# Patient Record
Sex: FEMALE | Race: WHITE | Employment: OTHER | URBAN - METROPOLITAN AREA
[De-identification: names, ages, dates, MRNs, and addresses within clinical notes are randomized per-mention and may not be internally consistent; named-entity substitution may affect disease eponyms.]

---

## 2017-01-25 RX ORDER — ALBUTEROL SULFATE 2.5 MG/3ML
SOLUTION RESPIRATORY (INHALATION)
Qty: 360 VIAL | Refills: 11 | Status: ON HOLD | OUTPATIENT
Start: 2017-01-25 | End: 2018-02-02 | Stop reason: HOSPADM

## 2017-02-28 ENCOUNTER — OFFICE VISIT (OUTPATIENT)
Dept: PULMONOLOGY | Age: 72
End: 2017-02-28

## 2017-02-28 VITALS
BODY MASS INDEX: 19.92 KG/M2 | HEART RATE: 105 BPM | SYSTOLIC BLOOD PRESSURE: 132 MMHG | DIASTOLIC BLOOD PRESSURE: 76 MMHG | WEIGHT: 131 LBS | OXYGEN SATURATION: 91 %

## 2017-02-28 DIAGNOSIS — R06.02 SHORTNESS OF BREATH: ICD-10-CM

## 2017-02-28 DIAGNOSIS — R05.9 COUGH: ICD-10-CM

## 2017-02-28 DIAGNOSIS — C34.90 MALIGNANT NEOPLASM OF LUNG, UNSPECIFIED LATERALITY, UNSPECIFIED PART OF LUNG (HCC): ICD-10-CM

## 2017-02-28 DIAGNOSIS — J96.11 CHRONIC RESPIRATORY FAILURE WITH HYPOXIA (HCC): ICD-10-CM

## 2017-02-28 DIAGNOSIS — J44.9 COPD, SEVERE (HCC): Primary | ICD-10-CM

## 2017-02-28 DIAGNOSIS — R91.8 PULMONARY NODULES: ICD-10-CM

## 2017-02-28 PROCEDURE — 99214 OFFICE O/P EST MOD 30 MIN: CPT | Performed by: INTERNAL MEDICINE

## 2017-02-28 RX ORDER — PREDNISONE 10 MG/1
TABLET ORAL
Qty: 30 TABLET | Refills: 0 | Status: ON HOLD | OUTPATIENT
Start: 2017-02-28 | End: 2017-03-12

## 2017-02-28 RX ORDER — AMOXICILLIN AND CLAVULANATE POTASSIUM 875; 125 MG/1; MG/1
1 TABLET, FILM COATED ORAL 2 TIMES DAILY
Qty: 20 TABLET | Refills: 0 | Status: ON HOLD | OUTPATIENT
Start: 2017-02-28 | End: 2017-03-12 | Stop reason: HOSPADM

## 2017-02-28 RX ORDER — DOXYCYCLINE HYCLATE 100 MG/1
100 CAPSULE ORAL DAILY
Qty: 10 CAPSULE | Refills: 3 | Status: ON HOLD | OUTPATIENT
Start: 2017-02-28 | End: 2017-03-12

## 2017-03-03 ENCOUNTER — TELEPHONE (OUTPATIENT)
Dept: PULMONOLOGY | Age: 72
End: 2017-03-03

## 2017-03-03 DIAGNOSIS — R06.02 SHORTNESS OF BREATH: ICD-10-CM

## 2017-03-03 DIAGNOSIS — R05.9 COUGH: ICD-10-CM

## 2017-03-03 DIAGNOSIS — J44.9 COPD, SEVERE (HCC): Primary | ICD-10-CM

## 2017-03-04 ENCOUNTER — HOSPITAL ENCOUNTER (OUTPATIENT)
Dept: OTHER | Age: 72
Discharge: HOME | End: 2017-03-04
Attending: INTERNAL MEDICINE | Admitting: INTERNAL MEDICINE

## 2017-03-04 DIAGNOSIS — R06.02 SHORTNESS OF BREATH: ICD-10-CM

## 2017-03-04 DIAGNOSIS — R05.9 COUGH: ICD-10-CM

## 2017-03-04 DIAGNOSIS — J44.9 COPD, SEVERE (HCC): ICD-10-CM

## 2017-03-07 ENCOUNTER — OFFICE VISIT (OUTPATIENT)
Dept: PULMONOLOGY | Age: 72
End: 2017-03-07

## 2017-03-07 ENCOUNTER — OFFICE VISIT (OUTPATIENT)
Dept: RHEUMATOLOGY | Age: 72
End: 2017-03-07

## 2017-03-07 VITALS
HEART RATE: 112 BPM | OXYGEN SATURATION: 86 % | DIASTOLIC BLOOD PRESSURE: 79 MMHG | SYSTOLIC BLOOD PRESSURE: 130 MMHG | BODY MASS INDEX: 19.61 KG/M2 | WEIGHT: 129 LBS

## 2017-03-07 VITALS
HEART RATE: 100 BPM | HEIGHT: 68 IN | DIASTOLIC BLOOD PRESSURE: 80 MMHG | WEIGHT: 129.5 LBS | BODY MASS INDEX: 19.63 KG/M2 | SYSTOLIC BLOOD PRESSURE: 128 MMHG

## 2017-03-07 DIAGNOSIS — J44.9 COPD, SEVERE (HCC): ICD-10-CM

## 2017-03-07 DIAGNOSIS — Z79.1 ENCOUNTER FOR LONG-TERM (CURRENT) USE OF NON-STEROIDAL ANTI-INFLAMMATORIES: ICD-10-CM

## 2017-03-07 DIAGNOSIS — M81.0 OSTEOPOROSIS: Primary | ICD-10-CM

## 2017-03-07 DIAGNOSIS — J96.11 CHRONIC RESPIRATORY FAILURE WITH HYPOXIA (HCC): ICD-10-CM

## 2017-03-07 DIAGNOSIS — C34.90 MALIGNANT NEOPLASM OF LUNG, UNSPECIFIED LATERALITY, UNSPECIFIED PART OF LUNG (HCC): ICD-10-CM

## 2017-03-07 DIAGNOSIS — R06.02 SHORTNESS OF BREATH: Primary | ICD-10-CM

## 2017-03-07 LAB
BANDED NEUTROPHILS RELATIVE PERCENT: 1 % (ref 0–7)
BASOPHILS ABSOLUTE: 0 K/UL (ref 0–0.2)
BASOPHILS RELATIVE PERCENT: 0 %
EOSINOPHILS ABSOLUTE: 0 K/UL (ref 0–0.6)
EOSINOPHILS RELATIVE PERCENT: 0 %
HCT VFR BLD CALC: 51.3 % (ref 36–48)
HEMOGLOBIN: 16.3 G/DL (ref 12–16)
LYMPHOCYTES ABSOLUTE: 3.7 K/UL (ref 1–5.1)
LYMPHOCYTES RELATIVE PERCENT: 22 %
MCH RBC QN AUTO: 29.8 PG (ref 26–34)
MCHC RBC AUTO-ENTMCNC: 31.9 G/DL (ref 31–36)
MCV RBC AUTO: 93.7 FL (ref 80–100)
METAMYELOCYTES RELATIVE PERCENT: 1 %
MONOCYTES ABSOLUTE: 1 K/UL (ref 0–1.3)
MONOCYTES RELATIVE PERCENT: 6 %
NEUTROPHILS ABSOLUTE: 12 K/UL (ref 1.7–7.7)
NEUTROPHILS RELATIVE PERCENT: 70 %
NUCLEATED RED BLOOD CELLS: 2 /100 WBC
PDW BLD-RTO: 15.6 % (ref 12.4–15.4)
PLATELET # BLD: 272 K/UL (ref 135–450)
PLATELET SLIDE REVIEW: ADEQUATE
PMV BLD AUTO: 9.3 FL (ref 5–10.5)
RBC # BLD: 5.48 M/UL (ref 4–5.2)
WBC # BLD: 16.7 K/UL (ref 4–11)

## 2017-03-07 PROCEDURE — 99213 OFFICE O/P EST LOW 20 MIN: CPT | Performed by: INTERNAL MEDICINE

## 2017-03-07 PROCEDURE — 99214 OFFICE O/P EST MOD 30 MIN: CPT | Performed by: INTERNAL MEDICINE

## 2017-03-07 RX ORDER — TRAMADOL HYDROCHLORIDE 50 MG/1
50 TABLET ORAL EVERY 8 HOURS PRN
Qty: 90 TABLET | Refills: 2 | Status: SHIPPED | OUTPATIENT
Start: 2017-03-07 | End: 2017-06-13 | Stop reason: SDUPTHER

## 2017-03-07 RX ORDER — PREDNISONE 10 MG/1
10 TABLET ORAL 2 TIMES DAILY
Qty: 60 TABLET | Refills: 1 | Status: ON HOLD | OUTPATIENT
Start: 2017-03-07 | End: 2017-03-12 | Stop reason: HOSPADM

## 2017-03-08 LAB
ALBUMIN SERPL-MCNC: 4.7 G/DL (ref 3.4–5)
ALP BLD-CCNC: 80 U/L (ref 40–129)
ALT SERPL-CCNC: 14 U/L (ref 10–40)
AST SERPL-CCNC: 14 U/L (ref 15–37)
BILIRUB SERPL-MCNC: 0.4 MG/DL (ref 0–1)
BILIRUBIN DIRECT: <0.2 MG/DL (ref 0–0.3)
BILIRUBIN, INDIRECT: ABNORMAL MG/DL (ref 0–1)
CREAT SERPL-MCNC: 0.7 MG/DL (ref 0.6–1.2)
GFR AFRICAN AMERICAN: >60
GFR NON-AFRICAN AMERICAN: >60
TOTAL PROTEIN: 7.2 G/DL (ref 6.4–8.2)

## 2017-03-09 ENCOUNTER — OFFICE VISIT (OUTPATIENT)
Dept: PULMONOLOGY | Age: 72
End: 2017-03-09

## 2017-03-09 VITALS
DIASTOLIC BLOOD PRESSURE: 74 MMHG | RESPIRATION RATE: 20 BRPM | BODY MASS INDEX: 19.95 KG/M2 | OXYGEN SATURATION: 92 % | WEIGHT: 131.2 LBS | HEART RATE: 106 BPM | SYSTOLIC BLOOD PRESSURE: 138 MMHG

## 2017-03-09 DIAGNOSIS — J96.01 ACUTE HYPOXEMIC RESPIRATORY FAILURE (HCC): Primary | ICD-10-CM

## 2017-03-09 DIAGNOSIS — C34.90 MALIGNANT NEOPLASM OF LUNG, UNSPECIFIED LATERALITY, UNSPECIFIED PART OF LUNG (HCC): ICD-10-CM

## 2017-03-09 DIAGNOSIS — J20.9 ACUTE BRONCHITIS, UNSPECIFIED ORGANISM: ICD-10-CM

## 2017-03-09 DIAGNOSIS — J44.1 COPD EXACERBATION (HCC): ICD-10-CM

## 2017-03-09 PROCEDURE — 99214 OFFICE O/P EST MOD 30 MIN: CPT | Performed by: NURSE PRACTITIONER

## 2017-03-27 ENCOUNTER — OFFICE VISIT (OUTPATIENT)
Dept: PULMONOLOGY | Age: 72
End: 2017-03-27

## 2017-03-27 VITALS
DIASTOLIC BLOOD PRESSURE: 65 MMHG | BODY MASS INDEX: 19.77 KG/M2 | OXYGEN SATURATION: 95 % | WEIGHT: 130 LBS | SYSTOLIC BLOOD PRESSURE: 132 MMHG | HEART RATE: 102 BPM | RESPIRATION RATE: 18 BRPM

## 2017-03-27 DIAGNOSIS — J44.1 COPD EXACERBATION (HCC): Primary | ICD-10-CM

## 2017-03-27 DIAGNOSIS — R91.8 PULMONARY NODULES: ICD-10-CM

## 2017-03-27 DIAGNOSIS — J96.11 CHRONIC RESPIRATORY FAILURE WITH HYPOXIA (HCC): ICD-10-CM

## 2017-03-27 DIAGNOSIS — C34.12 MALIGNANT NEOPLASM OF UPPER LOBE OF LEFT LUNG (HCC): ICD-10-CM

## 2017-03-27 PROCEDURE — 99214 OFFICE O/P EST MOD 30 MIN: CPT | Performed by: NURSE PRACTITIONER

## 2017-03-28 ENCOUNTER — OFFICE VISIT (OUTPATIENT)
Dept: INTERNAL MEDICINE CLINIC | Age: 72
End: 2017-03-28

## 2017-03-28 ENCOUNTER — TELEPHONE (OUTPATIENT)
Dept: INTERNAL MEDICINE CLINIC | Age: 72
End: 2017-03-28

## 2017-03-28 VITALS
WEIGHT: 132 LBS | HEIGHT: 68 IN | BODY MASS INDEX: 20 KG/M2 | SYSTOLIC BLOOD PRESSURE: 104 MMHG | DIASTOLIC BLOOD PRESSURE: 56 MMHG | HEART RATE: 76 BPM | OXYGEN SATURATION: 91 %

## 2017-03-28 DIAGNOSIS — J43.9 PULMONARY EMPHYSEMA, UNSPECIFIED EMPHYSEMA TYPE (HCC): Primary | ICD-10-CM

## 2017-03-28 PROCEDURE — 99214 OFFICE O/P EST MOD 30 MIN: CPT | Performed by: INTERNAL MEDICINE

## 2017-03-28 ASSESSMENT — PATIENT HEALTH QUESTIONNAIRE - PHQ9
SUM OF ALL RESPONSES TO PHQ QUESTIONS 1-9: 0
2. FEELING DOWN, DEPRESSED OR HOPELESS: 0
SUM OF ALL RESPONSES TO PHQ9 QUESTIONS 1 & 2: 0
1. LITTLE INTEREST OR PLEASURE IN DOING THINGS: 0

## 2017-04-14 ENCOUNTER — TELEPHONE (OUTPATIENT)
Dept: INTERNAL MEDICINE CLINIC | Age: 72
End: 2017-04-14

## 2017-05-17 ENCOUNTER — OFFICE VISIT (OUTPATIENT)
Dept: ENT CLINIC | Age: 72
End: 2017-05-17

## 2017-05-17 VITALS
BODY MASS INDEX: 20.31 KG/M2 | DIASTOLIC BLOOD PRESSURE: 79 MMHG | HEART RATE: 99 BPM | HEIGHT: 68 IN | WEIGHT: 134 LBS | SYSTOLIC BLOOD PRESSURE: 134 MMHG

## 2017-05-17 DIAGNOSIS — H61.23 CERUMEN DEBRIS ON TYMPANIC MEMBRANE, BILATERAL: Primary | ICD-10-CM

## 2017-05-17 PROCEDURE — 69210 REMOVE IMPACTED EAR WAX UNI: CPT | Performed by: OTOLARYNGOLOGY

## 2017-05-17 RX ORDER — ACETAMINOPHEN 325 MG/1
650 TABLET ORAL EVERY 6 HOURS PRN
COMMUNITY
End: 2019-05-13

## 2017-06-13 ENCOUNTER — OFFICE VISIT (OUTPATIENT)
Dept: RHEUMATOLOGY | Age: 72
End: 2017-06-13

## 2017-06-13 VITALS
BODY MASS INDEX: 19.85 KG/M2 | WEIGHT: 131 LBS | HEART RATE: 72 BPM | HEIGHT: 68 IN | SYSTOLIC BLOOD PRESSURE: 124 MMHG | DIASTOLIC BLOOD PRESSURE: 78 MMHG

## 2017-06-13 DIAGNOSIS — M81.0 OSTEOPOROSIS: ICD-10-CM

## 2017-06-13 DIAGNOSIS — Z79.1 ENCOUNTER FOR LONG-TERM (CURRENT) USE OF NON-STEROIDAL ANTI-INFLAMMATORIES: ICD-10-CM

## 2017-06-13 PROCEDURE — 99213 OFFICE O/P EST LOW 20 MIN: CPT | Performed by: INTERNAL MEDICINE

## 2017-06-13 RX ORDER — TRAMADOL HYDROCHLORIDE 50 MG/1
50 TABLET ORAL EVERY 8 HOURS PRN
Qty: 90 TABLET | Refills: 2 | Status: SHIPPED | OUTPATIENT
Start: 2017-06-13 | End: 2017-09-12 | Stop reason: SDUPTHER

## 2017-06-22 ENCOUNTER — OFFICE VISIT (OUTPATIENT)
Dept: INTERNAL MEDICINE CLINIC | Age: 72
End: 2017-06-22

## 2017-06-22 VITALS
HEART RATE: 88 BPM | DIASTOLIC BLOOD PRESSURE: 66 MMHG | SYSTOLIC BLOOD PRESSURE: 124 MMHG | HEIGHT: 68 IN | BODY MASS INDEX: 19.85 KG/M2 | WEIGHT: 131 LBS

## 2017-06-22 DIAGNOSIS — I10 ESSENTIAL HYPERTENSION: Primary | ICD-10-CM

## 2017-06-22 PROCEDURE — 99213 OFFICE O/P EST LOW 20 MIN: CPT | Performed by: INTERNAL MEDICINE

## 2017-07-14 ENCOUNTER — OFFICE VISIT (OUTPATIENT)
Dept: PULMONOLOGY | Age: 72
End: 2017-07-14

## 2017-07-14 VITALS
OXYGEN SATURATION: 95 % | DIASTOLIC BLOOD PRESSURE: 72 MMHG | WEIGHT: 131 LBS | HEART RATE: 78 BPM | BODY MASS INDEX: 19.92 KG/M2 | SYSTOLIC BLOOD PRESSURE: 139 MMHG

## 2017-07-14 DIAGNOSIS — C34.12 MALIGNANT NEOPLASM OF UPPER LOBE OF LEFT LUNG (HCC): ICD-10-CM

## 2017-07-14 DIAGNOSIS — R91.8 PULMONARY NODULES: ICD-10-CM

## 2017-07-14 DIAGNOSIS — R06.02 SHORTNESS OF BREATH: Primary | ICD-10-CM

## 2017-07-14 DIAGNOSIS — J44.9 COPD, SEVERE (HCC): ICD-10-CM

## 2017-07-14 DIAGNOSIS — Z79.1 ENCOUNTER FOR LONG-TERM (CURRENT) USE OF NON-STEROIDAL ANTI-INFLAMMATORIES: ICD-10-CM

## 2017-07-14 DIAGNOSIS — M81.0 OSTEOPOROSIS: ICD-10-CM

## 2017-07-14 PROCEDURE — 99214 OFFICE O/P EST MOD 30 MIN: CPT | Performed by: INTERNAL MEDICINE

## 2017-07-14 RX ORDER — DOXYCYCLINE HYCLATE 100 MG/1
100 CAPSULE ORAL 2 TIMES DAILY
Qty: 14 CAPSULE | Refills: 3 | Status: SHIPPED | OUTPATIENT
Start: 2017-07-14 | End: 2017-07-21

## 2017-09-11 ENCOUNTER — OFFICE VISIT (OUTPATIENT)
Dept: INTERNAL MEDICINE CLINIC | Age: 72
End: 2017-09-11

## 2017-09-11 VITALS
WEIGHT: 133 LBS | DIASTOLIC BLOOD PRESSURE: 68 MMHG | HEART RATE: 64 BPM | HEIGHT: 68 IN | BODY MASS INDEX: 20.16 KG/M2 | SYSTOLIC BLOOD PRESSURE: 130 MMHG

## 2017-09-11 DIAGNOSIS — I10 ESSENTIAL HYPERTENSION: Primary | ICD-10-CM

## 2017-09-11 DIAGNOSIS — M72.0 DUPUYTREN'S CONTRACTURE OF BOTH HANDS: ICD-10-CM

## 2017-09-11 PROCEDURE — 99213 OFFICE O/P EST LOW 20 MIN: CPT | Performed by: INTERNAL MEDICINE

## 2017-09-12 ENCOUNTER — OFFICE VISIT (OUTPATIENT)
Dept: RHEUMATOLOGY | Age: 72
End: 2017-09-12

## 2017-09-12 VITALS
SYSTOLIC BLOOD PRESSURE: 122 MMHG | WEIGHT: 132.5 LBS | BODY MASS INDEX: 20.08 KG/M2 | DIASTOLIC BLOOD PRESSURE: 80 MMHG | HEART RATE: 72 BPM | HEIGHT: 68 IN

## 2017-09-12 DIAGNOSIS — Z79.1 ENCOUNTER FOR LONG-TERM (CURRENT) USE OF NON-STEROIDAL ANTI-INFLAMMATORIES: ICD-10-CM

## 2017-09-12 DIAGNOSIS — M81.0 AGE-RELATED OSTEOPOROSIS WITHOUT CURRENT PATHOLOGICAL FRACTURE: Primary | ICD-10-CM

## 2017-09-12 PROCEDURE — 99213 OFFICE O/P EST LOW 20 MIN: CPT | Performed by: INTERNAL MEDICINE

## 2017-09-12 RX ORDER — TRAMADOL HYDROCHLORIDE 50 MG/1
50 TABLET ORAL EVERY 8 HOURS PRN
Qty: 90 TABLET | Refills: 2 | Status: SHIPPED | OUTPATIENT
Start: 2017-09-13 | End: 2017-12-19 | Stop reason: SDUPTHER

## 2017-09-19 ENCOUNTER — HOSPITAL ENCOUNTER (OUTPATIENT)
Dept: MAMMOGRAPHY | Age: 72
Discharge: HOME | End: 2017-09-19
Attending: OBSTETRICS & GYNECOLOGY | Admitting: OBSTETRICS & GYNECOLOGY

## 2017-09-19 DIAGNOSIS — Z12.31 ENCOUNTER FOR SCREENING MAMMOGRAM FOR BREAST CANCER: ICD-10-CM

## 2017-09-19 DIAGNOSIS — Z80.3 FAMILY HISTORY OF BREAST CANCER IN SISTER: ICD-10-CM

## 2017-09-20 ENCOUNTER — TELEPHONE (OUTPATIENT)
Dept: INTERNAL MEDICINE CLINIC | Age: 72
End: 2017-09-20

## 2017-09-20 ENCOUNTER — HOSPITAL ENCOUNTER (OUTPATIENT)
Dept: OTHER | Age: 72
Discharge: HOME | End: 2017-09-20
Attending: INTERNAL MEDICINE | Admitting: INTERNAL MEDICINE

## 2017-09-20 DIAGNOSIS — M72.0 DUPUYTREN'S CONTRACTURE OF BOTH HANDS: ICD-10-CM

## 2017-09-20 DIAGNOSIS — I10 ESSENTIAL HYPERTENSION: ICD-10-CM

## 2017-09-20 LAB
A/G RATIO: 1.5 (ref 1.1–2.2)
ALBUMIN SERPL-MCNC: 4.1 G/DL (ref 3.4–5)
ALP BLD-CCNC: 66 U/L (ref 40–129)
ALT SERPL-CCNC: 15 U/L (ref 10–40)
ANION GAP SERPL CALCULATED.3IONS-SCNC: 13 MMOL/L (ref 3–16)
AST SERPL-CCNC: 22 U/L (ref 15–37)
BILIRUB SERPL-MCNC: 0.7 MG/DL (ref 0–1)
BUN BLDV-MCNC: 16 MG/DL (ref 7–20)
CALCIUM SERPL-MCNC: 11.1 MG/DL (ref 8.3–10.6)
CHLORIDE BLD-SCNC: 99 MMOL/L (ref 99–110)
CHOLESTEROL, TOTAL: 177 MG/DL (ref 0–199)
CO2: 29 MMOL/L (ref 21–32)
CREAT SERPL-MCNC: 0.7 MG/DL (ref 0.6–1.2)
GFR AFRICAN AMERICAN: >60
GFR NON-AFRICAN AMERICAN: >60
GLOBULIN: 2.7 G/DL
GLUCOSE BLD-MCNC: 95 MG/DL (ref 70–99)
HCT VFR BLD CALC: 44.3 % (ref 36–48)
HDLC SERPL-MCNC: 70 MG/DL (ref 40–60)
HEMOGLOBIN: 15.1 G/DL (ref 12–16)
LDL CHOLESTEROL CALCULATED: 88 MG/DL
MCH RBC QN AUTO: 32.1 PG (ref 26–34)
MCHC RBC AUTO-ENTMCNC: 34 G/DL (ref 31–36)
MCV RBC AUTO: 94.5 FL (ref 80–100)
PDW BLD-RTO: 14.4 % (ref 12.4–15.4)
PLATELET # BLD: 189 K/UL (ref 135–450)
PMV BLD AUTO: 9.5 FL (ref 5–10.5)
POTASSIUM SERPL-SCNC: 4.6 MMOL/L (ref 3.5–5.1)
RBC # BLD: 4.69 M/UL (ref 4–5.2)
SODIUM BLD-SCNC: 141 MMOL/L (ref 136–145)
TOTAL PROTEIN: 6.8 G/DL (ref 6.4–8.2)
TRIGL SERPL-MCNC: 97 MG/DL (ref 0–150)
VLDLC SERPL CALC-MCNC: 19 MG/DL
WBC # BLD: 9.4 K/UL (ref 4–11)

## 2017-09-21 ENCOUNTER — TELEPHONE (OUTPATIENT)
Dept: INTERNAL MEDICINE CLINIC | Age: 72
End: 2017-09-21

## 2017-11-02 ENCOUNTER — HOSPITAL ENCOUNTER (OUTPATIENT)
Dept: CT IMAGING | Age: 72
Discharge: HOME | End: 2017-11-02
Attending: INTERNAL MEDICINE | Admitting: INTERNAL MEDICINE

## 2017-11-02 ENCOUNTER — HOSPITAL ENCOUNTER (OUTPATIENT)
Dept: GENERAL RADIOLOGY | Age: 72
Discharge: HOME | End: 2017-11-02
Attending: INTERNAL MEDICINE | Admitting: INTERNAL MEDICINE

## 2017-11-02 DIAGNOSIS — C34.12 MALIGNANT NEOPLASM OF UPPER LOBE OF LEFT LUNG (HCC): ICD-10-CM

## 2017-11-02 DIAGNOSIS — R91.8 PULMONARY NODULES: ICD-10-CM

## 2017-11-02 DIAGNOSIS — M81.0 AGE-RELATED OSTEOPOROSIS WITHOUT CURRENT PATHOLOGICAL FRACTURE: ICD-10-CM

## 2017-12-04 ENCOUNTER — TELEPHONE (OUTPATIENT)
Dept: PULMONOLOGY | Age: 72
End: 2017-12-04

## 2017-12-12 ENCOUNTER — OFFICE VISIT (OUTPATIENT)
Dept: ENT CLINIC | Age: 72
End: 2017-12-12

## 2017-12-12 VITALS
WEIGHT: 130 LBS | SYSTOLIC BLOOD PRESSURE: 140 MMHG | HEART RATE: 93 BPM | OXYGEN SATURATION: 92 % | DIASTOLIC BLOOD PRESSURE: 80 MMHG | BODY MASS INDEX: 19.77 KG/M2

## 2017-12-12 DIAGNOSIS — H61.23 CERUMEN DEBRIS ON TYMPANIC MEMBRANE OF BOTH EARS: Primary | ICD-10-CM

## 2017-12-12 PROCEDURE — 69210 REMOVE IMPACTED EAR WAX UNI: CPT | Performed by: OTOLARYNGOLOGY

## 2017-12-19 ENCOUNTER — OFFICE VISIT (OUTPATIENT)
Dept: RHEUMATOLOGY | Age: 72
End: 2017-12-19

## 2017-12-19 VITALS
DIASTOLIC BLOOD PRESSURE: 74 MMHG | HEIGHT: 68 IN | WEIGHT: 130.25 LBS | HEART RATE: 80 BPM | SYSTOLIC BLOOD PRESSURE: 124 MMHG | BODY MASS INDEX: 19.74 KG/M2

## 2017-12-19 DIAGNOSIS — M81.0 AGE-RELATED OSTEOPOROSIS WITHOUT CURRENT PATHOLOGICAL FRACTURE: Primary | ICD-10-CM

## 2017-12-19 PROCEDURE — 99213 OFFICE O/P EST LOW 20 MIN: CPT | Performed by: INTERNAL MEDICINE

## 2017-12-19 RX ORDER — TRAMADOL HYDROCHLORIDE 50 MG/1
50 TABLET ORAL EVERY 8 HOURS PRN
Qty: 90 TABLET | Refills: 2 | Status: ON HOLD | OUTPATIENT
Start: 2017-12-19 | End: 2018-01-22 | Stop reason: HOSPADM

## 2018-01-09 ENCOUNTER — TELEPHONE (OUTPATIENT)
Dept: PULMONOLOGY | Age: 73
End: 2018-01-09

## 2018-01-09 ENCOUNTER — OFFICE VISIT (OUTPATIENT)
Dept: PULMONOLOGY | Age: 73
End: 2018-01-09

## 2018-01-09 VITALS
HEART RATE: 131 BPM | WEIGHT: 124 LBS | BODY MASS INDEX: 18.85 KG/M2 | SYSTOLIC BLOOD PRESSURE: 98 MMHG | OXYGEN SATURATION: 78 % | DIASTOLIC BLOOD PRESSURE: 67 MMHG | TEMPERATURE: 98.1 F

## 2018-01-09 DIAGNOSIS — R05.9 COUGH: ICD-10-CM

## 2018-01-09 DIAGNOSIS — R06.02 SHORTNESS OF BREATH: Primary | ICD-10-CM

## 2018-01-09 DIAGNOSIS — J44.9 COPD, SEVERE (HCC): ICD-10-CM

## 2018-01-09 PROBLEM — J96.20 ACUTE ON CHRONIC RESPIRATORY FAILURE (HCC): Status: ACTIVE | Noted: 2018-01-09

## 2018-01-09 PROCEDURE — 99214 OFFICE O/P EST MOD 30 MIN: CPT | Performed by: INTERNAL MEDICINE

## 2018-01-09 NOTE — PROGRESS NOTES
[facility]  Progress Note  Skyler Trevino MD       Ann-Mariealejandrina    YOB: 1945    Date of Visit:  1/9/2018    Assessment/Plan:  1. Shortness of breath and 2. Cough  Her oxygen saturation on 5 L nasal cannula oxygen is only 88%  Her pulses in the 120s  I'm concerned that she may have influenza or pneumonia. I have recommended hospital admission and she is agreeable. 3. COPD, severe ([address]  PFT 7/15:  Spirometry reveals decreased FVC at 1.97 L which is 55% predicted. FEV1 is  decreased at 0.91 L which is 33% predicted. FEV1/FVC ratio is reduced at  46%. There is no significant improvement after inhaled bronchodilators. Lung volumes reveal mildly decreased total lung capacity at 78% predicted. Vital capacity is decreased at 55% predicted. Diffusion capacity is  decreased at 45% predicted. In comparison to a study from March 2012, FVC  has decreased by 15%, FEV1 has decreased by 20%. IMPRESSION: Very severe obstructive lung disease. There has been worsening  in air flow since the preceding study. Advair   Combivent  Duoneb    4. Chronic respiratory failure with hypoxia (HCC)  O2 sats are acceptable on supplemental O2. The patient benefits from the use of supplemental O2.      5. Malignant neoplasm of lung, unspecified laterality, unspecified part of lung ([address]  She had left upper lobectomy in 2008 for non-small cell carcinoma of the lung. Last CT scan was July 2016 and was stable. Repeat CT July 2017    6. Pulmonary nodules  Stable nodules on last imaging  Repeat CT ordered      FOLLOW UP: 6 months      Chief Complaint   Patient presents with    Shortness of Breath    Cough     green phlegm. Not sure if she has been running a feve but gets real hot and then cold Sx's started Sunday while pt was taking her Doxy    Wheezing       HPI  The patient presents with a chief complaint of shortness of breath and cough. She has been sick for several days now. She's been feeling both hot and cold at home. She feels congested but really has not brought up any mucus yet. she does have a history of severe COPD which is O2 dependent. Normally, she is wearing 2 L nasal cannula oxygen at home she has a history of non-small cell lung cancer with lobectomy in 2008. Her most recent CT scan in 2017 was okay. She still smokes a little bit. he still smokes intermittently. She denies fevers and chills. Review of Systems  As documented in HPI     Physical Exam:  Well developed, well nourished  Alert and oriented  Sclera is clear  No cervical adenopathy  No JVD. Chest examination is clear. Cardiac examination reveals regular rate and rhythm without murmur, gallop or rub. The abdomen is soft, nontender and nondistended. There is no clubbing, cyanosis or edema of the extremities. There is no obvious skin rash. No focal neuro deficicts  Normal mood and affect    Allergies   Allergen Reactions    Wellbutrin [Bupropion Hcl] Palpitations     Prior to Visit Medications    Medication Sig Taking? Authorizing Provider   traMADol (ULTRAM) 50 MG tablet Take 1 tablet by mouth every 8 hours as needed for Pain .   Abrahan Lee MD   teriparatide, recombinant, (FORTEO) 600 MCG/2.4ML SOLN injection INJECT 0.08 ML UNDER THE SKIN DAILY  Abrahan Lee MD   fluticasone-salmeterol (ADVAIR DISKUS) 250-50 MCG/DOSE AEPB Inhale 1 puff into the lungs 2 times daily  Jonatan Anaya MD   albuterol-ipratropium (COMBIVENT RESPIMAT)  MCG/ACT AERS inhaler Inhale 1 puff into the lungs every 6 hours  Jonatan Anaya MD   TOVIAZ 4 MG TB24 ER tablet TAKE ONE TABLET BY MOUTH DAILY  Blanco Mejía MD   acetaminophen (TYLENOL) 325 MG tablet Take 650 mg by mouth every 6 hours as needed for Pain  Historical Provider, MD   Naproxen Sodium (ALEVE PO) Take by mouth  Historical Provider, MD   gabapentin (NEURONTIN) 300 MG capsule TAKE ONE CAPSULE BY MOUTH TWICE A DAY  Justino Bueno

## 2018-01-17 ENCOUNTER — TELEPHONE (OUTPATIENT)
Dept: PULMONOLOGY | Age: 73
End: 2018-01-17

## 2018-01-17 ENCOUNTER — TELEPHONE (OUTPATIENT)
Dept: INTERNAL MEDICINE CLINIC | Age: 73
End: 2018-01-17

## 2018-01-17 PROBLEM — A40.3 SEPSIS DUE TO STREPTOCOCCUS PNEUMONIAE (HCC): Status: ACTIVE | Noted: 2018-01-17

## 2018-01-17 PROBLEM — K92.2 UPPER GI BLEEDING: Status: ACTIVE | Noted: 2018-01-17

## 2018-01-17 PROBLEM — A41.9 SEPSIS (HCC): Status: ACTIVE | Noted: 2018-01-17

## 2018-01-17 NOTE — TELEPHONE ENCOUNTER
Candy Moss, increase to 6 L, call Dr Toth office for order. If [place] office will not call back.     TK

## 2018-01-19 ENCOUNTER — TELEPHONE (OUTPATIENT)
Dept: INTERNAL MEDICINE CLINIC | Age: 73
End: 2018-01-19

## 2018-01-21 PROBLEM — A40.3 SEPSIS DUE TO STREPTOCOCCUS PNEUMONIAE (HCC): Status: RESOLVED | Noted: 2018-01-17 | Resolved: 2018-01-21

## 2018-01-23 ENCOUNTER — TELEPHONE (OUTPATIENT)
Dept: INTERNAL MEDICINE CLINIC | Age: 73
End: 2018-01-23

## 2018-01-23 DIAGNOSIS — J43.1 PANLOBULAR EMPHYSEMA (HCC): Primary | ICD-10-CM

## 2018-01-23 NOTE — TELEPHONE ENCOUNTER
Pt called, was instructed by hospital staff to get order from PCP for overnight pulse ox monitor test. Please send order to [facility].    Pt # [phone] after ordering test

## 2018-01-24 ENCOUNTER — TELEPHONE (OUTPATIENT)
Dept: INTERNAL MEDICINE CLINIC | Age: 73
End: 2018-01-24

## 2018-01-29 ENCOUNTER — OFFICE VISIT (OUTPATIENT)
Dept: PULMONOLOGY | Age: 73
End: 2018-01-29

## 2018-01-29 VITALS — HEART RATE: 88 BPM | OXYGEN SATURATION: 92 %

## 2018-01-29 DIAGNOSIS — C34.12 MALIGNANT NEOPLASM OF UPPER LOBE OF LEFT LUNG (HCC): ICD-10-CM

## 2018-01-29 DIAGNOSIS — J44.9 COPD, SEVERE (HCC): ICD-10-CM

## 2018-01-29 DIAGNOSIS — R05.9 COUGH: ICD-10-CM

## 2018-01-29 DIAGNOSIS — J96.11 CHRONIC RESPIRATORY FAILURE WITH HYPOXIA (HCC): ICD-10-CM

## 2018-01-29 DIAGNOSIS — R06.02 SHORTNESS OF BREATH: Primary | ICD-10-CM

## 2018-01-29 PROBLEM — K25.4 CHRONIC GASTRIC ULCER WITH HEMORRHAGE: Status: ACTIVE | Noted: 2018-01-29

## 2018-01-29 PROCEDURE — 99214 OFFICE O/P EST MOD 30 MIN: CPT | Performed by: INTERNAL MEDICINE

## 2018-02-12 ENCOUNTER — OFFICE VISIT (OUTPATIENT)
Dept: INTERNAL MEDICINE CLINIC | Age: 73
End: 2018-02-12

## 2018-02-12 VITALS
DIASTOLIC BLOOD PRESSURE: 70 MMHG | HEART RATE: 100 BPM | BODY MASS INDEX: 17.88 KG/M2 | SYSTOLIC BLOOD PRESSURE: 130 MMHG | OXYGEN SATURATION: 97 % | WEIGHT: 118 LBS | HEIGHT: 68 IN

## 2018-02-12 DIAGNOSIS — R09.02 HYPOXIA: ICD-10-CM

## 2018-02-12 DIAGNOSIS — K92.2 UPPER GI BLEEDING: Primary | ICD-10-CM

## 2018-02-12 PROCEDURE — 99495 TRANSJ CARE MGMT MOD F2F 14D: CPT | Performed by: INTERNAL MEDICINE

## 2018-02-12 NOTE — PROGRESS NOTES
Subjective:      Patient ID: Ken Triplett is a 81 y.o. female.     HPI-has had 3 hospitalizations within past 4 weeks, today is first time she was able to make it to a hospital f/u visit before getting re-admitted again  [facility] 1 & 3 were for respiratory/COPD/flu issues and #2 was for UGI bleed/gastric ulcer, nabumetone was D/C'd and she is on PPI currently    \"Not sure if she should be using DuoNeb or not\"    Remains on continuous flow nasal oxygen 2 l/min    Review of Systems -comes in today in wheelchair    Current Outpatient Prescriptions on File Prior to Visit   Medication Sig Dispense Refill    doxycycline hyclate (VIBRA-TABS) 100 MG tablet Take 1 tablet by mouth daily 30 tablet 0    predniSONE (DELTASONE) 10 MG tablet Take 2 tablets by mouth daily 30 tablet 0    ferrous sulfate 325 (65 Fe) MG tablet Take 1 tablet by mouth 2 times daily (with meals) 30 tablet 1    pantoprazole (PROTONIX) 40 MG tablet Take 1 tablet by mouth every morning (before breakfast) 30 tablet 3    teriparatide, recombinant, (FORTEO) 600 MCG/2.4ML SOLN injection INJECT 0.08 ML UNDER THE SKIN DAILY 7.2 mL 1    fluticasone-salmeterol (ADVAIR DISKUS) 250-50 MCG/DOSE AEPB Inhale 1 puff into the lungs 2 times daily 60 each 11    albuterol-ipratropium (COMBIVENT RESPIMAT)  MCG/ACT AERS inhaler Inhale 1 puff into the lungs every 6 hours 1 Inhaler 11    TOVIAZ 4 MG TB24 ER tablet TAKE ONE TABLET BY MOUTH DAILY 30 tablet 11    acetaminophen (TYLENOL) 325 MG tablet Take 650 mg by mouth every 6 hours as needed for Pain      gabapentin (NEURONTIN) 300 MG capsule TAKE ONE CAPSULE BY MOUTH TWICE A DAY 60 capsule 11    montelukast (SINGULAIR) 10 MG tablet TAKE ONE TABLET BY MOUTH ONCE NIGHTLY 30 tablet 11    diltiazem (CARDIZEM) 60 MG tablet Take 60 mg by mouth 4 times daily       calcium carbonate 600 MG TABS tablet Take 1 tablet by mouth 2 times daily      OXYGEN Inhale into the lungs Use nightly as needed      Estradiol (VAGIFEM) 10 MCG TABS vaginal tablet Place 10 mcg vaginally daily      Cholecalciferol (VITAMIN D) 2000 UNITS CAPS capsule Take 1 capsule by mouth daily      ipratropium-albuterol (DUONEB) 0.5-2.5 (3) MG/3ML SOLN nebulizer solution Inhale 3 mLs into the lungs 4 times daily. 360 mL 11    therapeutic multivitamin-minerals (THERAGRAN-M) tablet Take 1 tablet by mouth daily. No current facility-administered medications on file prior to visit. Objective:   Physical Exam   Constitutional: She is oriented to person, place, and time. She appears well-developed and well-nourished. /70   Pulse 100   Ht 5' 8\" (1.727 m)   Wt 118 lb (53.5 kg)   SpO2 97%   BMI 17.94 kg/m²   C/o \"weak and fatigue\"     Neck: No JVD present. No thyromegaly present. Cardiovascular: Normal rate, regular rhythm and normal heart sounds. Pulmonary/Chest: Effort normal and breath sounds normal.   Genitourinary: No vaginal discharge found. Musculoskeletal: Normal range of motion. She exhibits no edema or tenderness. Neurological: She is alert and oriented to person, place, and time. She has normal reflexes. Psychiatric: She has a normal mood and affect. Her behavior is normal.   Nursing note and vitals reviewed.       Assessment:      Patient Active Problem List   Diagnosis    COPD (chronic obstructive pulmonary disease) ([address]    Lung cancer ([address]    Hypertension    Diverticulosis    Acute on chronic respiratory failure with hypoxia (HCC)    Colon cancer screening    OAB (overactive bladder)    Pulmonary nodules    Multifocal pneumonia    Cavitary lesion of lung    Left lower lobe pneumonia (HCC)    Ventral hernia    Cerumen debris on tympanic membrane    COPD, severe ([address]    Breast cancer screening    Hemoptysis    Anorexia    S/P lobectomy of lung    Shortness of breath    Cough    COPD exacerbation (HCC)    Acute on chronic respiratory failure ([address]    Pneumonia of both lungs due to Streptococcus pneumoniae [facility]    Upper GI bleeding    Sepsis ([facility]    Upper GI bleed    Chronic respiratory failure with hypoxia (HCC)    Chronic gastric ulcer with hemorrhage    HAP (hospital-acquired pneumonia)    Mucus plugging of bronchi           Plan:      Check labs again now after UGI bleed    Refer for sleep study as recommended by [employer]  Screening  F/u with Pulm on Wed    Albuterol per [facility] instead of DuoNeb when on Combivent    F/u in 1 month with labs prior    Mariano Catherine

## 2018-02-14 ENCOUNTER — TELEPHONE (OUTPATIENT)
Dept: SLEEP MEDICINE | Age: 73
End: 2018-02-14

## 2018-02-15 ENCOUNTER — HOSPITAL ENCOUNTER (OUTPATIENT)
Dept: OTHER | Age: 73
Discharge: HOME | End: 2018-02-15
Attending: INTERNAL MEDICINE | Admitting: INTERNAL MEDICINE

## 2018-02-15 ENCOUNTER — OFFICE VISIT (OUTPATIENT)
Dept: PULMONOLOGY | Age: 73
End: 2018-02-15

## 2018-02-15 VITALS
HEART RATE: 94 BPM | DIASTOLIC BLOOD PRESSURE: 63 MMHG | RESPIRATION RATE: 24 BRPM | SYSTOLIC BLOOD PRESSURE: 113 MMHG | WEIGHT: 120 LBS | BODY MASS INDEX: 18.25 KG/M2 | OXYGEN SATURATION: 92 %

## 2018-02-15 DIAGNOSIS — K92.2 UPPER GI BLEEDING: ICD-10-CM

## 2018-02-15 DIAGNOSIS — J13 PNEUMONIA OF RIGHT LOWER LOBE DUE TO STREPTOCOCCUS PNEUMONIAE (HCC): ICD-10-CM

## 2018-02-15 DIAGNOSIS — J44.9 COPD, VERY SEVERE (HCC): ICD-10-CM

## 2018-02-15 DIAGNOSIS — J96.11 CHRONIC RESPIRATORY FAILURE WITH HYPOXIA (HCC): Primary | ICD-10-CM

## 2018-02-15 LAB
HCT VFR BLD CALC: 38.7 % (ref 36–48)
HEMOGLOBIN: 12.4 G/DL (ref 12–16)
MCH RBC QN AUTO: 29.3 PG (ref 26–34)
MCHC RBC AUTO-ENTMCNC: 31.9 G/DL (ref 31–36)
MCV RBC AUTO: 91.9 FL (ref 80–100)
PDW BLD-RTO: 16.8 % (ref 12.4–15.4)
PLATELET # BLD: 209 K/UL (ref 135–450)
PMV BLD AUTO: 9.7 FL (ref 5–10.5)
RBC # BLD: 4.21 M/UL (ref 4–5.2)
WBC # BLD: 12.3 K/UL (ref 4–11)

## 2018-02-15 PROCEDURE — 99214 OFFICE O/P EST MOD 30 MIN: CPT | Performed by: NURSE PRACTITIONER

## 2018-02-22 ASSESSMENT — ENCOUNTER SYMPTOMS
SHORTNESS OF BREATH: 1
ABDOMINAL PAIN: 0
WHEEZING: 0
COUGH: 1
SINUS PAIN: 0
SORE THROAT: 0
COLOR CHANGE: 0
DIARRHEA: 0
VOMITING: 0

## 2018-02-27 ENCOUNTER — TELEPHONE (OUTPATIENT)
Dept: PULMONOLOGY | Age: 73
End: 2018-02-27

## 2018-02-27 RX ORDER — ALBUTEROL SULFATE 90 UG/1
2 AEROSOL, METERED RESPIRATORY (INHALATION) EVERY 6 HOURS PRN
Qty: 1 INHALER | Status: SHIPPED | OUTPATIENT
Start: 2018-02-27 | End: 2020-11-09 | Stop reason: SDUPTHER

## 2018-02-27 RX ORDER — IPRATROPIUM BROMIDE AND ALBUTEROL SULFATE 2.5; .5 MG/3ML; MG/3ML
1 SOLUTION RESPIRATORY (INHALATION) 4 TIMES DAILY
Qty: 360 ML | Refills: 11 | Status: SHIPPED | OUTPATIENT
Start: 2018-02-27 | End: 2019-05-13 | Stop reason: ALTCHOICE

## 2018-03-05 ENCOUNTER — HOSPITAL ENCOUNTER (OUTPATIENT)
Dept: OTHER | Age: 73
Discharge: HOME | End: 2018-03-05
Attending: INTERNAL MEDICINE | Admitting: INTERNAL MEDICINE

## 2018-03-05 LAB
A/G RATIO: 1.4 (ref 1.1–2.2)
ALBUMIN SERPL-MCNC: 3.9 G/DL (ref 3.4–5)
ALP BLD-CCNC: 68 U/L (ref 40–129)
ALT SERPL-CCNC: 14 U/L (ref 10–40)
ANION GAP SERPL CALCULATED.3IONS-SCNC: 12 MMOL/L (ref 3–16)
AST SERPL-CCNC: 16 U/L (ref 15–37)
BILIRUB SERPL-MCNC: 0.3 MG/DL (ref 0–1)
BUN BLDV-MCNC: 21 MG/DL (ref 7–20)
CALCIUM SERPL-MCNC: 10.2 MG/DL (ref 8.3–10.6)
CHLORIDE BLD-SCNC: 99 MMOL/L (ref 99–110)
CHOLESTEROL, TOTAL: 214 MG/DL (ref 0–199)
CO2: 33 MMOL/L (ref 21–32)
CREAT SERPL-MCNC: 0.7 MG/DL (ref 0.6–1.2)
GFR AFRICAN AMERICAN: >60
GFR NON-AFRICAN AMERICAN: >60
GLOBULIN: 2.8 G/DL
GLUCOSE BLD-MCNC: 197 MG/DL (ref 70–99)
HCT VFR BLD CALC: 40.4 % (ref 36–48)
HDLC SERPL-MCNC: 88 MG/DL (ref 40–60)
HEMOGLOBIN: 13.1 G/DL (ref 12–16)
LDL CHOLESTEROL CALCULATED: 100 MG/DL
MCH RBC QN AUTO: 29 PG (ref 26–34)
MCHC RBC AUTO-ENTMCNC: 32.6 G/DL (ref 31–36)
MCV RBC AUTO: 88.9 FL (ref 80–100)
PDW BLD-RTO: 16.3 % (ref 12.4–15.4)
PLATELET # BLD: 273 K/UL (ref 135–450)
PMV BLD AUTO: 9.4 FL (ref 5–10.5)
POTASSIUM SERPL-SCNC: 5.3 MMOL/L (ref 3.5–5.1)
RBC # BLD: 4.54 M/UL (ref 4–5.2)
SODIUM BLD-SCNC: 144 MMOL/L (ref 136–145)
TOTAL PROTEIN: 6.7 G/DL (ref 6.4–8.2)
TRIGL SERPL-MCNC: 129 MG/DL (ref 0–150)
VLDLC SERPL CALC-MCNC: 26 MG/DL
WBC # BLD: 15.6 K/UL (ref 4–11)

## 2018-03-08 ENCOUNTER — OFFICE VISIT (OUTPATIENT)
Dept: INTERNAL MEDICINE CLINIC | Age: 73
End: 2018-03-08

## 2018-03-08 VITALS
HEART RATE: 115 BPM | WEIGHT: 124 LBS | DIASTOLIC BLOOD PRESSURE: 62 MMHG | BODY MASS INDEX: 18.79 KG/M2 | HEIGHT: 68 IN | OXYGEN SATURATION: 91 % | SYSTOLIC BLOOD PRESSURE: 118 MMHG

## 2018-03-08 DIAGNOSIS — R73.9 HYPERGLYCEMIA: ICD-10-CM

## 2018-03-08 DIAGNOSIS — J44.1 CHRONIC OBSTRUCTIVE PULMONARY DISEASE WITH ACUTE EXACERBATION (HCC): Primary | ICD-10-CM

## 2018-03-08 DIAGNOSIS — E87.5 HYPERKALEMIA: ICD-10-CM

## 2018-03-08 PROCEDURE — 99214 OFFICE O/P EST MOD 30 MIN: CPT | Performed by: INTERNAL MEDICINE

## 2018-04-10 ENCOUNTER — OFFICE VISIT (OUTPATIENT)
Dept: RHEUMATOLOGY | Age: 73
End: 2018-04-10

## 2018-04-10 VITALS
SYSTOLIC BLOOD PRESSURE: 110 MMHG | DIASTOLIC BLOOD PRESSURE: 68 MMHG | BODY MASS INDEX: 19.46 KG/M2 | HEIGHT: 68 IN | WEIGHT: 128.38 LBS | HEART RATE: 88 BPM

## 2018-04-10 DIAGNOSIS — Z79.899 ENCOUNTER FOR LONG-TERM (CURRENT) USE OF MEDICATIONS: ICD-10-CM

## 2018-04-10 DIAGNOSIS — M81.0 AGE-RELATED OSTEOPOROSIS WITHOUT CURRENT PATHOLOGICAL FRACTURE: Primary | ICD-10-CM

## 2018-04-10 LAB
CALCIUM SERPL-MCNC: 10 MG/DL (ref 8.3–10.6)
CREAT SERPL-MCNC: 0.5 MG/DL (ref 0.6–1.2)
GFR AFRICAN AMERICAN: >60
GFR NON-AFRICAN AMERICAN: >60

## 2018-04-10 PROCEDURE — 99213 OFFICE O/P EST LOW 20 MIN: CPT | Performed by: INTERNAL MEDICINE

## 2018-04-10 RX ORDER — TRAMADOL HYDROCHLORIDE 50 MG/1
50 TABLET ORAL 2 TIMES DAILY
COMMUNITY
End: 2018-04-10 | Stop reason: SDUPTHER

## 2018-04-10 RX ORDER — TRAMADOL HYDROCHLORIDE 50 MG/1
50 TABLET ORAL 2 TIMES DAILY
Qty: 60 TABLET | Refills: 2 | Status: SHIPPED | OUTPATIENT
Start: 2018-04-10 | End: 2018-07-10 | Stop reason: SDUPTHER

## 2018-04-24 ENCOUNTER — TELEPHONE (OUTPATIENT)
Dept: INTERNAL MEDICINE CLINIC | Age: 73
End: 2018-04-24

## 2018-04-26 ENCOUNTER — TELEPHONE (OUTPATIENT)
Dept: RHEUMATOLOGY | Age: 73
End: 2018-04-26

## 2018-05-11 ENCOUNTER — OFFICE VISIT (OUTPATIENT)
Dept: PULMONOLOGY | Age: 73
End: 2018-05-11

## 2018-05-11 VITALS
SYSTOLIC BLOOD PRESSURE: 118 MMHG | DIASTOLIC BLOOD PRESSURE: 71 MMHG | WEIGHT: 133 LBS | BODY MASS INDEX: 20.22 KG/M2 | HEART RATE: 88 BPM | OXYGEN SATURATION: 89 %

## 2018-05-11 DIAGNOSIS — R91.8 PULMONARY NODULES: ICD-10-CM

## 2018-05-11 DIAGNOSIS — R06.02 SHORTNESS OF BREATH: Primary | ICD-10-CM

## 2018-05-11 DIAGNOSIS — J96.11 CHRONIC RESPIRATORY FAILURE WITH HYPOXIA (HCC): ICD-10-CM

## 2018-05-11 DIAGNOSIS — J44.9 COPD, SEVERE (HCC): ICD-10-CM

## 2018-05-11 DIAGNOSIS — R05.9 COUGH: ICD-10-CM

## 2018-05-11 PROCEDURE — 99214 OFFICE O/P EST MOD 30 MIN: CPT | Performed by: INTERNAL MEDICINE

## 2018-05-17 ENCOUNTER — TELEPHONE (OUTPATIENT)
Dept: PULMONOLOGY | Age: 73
End: 2018-05-17

## 2018-05-24 RX ORDER — PANTOPRAZOLE SODIUM 40 MG/1
TABLET, DELAYED RELEASE ORAL
Qty: 30 TABLET | Refills: 2 | Status: SHIPPED | OUTPATIENT
Start: 2018-05-24 | End: 2018-08-29 | Stop reason: SDUPTHER

## 2018-05-30 ENCOUNTER — TELEPHONE (OUTPATIENT)
Dept: INTERNAL MEDICINE CLINIC | Age: 73
End: 2018-05-30

## 2018-06-04 ENCOUNTER — HOSPITAL ENCOUNTER (OUTPATIENT)
Dept: CT IMAGING | Age: 73
Discharge: HOME | End: 2018-06-04
Attending: INTERNAL MEDICINE | Admitting: INTERNAL MEDICINE

## 2018-06-04 DIAGNOSIS — R91.8 PULMONARY NODULES: ICD-10-CM

## 2018-06-04 DIAGNOSIS — R91.8 OTHER NONSPECIFIC ABNORMAL FINDING OF LUNG FIELD (CODE): ICD-10-CM

## 2018-06-04 LAB
BUN BLDV-MCNC: 20 MG/DL (ref 7–20)
CREAT SERPL-MCNC: 0.7 MG/DL (ref 0.6–1.2)
GFR AFRICAN AMERICAN: >60
GFR NON-AFRICAN AMERICAN: >60

## 2018-06-05 ENCOUNTER — TELEPHONE (OUTPATIENT)
Dept: RHEUMATOLOGY | Age: 73
End: 2018-06-05

## 2018-06-11 ENCOUNTER — HOSPITAL ENCOUNTER (OUTPATIENT)
Dept: PULMONOLOGY | Age: 73
Discharge: HOME | End: 2018-06-11
Attending: INTERNAL MEDICINE | Admitting: INTERNAL MEDICINE

## 2018-06-11 VITALS — OXYGEN SATURATION: 100 %

## 2018-06-11 DIAGNOSIS — J44.9 COPD, SEVERE (HCC): ICD-10-CM

## 2018-06-11 DIAGNOSIS — R91.8 OTHER NONSPECIFIC ABNORMAL FINDING OF LUNG FIELD (CODE): ICD-10-CM

## 2018-06-11 DIAGNOSIS — R06.02 SHORTNESS OF BREATH: ICD-10-CM

## 2018-06-11 RX ORDER — ALBUTEROL SULFATE 90 UG/1
4 AEROSOL, METERED RESPIRATORY (INHALATION) ONCE
Status: COMPLETED | OUTPATIENT
Start: 2018-06-11 | End: 2018-06-11

## 2018-06-11 RX ADMIN — ALBUTEROL SULFATE 4 PUFF: 90 AEROSOL, METERED RESPIRATORY (INHALATION) at 13:26

## 2018-06-12 ENCOUNTER — OFFICE VISIT (OUTPATIENT)
Dept: ENT CLINIC | Age: 73
End: 2018-06-12

## 2018-06-12 VITALS — DIASTOLIC BLOOD PRESSURE: 66 MMHG | SYSTOLIC BLOOD PRESSURE: 110 MMHG | HEART RATE: 72 BPM

## 2018-06-12 DIAGNOSIS — H61.23 BILATERAL IMPACTED CERUMEN: Primary | ICD-10-CM

## 2018-06-12 PROCEDURE — 69210 REMOVE IMPACTED EAR WAX UNI: CPT | Performed by: OTOLARYNGOLOGY

## 2018-06-13 ENCOUNTER — TELEPHONE (OUTPATIENT)
Dept: INTERNAL MEDICINE CLINIC | Age: 73
End: 2018-06-13

## 2018-06-14 DIAGNOSIS — N32.81 OAB (OVERACTIVE BLADDER): ICD-10-CM

## 2018-06-14 RX ORDER — FESOTERODINE FUMARATE 4 MG/1
TABLET, FILM COATED, EXTENDED RELEASE ORAL
Qty: 30 TABLET | Refills: 10 | Status: SHIPPED | OUTPATIENT
Start: 2018-06-14 | End: 2019-06-12 | Stop reason: SDUPTHER

## 2018-07-02 PROBLEM — J18.9 PNEUMONIA DUE TO INFECTIOUS ORGANISM: Status: ACTIVE | Noted: 2018-07-02

## 2018-07-02 PROBLEM — J18.9 PNEUMONIA: Status: ACTIVE | Noted: 2018-07-02

## 2018-07-05 PROBLEM — K25.4 CHRONIC GASTRIC ULCER WITH HEMORRHAGE: Status: RESOLVED | Noted: 2018-01-29 | Resolved: 2018-07-05

## 2018-07-06 ENCOUNTER — TELEPHONE (OUTPATIENT)
Dept: INTERNAL MEDICINE CLINIC | Age: 73
End: 2018-07-06

## 2018-07-06 ENCOUNTER — TELEPHONE (OUTPATIENT)
Dept: PULMONOLOGY | Age: 73
End: 2018-07-06

## 2018-07-06 NOTE — TELEPHONE ENCOUNTER
Patient was discharged from the hospital yesterday and needs a HFU appointment. Patient has an appointment with Dr. Iwona Malhotra on 07/10 at 1:00, other than that she is available. Please call patient at [phone].

## 2018-07-06 NOTE — TELEPHONE ENCOUNTER
Patient is calling to see if her Prolia came in yet. She has an appointment on 7/10/18 and wanted to receive shot at that visit. There is not a Prolia in the refrigerator with her name on it. There is one left in the refrigerator without a name on it. Please call and let her know if drug will be her on the 10th.

## 2018-07-10 ENCOUNTER — OFFICE VISIT (OUTPATIENT)
Dept: RHEUMATOLOGY | Age: 73
End: 2018-07-10

## 2018-07-10 VITALS
WEIGHT: 127 LBS | DIASTOLIC BLOOD PRESSURE: 78 MMHG | BODY MASS INDEX: 19.25 KG/M2 | SYSTOLIC BLOOD PRESSURE: 120 MMHG | HEIGHT: 68 IN | HEART RATE: 68 BPM

## 2018-07-10 DIAGNOSIS — M81.0 AGE-RELATED OSTEOPOROSIS WITHOUT CURRENT PATHOLOGICAL FRACTURE: ICD-10-CM

## 2018-07-10 PROCEDURE — 99213 OFFICE O/P EST LOW 20 MIN: CPT | Performed by: INTERNAL MEDICINE

## 2018-07-10 RX ORDER — TRAMADOL HYDROCHLORIDE 50 MG/1
50 TABLET ORAL 2 TIMES DAILY
Qty: 60 TABLET | Refills: 2 | Status: SHIPPED | OUTPATIENT
Start: 2018-07-10 | End: 2018-10-09 | Stop reason: SDUPTHER

## 2018-07-10 RX ORDER — PREDNISONE 10 MG/1
10 TABLET ORAL 2 TIMES DAILY
COMMUNITY
End: 2018-07-23

## 2018-07-19 ENCOUNTER — OFFICE VISIT (OUTPATIENT)
Dept: PULMONOLOGY | Age: 73
End: 2018-07-19

## 2018-07-19 VITALS
BODY MASS INDEX: 20.99 KG/M2 | HEIGHT: 65 IN | DIASTOLIC BLOOD PRESSURE: 66 MMHG | RESPIRATION RATE: 15 BRPM | HEART RATE: 88 BPM | WEIGHT: 126 LBS | OXYGEN SATURATION: 92 % | SYSTOLIC BLOOD PRESSURE: 115 MMHG

## 2018-07-19 DIAGNOSIS — J96.11 CHRONIC RESPIRATORY FAILURE WITH HYPOXIA (HCC): ICD-10-CM

## 2018-07-19 DIAGNOSIS — J44.9 COPD, SEVERE (HCC): ICD-10-CM

## 2018-07-19 DIAGNOSIS — C34.12 MALIGNANT NEOPLASM OF UPPER LOBE OF LEFT LUNG (HCC): ICD-10-CM

## 2018-07-19 DIAGNOSIS — Z09 HOSPITAL DISCHARGE FOLLOW-UP: Primary | ICD-10-CM

## 2018-07-19 PROCEDURE — 99214 OFFICE O/P EST MOD 30 MIN: CPT | Performed by: NURSE PRACTITIONER

## 2018-07-19 NOTE — PROGRESS NOTES
[facility] Follow Up Note    Subjective:   CHIEF COMPLAINT / HPI: Hospitalization 7/1-7/5   The patient is 72 y.o. female who presents today for a routine follow up visit. There is a PMH significant for severe COPD, non-small cell carcinoma of the LOS s/p lobectomy, tobacco abuse and HTN. She presented to the ED with increased SOB with cough productive of purulent mucous and a Tmax of 103. CXR was negative for acute infiltrative process. She was treated empirically with Zithromax and Rocephin as well as Prednisone. Sputum cx revealed NRF and she was discharged on oral Levaquin. Presently she reports SOB which has returned to baseline. She does continue to cough but sputum is no longer purulent. She does feel like sputum quantity has increased but it is much easier to expectorate. She completed a full course of Levaquin and Prednisone a couple of days ago. She has been compliant with previously prescribed Duoneb BID and PRN, Advair BID. She was discharged on supplemental O2 continuously. She has been using this PRN. Past Medical History:   Diagnosis Date    Bronchitis     COPD     Hemoptysis     Hernia     Hypertension     Lung cancer ([facility] 2008    left lung    Pneumonia     Rupture of colon ([facility] 05/24/2013    Skin cancer 2014    ear     Social History:    History   Smoking Status    Light Tobacco Smoker    Packs/day: 1.00    Years: 42.00    Types: Cigarettes    Last attempt to quit: 2/28/2010   Smokeless Tobacco    Current User     Comment: 1 cigarette seun, pt like to quit, will quit after vacation      Current Medications:  Current Outpatient Prescriptions on File Prior to Visit   Medication Sig Dispense Refill    traMADol (ULTRAM) 50 MG tablet Take 1 tablet by mouth 2 times daily for 90 days. . 60 tablet 2    OXYGEN Inhale 3 L/min into the lungs continuous Use nightly as needed 1 Bottle 5    TOVIAZ 4 MG TB24 ER tablet TAKE ONE TABLET BY MOUTH DAILY 30 tablet 10

## 2018-07-23 ENCOUNTER — OFFICE VISIT (OUTPATIENT)
Dept: INTERNAL MEDICINE CLINIC | Age: 73
End: 2018-07-23

## 2018-07-23 VITALS
HEART RATE: 102 BPM | HEIGHT: 68 IN | OXYGEN SATURATION: 96 % | BODY MASS INDEX: 18.94 KG/M2 | SYSTOLIC BLOOD PRESSURE: 118 MMHG | WEIGHT: 125 LBS | DIASTOLIC BLOOD PRESSURE: 70 MMHG

## 2018-07-23 DIAGNOSIS — J44.1 CHRONIC OBSTRUCTIVE PULMONARY DISEASE WITH ACUTE EXACERBATION (HCC): ICD-10-CM

## 2018-07-23 DIAGNOSIS — R35.0 FREQUENT URINATION: Primary | ICD-10-CM

## 2018-07-23 LAB
BILIRUBIN, POC: NORMAL
BLOOD URINE, POC: NORMAL
CLARITY, POC: CLEAR
COLOR, POC: NORMAL
GLUCOSE URINE, POC: NORMAL
KETONES, POC: 15
LEUKOCYTE EST, POC: NORMAL
NITRITE, POC: NORMAL
PH, POC: 5
PROTEIN, POC: NORMAL
SPECIFIC GRAVITY, POC: 1.01
UROBILINOGEN, POC: 1

## 2018-07-23 PROCEDURE — 99214 OFFICE O/P EST MOD 30 MIN: CPT | Performed by: INTERNAL MEDICINE

## 2018-07-23 PROCEDURE — 81002 URINALYSIS NONAUTO W/O SCOPE: CPT | Performed by: INTERNAL MEDICINE

## 2018-07-23 RX ORDER — LEVOFLOXACIN 750 MG/1
750 TABLET ORAL DAILY
Qty: 10 TABLET | Refills: 0 | Status: SHIPPED | OUTPATIENT
Start: 2018-07-23 | End: 2018-08-02

## 2018-07-25 ASSESSMENT — ENCOUNTER SYMPTOMS
SHORTNESS OF BREATH: 1
COLOR CHANGE: 0
COUGH: 1
CONSTIPATION: 0
ABDOMINAL PAIN: 0

## 2018-08-01 ENCOUNTER — TELEPHONE (OUTPATIENT)
Dept: INTERNAL MEDICINE CLINIC | Age: 73
End: 2018-08-01

## 2018-08-02 ENCOUNTER — NURSE ONLY (OUTPATIENT)
Dept: INTERNAL MEDICINE CLINIC | Age: 73
End: 2018-08-02

## 2018-08-02 ENCOUNTER — HOSPITAL ENCOUNTER (OUTPATIENT)
Dept: OTHER | Age: 73
Discharge: HOME | End: 2018-08-02
Attending: INTERNAL MEDICINE | Admitting: INTERNAL MEDICINE

## 2018-08-02 DIAGNOSIS — J44.1 CHRONIC OBSTRUCTIVE PULMONARY DISEASE WITH ACUTE EXACERBATION (HCC): ICD-10-CM

## 2018-08-02 DIAGNOSIS — M81.0 AGE-RELATED OSTEOPOROSIS WITHOUT CURRENT PATHOLOGICAL FRACTURE: Primary | ICD-10-CM

## 2018-08-02 PROCEDURE — 96372 THER/PROPH/DIAG INJ SC/IM: CPT | Performed by: INTERNAL MEDICINE

## 2018-08-02 NOTE — PROGRESS NOTES
Pt came in for Prolia injection.    [facility]   Lot# [id number]  Exp: 11/2020  1 syringe = 60 mg given SQ left Arm  Injection time : 9:40A

## 2018-08-03 ENCOUNTER — TELEPHONE (OUTPATIENT)
Dept: INTERNAL MEDICINE CLINIC | Age: 73
End: 2018-08-03

## 2018-08-03 NOTE — TELEPHONE ENCOUNTER
----- Message from Savannah Sever, MD sent at 8/2/2018  1:17 PM EDT -----  Interval improvement in previously seen basilar changes seen on CXR    F/u as planned    Savannah Sever

## 2018-08-06 ENCOUNTER — OFFICE VISIT (OUTPATIENT)
Dept: INTERNAL MEDICINE CLINIC | Age: 73
End: 2018-08-06

## 2018-08-06 VITALS
OXYGEN SATURATION: 95 % | HEIGHT: 68 IN | BODY MASS INDEX: 18.94 KG/M2 | SYSTOLIC BLOOD PRESSURE: 124 MMHG | DIASTOLIC BLOOD PRESSURE: 62 MMHG | HEART RATE: 110 BPM | WEIGHT: 125 LBS

## 2018-08-06 DIAGNOSIS — J44.1 CHRONIC OBSTRUCTIVE PULMONARY DISEASE WITH ACUTE EXACERBATION (HCC): Primary | ICD-10-CM

## 2018-08-06 PROCEDURE — 99214 OFFICE O/P EST MOD 30 MIN: CPT | Performed by: INTERNAL MEDICINE

## 2018-08-06 NOTE — PROGRESS NOTES
Cholecalciferol (VITAMIN D) 2000 UNITS CAPS capsule Take 1 capsule by mouth daily      therapeutic multivitamin-minerals (THERAGRAN-M) tablet Take 1 tablet by mouth daily. No current facility-administered medications on file prior to visit. Objective:   Physical Exam   Constitutional: She is oriented to person, place, and time. She appears well-developed and well-nourished. /62   Pulse 110   Ht 5' 8\" (1.727 m)   Wt 125 lb (56.7 kg)   SpO2 95%   BMI 19.01 kg/m²     02% on 3 l/min     Neck: No JVD present. No thyromegaly present. Cardiovascular: Normal rate, regular rhythm and normal heart sounds. Pulmonary/Chest: Effort normal and breath sounds normal.   Genitourinary: No vaginal discharge found. Musculoskeletal: Normal range of motion. She exhibits no edema or tenderness. Neurological: She is alert and oriented to person, place, and time. She has normal reflexes. Psychiatric: She has a normal mood and affect. Her behavior is normal.   Nursing note and vitals reviewed.       Assessment:      Clinically stable  Patient Active Problem List   Diagnosis    COPD (chronic obstructive pulmonary disease) ([address]    Lung cancer ([address]    Hypertension    Diverticulosis    Acute on chronic respiratory failure with hypoxia (HCC)    Colon cancer screening    OAB (overactive bladder)    Pulmonary nodules    Multifocal pneumonia    Cavitary lesion of lung    Left lower lobe pneumonia (HCC)    Ventral hernia    Cerumen debris on tympanic membrane    COPD, severe ([address]    Breast cancer screening    Hemoptysis    Anorexia    S/P lobectomy of lung    Shortness of breath    Cough    COPD exacerbation (HCC)    Acute on chronic respiratory failure (HCC)    Pneumonia of both lungs due to Streptococcus pneumoniae (HCC)    Upper GI bleeding    Sepsis ([address]    Upper GI bleed    Chronic respiratory failure with hypoxia (HCC)    HAP (hospital-acquired pneumonia)    Mucus plugging of

## 2018-08-09 ENCOUNTER — HOSPITAL ENCOUNTER (OUTPATIENT)
Dept: OTHER | Age: 73
Discharge: HOME | End: 2018-08-09
Attending: INTERNAL MEDICINE | Admitting: INTERNAL MEDICINE

## 2018-08-22 ENCOUNTER — OFFICE VISIT (OUTPATIENT)
Dept: PULMONOLOGY | Age: 73
End: 2018-08-22

## 2018-08-22 VITALS
DIASTOLIC BLOOD PRESSURE: 72 MMHG | HEART RATE: 100 BPM | BODY MASS INDEX: 19.01 KG/M2 | WEIGHT: 125 LBS | SYSTOLIC BLOOD PRESSURE: 117 MMHG | OXYGEN SATURATION: 95 %

## 2018-08-22 DIAGNOSIS — J44.9 COPD, SEVERE (HCC): ICD-10-CM

## 2018-08-22 DIAGNOSIS — C34.12 MALIGNANT NEOPLASM OF UPPER LOBE OF LEFT LUNG (HCC): ICD-10-CM

## 2018-08-22 DIAGNOSIS — J96.11 CHRONIC RESPIRATORY FAILURE WITH HYPOXIA (HCC): Primary | ICD-10-CM

## 2018-08-22 PROCEDURE — 99213 OFFICE O/P EST LOW 20 MIN: CPT | Performed by: NURSE PRACTITIONER

## 2018-08-22 RX ORDER — DOXYCYCLINE HYCLATE 100 MG
100 TABLET ORAL 2 TIMES DAILY
Qty: 20 TABLET | Refills: 3 | Status: SHIPPED | OUTPATIENT
Start: 2018-08-22 | End: 2019-04-25 | Stop reason: SDUPTHER

## 2018-08-22 ASSESSMENT — ENCOUNTER SYMPTOMS
ABDOMINAL PAIN: 0
COLOR CHANGE: 0
SHORTNESS OF BREATH: 1
COUGH: 0
CONSTIPATION: 0

## 2018-08-22 NOTE — PROGRESS NOTES
allergies. Hematological: Negative for adenopathy. Psychiatric/Behavioral: Negative for agitation and confusion. Objective:       VITALS:  /72   Pulse 100   Wt 125 lb (56.7 kg)   SpO2 95% Comment: RA at rest  BMI 19.01 kg/m²  on RA    Physical Exam   Constitutional: She is oriented to person, place, and time. She appears well-developed and well-nourished. No distress. HENT:   Head: Normocephalic. Mouth/Throat: No oropharyngeal exudate. Eyes: Pupils are equal, round, and reactive to light. Conjunctivae are normal.   Neck: Normal range of motion. Neck supple. No tracheal deviation present. No thyromegaly present. Cardiovascular: Normal rate, regular rhythm and intact distal pulses. Exam reveals no friction rub. Pulmonary/Chest: Effort normal. No accessory muscle usage or stridor. No tachypnea. No respiratory distress. She has decreased breath sounds. She has no wheezes. She has no rhonchi. She exhibits no tenderness and no crepitus. Abdominal: Soft. Bowel sounds are normal. She exhibits no distension. There is no tenderness. Musculoskeletal: Normal range of motion. She exhibits no edema. Lymphadenopathy:     She has no cervical adenopathy. Neurological: She is alert and oriented to person, place, and time. Skin: Skin is warm and dry. No erythema. Psychiatric: She has a normal mood and affect. Thought content normal.   Vitals reviewed. DATA:      Radiology Review:  Pertinent images / reports were reviewed as a part of this visit. CXR done 18:  1. Interval improvement in appearance of right basilar airspace disease, with mild residual right basilar atelectasis remaining. 2. Stable chronic postsurgical changes status post left upper lobectomy with chronic volume loss within the left hemithorax      CT chest done 2018 reveals the followin. Resolved infectious process in the right lower lobe 2.  Stable parenchymal changes since 2016, including right basilar lung nodule, compatible with benign postinflammatory opacities. No new suspicious pulmonary abnormality 3. Pulmonary emphysema 4. Status post left upper lobectomy    Last PFTs done June 2018:  Spirometry reveals decreased FVC at 2.02 liters, which is 58% predicted. FEV1 is decreased at 0.9 liters, which is 34% predicted. FEV1/FVC ratio is  reduced at 44%. There is a 22% improvement in FEV1 after inhaled  bronchodilators. Lung volumes reveal normal total lung capacity. Vital  capacity is reduced. Diffusion capacity is reduced at 42% of predicted. In comparison to a study from 07/2015, diffusion capacity has decreased by  22%. IMPRESSION:  Severe obstructive lung disease. There is a good response to  inhaled bronchodilators. There has been reduction in diffusion capacity  since the preceding study. Assessment / Plan:   1. Chronic respiratory failure with hypoxia (HCC)  - She presents to the office today without O2, RA resting saturations are acceptable   - Exercise oximetry done on RA also reveals acceptable saturations  - Continue O2 at night and PRN during the day    2. COPD, severe ([address]  - Cough has resolved  - She is experiencing baseline HAYES  - Continue Advair BID  - Continue Duoneb q4 PRN, educated on increased frequency when ill  - Doxy Rx'd to pharmacy to have on hand in the event of AECOPD     3. Malignant neoplasm of upper lobe of left lung ([address]  - CXR done per Dr. Hall Span the beginning of August showed improved ASD  - She is s/p ROMINA lobectomy   - No evidence of concerning pulmonary nodules on last CT chest done in June of this year    Return in about 3 months (around 11/22/2018).      Lyn Fails MSN APRN-ACNP CCRN

## 2018-08-29 RX ORDER — PANTOPRAZOLE SODIUM 40 MG/1
TABLET, DELAYED RELEASE ORAL
Qty: 30 TABLET | Refills: 1 | Status: SHIPPED | OUTPATIENT
Start: 2018-08-29 | End: 2018-11-13 | Stop reason: SDUPTHER

## 2018-09-26 PROBLEM — R05.9 COUGH: Status: RESOLVED | Noted: 2017-02-28 | Resolved: 2018-09-26

## 2018-10-09 ENCOUNTER — OFFICE VISIT (OUTPATIENT)
Dept: RHEUMATOLOGY | Age: 73
End: 2018-10-09
Payer: COMMERCIAL

## 2018-10-09 VITALS
HEIGHT: 68 IN | BODY MASS INDEX: 18.22 KG/M2 | SYSTOLIC BLOOD PRESSURE: 120 MMHG | HEART RATE: 96 BPM | DIASTOLIC BLOOD PRESSURE: 78 MMHG | WEIGHT: 120.25 LBS

## 2018-10-09 DIAGNOSIS — M81.0 AGE-RELATED OSTEOPOROSIS WITHOUT CURRENT PATHOLOGICAL FRACTURE: ICD-10-CM

## 2018-10-09 PROCEDURE — 99213 OFFICE O/P EST LOW 20 MIN: CPT | Performed by: INTERNAL MEDICINE

## 2018-10-09 RX ORDER — TRAMADOL HYDROCHLORIDE 50 MG/1
50 TABLET ORAL 2 TIMES DAILY
Qty: 60 TABLET | Refills: 2 | Status: SHIPPED | OUTPATIENT
Start: 2018-10-09 | End: 2019-01-14 | Stop reason: SDUPTHER

## 2018-10-15 NOTE — PROGRESS NOTES
Patient not reached. Preop instructions left on voice mail.  [phone]______________    -Date_10/31/18_masc_____time_0730______arrival__0600__________  -Nothing to eat or drink after midnight  -Responsible adult 25 or older to stay on site while you are here and drive you home and stay with you after  -Follow any instructions your doctors office has given you  -Bring a complete list of all your medications and supplements  -If you normally take the following medications in the morning please do so with a small    sip of water-heart,blood pressure,seizure,breathing or thyroid-avoid water pillls and any blood pressure medications ending in \"jamarcus\" or \"pril\"  -You may use your inhalers  -Take half of your normal dose of any long acting insulins the night before-do not take    any diabetic medications in the morning  -Follow your doctors instructions regarding blood thinners  -Any questions call your surgeons office  Anesthesia attempts to review all Endo charts prior to surgery and will place any PAT orders,Surgery patients will have orders placed based on history in chart which may not be complete  ENDOSCOPY PATIENTS ONLY-FOLLOW YOUR DOCTORS BOWEL PREP INSTRUCTIONS,THIS MAY INCLUDE TAKING A SECOND PORTION OF YOUR PREP AFTER MIDNIGHT

## 2018-10-17 ENCOUNTER — ANESTHESIA EVENT (OUTPATIENT)
Dept: ENDOSCOPY | Age: 73
End: 2018-10-17
Payer: COMMERCIAL

## 2018-10-25 ENCOUNTER — TELEPHONE (OUTPATIENT)
Dept: PULMONOLOGY | Age: 73
End: 2018-10-25

## 2018-10-25 NOTE — TELEPHONE ENCOUNTER
Patient called becaused she is purchasing a portable inogen portable unit and that she need Nini Dalton to sign off on it. She is purchasing it out right not using any insurance. She want you to know because she does not want to lose any of her home oxygen.   [phone]

## 2018-10-30 ENCOUNTER — TELEPHONE (OUTPATIENT)
Dept: PULMONOLOGY | Age: 73
End: 2018-10-30

## 2018-10-31 ENCOUNTER — HOSPITAL ENCOUNTER (OUTPATIENT)
Age: 73
Setting detail: OUTPATIENT SURGERY
Discharge: HOME | End: 2018-10-31
Attending: INTERNAL MEDICINE | Admitting: INTERNAL MEDICINE
Payer: COMMERCIAL

## 2018-10-31 ENCOUNTER — ANESTHESIA (OUTPATIENT)
Dept: ENDOSCOPY | Age: 73
End: 2018-10-31
Payer: COMMERCIAL

## 2018-10-31 VITALS — OXYGEN SATURATION: 91 % | SYSTOLIC BLOOD PRESSURE: 124 MMHG | DIASTOLIC BLOOD PRESSURE: 63 MMHG

## 2018-10-31 VITALS
OXYGEN SATURATION: 93 % | SYSTOLIC BLOOD PRESSURE: 126 MMHG | DIASTOLIC BLOOD PRESSURE: 57 MMHG | RESPIRATION RATE: 16 BRPM | TEMPERATURE: 98.1 F | HEIGHT: 68 IN | HEART RATE: 59 BPM | BODY MASS INDEX: 18.19 KG/M2 | WEIGHT: 120 LBS

## 2018-10-31 LAB
BASOPHILS ABSOLUTE: 0 K/UL (ref 0–0.2)
BASOPHILS RELATIVE PERCENT: 0.3 %
EOSINOPHILS ABSOLUTE: 0.1 K/UL (ref 0–0.6)
EOSINOPHILS RELATIVE PERCENT: 2.5 %
HCT VFR BLD CALC: 45.4 % (ref 36–48)
HEMOGLOBIN: 15 G/DL (ref 12–16)
LYMPHOCYTES ABSOLUTE: 1.7 K/UL (ref 1–5.1)
LYMPHOCYTES RELATIVE PERCENT: 28.6 %
MAGNESIUM: 2.1 MG/DL (ref 1.8–2.4)
MCH RBC QN AUTO: 30.8 PG (ref 26–34)
MCHC RBC AUTO-ENTMCNC: 33.1 G/DL (ref 31–36)
MCV RBC AUTO: 92.9 FL (ref 80–100)
MONOCYTES ABSOLUTE: 1.2 K/UL (ref 0–1.3)
MONOCYTES RELATIVE PERCENT: 20.4 %
NEUTROPHILS ABSOLUTE: 2.8 K/UL (ref 1.7–7.7)
NEUTROPHILS RELATIVE PERCENT: 48.2 %
PDW BLD-RTO: 14.4 % (ref 12.4–15.4)
PLATELET # BLD: 146 K/UL (ref 135–450)
PMV BLD AUTO: 10.2 FL (ref 5–10.5)
RBC # BLD: 4.89 M/UL (ref 4–5.2)
WBC # BLD: 5.9 K/UL (ref 4–11)

## 2018-10-31 PROCEDURE — 83735 ASSAY OF MAGNESIUM: CPT

## 2018-10-31 PROCEDURE — 6360000002 HC RX W HCPCS: Performed by: NURSE ANESTHETIST, CERTIFIED REGISTERED

## 2018-10-31 PROCEDURE — 7100000011 HC PHASE II RECOVERY - ADDTL 15 MIN: Performed by: INTERNAL MEDICINE

## 2018-10-31 PROCEDURE — 2709999900 HC NON-CHARGEABLE SUPPLY: Performed by: INTERNAL MEDICINE

## 2018-10-31 PROCEDURE — 88305 TISSUE EXAM BY PATHOLOGIST: CPT

## 2018-10-31 PROCEDURE — 3609012400 HC EGD TRANSORAL BIOPSY SINGLE/MULTIPLE: Performed by: INTERNAL MEDICINE

## 2018-10-31 PROCEDURE — 3700000000 HC ANESTHESIA ATTENDED CARE: Performed by: INTERNAL MEDICINE

## 2018-10-31 PROCEDURE — 2500000003 HC RX 250 WO HCPCS: Performed by: NURSE ANESTHETIST, CERTIFIED REGISTERED

## 2018-10-31 PROCEDURE — 3700000001 HC ADD 15 MINUTES (ANESTHESIA): Performed by: INTERNAL MEDICINE

## 2018-10-31 PROCEDURE — 2580000003 HC RX 258: Performed by: FAMILY MEDICINE

## 2018-10-31 PROCEDURE — 7100000010 HC PHASE II RECOVERY - FIRST 15 MIN: Performed by: INTERNAL MEDICINE

## 2018-10-31 PROCEDURE — 85025 COMPLETE CBC W/AUTO DIFF WBC: CPT

## 2018-10-31 PROCEDURE — 36415 COLL VENOUS BLD VENIPUNCTURE: CPT

## 2018-10-31 RX ORDER — SODIUM CHLORIDE 9 MG/ML
INJECTION, SOLUTION INTRAVENOUS CONTINUOUS
Status: DISCONTINUED | OUTPATIENT
Start: 2018-10-31 | End: 2018-10-31 | Stop reason: HOSPADM

## 2018-10-31 RX ORDER — SODIUM CHLORIDE 0.9 % (FLUSH) 0.9 %
10 SYRINGE (ML) INJECTION EVERY 12 HOURS SCHEDULED
Status: DISCONTINUED | OUTPATIENT
Start: 2018-10-31 | End: 2018-10-31 | Stop reason: HOSPADM

## 2018-10-31 RX ORDER — SODIUM CHLORIDE 0.9 % (FLUSH) 0.9 %
10 SYRINGE (ML) INJECTION PRN
Status: DISCONTINUED | OUTPATIENT
Start: 2018-10-31 | End: 2018-10-31 | Stop reason: HOSPADM

## 2018-10-31 RX ORDER — PROPOFOL 10 MG/ML
INJECTION, EMULSION INTRAVENOUS PRN
Status: DISCONTINUED | OUTPATIENT
Start: 2018-10-31 | End: 2018-10-31 | Stop reason: SDUPTHER

## 2018-10-31 RX ORDER — LIDOCAINE HYDROCHLORIDE 20 MG/ML
INJECTION, SOLUTION INFILTRATION; PERINEURAL PRN
Status: DISCONTINUED | OUTPATIENT
Start: 2018-10-31 | End: 2018-10-31 | Stop reason: SDUPTHER

## 2018-10-31 RX ADMIN — PROPOFOL 20 MG: 10 INJECTION, EMULSION INTRAVENOUS at 07:50

## 2018-10-31 RX ADMIN — SODIUM CHLORIDE: 9 INJECTION, SOLUTION INTRAVENOUS at 07:39

## 2018-10-31 RX ADMIN — PROPOFOL 50 MG: 10 INJECTION, EMULSION INTRAVENOUS at 07:42

## 2018-10-31 RX ADMIN — SODIUM CHLORIDE: 9 INJECTION, SOLUTION INTRAVENOUS at 06:33

## 2018-10-31 RX ADMIN — PROPOFOL 20 MG: 10 INJECTION, EMULSION INTRAVENOUS at 07:46

## 2018-10-31 RX ADMIN — LIDOCAINE HYDROCHLORIDE 60 MG: 20 INJECTION, SOLUTION INFILTRATION; PERINEURAL at 07:42

## 2018-10-31 ASSESSMENT — PAIN SCALES - GENERAL
PAINLEVEL_OUTOF10: 0

## 2018-10-31 ASSESSMENT — ENCOUNTER SYMPTOMS: SHORTNESS OF BREATH: 1

## 2018-10-31 ASSESSMENT — COPD QUESTIONNAIRES: CAT_SEVERITY: MODERATE

## 2018-10-31 ASSESSMENT — PAIN - FUNCTIONAL ASSESSMENT: PAIN_FUNCTIONAL_ASSESSMENT: 0-10

## 2018-10-31 NOTE — PROGRESS NOTES
Name:  Nubia Gan  Age:  72 y.o.  CSN:  [id number]            Past Medical History:        Diagnosis Date    Bronchitis     COPD     Hemoptysis     Hernia     Hypertension     Lung [facility] ([facility] 2008    left lung    Osteoporosis     Pneumonia     Rupture of colon ([facility] 05/24/2013    Skin cancer 2014    ear       Past Surgical History:      Procedure Laterality Date    BRONCHOSCOPY  08/05/2016    BRONCHOSCOPY  01/31/2018    CHOLECYSTECTOMY      COLONOSCOPY  8/20/13    KYPHOSIS SURGERY  02/04/2016    LOBECTOMY  0808    MANDIBLE SURGERY      upper jaw and lower jaw    OTHER SURGICAL HISTORY  5/30/13    secondary wound closure    SIGMOIDECTOMY  5/24/13    THORACOTOMY  0808    TONSILLECTOMY      UPPER GASTROINTESTINAL ENDOSCOPY  01/18/2018       Medications Prior to Admission:  Prescriptions Prior to Admission: traMADol (ULTRAM) 50 MG tablet, Take 1 tablet by mouth 2 times daily for 90 days. .  pantoprazole (PROTONIX) 40 MG tablet, TAKE ONE TABLET BY MOUTH EVERY MORNING BEFORE BREAKFAST  fluticasone-salmeterol (ADVAIR DISKUS) 250-50 MCG/DOSE AEPB, Inhale 1 puff into the lungs 2 times daily  albuterol-ipratropium (COMBIVENT RESPIMAT)  MCG/ACT AERS inhaler, Inhale 1 puff into the lungs every 6 hours  OXYGEN, Inhale 3 L/min into the lungs continuous Use nightly as needed  TOVIAZ 4 MG TB24 ER tablet, TAKE ONE TABLET BY MOUTH DAILY  gabapentin (NEURONTIN) 300 MG capsule, TAKE ONE CAPSULE BY MOUTH TWICE A DAY  ipratropium-albuterol (DUONEB) 0.5-2.5 (3) MG/3ML SOLN nebulizer solution, Inhale 3 mLs into the lungs 4 times daily DX:COPD J44.9  albuterol sulfate HFA (VENTOLIN HFA) 108 (90 Base) MCG/ACT inhaler, Inhale 2 puffs into the lungs every 6 hours as needed for Wheezing  montelukast (SINGULAIR) 10 MG tablet, TAKE ONE TABLET BY MOUTH ONCE NIGHTLY  ferrous sulfate 325 (65 Fe) MG tablet, Take 1 tablet by mouth 2 times daily (with meals)  acetaminophen (TYLENOL) 325 MG tablet, Take 650 mg by mouth

## 2018-10-31 NOTE — ANESTHESIA PRE PROCEDURE
Provider, MD   diltiazem (CARDIZEM) 60 MG tablet Take 60 mg by mouth 4 times daily    Yes Historical Provider, MD   calcium carbonate 600 MG TABS tablet Take 1 tablet by mouth 2 times daily   Yes Historical Provider, MD   Estradiol (VAGIFEM) 10 MCG TABS vaginal tablet Place 10 mcg vaginally daily   Yes Historical Provider, MD   Cholecalciferol (VITAMIN D) 2000 UNITS CAPS capsule Take 1 capsule by mouth daily   Yes Historical Provider, MD   therapeutic multivitamin-minerals (THERAGRAN-M) tablet Take 1 tablet by mouth daily. Yes Historical Provider, MD   Denosumab (PROLIA SC) Inject into the skin    Historical Provider, MD       Current medications:    Current Facility-Administered Medications   Medication Dose Route Frequency Provider Last Rate Last Dose    0.9 % sodium chloride infusion   Intravenous Continuous Lisa Jennings MD 75 mL/hr at 10/31/18 4736      sodium chloride flush 0.9 % injection 10 mL  10 mL Intravenous 2 times per day Lisa Jennings MD        sodium chloride flush 0.9 % injection 10 mL  10 mL Intravenous PRN Lisa Jennings MD           Allergies:     Allergies   Allergen Reactions    Wellbutrin [Bupropion Hcl] Palpitations       Problem List:    Patient Active Problem List   Diagnosis Code    COPD (chronic obstructive pulmonary disease) ([facility] J44.9    Lung cancer ([facility] C34.90    Hypertension I10    Diverticulosis K57.90    Acute on chronic respiratory failure with hypoxia ([facility]) J96.21    OAB (overactive bladder) N32.81    Pulmonary nodules R91.8    Multifocal pneumonia J18.9    Cavitary lesion of lung J98.4    Left lower lobe pneumonia ([facility] J18.1    Ventral hernia K43.9    Cerumen debris on tympanic membrane H61.20    COPD, severe ([facility] J44.9    Hemoptysis R04.2    Anorexia R63.0    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    Shortness of breath R06.02    COPD exacerbation (HCC) J44.1    Acute on chronic respiratory failure (HCC) J96.20    Pneumonia of both lungs due to Date of last liquid consumption: 10/30/18                        Date of last solid food consumption: 10/30/18    BMI:   Wt Readings from Last 3 Encounters:   10/31/18 120 lb (54.4 kg)   10/09/18 120 lb 4 oz (54.5 kg)   08/22/18 125 lb (56.7 kg)     Body mass index is 18.25 kg/m². CBC:   Lab Results   Component Value Date    WBC 9.5 07/03/2018    RBC 5.04 07/03/2018    HGB 14.2 07/03/2018    HCT 43.6 07/03/2018    MCV 86.6 07/03/2018    RDW 16.2 07/03/2018     07/03/2018       CMP:   Lab Results   Component Value Date     07/01/2018    K 3.9 07/01/2018    K 4.1 01/18/2018     07/01/2018    CO2 28 07/01/2018    BUN 16 07/01/2018    CREATININE 0.6 07/01/2018    GFRAA >60 07/01/2018    GFRAA >60 05/27/2013    AGRATIO 1.4 03/05/2018    LABGLOM >60 07/01/2018    GLUCOSE 195 07/01/2018    PROT 6.7 03/05/2018    PROT 7.4 04/08/2010    CALCIUM 10.2 07/01/2018    BILITOT 0.3 03/05/2018    ALKPHOS 68 03/05/2018    AST 16 03/05/2018    ALT 14 03/05/2018       POC Tests: No results for input(s): POCGLU, POCNA, POCK, POCCL, POCBUN, POCHEMO, POCHCT in the last 72 hours. Coags:   Lab Results   Component Value Date    PROTIME 11.9 01/18/2018    INR 1.05 01/18/2018    APTT 24.6 01/18/2018       HCG (If Applicable): No results found for: PREGTESTUR, PREGSERUM, HCG, HCGQUANT     ABGs:   Lab Results   Component Value Date    PHART 7.462 01/29/2018    PO2ART 110.5 01/29/2018    TRT7YKR 36.4 01/29/2018    VES3VIJ 25.4 01/29/2018    BEART 1.7 01/29/2018    O3EFQJKO 98.7 01/29/2018        Type & Screen (If Applicable):  No results found for: [facility], [facility]    Anesthesia Evaluation  Patient summary reviewed and Nursing notes reviewed  Airway: Mallampati: II  TM distance: >3 FB   Neck ROM: full  Mouth opening: > = 3 FB Dental:          Pulmonary:   (+) pneumonia: resolved,  COPD: moderate,  shortness of breath:                            ROS comment: LUNG CA.  S/P LOBECTOMY

## 2018-11-13 ENCOUNTER — OFFICE VISIT (OUTPATIENT)
Dept: INTERNAL MEDICINE CLINIC | Age: 73
End: 2018-11-13

## 2018-11-13 VITALS
DIASTOLIC BLOOD PRESSURE: 68 MMHG | WEIGHT: 122 LBS | HEIGHT: 68 IN | OXYGEN SATURATION: 91 % | HEART RATE: 80 BPM | SYSTOLIC BLOOD PRESSURE: 130 MMHG | BODY MASS INDEX: 18.49 KG/M2

## 2018-11-13 DIAGNOSIS — J44.9 CHRONIC OBSTRUCTIVE PULMONARY DISEASE, UNSPECIFIED COPD TYPE (HCC): Primary | ICD-10-CM

## 2018-11-13 PROCEDURE — 99213 OFFICE O/P EST LOW 20 MIN: CPT | Performed by: INTERNAL MEDICINE

## 2018-11-13 RX ORDER — PANTOPRAZOLE SODIUM 40 MG/1
40 TABLET, DELAYED RELEASE ORAL 2 TIMES DAILY
Qty: 30 TABLET | Refills: 1
Start: 2018-11-13 | End: 2019-05-13 | Stop reason: DRUGHIGH

## 2018-11-13 NOTE — PROGRESS NOTES
Subjective:      Patient ID: Sea Carlton is a 72 y.o. female. HPI-chronically dyspneic as usual, feels \"better today\" after having had bout of bronchitis for last 2 weeks of October. ., had old antibiotics on hand from Pulmonary so she took those and now feels good  Has prn nasal oxygen at home    HE GIVES ME PRESCRIPTIONS FOR ANTIBIOTICS WHEN I NEED THEM AND I SEE PULMONARY LATER THIS  MONTH    Review of Systems-had an Endoscopy 10/31/2018 and was told that her Lorena Stewart is NOT gone! \"  ZOË Jimenez increased her protonix to BID again! Has another EGD scheduled early Jan 2019    Current Outpatient Prescriptions on File Prior to Visit   Medication Sig Dispense Refill    traMADol (ULTRAM) 50 MG tablet Take 1 tablet by mouth 2 times daily for 90 days. . 60 tablet 2    pantoprazole (PROTONIX) 40 MG tablet TAKE ONE TABLET BY MOUTH EVERY MORNING BEFORE BREAKFAST 30 tablet 1    Denosumab (PROLIA SC) Inject into the skin      fluticasone-salmeterol (ADVAIR DISKUS) 250-50 MCG/DOSE AEPB Inhale 1 puff into the lungs 2 times daily 60 each 11    albuterol-ipratropium (COMBIVENT RESPIMAT)  MCG/ACT AERS inhaler Inhale 1 puff into the lungs every 6 hours 1 Inhaler 11    OXYGEN Inhale 3 L/min into the lungs continuous Use nightly as needed 1 Bottle 5    TOVIAZ 4 MG TB24 ER tablet TAKE ONE TABLET BY MOUTH DAILY 30 tablet 10    gabapentin (NEURONTIN) 300 MG capsule TAKE ONE CAPSULE BY MOUTH TWICE A DAY 60 capsule 5    ipratropium-albuterol (DUONEB) 0.5-2.5 (3) MG/3ML SOLN nebulizer solution Inhale 3 mLs into the lungs 4 times daily DX:COPD J44.9 360 mL 11    albuterol sulfate HFA (VENTOLIN HFA) 108 (90 Base) MCG/ACT inhaler Inhale 2 puffs into the lungs every 6 hours as needed for Wheezing 1 Inhaler 11plkeas    montelukast (SINGULAIR) 10 MG tablet TAKE ONE TABLET BY MOUTH ONCE NIGHTLY 30 tablet 11    ferrous sulfate 325 (65 Fe) MG tablet Take 1 tablet by mouth 2 times daily (with meals) 30 tablet 1    acetaminophen

## 2018-11-13 NOTE — PATIENT INSTRUCTIONS
Annual fluvax is advised every Fall    Consider Shingrix series of 2 shots over 2-6 month period if desired for additional protection again shingles

## 2018-11-19 ENCOUNTER — OFFICE VISIT (OUTPATIENT)
Dept: PULMONOLOGY | Age: 73
End: 2018-11-19
Payer: COMMERCIAL

## 2018-11-19 VITALS
BODY MASS INDEX: 17.79 KG/M2 | SYSTOLIC BLOOD PRESSURE: 122 MMHG | WEIGHT: 117 LBS | OXYGEN SATURATION: 90 % | DIASTOLIC BLOOD PRESSURE: 79 MMHG | HEART RATE: 93 BPM

## 2018-11-19 DIAGNOSIS — R06.02 SHORTNESS OF BREATH: Primary | ICD-10-CM

## 2018-11-19 DIAGNOSIS — R91.8 PULMONARY NODULES: ICD-10-CM

## 2018-11-19 DIAGNOSIS — C34.12 MALIGNANT NEOPLASM OF UPPER LOBE OF LEFT LUNG (HCC): ICD-10-CM

## 2018-11-19 DIAGNOSIS — J44.9 COPD, SEVERE (HCC): ICD-10-CM

## 2018-11-19 DIAGNOSIS — J96.11 CHRONIC RESPIRATORY FAILURE WITH HYPOXIA (HCC): ICD-10-CM

## 2018-11-19 PROCEDURE — 99214 OFFICE O/P EST MOD 30 MIN: CPT | Performed by: INTERNAL MEDICINE

## 2018-11-19 RX ORDER — PREDNISONE 10 MG/1
TABLET ORAL
Qty: 30 TABLET | Refills: 0 | Status: SHIPPED | OUTPATIENT
Start: 2018-11-19 | End: 2018-11-29

## 2018-12-07 ENCOUNTER — TELEPHONE (OUTPATIENT)
Dept: PULMONOLOGY | Age: 73
End: 2018-12-07

## 2018-12-14 ENCOUNTER — OFFICE VISIT (OUTPATIENT)
Dept: ENT CLINIC | Age: 73
End: 2018-12-14
Payer: COMMERCIAL

## 2018-12-14 VITALS — DIASTOLIC BLOOD PRESSURE: 70 MMHG | HEART RATE: 98 BPM | SYSTOLIC BLOOD PRESSURE: 132 MMHG | OXYGEN SATURATION: 90 %

## 2018-12-14 DIAGNOSIS — H61.23 BILATERAL IMPACTED CERUMEN: Primary | ICD-10-CM

## 2018-12-14 PROCEDURE — 69210 REMOVE IMPACTED EAR WAX UNI: CPT | Performed by: OTOLARYNGOLOGY

## 2018-12-17 ENCOUNTER — OFFICE VISIT (OUTPATIENT)
Dept: PULMONOLOGY | Age: 73
End: 2018-12-17
Payer: COMMERCIAL

## 2018-12-17 ENCOUNTER — HOSPITAL ENCOUNTER (OUTPATIENT)
Dept: GENERAL RADIOLOGY | Age: 73
Discharge: HOME | End: 2018-12-17
Payer: COMMERCIAL

## 2018-12-17 ENCOUNTER — HOSPITAL ENCOUNTER (OUTPATIENT)
Age: 73
Discharge: HOME | End: 2018-12-17
Payer: COMMERCIAL

## 2018-12-17 ENCOUNTER — TELEPHONE (OUTPATIENT)
Dept: PULMONOLOGY | Age: 73
End: 2018-12-17

## 2018-12-17 VITALS
SYSTOLIC BLOOD PRESSURE: 131 MMHG | OXYGEN SATURATION: 94 % | BODY MASS INDEX: 17.49 KG/M2 | WEIGHT: 115 LBS | TEMPERATURE: 99.2 F | DIASTOLIC BLOOD PRESSURE: 84 MMHG | HEART RATE: 114 BPM

## 2018-12-17 DIAGNOSIS — J44.9 COPD, SEVERE (HCC): ICD-10-CM

## 2018-12-17 DIAGNOSIS — G62.9 NEUROPATHY: ICD-10-CM

## 2018-12-17 DIAGNOSIS — R05.3 CHRONIC COUGH: ICD-10-CM

## 2018-12-17 DIAGNOSIS — J96.11 CHRONIC RESPIRATORY FAILURE WITH HYPOXIA (HCC): ICD-10-CM

## 2018-12-17 DIAGNOSIS — C34.12 MALIGNANT NEOPLASM OF UPPER LOBE OF LEFT LUNG (HCC): ICD-10-CM

## 2018-12-17 DIAGNOSIS — R06.02 SHORTNESS OF BREATH: Primary | ICD-10-CM

## 2018-12-17 DIAGNOSIS — M54.50 CHRONIC MIDLINE LOW BACK PAIN WITHOUT SCIATICA: ICD-10-CM

## 2018-12-17 DIAGNOSIS — R91.8 PULMONARY NODULES: ICD-10-CM

## 2018-12-17 DIAGNOSIS — G89.29 CHRONIC MIDLINE LOW BACK PAIN WITHOUT SCIATICA: ICD-10-CM

## 2018-12-17 LAB
BASOPHILS ABSOLUTE: 0 K/UL (ref 0–0.2)
BASOPHILS RELATIVE PERCENT: 0.3 %
EOSINOPHILS ABSOLUTE: 0.1 K/UL (ref 0–0.6)
EOSINOPHILS RELATIVE PERCENT: 0.5 %
HCT VFR BLD CALC: 53.2 % (ref 36–48)
HEMOGLOBIN: 17 G/DL (ref 12–16)
LYMPHOCYTES ABSOLUTE: 1.5 K/UL (ref 1–5.1)
LYMPHOCYTES RELATIVE PERCENT: 11.8 %
MCH RBC QN AUTO: 30.2 PG (ref 26–34)
MCHC RBC AUTO-ENTMCNC: 32 G/DL (ref 31–36)
MCV RBC AUTO: 94.5 FL (ref 80–100)
MONOCYTES ABSOLUTE: 1.3 K/UL (ref 0–1.3)
MONOCYTES RELATIVE PERCENT: 10.2 %
NEUTROPHILS ABSOLUTE: 9.6 K/UL (ref 1.7–7.7)
NEUTROPHILS RELATIVE PERCENT: 77.2 %
PDW BLD-RTO: 14.5 % (ref 12.4–15.4)
PLATELET # BLD: 253 K/UL (ref 135–450)
PMV BLD AUTO: 9.8 FL (ref 5–10.5)
RBC # BLD: 5.63 M/UL (ref 4–5.2)
WBC # BLD: 12.5 K/UL (ref 4–11)

## 2018-12-17 PROCEDURE — 71046 X-RAY EXAM CHEST 2 VIEWS: CPT

## 2018-12-17 PROCEDURE — 36415 COLL VENOUS BLD VENIPUNCTURE: CPT

## 2018-12-17 PROCEDURE — 85025 COMPLETE CBC W/AUTO DIFF WBC: CPT

## 2018-12-17 PROCEDURE — 99214 OFFICE O/P EST MOD 30 MIN: CPT | Performed by: INTERNAL MEDICINE

## 2018-12-17 RX ORDER — GABAPENTIN 300 MG/1
CAPSULE ORAL
Qty: 60 CAPSULE | Refills: 4 | Status: SHIPPED | OUTPATIENT
Start: 2018-12-17 | End: 2019-06-29 | Stop reason: SDUPTHER

## 2018-12-17 RX ORDER — AMOXICILLIN AND CLAVULANATE POTASSIUM 875; 125 MG/1; MG/1
1 TABLET, FILM COATED ORAL 2 TIMES DAILY
Qty: 20 TABLET | Refills: 0 | Status: SHIPPED | OUTPATIENT
Start: 2018-12-17 | End: 2018-12-27

## 2018-12-17 RX ORDER — PREDNISONE 10 MG/1
TABLET ORAL
Qty: 30 TABLET | Refills: 0 | Status: SHIPPED | OUTPATIENT
Start: 2018-12-17 | End: 2018-12-27

## 2018-12-17 NOTE — PROGRESS NOTES
[facility]  Progress Note  Catrina Sanches MD       Stella Sesay   YOB: 1945    Date of Visit:  12/17/2018    Assessment/Plan:  1. Shortness of breath and 2. Cough  She just finished Doxy and Pred taper  Still sick    CXR  CBC   Sputum Culture    Augmentin 875 mg bid  Another Pred taper. 3. COPD, severe ([address]  PFT 7/15:  Spirometry reveals decreased FVC at 1.97 L which is 55% predicted. FEV1 is  decreased at 0.91 L which is 33% predicted. FEV1/FVC ratio is reduced at  46%. There is no significant improvement after inhaled bronchodilators. Lung volumes reveal mildly decreased total lung capacity at 78% predicted. Vital capacity is decreased at 55% predicted. Diffusion capacity is  decreased at 45% predicted. In comparison to a study from March 2012, FVC  has decreased by 15%, FEV1 has decreased by 20%. IMPRESSION: Very severe obstructive lung disease. There has been worsening  in air flow since the preceding study. Advair   Combivent  Duoneb    4. Chronic respiratory failure with hypoxia (HCC)  O2 sats are acceptable on supplemental O2. The patient benefits from the use of supplemental O2. She really needs to wear her O2. She was 83% when she got to the office. 5. Malignant neoplasm of lung, unspecified laterality, unspecified part of lung ([address]  She had left upper lobectomy in 2008 for non-small cell carcinoma of the lung. Repeat CT chest    6. Pulmonary nodules  Stable nodules on last imaging      FOLLOW UP: 6 months      Chief Complaint   Patient presents with    Shortness of Breath    Cough     12-7-18 started the Doxy and Pred Taper       HPI  The patient presents with a chief complaint of shortness of breath and cough. She was admitted to the hospital in July 2018. She feels more SOB since last Thursday. She always has cough and sputum. She started Doxy but it is not helping.   She has a history of very severe COPD and emphysema. She is also had lung cancer in the past for which she underwent left upper lobectomy in 2008. CT chest 6/18 did show any suspicious findings. She has O2 but doesn't wear it No Chest pain, Nausea or vomiting reported. She is not smoking but \"I'd love to. \"    Review of Systems  As documented in HPI     Physical Exam:  Well developed, well nourished  Alert and oriented  Sclera is clear  No cervical adenopathy  No JVD. Chest examination is diffuse wheeze. Cardiac examination reveals regular rate and rhythm without murmur, gallop or rub. The abdomen is soft, nontender and nondistended. There is no clubbing, cyanosis or edema of the extremities. There is no obvious skin rash. No focal neuro deficicts  Normal mood and affect    Allergies   Allergen Reactions    Wellbutrin [Bupropion Hcl] Palpitations     Prior to Visit Medications    Medication Sig Taking? Authorizing Provider   gabapentin (NEURONTIN) 300 MG capsule TAKE ONE CAPSULE BY MOUTH TWICE A DAY  Ingrid Retana MD   pantoprazole (PROTONIX) 40 MG tablet Take 1 tablet by mouth 2 times daily  Ingrid Retana MD   traMADol (ULTRAM) 50 MG tablet Take 1 tablet by mouth 2 times daily for 90 days. Messi Booth MD   Denosumab (PROLIA SC) Inject into the skin  Historical Provider, MD   fluticasone-salmeterol (ADVAIR DISKUS) 250-50 MCG/DOSE AEPB Inhale 1 puff into the lungs 2 times daily  Ingrid Retana MD   albuterol-ipratropium (COMBIVENT RESPIMAT)  MCG/ACT AERS inhaler Inhale 1 puff into the lungs every 6 hours  Ingrid Retana MD   OXYGEN Inhale 3 L/min into the lungs continuous Use nightly as needed  Ingrid Retana MD   TOVIAZ 4 MG TB24 ER tablet TAKE ONE TABLET BY MOUTH DAILY  Ingrid Retana MD   ipratropium-albuterol (DUONEB) 0.5-2.5 (3) MG/3ML SOLN nebulizer solution Inhale 3 mLs into the lungs 4 times daily DX:COPD J44.9  Mel Arroyo MD   albuterol sulfate HFA (VENTOLIN HFA) 108 (90 Base) MCG/ACT inhaler Inhale 2 puffs into the

## 2018-12-19 ENCOUNTER — HOSPITAL ENCOUNTER (OUTPATIENT)
Age: 73
Setting detail: SPECIMEN
Discharge: HOME | End: 2018-12-19
Payer: COMMERCIAL

## 2018-12-19 PROCEDURE — 87070 CULTURE OTHR SPECIMN AEROBIC: CPT

## 2018-12-19 PROCEDURE — 87186 SC STD MICRODIL/AGAR DIL: CPT

## 2018-12-19 PROCEDURE — 87205 SMEAR GRAM STAIN: CPT

## 2018-12-19 PROCEDURE — 87077 CULTURE AEROBIC IDENTIFY: CPT

## 2018-12-21 LAB
CULTURE, RESPIRATORY: ABNORMAL
CULTURE, RESPIRATORY: ABNORMAL
GRAM STAIN RESULT: ABNORMAL
ORGANISM: ABNORMAL

## 2018-12-26 ENCOUNTER — TELEPHONE (OUTPATIENT)
Dept: PULMONOLOGY | Age: 73
End: 2018-12-26

## 2018-12-26 NOTE — TELEPHONE ENCOUNTER
Patient called said that the infection in her chest has not cleared up. She still have the congestion in her chest and mucous is still green. So she wanted to know if she could get another round of antibiotics.   [phone]

## 2019-01-10 ENCOUNTER — ANESTHESIA EVENT (OUTPATIENT)
Dept: ENDOSCOPY | Age: 74
End: 2019-01-10
Payer: COMMERCIAL

## 2019-01-14 ENCOUNTER — OFFICE VISIT (OUTPATIENT)
Dept: RHEUMATOLOGY | Age: 74
End: 2019-01-14
Payer: COMMERCIAL

## 2019-01-14 VITALS
HEIGHT: 68 IN | DIASTOLIC BLOOD PRESSURE: 72 MMHG | SYSTOLIC BLOOD PRESSURE: 110 MMHG | BODY MASS INDEX: 17.88 KG/M2 | WEIGHT: 118 LBS | HEART RATE: 80 BPM

## 2019-01-14 DIAGNOSIS — Z79.899 ENCOUNTER FOR LONG-TERM (CURRENT) USE OF MEDICATIONS: ICD-10-CM

## 2019-01-14 DIAGNOSIS — M81.0 AGE-RELATED OSTEOPOROSIS WITHOUT CURRENT PATHOLOGICAL FRACTURE: Primary | ICD-10-CM

## 2019-01-14 PROCEDURE — 99213 OFFICE O/P EST LOW 20 MIN: CPT | Performed by: INTERNAL MEDICINE

## 2019-01-14 RX ORDER — TRAMADOL HYDROCHLORIDE 50 MG/1
50 TABLET ORAL 2 TIMES DAILY
Qty: 60 TABLET | Refills: 2 | Status: SHIPPED | OUTPATIENT
Start: 2019-01-14 | End: 2019-05-09 | Stop reason: SDUPTHER

## 2019-01-15 LAB
CALCIUM SERPL-MCNC: 10.2 MG/DL (ref 8.3–10.6)
CREAT SERPL-MCNC: 0.6 MG/DL (ref 0.6–1.2)
GFR AFRICAN AMERICAN: >60
GFR NON-AFRICAN AMERICAN: >60

## 2019-01-18 ENCOUNTER — TELEPHONE (OUTPATIENT)
Dept: INTERNAL MEDICINE CLINIC | Age: 74
End: 2019-01-18

## 2019-01-29 ENCOUNTER — TELEPHONE (OUTPATIENT)
Dept: RHEUMATOLOGY | Age: 74
End: 2019-01-29

## 2019-01-29 NOTE — TELEPHONE ENCOUNTER
I received a PA request for Prolia 60 mg Injections. I called [address] [place] [address] and spoke to Carole Ibanez. She asked for a start date and I told her that there is a PA on file that ends 3/28/19. She tried to go ahead and do a PA starting on 3/29/19 and the system won't allow her to do once for that far in advance. This will have to be done closer the the end date as listed above. Patient received her last Prolia on August 2nd 2018 so she has 1 more visit on this PA if she gets it in February. She shouldn't need another PA for Prolia until July for her August injection.

## 2019-01-30 ENCOUNTER — HOSPITAL ENCOUNTER (OUTPATIENT)
Age: 74
Setting detail: OUTPATIENT SURGERY
Discharge: HOME | End: 2019-01-30
Attending: INTERNAL MEDICINE | Admitting: INTERNAL MEDICINE
Payer: COMMERCIAL

## 2019-01-30 ENCOUNTER — ANESTHESIA (OUTPATIENT)
Dept: ENDOSCOPY | Age: 74
End: 2019-01-30
Payer: COMMERCIAL

## 2019-01-30 VITALS
SYSTOLIC BLOOD PRESSURE: 129 MMHG | DIASTOLIC BLOOD PRESSURE: 66 MMHG | HEIGHT: 68 IN | BODY MASS INDEX: 17.43 KG/M2 | OXYGEN SATURATION: 99 % | RESPIRATION RATE: 16 BRPM | HEART RATE: 73 BPM | WEIGHT: 115 LBS | TEMPERATURE: 97.6 F

## 2019-01-30 VITALS
OXYGEN SATURATION: 76 % | DIASTOLIC BLOOD PRESSURE: 56 MMHG | SYSTOLIC BLOOD PRESSURE: 120 MMHG | RESPIRATION RATE: 16 BRPM

## 2019-01-30 PROCEDURE — 7100000010 HC PHASE II RECOVERY - FIRST 15 MIN: Performed by: INTERNAL MEDICINE

## 2019-01-30 PROCEDURE — 2580000003 HC RX 258: Performed by: ANESTHESIOLOGY

## 2019-01-30 PROCEDURE — 7100000011 HC PHASE II RECOVERY - ADDTL 15 MIN: Performed by: INTERNAL MEDICINE

## 2019-01-30 PROCEDURE — 2500000003 HC RX 250 WO HCPCS: Performed by: NURSE ANESTHETIST, CERTIFIED REGISTERED

## 2019-01-30 PROCEDURE — 3700000000 HC ANESTHESIA ATTENDED CARE: Performed by: INTERNAL MEDICINE

## 2019-01-30 PROCEDURE — 3609017100 HC EGD: Performed by: INTERNAL MEDICINE

## 2019-01-30 PROCEDURE — 6360000002 HC RX W HCPCS: Performed by: NURSE ANESTHETIST, CERTIFIED REGISTERED

## 2019-01-30 PROCEDURE — 2709999900 HC NON-CHARGEABLE SUPPLY: Performed by: INTERNAL MEDICINE

## 2019-01-30 RX ORDER — PROPOFOL 10 MG/ML
INJECTION, EMULSION INTRAVENOUS PRN
Status: DISCONTINUED | OUTPATIENT
Start: 2019-01-30 | End: 2019-01-30 | Stop reason: SDUPTHER

## 2019-01-30 RX ORDER — LIDOCAINE HYDROCHLORIDE 10 MG/ML
1 INJECTION, SOLUTION EPIDURAL; INFILTRATION; INTRACAUDAL; PERINEURAL
Status: DISCONTINUED | OUTPATIENT
Start: 2019-01-30 | End: 2019-01-30 | Stop reason: HOSPADM

## 2019-01-30 RX ORDER — SODIUM CHLORIDE 9 MG/ML
INJECTION, SOLUTION INTRAVENOUS CONTINUOUS
Status: DISCONTINUED | OUTPATIENT
Start: 2019-01-30 | End: 2019-01-30 | Stop reason: HOSPADM

## 2019-01-30 RX ORDER — LIDOCAINE HYDROCHLORIDE 20 MG/ML
INJECTION, SOLUTION INFILTRATION; PERINEURAL PRN
Status: DISCONTINUED | OUTPATIENT
Start: 2019-01-30 | End: 2019-01-30 | Stop reason: SDUPTHER

## 2019-01-30 RX ADMIN — PROPOFOL 60 MG: 10 INJECTION, EMULSION INTRAVENOUS at 08:11

## 2019-01-30 RX ADMIN — PROPOFOL 30 MG: 10 INJECTION, EMULSION INTRAVENOUS at 08:19

## 2019-01-30 RX ADMIN — SODIUM CHLORIDE: 9 INJECTION, SOLUTION INTRAVENOUS at 07:18

## 2019-01-30 RX ADMIN — PROPOFOL 30 MG: 10 INJECTION, EMULSION INTRAVENOUS at 08:15

## 2019-01-30 RX ADMIN — LIDOCAINE HYDROCHLORIDE 100 MG: 20 INJECTION, SOLUTION INFILTRATION; PERINEURAL at 08:11

## 2019-01-30 ASSESSMENT — COPD QUESTIONNAIRES: CAT_SEVERITY: SEVERE

## 2019-01-30 ASSESSMENT — PAIN SCALES - GENERAL: PAINLEVEL_OUTOF10: 0

## 2019-02-13 ENCOUNTER — TELEPHONE (OUTPATIENT)
Dept: PULMONOLOGY | Age: 74
End: 2019-02-13

## 2019-02-19 ENCOUNTER — TELEPHONE (OUTPATIENT)
Dept: INTERNAL MEDICINE CLINIC | Age: 74
End: 2019-02-19

## 2019-02-20 DIAGNOSIS — J41.1 MUCOPURULENT CHRONIC BRONCHITIS (HCC): ICD-10-CM

## 2019-02-20 RX ORDER — MONTELUKAST SODIUM 10 MG/1
TABLET ORAL
Qty: 30 TABLET | Refills: 10 | Status: SHIPPED | OUTPATIENT
Start: 2019-02-20 | End: 2020-01-15 | Stop reason: SDUPTHER

## 2019-02-27 ENCOUNTER — NURSE ONLY (OUTPATIENT)
Dept: RHEUMATOLOGY | Age: 74
End: 2019-02-27
Payer: COMMERCIAL

## 2019-02-27 DIAGNOSIS — M81.0 AGE-RELATED OSTEOPOROSIS WITHOUT CURRENT PATHOLOGICAL FRACTURE: Primary | ICD-10-CM

## 2019-02-27 PROCEDURE — 96372 THER/PROPH/DIAG INJ SC/IM: CPT | Performed by: INTERNAL MEDICINE

## 2019-03-19 ENCOUNTER — HOSPITAL ENCOUNTER (OUTPATIENT)
Age: 74
Discharge: HOME | End: 2019-03-19
Payer: COMMERCIAL

## 2019-03-19 ENCOUNTER — TELEPHONE (OUTPATIENT)
Dept: PULMONOLOGY | Age: 74
End: 2019-03-19

## 2019-03-19 ENCOUNTER — HOSPITAL ENCOUNTER (OUTPATIENT)
Dept: GENERAL RADIOLOGY | Age: 74
Discharge: HOME | End: 2019-03-19
Payer: COMMERCIAL

## 2019-03-19 ENCOUNTER — OFFICE VISIT (OUTPATIENT)
Dept: PULMONOLOGY | Age: 74
End: 2019-03-19
Payer: COMMERCIAL

## 2019-03-19 VITALS
DIASTOLIC BLOOD PRESSURE: 66 MMHG | WEIGHT: 113 LBS | TEMPERATURE: 99.4 F | OXYGEN SATURATION: 91 % | HEART RATE: 109 BPM | SYSTOLIC BLOOD PRESSURE: 106 MMHG | BODY MASS INDEX: 17.18 KG/M2

## 2019-03-19 DIAGNOSIS — R91.8 PULMONARY NODULES: ICD-10-CM

## 2019-03-19 DIAGNOSIS — J96.11 CHRONIC RESPIRATORY FAILURE WITH HYPOXIA (HCC): ICD-10-CM

## 2019-03-19 DIAGNOSIS — C34.12 MALIGNANT NEOPLASM OF UPPER LOBE OF LEFT LUNG (HCC): ICD-10-CM

## 2019-03-19 DIAGNOSIS — R06.02 SHORTNESS OF BREATH: ICD-10-CM

## 2019-03-19 DIAGNOSIS — R05.3 CHRONIC COUGH: Primary | ICD-10-CM

## 2019-03-19 DIAGNOSIS — R05.3 CHRONIC COUGH: ICD-10-CM

## 2019-03-19 DIAGNOSIS — J44.9 COPD, SEVERE (HCC): ICD-10-CM

## 2019-03-19 DIAGNOSIS — R06.02 SHORTNESS OF BREATH: Primary | ICD-10-CM

## 2019-03-19 PROCEDURE — 99214 OFFICE O/P EST MOD 30 MIN: CPT | Performed by: INTERNAL MEDICINE

## 2019-03-19 PROCEDURE — 71046 X-RAY EXAM CHEST 2 VIEWS: CPT

## 2019-03-19 RX ORDER — CIPROFLOXACIN 500 MG/1
500 TABLET, FILM COATED ORAL 2 TIMES DAILY
Qty: 20 TABLET | Refills: 0 | Status: SHIPPED | OUTPATIENT
Start: 2019-03-19 | End: 2019-03-29

## 2019-03-19 RX ORDER — PREDNISONE 10 MG/1
TABLET ORAL
Qty: 30 TABLET | Refills: 0 | Status: SHIPPED | OUTPATIENT
Start: 2019-03-19 | End: 2019-03-29

## 2019-04-05 ENCOUNTER — TELEPHONE (OUTPATIENT)
Dept: PULMONOLOGY | Age: 74
End: 2019-04-05

## 2019-04-05 NOTE — TELEPHONE ENCOUNTER
Doxycycline 100mg, 10 days  Prednisone taper -- 40mg x 3 days; 30 mg x 3 days; 20 mg x 3 days; 10 mg x 3 days

## 2019-04-05 NOTE — TELEPHONE ENCOUNTER
Pt called in stating that she is sick again. Pt stated she is coughing up green mucus. Pt said she can not come in, she does not trust herself to drive right now and her  is on jury duty. Pt requesting a call back from Sheila.      Pt # [phone]

## 2019-04-25 ENCOUNTER — OFFICE VISIT (OUTPATIENT)
Dept: INTERNAL MEDICINE CLINIC | Age: 74
End: 2019-04-25

## 2019-04-25 VITALS
HEART RATE: 103 BPM | BODY MASS INDEX: 18.09 KG/M2 | SYSTOLIC BLOOD PRESSURE: 100 MMHG | OXYGEN SATURATION: 93 % | WEIGHT: 119 LBS | DIASTOLIC BLOOD PRESSURE: 70 MMHG

## 2019-04-25 DIAGNOSIS — I10 ESSENTIAL HYPERTENSION: Primary | ICD-10-CM

## 2019-04-25 PROCEDURE — 99214 OFFICE O/P EST MOD 30 MIN: CPT | Performed by: INTERNAL MEDICINE

## 2019-04-25 RX ORDER — DOXYCYCLINE HYCLATE 100 MG
100 TABLET ORAL 2 TIMES DAILY
Qty: 20 TABLET | Refills: 3 | Status: SHIPPED | OUTPATIENT
Start: 2019-04-25 | End: 2019-05-05

## 2019-04-25 NOTE — PROGRESS NOTES
Chief Complaint   Patient presents with    COPD     wants labs ordered again     Subjective:      Patient ID: Sachin Rdz is a 69 y.o. female. HPI \"didn't have any blood work ordered. Yahaira Never Yahaira Never \"  Wears home Oxygen  I WHEEZE ALL THE TIME   wants a form completed for a physical exam!  My husbands work needs it signed  Sees Pulm every 3 months    Takes Cardizem for swallowing that's from Dr. Dana Castellanos  See you had me on Lisinopril so I stopped it    Review of Systems-wants refill on something, takes doxycycline chronically due to all her respiratory infections. ..   Takes doxy when she needs it used 4 courses over past 7 months    Current Outpatient Medications on File Prior to Visit   Medication Sig Dispense Refill    montelukast (SINGULAIR) 10 MG tablet TAKE ONE TABLET BY MOUTH ONCE NIGHTLY 30 tablet 10    gabapentin (NEURONTIN) 300 MG capsule TAKE ONE CAPSULE BY MOUTH TWICE A DAY 60 capsule 4    pantoprazole (PROTONIX) 40 MG tablet Take 1 tablet by mouth 2 times daily 30 tablet 1    Denosumab (PROLIA SC) Inject into the skin      fluticasone-salmeterol (ADVAIR DISKUS) 250-50 MCG/DOSE AEPB Inhale 1 puff into the lungs 2 times daily 60 each 11    albuterol-ipratropium (COMBIVENT RESPIMAT)  MCG/ACT AERS inhaler Inhale 1 puff into the lungs every 6 hours 1 Inhaler 11    OXYGEN Inhale 3 L/min into the lungs continuous Use nightly as needed 1 Bottle 5    TOVIAZ 4 MG TB24 ER tablet TAKE ONE TABLET BY MOUTH DAILY 30 tablet 10    ipratropium-albuterol (DUONEB) 0.5-2.5 (3) MG/3ML SOLN nebulizer solution Inhale 3 mLs into the lungs 4 times daily DX:COPD J44.9 360 mL 11    albuterol sulfate HFA (VENTOLIN HFA) 108 (90 Base) MCG/ACT inhaler Inhale 2 puffs into the lungs every 6 hours as needed for Wheezing 1 Inhaler 11plkeas    ferrous sulfate 325 (65 Fe) MG tablet Take 1 tablet by mouth 2 times daily (with meals) 30 tablet 1    acetaminophen (TYLENOL) 325 MG tablet Take 650 mg by mouth every 6 hours as needed for Pain       diltiazem (CARDIZEM) 60 MG tablet Take 60 mg by mouth 4 times daily       calcium carbonate 600 MG TABS tablet Take 1 tablet by mouth 2 times daily      Estradiol (VAGIFEM) 10 MCG TABS vaginal tablet Place 10 mcg vaginally daily      Cholecalciferol (VITAMIN D) 2000 UNITS CAPS capsule Take 1 capsule by mouth daily      therapeutic multivitamin-minerals (THERAGRAN-M) tablet Take 1 tablet by mouth daily. No current facility-administered medications on file prior to visit. Objective:   Physical Exam   Constitutional: She is oriented to person, place, and time. She appears well-developed and well-nourished. /70   Pulse 103   Wt 119 lb (54 kg)   SpO2 93%   BMI 18.09 kg/m²      Neck: No JVD present. No thyromegaly present. Cardiovascular: Normal rate, regular rhythm and normal heart sounds. Pulmonary/Chest: Effort normal. No respiratory distress. She has wheezes. \"always has wheezes\"   Musculoskeletal: Normal range of motion. She exhibits no edema or tenderness. Neurological: She is alert and oriented to person, place, and time. She has normal reflexes. Psychiatric: She has a normal mood and affect. Her behavior is normal.   Nursing note and vitals reviewed.       Assessment:      Patient Active Problem List   Diagnosis    COPD (chronic obstructive pulmonary disease) ([address]    Lung cancer ([address]    Hypertension    Diverticulosis    Acute on chronic respiratory failure with hypoxia (HCC)    OAB (overactive bladder)    Pulmonary nodules    Multifocal pneumonia    Cavitary lesion of lung    Left lower lobe pneumonia (HCC)    Ventral hernia    Cerumen debris on tympanic membrane    COPD, severe ([address]    Hemoptysis    Anorexia    S/P lobectomy of lung    Shortness of breath    Chronic cough    COPD exacerbation (HCC)    Acute on chronic respiratory failure (HCC)    Pneumonia of both lungs due to Streptococcus pneumoniae ([address]    Upper GI bleeding    Sepsis ([address]    Upper GI bleed    Chronic respiratory failure with hypoxia (HCC)    HAP (hospital-acquired pneumonia)    Mucus plugging of bronchi    Bilateral impacted cerumen    Pneumonia due to infectious organism    Pneumonia           Plan:      Discussed possible decrease Cardizem due to low BP if OK with Dr. Marla Hernadez (takes it for swallowing)    F/u with Dr. Justin Lopez for COPD  OK'd prn Doxycycline again as per pt request    Well call pt with lab results when available    F/u here in 6 months    Joe Ely MD

## 2019-05-09 ENCOUNTER — OFFICE VISIT (OUTPATIENT)
Dept: RHEUMATOLOGY | Age: 74
End: 2019-05-09
Payer: COMMERCIAL

## 2019-05-09 VITALS
HEART RATE: 80 BPM | BODY MASS INDEX: 17.97 KG/M2 | WEIGHT: 118.2 LBS | DIASTOLIC BLOOD PRESSURE: 68 MMHG | SYSTOLIC BLOOD PRESSURE: 110 MMHG

## 2019-05-09 DIAGNOSIS — M81.0 AGE-RELATED OSTEOPOROSIS WITHOUT CURRENT PATHOLOGICAL FRACTURE: ICD-10-CM

## 2019-05-09 PROCEDURE — 99213 OFFICE O/P EST LOW 20 MIN: CPT | Performed by: INTERNAL MEDICINE

## 2019-05-09 RX ORDER — TRAMADOL HYDROCHLORIDE 50 MG/1
50 TABLET ORAL 2 TIMES DAILY
Qty: 60 TABLET | Refills: 2 | Status: SHIPPED | OUTPATIENT
Start: 2019-05-09 | End: 2019-05-13 | Stop reason: ALTCHOICE

## 2019-05-13 ENCOUNTER — APPOINTMENT (OUTPATIENT)
Dept: GENERAL RADIOLOGY | Age: 74
DRG: 190 | End: 2019-05-13
Payer: COMMERCIAL

## 2019-05-13 ENCOUNTER — TELEPHONE (OUTPATIENT)
Dept: PULMONOLOGY | Age: 74
End: 2019-05-13

## 2019-05-13 ENCOUNTER — APPOINTMENT (OUTPATIENT)
Dept: CT IMAGING | Age: 74
DRG: 190 | End: 2019-05-13
Payer: COMMERCIAL

## 2019-05-13 ENCOUNTER — HOSPITAL ENCOUNTER (INPATIENT)
Age: 74
LOS: 4 days | Discharge: HOME | DRG: 190 | End: 2019-05-17
Attending: EMERGENCY MEDICINE | Admitting: INTERNAL MEDICINE
Payer: COMMERCIAL

## 2019-05-13 DIAGNOSIS — J18.9 PNEUMONIA DUE TO ORGANISM: ICD-10-CM

## 2019-05-13 DIAGNOSIS — J44.1 COPD EXACERBATION (HCC): Primary | ICD-10-CM

## 2019-05-13 PROBLEM — H61.23 BILATERAL IMPACTED CERUMEN: Status: RESOLVED | Noted: 2018-06-12 | Resolved: 2019-05-13

## 2019-05-13 PROBLEM — J96.20 ACUTE ON CHRONIC RESPIRATORY FAILURE (HCC): Status: RESOLVED | Noted: 2018-01-09 | Resolved: 2019-05-13

## 2019-05-13 PROBLEM — A41.9 SEPSIS (HCC): Status: RESOLVED | Noted: 2018-01-17 | Resolved: 2019-05-13

## 2019-05-13 LAB
A/G RATIO: 1.2 (ref 1.1–2.2)
ALBUMIN SERPL-MCNC: 3.6 G/DL (ref 3.4–5)
ALP BLD-CCNC: 57 U/L (ref 40–129)
ALT SERPL-CCNC: 9 U/L (ref 10–40)
ANION GAP SERPL CALCULATED.3IONS-SCNC: 11 MMOL/L (ref 3–16)
AST SERPL-CCNC: 13 U/L (ref 15–37)
BASOPHILS ABSOLUTE: 0 K/UL (ref 0–0.2)
BASOPHILS RELATIVE PERCENT: 0.1 %
BILIRUB SERPL-MCNC: 0.8 MG/DL (ref 0–1)
BUN BLDV-MCNC: 14 MG/DL (ref 7–20)
CALCIUM SERPL-MCNC: 9.7 MG/DL (ref 8.3–10.6)
CHLORIDE BLD-SCNC: 103 MMOL/L (ref 99–110)
CO2: 26 MMOL/L (ref 21–32)
CREAT SERPL-MCNC: <0.5 MG/DL (ref 0.6–1.2)
EKG ATRIAL RATE: 100 BPM
EKG DIAGNOSIS: NORMAL
EKG P AXIS: 83 DEGREES
EKG P-R INTERVAL: 126 MS
EKG Q-T INTERVAL: 344 MS
EKG QRS DURATION: 66 MS
EKG QTC CALCULATION (BAZETT): 443 MS
EKG R AXIS: 5 DEGREES
EKG T AXIS: 50 DEGREES
EKG VENTRICULAR RATE: 100 BPM
EOSINOPHILS ABSOLUTE: 0 K/UL (ref 0–0.6)
EOSINOPHILS RELATIVE PERCENT: 0.3 %
GFR AFRICAN AMERICAN: >60
GFR NON-AFRICAN AMERICAN: >60
GLOBULIN: 2.9 G/DL
GLUCOSE BLD-MCNC: 116 MG/DL (ref 70–99)
GLUCOSE BLD-MCNC: 233 MG/DL (ref 70–99)
HCT VFR BLD CALC: 46.3 % (ref 36–48)
HEMOGLOBIN: 15 G/DL (ref 12–16)
INR BLD: 1.09 (ref 0.86–1.14)
LACTIC ACID, SEPSIS: 0.9 MMOL/L (ref 0.4–1.9)
LYMPHOCYTES ABSOLUTE: 1.1 K/UL (ref 1–5.1)
LYMPHOCYTES RELATIVE PERCENT: 8.1 %
MCH RBC QN AUTO: 30.3 PG (ref 26–34)
MCHC RBC AUTO-ENTMCNC: 32.4 G/DL (ref 31–36)
MCV RBC AUTO: 93.4 FL (ref 80–100)
MONOCYTES ABSOLUTE: 1.2 K/UL (ref 0–1.3)
MONOCYTES RELATIVE PERCENT: 9 %
NEUTROPHILS ABSOLUTE: 11.3 K/UL (ref 1.7–7.7)
NEUTROPHILS RELATIVE PERCENT: 82.5 %
PDW BLD-RTO: 14 % (ref 12.4–15.4)
PERFORMED ON: ABNORMAL
PLATELET # BLD: 179 K/UL (ref 135–450)
PMV BLD AUTO: 9.8 FL (ref 5–10.5)
POTASSIUM REFLEX MAGNESIUM: 4.3 MMOL/L (ref 3.5–5.1)
PRO-BNP: 253 PG/ML (ref 0–124)
PROCALCITONIN: 0.13 NG/ML (ref 0–0.15)
PROTHROMBIN TIME: 12.4 SEC (ref 9.8–13)
RAPID INFLUENZA  B AGN: NEGATIVE
RAPID INFLUENZA A AGN: NEGATIVE
RBC # BLD: 4.95 M/UL (ref 4–5.2)
SODIUM BLD-SCNC: 140 MMOL/L (ref 136–145)
TOTAL PROTEIN: 6.5 G/DL (ref 6.4–8.2)
TROPONIN: <0.01 NG/ML
WBC # BLD: 13.7 K/UL (ref 4–11)

## 2019-05-13 PROCEDURE — 6370000000 HC RX 637 (ALT 250 FOR IP): Performed by: PHYSICIAN ASSISTANT

## 2019-05-13 PROCEDURE — 1200000000 HC SEMI PRIVATE

## 2019-05-13 PROCEDURE — 71046 X-RAY EXAM CHEST 2 VIEWS: CPT

## 2019-05-13 PROCEDURE — 94760 N-INVAS EAR/PLS OXIMETRY 1: CPT

## 2019-05-13 PROCEDURE — 85610 PROTHROMBIN TIME: CPT

## 2019-05-13 PROCEDURE — 2700000000 HC OXYGEN THERAPY PER DAY

## 2019-05-13 PROCEDURE — 96365 THER/PROPH/DIAG IV INF INIT: CPT

## 2019-05-13 PROCEDURE — 94640 AIRWAY INHALATION TREATMENT: CPT

## 2019-05-13 PROCEDURE — 6370000000 HC RX 637 (ALT 250 FOR IP): Performed by: INTERNAL MEDICINE

## 2019-05-13 PROCEDURE — 80053 COMPREHEN METABOLIC PANEL: CPT

## 2019-05-13 PROCEDURE — 6360000002 HC RX W HCPCS: Performed by: INTERNAL MEDICINE

## 2019-05-13 PROCEDURE — 96375 TX/PRO/DX INJ NEW DRUG ADDON: CPT

## 2019-05-13 PROCEDURE — 84145 PROCALCITONIN (PCT): CPT

## 2019-05-13 PROCEDURE — 93005 ELECTROCARDIOGRAM TRACING: CPT | Performed by: EMERGENCY MEDICINE

## 2019-05-13 PROCEDURE — 83605 ASSAY OF LACTIC ACID: CPT

## 2019-05-13 PROCEDURE — 85025 COMPLETE CBC W/AUTO DIFF WBC: CPT

## 2019-05-13 PROCEDURE — 84484 ASSAY OF TROPONIN QUANT: CPT

## 2019-05-13 PROCEDURE — 99223 1ST HOSP IP/OBS HIGH 75: CPT | Performed by: INTERNAL MEDICINE

## 2019-05-13 PROCEDURE — 87040 BLOOD CULTURE FOR BACTERIA: CPT

## 2019-05-13 PROCEDURE — 87804 INFLUENZA ASSAY W/OPTIC: CPT

## 2019-05-13 PROCEDURE — 83880 ASSAY OF NATRIURETIC PEPTIDE: CPT

## 2019-05-13 PROCEDURE — 93010 ELECTROCARDIOGRAM REPORT: CPT | Performed by: INTERNAL MEDICINE

## 2019-05-13 PROCEDURE — 99285 EMERGENCY DEPT VISIT HI MDM: CPT

## 2019-05-13 PROCEDURE — 83036 HEMOGLOBIN GLYCOSYLATED A1C: CPT

## 2019-05-13 PROCEDURE — 6360000002 HC RX W HCPCS: Performed by: PHYSICIAN ASSISTANT

## 2019-05-13 PROCEDURE — 71250 CT THORAX DX C-: CPT

## 2019-05-13 RX ORDER — GUAIFENESIN 600 MG/1
1200 TABLET, EXTENDED RELEASE ORAL DAILY
COMMUNITY

## 2019-05-13 RX ORDER — DEXTROSE MONOHYDRATE 25 G/50ML
12.5 INJECTION, SOLUTION INTRAVENOUS PRN
Status: DISCONTINUED | OUTPATIENT
Start: 2019-05-13 | End: 2019-05-17 | Stop reason: HOSPADM

## 2019-05-13 RX ORDER — TROSPIUM CHLORIDE 20 MG/1
20 TABLET, FILM COATED ORAL
Status: DISCONTINUED | OUTPATIENT
Start: 2019-05-13 | End: 2019-05-15

## 2019-05-13 RX ORDER — IPRATROPIUM BROMIDE AND ALBUTEROL SULFATE 2.5; .5 MG/3ML; MG/3ML
1 SOLUTION RESPIRATORY (INHALATION) EVERY 6 HOURS
Status: DISCONTINUED | OUTPATIENT
Start: 2019-05-13 | End: 2019-05-14

## 2019-05-13 RX ORDER — TRAMADOL HYDROCHLORIDE 50 MG/1
50 TABLET ORAL EVERY 6 HOURS PRN
Status: DISCONTINUED | OUTPATIENT
Start: 2019-05-13 | End: 2019-05-17 | Stop reason: HOSPADM

## 2019-05-13 RX ORDER — GUAIFENESIN 600 MG/1
1200 TABLET, EXTENDED RELEASE ORAL DAILY
Status: DISCONTINUED | OUTPATIENT
Start: 2019-05-13 | End: 2019-05-17 | Stop reason: HOSPADM

## 2019-05-13 RX ORDER — METHYLPREDNISOLONE SODIUM SUCCINATE 40 MG/ML
40 INJECTION, POWDER, LYOPHILIZED, FOR SOLUTION INTRAMUSCULAR; INTRAVENOUS EVERY 6 HOURS
Status: DISCONTINUED | OUTPATIENT
Start: 2019-05-13 | End: 2019-05-15

## 2019-05-13 RX ORDER — MONTELUKAST SODIUM 10 MG/1
10 TABLET ORAL NIGHTLY
Status: DISCONTINUED | OUTPATIENT
Start: 2019-05-13 | End: 2019-05-17 | Stop reason: HOSPADM

## 2019-05-13 RX ORDER — TRAMADOL HYDROCHLORIDE 50 MG/1
50 TABLET ORAL 2 TIMES DAILY PRN
COMMUNITY
End: 2020-11-09 | Stop reason: SDUPTHER

## 2019-05-13 RX ORDER — NICOTINE POLACRILEX 4 MG
15 LOZENGE BUCCAL PRN
Status: DISCONTINUED | OUTPATIENT
Start: 2019-05-13 | End: 2019-05-17 | Stop reason: HOSPADM

## 2019-05-13 RX ORDER — LEVOFLOXACIN 5 MG/ML
500 INJECTION, SOLUTION INTRAVENOUS EVERY 24 HOURS
Status: DISCONTINUED | OUTPATIENT
Start: 2019-05-13 | End: 2019-05-14

## 2019-05-13 RX ORDER — IPRATROPIUM BROMIDE AND ALBUTEROL SULFATE 2.5; .5 MG/3ML; MG/3ML
1 SOLUTION RESPIRATORY (INHALATION) ONCE
Status: COMPLETED | OUTPATIENT
Start: 2019-05-13 | End: 2019-05-13

## 2019-05-13 RX ORDER — DEXTROSE MONOHYDRATE 50 MG/ML
100 INJECTION, SOLUTION INTRAVENOUS PRN
Status: DISCONTINUED | OUTPATIENT
Start: 2019-05-13 | End: 2019-05-17 | Stop reason: HOSPADM

## 2019-05-13 RX ORDER — LEVOFLOXACIN 5 MG/ML
750 INJECTION, SOLUTION INTRAVENOUS EVERY 24 HOURS
Status: DISCONTINUED | OUTPATIENT
Start: 2019-05-14 | End: 2019-05-17

## 2019-05-13 RX ORDER — GABAPENTIN 300 MG/1
300 CAPSULE ORAL 2 TIMES DAILY
Status: DISCONTINUED | OUTPATIENT
Start: 2019-05-13 | End: 2019-05-17 | Stop reason: HOSPADM

## 2019-05-13 RX ORDER — IPRATROPIUM BROMIDE AND ALBUTEROL SULFATE 2.5; .5 MG/3ML; MG/3ML
1 SOLUTION RESPIRATORY (INHALATION) 4 TIMES DAILY
Status: DISCONTINUED | OUTPATIENT
Start: 2019-05-13 | End: 2019-05-17 | Stop reason: HOSPADM

## 2019-05-13 RX ORDER — DILTIAZEM HYDROCHLORIDE 60 MG/1
60 TABLET, FILM COATED ORAL 4 TIMES DAILY
Status: DISCONTINUED | OUTPATIENT
Start: 2019-05-13 | End: 2019-05-17 | Stop reason: HOSPADM

## 2019-05-13 RX ADMIN — LEVOFLOXACIN 500 MG: 5 INJECTION, SOLUTION INTRAVENOUS at 14:38

## 2019-05-13 RX ADMIN — GABAPENTIN 300 MG: 300 CAPSULE ORAL at 21:42

## 2019-05-13 RX ADMIN — GUAIFENESIN 1200 MG: 600 TABLET, EXTENDED RELEASE ORAL at 19:12

## 2019-05-13 RX ADMIN — IPRATROPIUM BROMIDE AND ALBUTEROL SULFATE 1 AMPULE: .5; 3 SOLUTION RESPIRATORY (INHALATION) at 13:31

## 2019-05-13 RX ADMIN — Medication 2 PUFF: at 19:57

## 2019-05-13 RX ADMIN — MONTELUKAST SODIUM 10 MG: 10 TABLET, FILM COATED ORAL at 21:42

## 2019-05-13 RX ADMIN — IPRATROPIUM BROMIDE AND ALBUTEROL SULFATE 1 AMPULE: .5; 3 SOLUTION RESPIRATORY (INHALATION) at 19:57

## 2019-05-13 RX ADMIN — METHYLPREDNISOLONE SODIUM SUCCINATE 40 MG: 40 INJECTION, POWDER, FOR SOLUTION INTRAMUSCULAR; INTRAVENOUS at 16:18

## 2019-05-13 RX ADMIN — METHYLPREDNISOLONE SODIUM SUCCINATE 40 MG: 40 INJECTION, POWDER, FOR SOLUTION INTRAMUSCULAR; INTRAVENOUS at 21:42

## 2019-05-13 RX ADMIN — TRAMADOL HYDROCHLORIDE 50 MG: 50 TABLET, FILM COATED ORAL at 19:12

## 2019-05-13 RX ADMIN — DILTIAZEM HYDROCHLORIDE 60 MG: 60 TABLET, FILM COATED ORAL at 21:42

## 2019-05-13 RX ADMIN — ALBUTEROL SULFATE 5 MG: 2.5 SOLUTION RESPIRATORY (INHALATION) at 13:31

## 2019-05-13 RX ADMIN — IPRATROPIUM BROMIDE AND ALBUTEROL SULFATE 1 AMPULE: .5; 3 SOLUTION RESPIRATORY (INHALATION) at 14:18

## 2019-05-13 RX ADMIN — INSULIN LISPRO 1 UNITS: 100 INJECTION, SOLUTION INTRAVENOUS; SUBCUTANEOUS at 21:42

## 2019-05-13 ASSESSMENT — ENCOUNTER SYMPTOMS
ALLERGIC/IMMUNOLOGIC NEGATIVE: 1
NAUSEA: 0
DIARRHEA: 0
SHORTNESS OF BREATH: 1
CONSTIPATION: 0
CHEST TIGHTNESS: 1
GASTROINTESTINAL NEGATIVE: 1
ABDOMINAL PAIN: 0
WHEEZING: 1
COLOR CHANGE: 0
VOMITING: 0
COUGH: 1
EYES NEGATIVE: 1

## 2019-05-13 ASSESSMENT — PAIN DESCRIPTION - LOCATION
LOCATION: BACK
LOCATION: BACK

## 2019-05-13 ASSESSMENT — PAIN SCALES - GENERAL
PAINLEVEL_OUTOF10: 10
PAINLEVEL_OUTOF10: 3
PAINLEVEL_OUTOF10: 8
PAINLEVEL_OUTOF10: 0
PAINLEVEL_OUTOF10: 7

## 2019-05-13 ASSESSMENT — PAIN DESCRIPTION - PAIN TYPE
TYPE: CHRONIC PAIN
TYPE: CHRONIC PAIN

## 2019-05-13 ASSESSMENT — PAIN DESCRIPTION - ORIENTATION: ORIENTATION: UPPER

## 2019-05-13 NOTE — ED NOTES
Juju Field RN to ED to pull patient to [facility] room 4474. Bedside report given. Patient's belongings placed on bed and transported to [facility] with patient. Patient's IV is saline locked. Patient's  is at the bedside. Patient transported by bed on 2 liter O2 nasal canula by ED tech [place].      Loretta Saez RN  05/13/19 [id number]

## 2019-05-13 NOTE — PROGRESS NOTES
4 Eyes Skin Assessment     The patient is being assess for  Admission    I agree that 2 RN's have performed a thorough Head to Toe Skin Assessment on the patient. ALL assessment sites listed below have been assessed. Areas assessed by both nurses:   [x]   Head, Face, and Ears   [x]   Shoulders, Back, and Chest  [x]   Arms, Elbows, and Hands   [x]   Coccyx, Sacrum, and IschIum  [x]   Legs, Feet, and Heels        Does the Patient have Skin Breakdown?   No         Lele Prevention initiated:  No   Wound Care Orders initiated:  No      [facility] nurse consulted for Pressure Injury (Stage 3,4, Unstageable, DTI, NWPT, and Complex wounds), New and Established Ostomies:  No      Nurse 1 eSignature: Electronically signed by Josseline Gutiérrez RN on 5/13/19 at 6:30 PM    **SHARE this note so that the co-signing nurse is able to place an eSignature**    Nurse 2 eSignature: Electronically signed by Eddy Barksdale RN on 5/13/19 at 7:02 PM

## 2019-05-13 NOTE — ED PROVIDER NOTES
[facility] Emergency Department      Pt Name: Katelyn Martinez  MRN: [id number]  [place] 1945  Date of evaluation: 5/13/2019  Provider: Christin Pittman MD  I independently performed a history and physical on Katelyn Martinez. All diagnostic, treatment, and disposition decisions were made by myself in conjunction with the advanced practice provider. HPI: Katelyn Martinez presented with   Chief Complaint   Patient presents with    Shortness of Breath     pt transported from home by [employer]. pt feeling short of breath since Saturday night, coughing up green gunk, she started taking doxycycline. pt unable to speak in complete sentences for ems, bilat wheezes heard per ems. 2 duonebs and 125 mg solumedrol administed by ems. Katelyn Martinez has a past medical history of Bronchitis, COPD, Hemoptysis, Hernia, Hypertension, Lung cancer ([facility] (2008), Osteoporosis, Pneumonia, Rupture of colon ([facility] (05/24/2013), and Skin cancer (2014). She has a past surgical history that includes thoracotomy (0645); lobectomy (6412); Cholecystectomy; Tonsillectomy; Mandible surgery; sigmoidectomy (5/24/13); other surgical history (5/30/13); Colonoscopy (8/20/13); bronchoscopy (08/05/2016); Kyphosis surgery (02/04/2016); Upper gastrointestinal endoscopy (01/18/2018); bronchoscopy (01/31/2018); Upper gastrointestinal endoscopy (N/A, 10/31/2018); and Upper gastrointestinal endoscopy (N/A, 1/30/2019). No current facility-administered medications on file prior to encounter.       Current Outpatient Medications on File Prior to Encounter   Medication Sig Dispense Refill    guaiFENesin (MUCINEX) 600 MG extended release tablet Take 1,200 mg by mouth daily      montelukast (SINGULAIR) 10 MG tablet TAKE ONE TABLET BY MOUTH ONCE NIGHTLY 30 tablet 10    gabapentin (NEURONTIN) 300 MG capsule TAKE ONE CAPSULE BY MOUTH TWICE A DAY 60 capsule 4    fluticasone-salmeterol (ADVAIR DISKUS) 250-50 MCG/DOSE AEPB Inhale 1 puff into the lungs 2 times daily 60 each 11    albuterol-ipratropium (COMBIVENT RESPIMAT)  MCG/ACT AERS inhaler Inhale 1 puff into the lungs every 6 hours 1 Inhaler 11    OXYGEN Inhale 3 L/min into the lungs continuous Use nightly as needed 1 Bottle 5    TOVIAZ 4 MG TB24 ER tablet TAKE ONE TABLET BY MOUTH DAILY 30 tablet 10    albuterol sulfate HFA (VENTOLIN HFA) 108 (90 Base) MCG/ACT inhaler Inhale 2 puffs into the lungs every 6 hours as needed for Wheezing 1 Inhaler 11plkeas    diltiazem (CARDIZEM) 60 MG tablet Take 60 mg by mouth 4 times daily       Denosumab (PROLIA SC) Inject into the skin Every 6 months       PHYSICAL EXAM  Vitals: BP (!) 129/50   Pulse 102   Temp 98.3 °F (36.8 °C) (Oral)   Resp (!) 36   Ht 5' 8\" (1.727 m)   Wt 118 lb (53.5 kg)   SpO2 93%   BMI 17.94 kg/m²   Constitutional:  69 y.o. female alert, cooperative  HENT:  Atraumatic scalp, mucous membranes moist  Eyes:   Conjunctiva clear, no icterus  Neck:  Supple, no visible JVD, no signs of injury  Cardiovascular:  Regular, no rubs, no discernible murmur  Thorax & Lungs:  Slight accessory muscle usage, wheezes present  Abdomen:  Soft, non distended, NT  Back:  No deformity  Genitalia:  Deferred  Rectal:  Deferred  Extremities:  No cyanosis, no edema, adequate perfusion  Skin:  Warm, dry  Neurologic:  Alert, no slurred speech  Psychiatric:  Affect appropriate    Medical Decision Making and Plan:  Briefly, this is an 69 y. o.female who presented with shortness of breath, green phlegm production, hx of COPD. CT shows ASD. Abx initiated. Dr. Jolene Jarrell will consult. Plan is to admit for further care. For further details of [facility] encounter, please see documentation by advanced practice provider TAWANNA Pelletier.      Labs Reviewed   CBC WITH AUTO DIFFERENTIAL - Abnormal; Notable for the following components:       Result Value    WBC 13.7 (*)     Neutrophils # 11.3 (*)     All other components within normal limits    Narrative:     Performed at:  [facility] [address], [place]   Phone [phone]   COMPREHENSIVE METABOLIC PANEL W/ REFLEX TO MG FOR LOW K - Abnormal; Notable for the following components:    Glucose 116 (*)     CREATININE <0.5 (*)     ALT 9 (*)     AST 13 (*)     All other components within normal limits    Narrative:     Performed at:  [facility] [address] [place], [place]   Phone [phone]   BRAIN NATRIURETIC PEPTIDE - Abnormal; Notable for the following components:    Pro- (*)     All other components within normal limits    Narrative:     Performed at:  [facility] [address], [place]   Phone [phone]   RAPID INFLUENZA A/B ANTIGENS    Narrative:     Performed at:  [facility] [address], [place]   Phone [phone]   CULTURE BLOOD #1   CULTURE BLOOD #2   TROPONIN    Narrative:     Performed at:  [facility] [address], [place]   Phone [phone]   PROTIME-INR    Narrative:     Performed at:  [facility] [address], [place]   Phone [phone]   LACTATE, SEPSIS    Narrative:     Performed at:  [facility] [address]   Phone [phone]   PROCALCITONIN     RADIOLOGY:     Plain x-rays were viewed by me:   CT Chest WO Contrast   Final Result   Multifocal airspace opacities as above favoring infection. Recommend short   interval follow-up in 3 months to ensure resolution given background   emphysema. A few scattered pulmonary nodules measuring up to 5 mm, not substantially   changed. These have been stable for a year no further follow-up needed.          XR CHEST STANDARD (2 VW)   Final Result   No significant change in left basilar opacity which may be chronic           EKG:  Read by me in the absence of a cardiologist shows:  SR, rate 100, intervals normal, axis 5, NSSTTWA, delayed R wave progression, no major change from prior study     Vitals:    05/13/19 1350 05/13/19 1415 05/13/19 1419 05/13/19 1430   BP:  (!) 137/55     Pulse: 106   111   Resp: 24   22   Temp:       TempSrc:       SpO2: 92%  95% 94%   Weight:       Height:         Medications administered:  EMS provided solumedrol 125 mg  Medications   ipratropium-albuterol (DUONEB) nebulizer solution 1 ampule (1 ampule Inhalation Given 5/13/19 1418)   mometasone-formoterol (DULERA) 200-5 MCG/ACT inhaler 2 puff (has no administration in time range)   methylPREDNISolone sodium (SOLU-MEDROL) injection 40 mg (has no administration in time range)   levofloxacin (LEVAQUIN) 500 MG/100ML infusion 500 mg (500 mg Intravenous New Bag 5/13/19 1438)   ipratropium-albuterol (DUONEB) nebulizer solution 1 ampule (1 ampule Inhalation Given 5/13/19 1331)   albuterol (PROVENTIL) nebulizer solution 5 mg (5 mg Nebulization Given 5/13/19 1331)     FINAL IMPRESSION:    1. COPD exacerbation ([address]    2.  Pneumonia due to organism          Leeanne Lau MD  05/13/19 2293

## 2019-05-13 NOTE — CONSULTS
[facility] Pulmonary and Critical Care   Consult Note      Reason for Consult: Shortness of breath, very severe COPD, respiratory failure   Requesting Physician: Dr Jen Gilmore:   [facility]:                The patient is a 69 y.o. female with significant past medical history of:      Diagnosis Date    Bronchitis     COPD     Hemoptysis     Hernia     Hypertension     Lung cancer ([facility] [facility]     left lung    Osteoporosis     Pneumonia     Rupture of colon ([facility] [facility] 2013    Skin cancer 2014    ear     The patient presented to the emergency department acutely with complaints of severe shortness of breath. She has had chest tightness and congestion over the last several days. She is known to have very severe COPD of many years duration. She normally takes Advair, Combivent and DuoNeb. She is oxygen dependent as well. She does have a prior history of lung cancer with left upper lobectomy in  for non-small cell carcinoma.   She struggled with smoking cessation       Past Surgical History:        Procedure Laterality Date    BRONCHOSCOPY  2016    BRONCHOSCOPY  2018    CHOLECYSTECTOMY      COLONOSCOPY  13    KYPHOSIS SURGERY  2016    LOBECTOMY  0808    MANDIBLE SURGERY      upper jaw and lower jaw    OTHER SURGICAL HISTORY  13    secondary wound closure    SIGMOIDECTOMY  13    THORACOTOMY  0808    TONSILLECTOMY      UPPER GASTROINTESTINAL ENDOSCOPY  2018    UPPER GASTROINTESTINAL ENDOSCOPY N/A 10/31/2018    EGD BIOPSY performed by Jacek Manuel MD at [facility] N/A 2019    EGD performed by Jacek Manuel MD at [facility]     Current Medications:    Current Facility-Administered Medications: ipratropium-albuterol (DUONEB) nebulizer solution 1 ampule, 1 ampule, Inhalation, 4x daily  mometasone-formoterol (DULERA) 200-5 MCG/ACT inhaler 2 puff, 2 puff, Inhalation, BID **AND** MDI Treatment, , , BID  methylPREDNISolone sodium (SOLU-MEDROL) injection 40 mg, 40 mg, Intravenous, Q6H  levofloxacin (LEVAQUIN) 500 MG/100ML infusion 500 mg, 500 mg, Intravenous, Q24H    Allergies   Allergen Reactions    Wellbutrin [Bupropion Hcl] Palpitations       Social History:    TOBACCO:   reports that she has been smoking cigarettes. She has a 4.20 pack-year smoking history. She has never used smokeless tobacco.  ETOH:   reports that she does not drink alcohol. Patient currently lives independently  Environmental/chemical exposure: None known     Family History:       Problem Relation Age of Onset   Beasley Diabetes Father     Other Father         A-Fib    Cancer Father         prostate    Cancer Mother         ovarian    Other Sister         A-Fib     REVIEW OF SYSTEMS:    CONSTITUTIONAL:  negative for fevers, chills, diaphoresis, activity change, appetite change, fatigue, night sweats and unexpected weight change.    EYES:  negative for blurred vision, eye discharge, visual disturbance and icterus  HEENT:  negative for hearing loss, tinnitus, ear drainage, sinus pressure, nasal congestion, epistaxis and snoring  RESPIRATORY:  See HPI  CARDIOVASCULAR:  negative for chest pain, palpitations, exertional chest pressure/discomfort, edema, syncope  GASTROINTESTINAL:  negative for nausea, vomiting, diarrhea, constipation, blood in stool and abdominal pain  GENITOURINARY:  negative for frequency, dysuria, urinary incontinence, decreased urine volume, and hematuria  HEMATOLOGIC/LYMPHATIC:  negative for easy bruising, bleeding and lymphadenopathy  ALLERGIC/IMMUNOLOGIC:  negative for recurrent infections, angioedema, anaphylaxis and drug reactions  ENDOCRINE:  negative for weight changes and diabetic symptoms including polyuria, polydipsia and polyphagia  MUSCULOSKELETAL:  negative for  pain, joint swelling, decreased range of motion and muscle weakness  NEUROLOGICAL:  negative for headaches, slurred speech, unilateral weakness  PSYCHIATRIC/BEHAVIORAL: negative for hallucinations, behavioral problems, confusion and agitation. Objective:   PHYSICAL EXAM:      VITALS:  /66   Pulse 114   Temp 98.5 °F (36.9 °C) (Oral)   Resp 21   Ht 5' 8\" (1.727 m)   Wt 118 lb (53.5 kg)   SpO2 93%   BMI 17.94 kg/m²      24HR INTAKE/OUTPUT:      Intake/Output Summary (Last 24 hours) at 5/13/2019 1625  Last data filed at 5/13/2019 1538  Gross per 24 hour   Intake 100 ml   Output --   Net 100 ml     CONSTITUTIONAL:  awake, alert, cooperative, no apparent distress, and appears stated age  NECK:  Supple, symmetrical, trachea midline, no adenopathy, thyroid symmetric, not enlarged and no tenderness, skin normal  LUNGS:  no increased work of breathing and diminished at the bases and wheezes in the upper lobes to auscultation. No accessory muscle use  CARDIOVASCULAR: S1 and S2, no edema and no JVD  ABDOMEN:  normal bowel sounds, non-distended and no masses palpated, and no tenderness to palpation. No hepatospleenomegaly  LYMPHADENOPATHY:  no axillary or supraclavicular adenopathy. No cervical adnenopathy  PSYCHIATRIC: Oriented to person place and time. No obvious depression or anxiety. MUSCULOSKELETAL: No obvious misalignment or effusion of the joints. No clubbing, cyanosis of the digits. SKIN:  normal skin color, texture, turgor and no redness, warmth, or swelling. No palpable nodules    DATA:    Old records have been reviewed    CBC:  Recent Labs     05/13/19  1334   WBC 13.7*   RBC 4.95   HGB 15.0   HCT 46.3      MCV 93.4   MCH 30.3   MCHC 32.4   RDW 14.0      BMP:  Recent Labs     05/13/19  1334      K 4.3      CO2 26   BUN 14   CREATININE <0.5*   CALCIUM 9.7   GLUCOSE 116*      ABG:  No results for input(s): PHART, RWV1JSQ, PO2ART, BSN9NJS, G3LYXVFL, BEART in the last 72 hours.     No results found for: BNP  Lab Results   Component Value Date    CKTOTAL 129 04/21/2010    TROPONINI <0.01 05/13/2019       Cultures: Abx:    Radiology Review:  All pertinent images / reports were reviewed as a part of this visit. Assessment:     1. Acute on chronic hypoxemic respiratory failure  2. COPD exacerbation  3. Multifocal pneumonia  4. History of non-small cell lung cancer status post lobectomy 2008    I reviewed CT imaging which reveals patchy airspace disease and some elements of bronchiectasis and bronchial thickening. Certainly could have early pneumonia  Does not appear to be malignant  However, given her history, she will need CT imaging to document clearing. She is on Levaquin which should cover community-acquired pneumonia and aspiration.   Continue Solu-Medrol, Dulera and DuoNeb as well  CT imaging does confirm severe emphysema  Over the last 12 months, her functional status has declined significantly  Discussed with her  at the bedside

## 2019-05-13 NOTE — ED PROVIDER NOTES
[facility]        Pt Name: Mikaela Cary  MRN: [id number]  [place] 1945  Date of evaluation: 5/13/2019  Provider: TAWANNA Trujillo  PCP: Pasquale North MD    This patient was seen and evaluated by the attending physician Leeanne Lau, *. CHIEF COMPLAINT       Chief Complaint   Patient presents with    Shortness of Breath     pt transported from home by [employer]. pt feeling short of breath since Saturday night, coughing up green gunk, she started taking doxycycline. pt unable to speak in complete sentences for ems, bilat wheezes heard per ems. 2 duonebs and 125 mg solumedrol administed by ems. HISTORY OF PRESENT ILLNESS   (Location/Symptom, Timing/Onset, Context/Setting, Quality, Duration, Modifying Factors, Severity)  Note limiting factors. Mikaela Cary is a 69 y.o. female with past medical history of COPD, hypertension, lung cancer, previous pneumonia who presents the ED with complaint of shortness of breath. Patient has had increasing shortness of breath, chest tightness and wheezing since Friday. States she has had a cough productive of greenish sputum. Patient's age is long-standing history of COPD and worse oxygen as needed at night. States she follows up with pulmonologist, Dr. Stefan Major, and states she has an outstanding prescription for doxycycline due to her frequent upper respiratory infections and long-standing COPD. Patient states she started taking doxycycline over the weekend but states has not improved her symptoms. States continued productive cough with feelings of fever and chills and no documented temperature. States chest tightness and associated shortness of breath with wheezing. Denies chest pain, pedal edema, calf tenderness, pleuritic pain, orthopnea, headache, lightheadedness/dizziness, rashes/lesions, abd pain or nausea/vomiting.     Nursing Notes were vacation    Substance and Sexual Activity    Alcohol use: No     Alcohol/week: 0.0 oz    Drug use: No    Sexual activity: Never   Lifestyle    Physical activity:     Days per week: None     Minutes per session: None    Stress: None   Relationships    Social connections:     Talks on phone: None     Gets together: None     Attends Mandaeism service: None     Active member of club or organization: None     Attends meetings of clubs or organizations: None     Relationship status: None    Intimate partner violence:     Fear of current or ex partner: None     Emotionally abused: None     Physically abused: None     Forced sexual activity: None   Other Topics Concern    None   Social History Narrative    ** Merged History Encounter **            SCREENINGS             PHYSICAL EXAM    (up to 7 for level 4, 8 or more for level 5)     ED Triage Vitals [05/13/19 1229]   BP Temp Temp Source Pulse Resp SpO2 Height Weight   (!) 129/50 98.3 °F (36.8 °C) Oral 102 (!) 36 93 % 5' 8\" (1.727 m) 118 lb (53.5 kg)       Physical Exam   Constitutional: She is oriented to person, place, and time. She appears well-developed and well-nourished. No distress. HENT:   Head: Normocephalic and atraumatic. Right Ear: External ear normal.   Left Ear: External ear normal.   Mouth/Throat: Oropharynx is clear and moist. No oropharyngeal exudate. Eyes: Conjunctivae are normal. Right eye exhibits no discharge. Left eye exhibits no discharge. Neck: Normal range of motion. Neck supple. No JVD present. Cardiovascular: Normal rate, regular rhythm, normal heart sounds and intact distal pulses. Exam reveals no gallop and no friction rub. No murmur heard. 2+ radial pulses bilaterally. No calf tenderness. No JVD. No pedal edema. Pulmonary/Chest: Effort normal. No stridor. No respiratory distress. She has wheezes. She has no rales. She exhibits no tenderness. Abdominal: Soft. She exhibits no distension and no mass.  There is no tenderness. There is no rebound and no guarding. Musculoskeletal: Normal range of motion. Lymphadenopathy:     She has no cervical adenopathy. Neurological: She is alert and oriented to person, place, and time. Skin: Skin is warm and dry. No rash noted. She is not diaphoretic. No erythema. No pallor. Psychiatric: She has a normal mood and affect.  Her behavior is normal.       DIAGNOSTIC RESULTS   LABS:    Labs Reviewed   CBC WITH AUTO DIFFERENTIAL - Abnormal; Notable for the following components:       Result Value    WBC 13.7 (*)     Neutrophils # 11.3 (*)     All other components within normal limits    Narrative:     Performed at:  [facility] [address]   Phone [phone]   COMPREHENSIVE METABOLIC PANEL W/ REFLEX TO MG FOR LOW K - Abnormal; Notable for the following components:    Glucose 116 (*)     CREATININE <0.5 (*)     ALT 9 (*)     AST 13 (*)     All other components within normal limits    Narrative:     Performed at:  [facility] [address]   Phone [phone]   BRAIN NATRIURETIC PEPTIDE - Abnormal; Notable for the following components:    Pro- (*)     All other components within normal limits    Narrative:     Performed at:  [facility] [address]   Phone [phone]   RAPID INFLUENZA A/B ANTIGENS    Narrative:     Performed at:  [facility] [address]   Phone [phone]   CULTURE BLOOD #1   CULTURE BLOOD #2   TROPONIN    Narrative:     Performed at:  [facility] [address]   Phone [phone]   PROTIME-INR    Narrative:     Performed at:  [facility] [address]   Phone [phone]   LACTATE, SEPSIS    Narrative: Performed at:  [facility]  [address]   Phone [phone]   PROCALCITONIN       All other labs were within normal range or not returned as of this dictation. EKG: All EKG's are interpreted by the Emergency Department Physician who either signs orCo-signs this chart in the absence of a cardiologist.  Please see their note for interpretation of EKG. RADIOLOGY:   Non-plain film images such as CT, Ultrasound and MRI are read by the radiologist. Gabbie Shoemaker radiographic images are visualized andpreliminarily interpreted by the  ED Provider with the below findings:        Interpretation perthe Radiologist below, if available at the time of this note:    CT Chest WO Contrast   Final Result   Multifocal airspace opacities as above favoring infection. Recommend short   interval follow-up in 3 months to ensure resolution given background   emphysema. A few scattered pulmonary nodules measuring up to 5 mm, not substantially   changed. These have been stable for a year no further follow-up needed. XR CHEST STANDARD (2 VW)   Final Result   No significant change in left basilar opacity which may be chronic           No results found.        PROCEDURES   Unless otherwise noted below, none     Procedures    CRITICAL CARE TIME   N/A    CONSULTS:  IP CONSULT TO INTERNAL MEDICINE  IP CONSULT TO PULMONOLOGY      EMERGENCY DEPARTMENT COURSE and DIFFERENTIALDIAGNOSIS/MDM:   Vitals:    Vitals:    05/13/19 1515 05/13/19 1530 05/13/19 1545 05/13/19 1600   BP: (!) 126/56 (!) 118/57 (!) 120/57 110/66   Pulse: 115 117 115 114   Resp: 17 24 25 21   Temp:    98.5 °F (36.9 °C)   TempSrc:    Oral   SpO2: 93% 92% 94% 93%   Weight:       Height:           Patient was given thefollowing medications:  Medications   ipratropium-albuterol (DUONEB) nebulizer solution 1 ampule (1 ampule Inhalation Given 5/13/19 1418)   mometasone-formoterol (DULERA) 200-5 MCG/ACT inhaler 2 puff (has no administration in time range)   methylPREDNISolone sodium (SOLU-MEDROL) injection 40 mg (40 mg Intravenous Given 5/13/19 1618)   levofloxacin (LEVAQUIN) 500 MG/100ML infusion 500 mg (0 mg Intravenous Stopped 5/13/19 1538)   ipratropium-albuterol (DUONEB) nebulizer solution 1 ampule (1 ampule Inhalation Given 5/13/19 1331)   albuterol (PROVENTIL) nebulizer solution 5 mg (5 mg Nebulization Given 5/13/19 1331)       Patient is a 72-year-old female who presents to the ED with complaint of shortness of breath and associated cough. Productive cough with associated shortness of breath. Upon examination patient is borderline hypoxic with O2 saturation in the lower 90s. Patient tachypnea and tachycardic. Remaining vital signs unremarkable. Patient given albuterol, DuoNeb here in the emergency department. Received IV Solu-Medrol in route. Blood work obtained. CBC showed white count 13.7. Hemoglobin and platelets unremarkable. CMP relatively unremarkable. Troponin normal.  . INR unremarkable. Lactic normal.  Flu negative. Blood cultures pending. Chest x-ray showed questionable chronic opacities. Evaluated by pulmonology here in the ED for IV Levaquin. CT ordered per pulmonology. CT of the chest showed multifocal airspace disease favoring infection. Given history and physical examination patient appears to be suffering from COPD exacerbation with associated pneumonia. Has been on doxycycline outpatient only persistent symptoms. Therefore believe would benefit from admission for IV antibiotics and further treatments. Patient was given IV Levaquin per pulmonology border. Case discussed with PCP, Dr. Leodan Valderrama, who agreed to admit the patient for further evaluation and treatment. FINAL IMPRESSION      1. COPD exacerbation ([address]    2.  Pneumonia due to organism          DISPOSITION/PLAN   DISPOSITION Admitted 05/13/2019 03:42:36 PM      PATIENT REFERREDTO:  José Miguel Alamo MD  [facility]

## 2019-05-13 NOTE — H&P
Rangel Morse is an 69 y.o.  female. Known severe COPD requiring fairly regular admission for SOB. She sees Pulmonary Dr. Flaquita Skinner, who she called earlier this AM to report 3 day h/o productive cough with green sputum, she was advised to come to [address] [facility] ED for admission. Initial CXR did not demonstrate any definite new findings, so she underwent chest CT which suggests multiple areas of \"pockets of infection\" so I was therefore notified by ED to admit patient for IV antibiotics and steroids. Pt has already been evaluated in ED by her Pulmonologist, Dr. Flaquita Skinner.       Past Medical History:   Diagnosis Date    Bronchitis     COPD     Hemoptysis     Hernia     Hypertension     Lung cancer ([facility] [address] 2008    left lung    Osteoporosis     Pneumonia     Rupture of colon ([facility] [address] 05/24/2013    Skin cancer 2014    ear     Past Surgical History:   Procedure Laterality Date    BRONCHOSCOPY  08/05/2016    BRONCHOSCOPY  01/31/2018    CHOLECYSTECTOMY      COLONOSCOPY  8/20/13    KYPHOSIS SURGERY  02/04/2016    LOBECTOMY  0808    MANDIBLE SURGERY      upper jaw and lower jaw    OTHER SURGICAL HISTORY  5/30/13    secondary wound closure    SIGMOIDECTOMY  5/24/13    THORACOTOMY  0808    TONSILLECTOMY      UPPER GASTROINTESTINAL ENDOSCOPY  01/18/2018    UPPER GASTROINTESTINAL ENDOSCOPY N/A 10/31/2018    EGD BIOPSY performed by Jessica England MD at [address] N/A 1/30/2019    EGD performed by Jessica England MD at [address] History     Socioeconomic History    Marital status:      Spouse name: Mark Montelongo Number of children: 2    Years of education: Not on file    Highest education level: Not on file   Occupational History    Not on file   Social Needs    Financial resource strain: Not on file    Food insecurity:     Worry: Not on file     Inability: Not on file    Transportation needs:     Medical: Not on file Non-medical: Not on file   Tobacco Use    Smoking status: Light Tobacco Smoker     Packs/day: 0.10     Years: 42.00     Pack years: 4.20     Types: Cigarettes     Last attempt to quit: 2010     Years since quittin.2    Smokeless tobacco: Never Used    Tobacco comment: 1 cigarette seun, pt like to quit, will quit after vacation    Substance and Sexual Activity    Alcohol use: No     Alcohol/week: 0.0 oz    Drug use: No    Sexual activity: Never   Lifestyle    Physical activity:     Days per week: Not on file     Minutes per session: Not on file    Stress: Not on file   Relationships    Social connections:     Talks on phone: Not on file     Gets together: Not on file     Attends Restorationist service: Not on file     Active member of club or organization: Not on file     Attends meetings of clubs or organizations: Not on file     Relationship status: Not on file    Intimate partner violence:     Fear of current or ex partner: Not on file     Emotionally abused: Not on file     Physically abused: Not on file     Forced sexual activity: Not on file   Other Topics Concern    Not on file   Social History Narrative    ** Merged History Encounter **          No current facility-administered medications on file prior to encounter.       Current Outpatient Medications on File Prior to Encounter   Medication Sig Dispense Refill    guaiFENesin (MUCINEX) 600 MG extended release tablet Take 1,200 mg by mouth daily      montelukast (SINGULAIR) 10 MG tablet TAKE ONE TABLET BY MOUTH ONCE NIGHTLY 30 tablet 10    gabapentin (NEURONTIN) 300 MG capsule TAKE ONE CAPSULE BY MOUTH TWICE A DAY 60 capsule 4    fluticasone-salmeterol (ADVAIR DISKUS) 250-50 MCG/DOSE AEPB Inhale 1 puff into the lungs 2 times daily 60 each 11    albuterol-ipratropium (COMBIVENT RESPIMAT)  MCG/ACT AERS inhaler Inhale 1 puff into the lungs every 6 hours 1 Inhaler 11    OXYGEN Inhale 3 L/min into the lungs continuous Use nightly as needed 1 Bottle 5    TOVIAZ 4 MG TB24 ER tablet TAKE ONE TABLET BY MOUTH DAILY 30 tablet 10    albuterol sulfate HFA (VENTOLIN HFA) 108 (90 Base) MCG/ACT inhaler Inhale 2 puffs into the lungs every 6 hours as needed for Wheezing 1 Inhaler 11plkeas    diltiazem (CARDIZEM) 60 MG tablet Take 60 mg by mouth 4 times daily       Denosumab (PROLIA SC) Inject into the skin Every 6 months         Allergies: Allergies   Allergen Reactions    Wellbutrin [Bupropion Hcl] Palpitations       Principal Problem:    Acute on chronic respiratory failure with hypoxia ([facility])  Active Problems:    COPD (chronic obstructive pulmonary disease) ([facility])    Lung cancer ([facility])    Hypertension  Resolved Problems:    * No resolved hospital problems. *    Blood pressure (!) 126/56, pulse 115, temperature 98.3 °F (36.8 °C), temperature source Oral, resp. rate 17, height 5' 8\" (1.727 m), weight 118 lb (53.5 kg), SpO2 93 %, not currently breastfeeding. Review of Systems   Constitutional: Negative. HENT: Negative. Eyes: Negative. Respiratory: Positive for cough, chest tightness, shortness of breath and wheezing. Green mucus x 3 days, no improvement after taking Doxycycline that she has at home for acute exacerbations of her COPD   Gastrointestinal: Negative. Endocrine: Negative. Genitourinary: Positive for urgency. Musculoskeletal: Negative. Allergic/Immunologic: Negative. Neurological: Negative. Hematological: Negative. Psychiatric/Behavioral: Negative. Physical Exam   Constitutional: She is oriented to person, place, and time. She appears well-developed. She appears distressed. BP (!) 126/56   Pulse 115   Temp 98.3 °F (36.8 °C) (Oral)   Resp 17   Ht 5' 8\" (1.727 m)   Wt 118 lb (53.5 kg)   SpO2 93%   BMI 17.94 kg/m²   Chronically ill female   HENT:   Head: Normocephalic and atraumatic.    Right Ear: External ear normal.   Left Ear: External ear normal.   Mouth/Throat: Oropharynx is clear and moist. No oropharyngeal exudate. Eyes: Pupils are equal, round, and reactive to light. Right eye exhibits no discharge. Left eye exhibits no discharge. Neck: No JVD present. No thyromegaly present. Cardiovascular: Regular rhythm and normal heart sounds. Mildly tachycardic   Pulmonary/Chest: No stridor. She is in respiratory distress. She has wheezes. She has no rales. She exhibits no tenderness. Coarse rhonchi bilaterally  Now seems comfortable on 2l/min nc oxygen   Abdominal: Soft. Bowel sounds are normal. She exhibits no distension and no mass. There is no tenderness. There is no rebound and no guarding. No hernia. Genitourinary:   Genitourinary Comments: deferred   Musculoskeletal: Normal range of motion. She exhibits no edema or tenderness. Lymphadenopathy:     She has no cervical adenopathy. Neurological: She is alert and oriented to person, place, and time. She has normal reflexes. She displays normal reflexes. No cranial nerve deficit. She exhibits normal muscle tone. Coordination normal.   Skin: Skin is warm and dry. No erythema. Psychiatric: She has a normal mood and affect. Her behavior is normal.   Nursing note and vitals reviewed.     CXR shows chronic changes at L base, unchanged from prior studies    CT chest shows what appears to be changes associated with bronchiolitis, \"multifocal airspace disease opacities favoring infection\"    Lab Results   Component Value Date    WBC 13.7 (H) 05/13/2019    HGB 15.0 05/13/2019    HCT 46.3 05/13/2019    MCV 93.4 05/13/2019     05/13/2019     Lab Results   Component Value Date     05/13/2019    K 4.3 05/13/2019     05/13/2019    CO2 26 05/13/2019    BUN 14 05/13/2019    CREATININE <0.5 (L) 05/13/2019    GLUCOSE 116 (H) 05/13/2019    CALCIUM 9.7 05/13/2019    PROT 6.5 05/13/2019    LABALBU 3.6 05/13/2019    BILITOT 0.8 05/13/2019    ALKPHOS 57 05/13/2019    AST 13 (L) 05/13/2019    ALT 9 (L) 05/13/2019    LABGLOM >60 05/13/2019 GFRAA >60 05/13/2019    AGRATIO 1.2 05/13/2019    GLOB 2.9 05/13/2019       Lab Results   Component Value Date    INR 1.09 05/13/2019    INR 1.05 01/18/2018    INR 0.98 08/05/2016    PROTIME 12.4 05/13/2019    PROTIME 11.9 01/18/2018    PROTIME 11.2 08/05/2016      BNP is WNL in ED    Assessment:  Patient Active Problem List   Diagnosis    [facility]) [facility] [address]    Lung cancer ([facility] [address]    Hypertension    Diverticulosis    Acute on chronic respiratory failure with hypoxia (HCC)    OAB (overactive bladder)    Pulmonary nodules    Ventral hernia    COPD, severe ([facility] [address]    S/P lobectomy of lung    Shortness of breath    Chronic cough    COPD exacerbation (HCC)    Upper GI bleeding    Chronic respiratory failure with hypoxia (HCC)    HAP (hospital-acquired pneumonia)    Mucus plugging of bronchi         Plan:  Admit Med Surg  IV Levaquin 750 QD to covers Pseudomonas spp  IV SoluMedrol 40 mg q 6 hrs  Albuterol HHN ATC with DuoNeb QID  Supplemental nasal oxygen at baseline of 3 l/min  Pulmonary consult with Dr. Sammi Birch QID while on steroids    Continue home meds    Caren Del Castillo MD  5/13/2019

## 2019-05-13 NOTE — TELEPHONE ENCOUNTER
Patient called stating Saturday she began coughing up green phlegm. She also has been having a difficult time keeping her oxygen above 90. When she gets up, it drops to 85. Please give patient a call at [phone].

## 2019-05-13 NOTE — PROGRESS NOTES
The care plan and education has been reviewed and mutually agreed upon with the patient. Assessment complete and documented. Independent. A/O, VSS. Lungs sounds diminished, expiratory wheezing. Patient states having pain 8/10 which is chronic from osteoarthritis. NOELLE Gordon as to what patient takes at home for pain, and home med list also updated. Assisted with meal order. Oriented to room and call light. Needs met, call light in reach, will monitor.

## 2019-05-13 NOTE — ED NOTES
Pharmacy Medication History Note      List of current medications patient is taking is complete. Source of information: [facility]    Changes made to medication list:  Medications flagged for removal (include reason, ex. noncompliance):  none    Medications removed (include reason, ex. therapy complete or physician discontinued):  Duoneb- therapy completed  Vitamins- therapy completed  Estradiol- therapy completed  Ferrous sulfate- therapy completed  Tramadol- therapy completed  Pantoprazole- dose adjustment    Medications added/doses adjusted:  Pantoprazole 40 mg daily    Other notes (ex. Recent course of antibiotics, Coumadin dosing):  Denies use of other OTC or herbal medications. Last dose times updated. Jam Bah, PharmD  ED Pharmacist [id number]      No current facility-administered medications on file prior to encounter.         Current Outpatient Medications on File Prior to Encounter   Medication Sig Dispense Refill    guaiFENesin (MUCINEX) 600 MG extended release tablet Take 1,200 mg by mouth daily      montelukast (SINGULAIR) 10 MG tablet TAKE ONE TABLET BY MOUTH ONCE NIGHTLY 30 tablet 10    gabapentin (NEURONTIN) 300 MG capsule TAKE ONE CAPSULE BY MOUTH TWICE A DAY 60 capsule 4    fluticasone-salmeterol (ADVAIR DISKUS) 250-50 MCG/DOSE AEPB Inhale 1 puff into the lungs 2 times daily 60 each 11    albuterol-ipratropium (COMBIVENT RESPIMAT)  MCG/ACT AERS inhaler Inhale 1 puff into the lungs every 6 hours 1 Inhaler 11    OXYGEN Inhale 3 L/min into the lungs continuous Use nightly as needed 1 Bottle 5    TOVIAZ 4 MG TB24 ER tablet TAKE ONE TABLET BY MOUTH DAILY 30 tablet 10    albuterol sulfate HFA (VENTOLIN HFA) 108 (90 Base) MCG/ACT inhaler Inhale 2 puffs into the lungs every 6 hours as needed for Wheezing 1 Inhaler 11plkeas    diltiazem (CARDIZEM) 60 MG tablet Take 60 mg by mouth 4 times daily       [DISCONTINUED] traMADol (ULTRAM) 50 MG tablet Take 1 tablet by mouth 2 times daily for 90 days. 60 tablet 2    [DISCONTINUED] pantoprazole (PROTONIX) 40 MG tablet Take 1 tablet by mouth 2 times daily 30 tablet 1    Denosumab (PROLIA SC) Inject into the skin Every 6 months      [DISCONTINUED] ipratropium-albuterol (DUONEB) 0.5-2.5 (3) MG/3ML SOLN nebulizer solution Inhale 3 mLs into the lungs 4 times daily DX:COPD J44.9 360 mL 11    [DISCONTINUED] ferrous sulfate 325 (65 Fe) MG tablet Take 1 tablet by mouth 2 times daily (with meals) 30 tablet 1    [DISCONTINUED] acetaminophen (TYLENOL) 325 MG tablet Take 650 mg by mouth every 6 hours as needed for Pain       [DISCONTINUED] calcium carbonate 600 MG TABS tablet Take 1 tablet by mouth 2 times daily      [DISCONTINUED] Estradiol (VAGIFEM) 10 MCG TABS vaginal tablet Place 10 mcg vaginally daily      [DISCONTINUED] Cholecalciferol (VITAMIN D) 2000 UNITS CAPS capsule Take 1 capsule by mouth daily      [DISCONTINUED] therapeutic multivitamin-minerals (THERAGRAN-M) tablet Take 1 tablet by mouth daily.

## 2019-05-13 NOTE — ED NOTES
Patient is awake in bed, she is alert and oriented, her respirations are easy and unlabored. Patient asking if Dr. Joshua Ha has been contacted for admission orders, discussed with TAWANNA Morrow.  IV antibiotics infusing as ordered.      Laura Chirinos RN  05/13/19 [id number]

## 2019-05-13 NOTE — ED NOTES
Bed: 29  Expected date:   Expected time:   Means of arrival: [facility]  Comments:  Maribel Mendez RN  05/13/19 [id number]

## 2019-05-14 LAB
ESTIMATED AVERAGE GLUCOSE: 142.7 MG/DL
GLUCOSE BLD-MCNC: 144 MG/DL (ref 70–99)
GLUCOSE BLD-MCNC: 169 MG/DL (ref 70–99)
GLUCOSE BLD-MCNC: 200 MG/DL (ref 70–99)
GLUCOSE BLD-MCNC: 267 MG/DL (ref 70–99)
HBA1C MFR BLD: 6.6 %
PERFORMED ON: ABNORMAL

## 2019-05-14 PROCEDURE — 6370000000 HC RX 637 (ALT 250 FOR IP): Performed by: INTERNAL MEDICINE

## 2019-05-14 PROCEDURE — 99232 SBSQ HOSP IP/OBS MODERATE 35: CPT | Performed by: INTERNAL MEDICINE

## 2019-05-14 PROCEDURE — 2580000003 HC RX 258

## 2019-05-14 PROCEDURE — 6360000002 HC RX W HCPCS: Performed by: INTERNAL MEDICINE

## 2019-05-14 PROCEDURE — 1200000000 HC SEMI PRIVATE

## 2019-05-14 PROCEDURE — 94640 AIRWAY INHALATION TREATMENT: CPT

## 2019-05-14 PROCEDURE — 89220 SPUTUM SPECIMEN COLLECTION: CPT

## 2019-05-14 PROCEDURE — 2700000000 HC OXYGEN THERAPY PER DAY

## 2019-05-14 PROCEDURE — 94669 MECHANICAL CHEST WALL OSCILL: CPT

## 2019-05-14 PROCEDURE — 94760 N-INVAS EAR/PLS OXIMETRY 1: CPT

## 2019-05-14 PROCEDURE — 87070 CULTURE OTHR SPECIMN AEROBIC: CPT

## 2019-05-14 PROCEDURE — 87205 SMEAR GRAM STAIN: CPT

## 2019-05-14 RX ORDER — SODIUM CHLORIDE 9 MG/ML
INJECTION, SOLUTION INTRAVENOUS
Status: COMPLETED
Start: 2019-05-14 | End: 2019-05-14

## 2019-05-14 RX ADMIN — GABAPENTIN 300 MG: 300 CAPSULE ORAL at 21:08

## 2019-05-14 RX ADMIN — DILTIAZEM HYDROCHLORIDE 60 MG: 60 TABLET, FILM COATED ORAL at 21:08

## 2019-05-14 RX ADMIN — TROSPIUM CHLORIDE 20 MG: 20 TABLET, FILM COATED ORAL at 07:00

## 2019-05-14 RX ADMIN — GABAPENTIN 300 MG: 300 CAPSULE ORAL at 08:37

## 2019-05-14 RX ADMIN — INSULIN LISPRO 1 UNITS: 100 INJECTION, SOLUTION INTRAVENOUS; SUBCUTANEOUS at 08:37

## 2019-05-14 RX ADMIN — MONTELUKAST SODIUM 10 MG: 10 TABLET, FILM COATED ORAL at 21:08

## 2019-05-14 RX ADMIN — METHYLPREDNISOLONE SODIUM SUCCINATE 40 MG: 40 INJECTION, POWDER, FOR SOLUTION INTRAMUSCULAR; INTRAVENOUS at 17:27

## 2019-05-14 RX ADMIN — INSULIN LISPRO 1 UNITS: 100 INJECTION, SOLUTION INTRAVENOUS; SUBCUTANEOUS at 21:17

## 2019-05-14 RX ADMIN — TROSPIUM CHLORIDE 20 MG: 20 TABLET, FILM COATED ORAL at 17:27

## 2019-05-14 RX ADMIN — GUAIFENESIN 1200 MG: 600 TABLET, EXTENDED RELEASE ORAL at 08:37

## 2019-05-14 RX ADMIN — IPRATROPIUM BROMIDE AND ALBUTEROL SULFATE 1 AMPULE: .5; 3 SOLUTION RESPIRATORY (INHALATION) at 20:03

## 2019-05-14 RX ADMIN — INSULIN LISPRO 3 UNITS: 100 INJECTION, SOLUTION INTRAVENOUS; SUBCUTANEOUS at 12:31

## 2019-05-14 RX ADMIN — IPRATROPIUM BROMIDE AND ALBUTEROL SULFATE 1 AMPULE: .5; 3 SOLUTION RESPIRATORY (INHALATION) at 15:36

## 2019-05-14 RX ADMIN — IPRATROPIUM BROMIDE AND ALBUTEROL SULFATE 1 AMPULE: .5; 3 SOLUTION RESPIRATORY (INHALATION) at 08:05

## 2019-05-14 RX ADMIN — Medication 2 PUFF: at 08:05

## 2019-05-14 RX ADMIN — METHYLPREDNISOLONE SODIUM SUCCINATE 40 MG: 40 INJECTION, POWDER, FOR SOLUTION INTRAMUSCULAR; INTRAVENOUS at 10:56

## 2019-05-14 RX ADMIN — Medication 2 PUFF: at 15:48

## 2019-05-14 RX ADMIN — METHYLPREDNISOLONE SODIUM SUCCINATE 40 MG: 40 INJECTION, POWDER, FOR SOLUTION INTRAMUSCULAR; INTRAVENOUS at 21:07

## 2019-05-14 RX ADMIN — DILTIAZEM HYDROCHLORIDE 60 MG: 60 TABLET, FILM COATED ORAL at 14:12

## 2019-05-14 RX ADMIN — DILTIAZEM HYDROCHLORIDE 60 MG: 60 TABLET, FILM COATED ORAL at 08:37

## 2019-05-14 RX ADMIN — IPRATROPIUM BROMIDE AND ALBUTEROL SULFATE 1 AMPULE: .5; 3 SOLUTION RESPIRATORY (INHALATION) at 12:15

## 2019-05-14 RX ADMIN — METHYLPREDNISOLONE SODIUM SUCCINATE 40 MG: 40 INJECTION, POWDER, FOR SOLUTION INTRAMUSCULAR; INTRAVENOUS at 03:44

## 2019-05-14 RX ADMIN — ENOXAPARIN SODIUM 40 MG: 40 INJECTION SUBCUTANEOUS at 21:08

## 2019-05-14 RX ADMIN — Medication 2 PUFF: at 20:03

## 2019-05-14 RX ADMIN — DILTIAZEM HYDROCHLORIDE 60 MG: 60 TABLET, FILM COATED ORAL at 17:27

## 2019-05-14 RX ADMIN — SODIUM CHLORIDE: 9 INJECTION, SOLUTION INTRAVENOUS at 14:15

## 2019-05-14 RX ADMIN — LEVOFLOXACIN 750 MG: 5 INJECTION, SOLUTION INTRAVENOUS at 14:12

## 2019-05-14 RX ADMIN — INSULIN LISPRO 1 UNITS: 100 INJECTION, SOLUTION INTRAVENOUS; SUBCUTANEOUS at 17:28

## 2019-05-14 ASSESSMENT — PAIN SCALES - GENERAL: PAINLEVEL_OUTOF10: 0

## 2019-05-14 NOTE — PROGRESS NOTES
PM assessment completed, see flow sheet. Patient resting well in bed with no c/o pain or discomfort made. VSS. No needs voiced. Fall precautions in place, hourly rounding, call light and belongings in reach, bed in lowest position, wheels locked in place, side rails up x 2, walkways free of clutter. The care plan and education has been reviewed and mutually agreed upon with the patient. Will monitor.

## 2019-05-14 NOTE — PLAN OF CARE
Problem: Falls - Risk of:  Goal: Will remain free from falls  Description  Will remain free from falls  Outcome: Ongoing  Goal: Absence of physical injury  Description  Absence of physical injury  Outcome: Ongoing  Fall precautions in place, hourly rounding, call light and belongings in reach, bed in lowest position, wheels locked in place, side rails up x 2, walkways free of clutter       Problem: Pain:  Goal: Pain level will decrease  Description  Pain level will decrease  Outcome: Ongoing  Goal: Control of acute pain  Description  Control of acute pain  Outcome: Ongoing  Goal: Control of chronic pain  Description  Control of chronic pain  Outcome: Ongoing  Pt has PRN pain medication available upon request. Pt aware to let nursing know when pain medication is needed. Problem: Breathing Pattern - Ineffective:  Goal: Ability to achieve and maintain a regular respiratory rate will improve  Description  Ability to achieve and maintain a regular respiratory rate will improve  Outcome: Ongoing. Pt remains on 2L oxygen. Breathing treatments administered per RT.  Remains on solu-medrol

## 2019-05-14 NOTE — PROGRESS NOTES
[facility] Pulmonary/CCM Progress note      Admit Date: 5/13/2019    Chief Complaint: Shortness of breath and chest tightness    Subjective: Interval History: States that she feels somewhat better, still has dyspnea with exertion and dry cough.     Scheduled Meds:   ipratropium-albuterol  1 ampule Inhalation 4x daily    methylPREDNISolone  40 mg Intravenous Q6H    levofloxacin  500 mg Intravenous Q24H    diltiazem  60 mg Oral 4x Daily    mometasone-formoterol  2 puff Inhalation BID    gabapentin  300 mg Oral BID    guaiFENesin  1,200 mg Oral Daily    montelukast  10 mg Oral Nightly    trospium  20 mg Oral BID AC    insulin lispro  0-6 Units Subcutaneous TID WC    insulin lispro  0-3 Units Subcutaneous Nightly    levofloxacin  750 mg Intravenous Q24H    ipratropium-albuterol  1 ampule Inhalation Q6H     Continuous Infusions:   dextrose       PRN Meds:glucose, dextrose, glucagon (rDNA), dextrose, traMADol    Review of Systems  Constitutional: negative for fatigue, fevers, malaise and weight loss  Ears, nose, mouth, throat: negative for ear drainage, epistaxis, hoarseness, nasal congestion, sore throat and voice change  Respiratory: negative except for cough, shortness of breath and wheezing  Cardiovascular: negative for chest pain, chest pressure/discomfort, irregular heart beat, lower extremity edema and palpitations  Gastrointestinal: negative for abdominal pain, constipation, diarrhea, jaundice, melena, odynophagia, reflux symptoms and vomiting  Hematologic/lymphatic: negative for bleeding, easy bruising, lymphadenopathy and petechiae  Musculoskeletal:negative for arthralgias, bone pain, muscle weakness, neck pain and stiff joints  Neurological: negative for dizziness, gait problems, headaches, seizures, speech problems, tremors and weakness  Behavioral/Psych: negative for anxiety, behavior problems, depression, fatigue and sleep disturbance  Endocrine: negative for diabetic symptoms including none, neuropathy, polyphagia, polyuria, polydipsia, vomiting and diarrhea and temperature intolerance  Allergic/Immunologic: negative for anaphylaxis, angioedema, hay fever and urticaria    Objective:     Patient Vitals for the past 8 hrs:   BP Temp Temp src Pulse Resp SpO2   05/14/19 0835 102/60 98.3 °F (36.8 °C) Oral 94 19 94 %   05/14/19 0808 -- -- -- -- 18 91 %   05/14/19 0512 110/65 98.2 °F (36.8 °C) Oral 84 16 95 %     I/O last 3 completed shifts: In: 80 [P.O.:480; IV Piggyback:100]  Out: -   No intake/output data recorded.     General Appearance: alert and oriented to person, place and time, well developed and well- nourished, in no acute distress  Skin: warm and dry, no rash or erythema  Head: normocephalic and atraumatic  Eyes: pupils equal, round, and reactive to light, extraocular eye movements intact, conjunctivae normal  ENT: external ear and ear canal normal bilaterally, nose without deformity, nasal mucosa and turbinates normal  Neck: supple and non-tender without mass, no cervical lymphadenopathy  Pulmonary/Chest: wheezing present- bilateral and rales present- bilateral  Cardiovascular: normal rate, regular rhythm,  no murmurs, rubs, distal pulses intact, no carotid bruits  Abdomen: soft, non-tender, non-distended, normal bowel sounds, no masses or organomegaly  Lymph Nodes: Cervical, supraclavicular normal  Extremities: no cyanosis, clubbing or edema  Musculoskeletal: normal range of motion, no joint swelling, deformity or tenderness  Neurologic: alert, no focal neurologic deficits    Data Review:  CBC:   Lab Results   Component Value Date    WBC 13.7 05/13/2019    RBC 4.95 05/13/2019     BMP:   Lab Results   Component Value Date    GLUCOSE 116 05/13/2019    CO2 26 05/13/2019    BUN 14 05/13/2019    CREATININE <0.5 05/13/2019    CALCIUM 9.7 05/13/2019     ABG:   Lab Results   Component Value Date    JXX4JSD 25.4 01/29/2018    BEART 1.7 01/29/2018    Y5PTBGMZ 98.7 01/29/2018    PHART 7.462 01/29/2018 THGBART 15.8 05/25/2013    THGBART 16.6 04/08/2010    MOA8WUW 36.4 01/29/2018    PO2ART 110.5 01/29/2018    YGV7MYR 26.5 01/29/2018       Radiology: All pertinent images / reports were reviewed as a part of this visit. Narrative   EXAMINATION:   CT OF THE CHEST WITHOUT CONTRAST 5/13/2019 2:02 pm       TECHNIQUE:   CT of the chest was performed without the administration of intravenous   contrast. Multiplanar reformatted images are provided for review. Dose   modulation, iterative reconstruction, and/or weight based adjustment of the   mA/kV was utilized to reduce the radiation dose to as low as reasonably   achievable.       COMPARISON:   June 4, 2018       HISTORY:   ORDERING SYSTEM PROVIDED HISTORY: SHORTNESS OF BREATH   TECHNOLOGIST PROVIDED HISTORY:   Ordering Physician Provided Reason for Exam: sob   Acuity: Unknown   Type of Exam: Unknown       FINDINGS:   Mediastinum: No thoracic aortic aneurysm.  Coronary artery calcifications. No significant pericardial effusion.  No adenopathy.       Lungs/pleura: No pneumothorax.  Emphysema.  Patchy opacity and ground-glass   attenuation in the right lower lobe, new.  Right apical scarring, unchanged. A few nodules in the left upper lobe measuring up to 5 mm, not substantially   changed.  Bronchial wall thickening and patchy airspace disease in the left   upper lobe and left lower lobe, new.       Upper Abdomen: Unremarkable       Soft Tissues/Bones: Postsurgical changes from kyphoplasty at T6 and T7. Compression deformity of T12 with loss of approximately 50% vertebral body   height.  This is similar to previous exam.           Impression   Multifocal airspace opacities as above favoring infection.  Recommend short   interval follow-up in 3 months to ensure resolution given background   emphysema.       A few scattered pulmonary nodules measuring up to 5 mm, not substantially   changed.  These have been stable for a year no further follow-up needed. Problem List:     COPD exacerbation  Acute hypoxic respiratory failure    Assessment/Plan:     Reviewed CT imaging done yesterday, suggestive of scattered bilateral faint infiltrates, could represent mucus plugging rather than pneumonia. Patient also has a dense right upper lobe scar which is stable. History of left upper lobectomy in remote past.  No suspicious lung nodules or masses. Continue Levaquin, Solu-Medrol and bronchodilators. Acapella valve.     Pulmonary will follow    Richy Bruno MD

## 2019-05-14 NOTE — CARE COORDINATION
Discharge Planning Assessment     met with patient to discuss reason for admission, current living situation, and potential needs at the time of discharge    Demographics/Insurance verified: Yes    Current type of dwelling: House    Living arrangements: Patient reports that she lives at home with her spouse. Level of function/Support: Patient reports that she is independent in her ADLs. Patient reports that her family is supportive. PCP: Dr. Giovanni Pillai    Last Visit to PCP: 4/25/19    DME: Has home oxygen. Nightly (through [facility]). Patient identifies no other DME needs at this time. Active with any community resources/agencies/skilled home care: None identified. Patient denies home care needs at this time. Medication compliance issues: None identified. Financial issues that could impact healthcare: None identified. Transportation at the time of discharge: Family will transport. Tentative discharge plan: Home with family. Discussed with the patient at the bedside, who identities no other needs at this time.     Will continue to follow for support and discharge planning.    -Veronica Jimenez, MSW, LSW

## 2019-05-14 NOTE — PROGRESS NOTES
Not much change yet since yesterday    Remains on IV Levaquin, IV site in ED became erythematous after infusion yesterday, pt has taken Levaquin multiple times in past without difficulty    /60   Pulse 94   Temp 98.3 °F (36.8 °C) (Oral)   Resp 19   Ht 5' 8\" (1.727 m)   Wt 118 lb (53.5 kg)   SpO2 94%   BMI 17.94 kg/m²     Scheduled Meds:   ipratropium-albuterol  1 ampule Inhalation 4x daily    methylPREDNISolone  40 mg Intravenous Q6H    diltiazem  60 mg Oral 4x Daily    mometasone-formoterol  2 puff Inhalation BID    gabapentin  300 mg Oral BID    guaiFENesin  1,200 mg Oral Daily    montelukast  10 mg Oral Nightly    trospium  20 mg Oral BID AC    insulin lispro  0-6 Units Subcutaneous TID WC    insulin lispro  0-3 Units Subcutaneous Nightly    levofloxacin  750 mg Intravenous Q24H    ipratropium-albuterol  1 ampule Inhalation Q6H     Continuous Infusions:   dextrose       PRN Meds:.glucose, dextrose, glucagon (rDNA), dextrose, traMADol    Lungs-coarse rhonchi bilaterally  Pt on 2 l/min nc oxygen    Patient Active Problem List   Diagnosis    COPD (chronic obstructive pulmonary disease) (HCC)    Lung cancer (HCC)    Hypertension    Diverticulosis    Acute on chronic respiratory failure with hypoxia (HCC)    OAB (overactive bladder)    Pulmonary nodules    Ventral hernia    COPD, severe (HCC)    S/P lobectomy of lung    Shortness of breath    Chronic cough    COPD exacerbation (HCC)    Upper GI bleeding    Chronic respiratory failure with hypoxia (HCC)    HAP (hospital-acquired pneumonia)    Mucus plugging of bronchi    COPD with respiratory distress, acute (HCC)     Plan: Continue current management, monitor IV site with next Levaquin dose    On Solumedrol 40mg q 6 hrs    Lovenox for DVT prophylaxis  OOB as tolerated    Tramadol was re-ordered last night as per pt request for her chronic arthralgias from [facility]

## 2019-05-14 NOTE — PROGRESS NOTES
The care plan and education has been reviewed and mutually agreed upon with the patient. Assessment complete and documented. A/O, VSS. Denies pain. SOB with exertion. Needs met. Independent. Call light in reach, will monitor.

## 2019-05-15 LAB
GLUCOSE BLD-MCNC: 170 MG/DL (ref 70–99)
GLUCOSE BLD-MCNC: 171 MG/DL (ref 70–99)
GLUCOSE BLD-MCNC: 218 MG/DL (ref 70–99)
GLUCOSE BLD-MCNC: 238 MG/DL (ref 70–99)
PERFORMED ON: ABNORMAL

## 2019-05-15 PROCEDURE — 2700000000 HC OXYGEN THERAPY PER DAY

## 2019-05-15 PROCEDURE — 6360000002 HC RX W HCPCS: Performed by: INTERNAL MEDICINE

## 2019-05-15 PROCEDURE — 99233 SBSQ HOSP IP/OBS HIGH 50: CPT | Performed by: INTERNAL MEDICINE

## 2019-05-15 PROCEDURE — 1200000000 HC SEMI PRIVATE

## 2019-05-15 PROCEDURE — 94668 MNPJ CHEST WALL SBSQ: CPT

## 2019-05-15 PROCEDURE — 94667 MNPJ CHEST WALL 1ST: CPT

## 2019-05-15 PROCEDURE — 6370000000 HC RX 637 (ALT 250 FOR IP): Performed by: INTERNAL MEDICINE

## 2019-05-15 PROCEDURE — 94640 AIRWAY INHALATION TREATMENT: CPT

## 2019-05-15 PROCEDURE — 94760 N-INVAS EAR/PLS OXIMETRY 1: CPT

## 2019-05-15 RX ORDER — METHYLPREDNISOLONE SODIUM SUCCINATE 40 MG/ML
40 INJECTION, POWDER, LYOPHILIZED, FOR SOLUTION INTRAMUSCULAR; INTRAVENOUS EVERY 8 HOURS
Status: DISCONTINUED | OUTPATIENT
Start: 2019-05-15 | End: 2019-05-16

## 2019-05-15 RX ORDER — FESOTERODINE FUMARATE 4 MG/1
4 TABLET, EXTENDED RELEASE ORAL DAILY
Status: DISCONTINUED | OUTPATIENT
Start: 2019-05-16 | End: 2019-05-17 | Stop reason: HOSPADM

## 2019-05-15 RX ADMIN — INSULIN LISPRO 2 UNITS: 100 INJECTION, SOLUTION INTRAVENOUS; SUBCUTANEOUS at 12:09

## 2019-05-15 RX ADMIN — LEVOFLOXACIN 750 MG: 5 INJECTION, SOLUTION INTRAVENOUS at 13:32

## 2019-05-15 RX ADMIN — Medication 2 PUFF: at 08:29

## 2019-05-15 RX ADMIN — DILTIAZEM HYDROCHLORIDE 60 MG: 60 TABLET, FILM COATED ORAL at 10:00

## 2019-05-15 RX ADMIN — IPRATROPIUM BROMIDE AND ALBUTEROL SULFATE 1 AMPULE: .5; 3 SOLUTION RESPIRATORY (INHALATION) at 16:55

## 2019-05-15 RX ADMIN — METHYLPREDNISOLONE SODIUM SUCCINATE 40 MG: 40 INJECTION, POWDER, FOR SOLUTION INTRAMUSCULAR; INTRAVENOUS at 17:10

## 2019-05-15 RX ADMIN — INSULIN LISPRO 1 UNITS: 100 INJECTION, SOLUTION INTRAVENOUS; SUBCUTANEOUS at 20:36

## 2019-05-15 RX ADMIN — ENOXAPARIN SODIUM 40 MG: 40 INJECTION SUBCUTANEOUS at 20:27

## 2019-05-15 RX ADMIN — INSULIN LISPRO 1 UNITS: 100 INJECTION, SOLUTION INTRAVENOUS; SUBCUTANEOUS at 17:15

## 2019-05-15 RX ADMIN — METHYLPREDNISOLONE SODIUM SUCCINATE 40 MG: 40 INJECTION, POWDER, FOR SOLUTION INTRAMUSCULAR; INTRAVENOUS at 03:49

## 2019-05-15 RX ADMIN — Medication 2 PUFF: at 19:51

## 2019-05-15 RX ADMIN — IPRATROPIUM BROMIDE AND ALBUTEROL SULFATE 1 AMPULE: .5; 3 SOLUTION RESPIRATORY (INHALATION) at 08:30

## 2019-05-15 RX ADMIN — DILTIAZEM HYDROCHLORIDE 60 MG: 60 TABLET, FILM COATED ORAL at 20:26

## 2019-05-15 RX ADMIN — METHYLPREDNISOLONE SODIUM SUCCINATE 40 MG: 40 INJECTION, POWDER, FOR SOLUTION INTRAMUSCULAR; INTRAVENOUS at 09:59

## 2019-05-15 RX ADMIN — IPRATROPIUM BROMIDE AND ALBUTEROL SULFATE 1 AMPULE: .5; 3 SOLUTION RESPIRATORY (INHALATION) at 13:38

## 2019-05-15 RX ADMIN — DILTIAZEM HYDROCHLORIDE 60 MG: 60 TABLET, FILM COATED ORAL at 13:31

## 2019-05-15 RX ADMIN — GABAPENTIN 300 MG: 300 CAPSULE ORAL at 20:26

## 2019-05-15 RX ADMIN — MONTELUKAST SODIUM 10 MG: 10 TABLET, FILM COATED ORAL at 20:26

## 2019-05-15 RX ADMIN — INSULIN LISPRO 1 UNITS: 100 INJECTION, SOLUTION INTRAVENOUS; SUBCUTANEOUS at 10:01

## 2019-05-15 RX ADMIN — IPRATROPIUM BROMIDE AND ALBUTEROL SULFATE 1 AMPULE: .5; 3 SOLUTION RESPIRATORY (INHALATION) at 19:51

## 2019-05-15 RX ADMIN — GABAPENTIN 300 MG: 300 CAPSULE ORAL at 09:59

## 2019-05-15 RX ADMIN — TROSPIUM CHLORIDE 20 MG: 20 TABLET, FILM COATED ORAL at 06:31

## 2019-05-15 RX ADMIN — DILTIAZEM HYDROCHLORIDE 60 MG: 60 TABLET, FILM COATED ORAL at 17:11

## 2019-05-15 ASSESSMENT — PAIN SCALES - GENERAL: PAINLEVEL_OUTOF10: 0

## 2019-05-15 NOTE — PLAN OF CARE
Problem: Falls - Risk of:  Goal: Will remain free from falls  Description  Will remain free from falls  5/15/2019 1055 by Enrrique Forrest RN  Outcome: Ongoing  Note:   Fall precautions in place, hourly rounding, call light and belongings in reach, bed in lowest position, wheels locked in place, side rails up x 2, walkways free of clutter    5/15/2019 0107 by Genny Orozco RN  Outcome: Ongoing  Goal: Absence of physical injury  Description  Absence of physical injury  5/15/2019 1055 by Enrrique Forrest RN  Outcome: Ongoing  5/15/2019 0107 by Genny Orozco RN  Outcome: Ongoing     Problem: Pain:  Goal: Pain level will decrease  Description  Pain level will decrease  5/15/2019 1055 by Enrrique Forrest RN  Outcome: Ongoing  Note:   Pt has PRN pain medication available upon request. Pt aware to let nursing know when pain medication is needed.     5/15/2019 0107 by Genny Orozco RN  Outcome: Ongoing  Goal: Control of acute pain  Description  Control of acute pain  5/15/2019 1055 by Enrrique Forrest RN  Outcome: Ongoing  5/15/2019 0107 by Genny Orozco RN  Outcome: Ongoing  Goal: Control of chronic pain  Description  Control of chronic pain  5/15/2019 1055 by Enrrique Forrest RN  Outcome: Ongoing  5/15/2019 0107 by Genny Orozco RN  Outcome: Ongoing     Problem: Breathing Pattern - Ineffective:  Goal: Ability to achieve and maintain a regular respiratory rate will improve  Description  Ability to achieve and maintain a regular respiratory rate will improve  5/15/2019 1055 by Enrrique Forrest RN  Outcome: Ongoing  5/15/2019 0107 by Genny Orozco RN  Outcome: Ongoing

## 2019-05-15 NOTE — PLAN OF CARE
Problem: Falls - Risk of:  Goal: Will remain free from falls  Description  Will remain free from falls  5/15/2019 1952 by Gary Alvarez RN  Outcome: Ongoing  5/15/2019 1055 by Ezekiel Jennings RN  Outcome: Ongoing  Note:   Fall precautions in place, hourly rounding, call light and belongings in reach, bed in lowest position, wheels locked in place, side rails up x 2, walkways free of clutter    Goal: Absence of physical injury  Description  Absence of physical injury  5/15/2019 1952 by Gary Alvarez RN  Outcome: Ongoing  5/15/2019 1055 by Ezekiel Jennings RN  Outcome: Ongoing     Problem: Pain:  Goal: Pain level will decrease  Description  Pain level will decrease  5/15/2019 1952 by Gary Alvarez RN  Outcome: Ongoing  Pain controlled with prn pain medication. Patient understands to use call light and use of the pain scale. 5/15/2019 1055 by Ezekiel Jennings RN  Outcome: Ongoing  Note:   Pt has PRN pain medication available upon request. Pt aware to let nursing know when pain medication is needed. Goal: Control of acute pain  Description  Control of acute pain  5/15/2019 1952 by Gary Alvarez RN  Outcome: Ongoing  5/15/2019 1055 by Ezekiel Jennings RN  Outcome: Ongoing  Goal: Control of chronic pain  Description  Control of chronic pain  5/15/2019 1952 by Gary Alvarez RN  Outcome: Ongoing  5/15/2019 1055 by Ezekiel Jennings RN  Outcome: Ongoing     Problem: Breathing Pattern - Ineffective:  Goal: Ability to achieve and maintain a regular respiratory rate will improve  Description  Ability to achieve and maintain a regular respiratory rate will improve  5/15/2019 1952 by Gary Alvarez RN  Outcome: Ongoing  Patient continues on steroid therapy. Use of oxygen continues. Will continue to monitor respiratory rate and breathing pattern.    5/15/2019 1055 by Ezekiel Jennings RN  Outcome: Ongoing

## 2019-05-15 NOTE — PROGRESS NOTES
Assessment completed and documented. Patient alert and oriented X4. Is pleasant and cooperative with staff. Ambulating in room independently. Voiding without difficulty. Tolerating diet and fluids well. Call light and personal items in reach. Bed in lowest position and room is clutter free. Denies any pain at this time. The care plan and education has been reviewed and mutually agreed upon with the patient.

## 2019-05-15 NOTE — PROGRESS NOTES
[facility] Pulmonary and Critical Care   Progress Note      Reason for Consult: Shortness of breath, very severe COPD, respiratory failure   Requesting Physician: Dr Ross Human:   [address] / HPI:                The patient is a 69 y.o. female with significant past medical history of:      Diagnosis Date    Bronchitis     COPD     Hemoptysis     Hernia     Hypertension     Lung cancer ([facility] [address] 2008    left lung    Osteoporosis     Pneumonia     Rupture of colon ([facility] [address] 05/24/2013    Skin cancer 2014    ear     The patient presented to the emergency department acutely with complaints of severe shortness of breath. She has had chest tightness and congestion over the last several days. She is known to have very severe COPD of many years duration. She normally takes Advair, Combivent and DuoNeb. She is oxygen dependent as well. She does have a prior history of lung cancer with left upper lobectomy in 2008 for non-small cell carcinoma. She struggled with smoking cessation       She looks and feels better today. She is up and moving around in the room.     Past Surgical History:        Procedure Laterality Date    BRONCHOSCOPY  08/05/2016    BRONCHOSCOPY  01/31/2018    CHOLECYSTECTOMY      COLONOSCOPY  8/20/13    KYPHOSIS SURGERY  02/04/2016    LOBECTOMY  0808    MANDIBLE SURGERY      upper jaw and lower jaw    OTHER SURGICAL HISTORY  5/30/13    secondary wound closure    SIGMOIDECTOMY  5/24/13    THORACOTOMY  0808    TONSILLECTOMY      UPPER GASTROINTESTINAL ENDOSCOPY  01/18/2018    UPPER GASTROINTESTINAL ENDOSCOPY N/A 10/31/2018    EGD BIOPSY performed by Rob Fischer MD at [facility] [address] 1/30/2019    EGD performed by Rob Fischer MD at [address]     Current Medications:    Current Facility-Administered Medications: methylPREDNISolone sodium (SOLU-MEDROL) injection 40 mg, 40 mg, Intravenous, Q8H  enoxaparin (LOVENOX) injection 40 mg, 40 mg, Subcutaneous, Nightly  ipratropium-albuterol (DUONEB) nebulizer solution 1 ampule, 1 ampule, Inhalation, 4x daily  diltiazem (CARDIZEM) tablet 60 mg, 60 mg, Oral, 4x Daily  mometasone-formoterol (DULERA) 200-5 MCG/ACT inhaler 2 puff, 2 puff, Inhalation, BID  gabapentin (NEURONTIN) capsule 300 mg, 300 mg, Oral, BID  guaiFENesin (MUCINEX) extended release tablet 1,200 mg, 1,200 mg, Oral, Daily  montelukast (SINGULAIR) tablet 10 mg, 10 mg, Oral, Nightly  trospium (SANCTURA) tablet 20 mg, 20 mg, Oral, BID AC  glucose (GLUTOSE) 40 % oral gel 15 g, 15 g, Oral, PRN  dextrose 50 % solution 12.5 g, 12.5 g, Intravenous, PRN  glucagon (rDNA) injection 1 mg, 1 mg, Intramuscular, PRN  dextrose 5 % solution, 100 mL/hr, Intravenous, PRN  insulin lispro (HUMALOG) injection pen 0-6 Units, 0-6 Units, Subcutaneous, TID WC  insulin lispro (HUMALOG) injection pen 0-3 Units, 0-3 Units, Subcutaneous, Nightly  levofloxacin (LEVAQUIN) 750 MG/150ML infusion 750 mg, 750 mg, Intravenous, Q24H  traMADol (ULTRAM) tablet 50 mg, 50 mg, Oral, Q6H PRN    Allergies   Allergen Reactions    Wellbutrin [Bupropion Hcl] Palpitations       Social History:    TOBACCO:   reports that she has been smoking cigarettes. She has a 4.20 pack-year smoking history. She has never used smokeless tobacco.  ETOH:   reports that she does not drink alcohol. Patient currently lives independently  Environmental/chemical exposure: None known     Family History:       Problem Relation Age of Onset   Jessica Moors Diabetes Father     Other Father         A-Fib    Cancer Father         prostate    Cancer Mother         ovarian    Other Sister         A-Fib     REVIEW OF SYSTEMS:    CONSTITUTIONAL:  negative for fevers, chills, diaphoresis, activity change, appetite change, fatigue, night sweats and unexpected weight change.    EYES:  negative for blurred vision, eye discharge, visual disturbance and icterus  HEENT:  negative for hearing loss, tinnitus, ear drainage, anxiety. MUSCULOSKELETAL: No obvious misalignment or effusion of the joints. No clubbing, cyanosis of the digits. SKIN:  normal skin color, texture, turgor and no redness, warmth, or swelling. No palpable nodules    DATA:    Old records have been reviewed    CBC:  Recent Labs     05/13/19  1334   WBC 13.7*   RBC 4.95   HGB 15.0   HCT 46.3      MCV 93.4   MCH 30.3   MCHC 32.4   RDW 14.0      BMP:  Recent Labs     05/13/19  1334      K 4.3      CO2 26   BUN 14   CREATININE <0.5*   CALCIUM 9.7   GLUCOSE 116*      ABG:  No results for input(s): PHART, IPC3LWE, PO2ART, WHJ4BMK, D5GSDQWG, BEART in the last 72 hours. No results found for: BNP  Lab Results   Component Value Date    CKTOTAL 129 04/21/2010    TROPONINI <0.01 05/13/2019       Cultures:     Abx:    Radiology Review:  All pertinent images / reports were reviewed as a part of this visit. Assessment:     1. Acute on chronic hypoxemic respiratory failure  2. COPD exacerbation  3. Multifocal pneumonia  4. History of non-small cell lung cancer status post lobectomy 2008    I reviewed CT imaging which reveals patchy airspace disease and some elements of bronchiectasis and bronchial thickening. Certainly could have early pneumonia  Does not appear to be malignant  However, given her history, she will need CT imaging to document clearing. She is on Levaquin which should cover community-acquired pneumonia and aspiration. Taper Solu-Medrol  Continue antibiotics  Continue inhaled therapy  Continue supplemental oxygen  If she continues to improve, home in the next 1-2 days.

## 2019-05-16 LAB
CULTURE, RESPIRATORY: NORMAL
GLUCOSE BLD-MCNC: 155 MG/DL (ref 70–99)
GLUCOSE BLD-MCNC: 166 MG/DL (ref 70–99)
GLUCOSE BLD-MCNC: 237 MG/DL (ref 70–99)
GLUCOSE BLD-MCNC: 242 MG/DL (ref 70–99)
GRAM STAIN RESULT: NORMAL
PERFORMED ON: ABNORMAL

## 2019-05-16 PROCEDURE — 6370000000 HC RX 637 (ALT 250 FOR IP): Performed by: INTERNAL MEDICINE

## 2019-05-16 PROCEDURE — 94640 AIRWAY INHALATION TREATMENT: CPT

## 2019-05-16 PROCEDURE — 2700000000 HC OXYGEN THERAPY PER DAY

## 2019-05-16 PROCEDURE — 94760 N-INVAS EAR/PLS OXIMETRY 1: CPT

## 2019-05-16 PROCEDURE — 6360000002 HC RX W HCPCS: Performed by: INTERNAL MEDICINE

## 2019-05-16 PROCEDURE — 99232 SBSQ HOSP IP/OBS MODERATE 35: CPT | Performed by: INTERNAL MEDICINE

## 2019-05-16 PROCEDURE — 94668 MNPJ CHEST WALL SBSQ: CPT

## 2019-05-16 PROCEDURE — 1200000000 HC SEMI PRIVATE

## 2019-05-16 RX ORDER — SODIUM CHLORIDE 0.9 % (FLUSH) 0.9 %
SYRINGE (ML) INJECTION
Status: DISPENSED
Start: 2019-05-16 | End: 2019-05-16

## 2019-05-16 RX ORDER — METHYLPREDNISOLONE SODIUM SUCCINATE 40 MG/ML
40 INJECTION, POWDER, LYOPHILIZED, FOR SOLUTION INTRAMUSCULAR; INTRAVENOUS 2 TIMES DAILY
Status: DISCONTINUED | OUTPATIENT
Start: 2019-05-16 | End: 2019-05-17

## 2019-05-16 RX ADMIN — Medication 2 PUFF: at 07:52

## 2019-05-16 RX ADMIN — INSULIN LISPRO 1 UNITS: 100 INJECTION, SOLUTION INTRAVENOUS; SUBCUTANEOUS at 08:39

## 2019-05-16 RX ADMIN — LEVOFLOXACIN 750 MG: 5 INJECTION, SOLUTION INTRAVENOUS at 13:43

## 2019-05-16 RX ADMIN — METHYLPREDNISOLONE SODIUM SUCCINATE 40 MG: 40 INJECTION, POWDER, FOR SOLUTION INTRAMUSCULAR; INTRAVENOUS at 01:34

## 2019-05-16 RX ADMIN — DILTIAZEM HYDROCHLORIDE 60 MG: 60 TABLET, FILM COATED ORAL at 21:40

## 2019-05-16 RX ADMIN — IPRATROPIUM BROMIDE AND ALBUTEROL SULFATE 1 AMPULE: .5; 3 SOLUTION RESPIRATORY (INHALATION) at 19:53

## 2019-05-16 RX ADMIN — DILTIAZEM HYDROCHLORIDE 60 MG: 60 TABLET, FILM COATED ORAL at 17:09

## 2019-05-16 RX ADMIN — IPRATROPIUM BROMIDE AND ALBUTEROL SULFATE 1 AMPULE: .5; 3 SOLUTION RESPIRATORY (INHALATION) at 11:19

## 2019-05-16 RX ADMIN — INSULIN LISPRO 1 UNITS: 100 INJECTION, SOLUTION INTRAVENOUS; SUBCUTANEOUS at 21:50

## 2019-05-16 RX ADMIN — GABAPENTIN 300 MG: 300 CAPSULE ORAL at 21:40

## 2019-05-16 RX ADMIN — GABAPENTIN 300 MG: 300 CAPSULE ORAL at 08:39

## 2019-05-16 RX ADMIN — METHYLPREDNISOLONE SODIUM SUCCINATE 40 MG: 40 INJECTION, POWDER, FOR SOLUTION INTRAMUSCULAR; INTRAVENOUS at 17:09

## 2019-05-16 RX ADMIN — INSULIN LISPRO 1 UNITS: 100 INJECTION, SOLUTION INTRAVENOUS; SUBCUTANEOUS at 17:10

## 2019-05-16 RX ADMIN — IPRATROPIUM BROMIDE AND ALBUTEROL SULFATE 1 AMPULE: .5; 3 SOLUTION RESPIRATORY (INHALATION) at 07:52

## 2019-05-16 RX ADMIN — MONTELUKAST SODIUM 10 MG: 10 TABLET, FILM COATED ORAL at 21:41

## 2019-05-16 RX ADMIN — METHYLPREDNISOLONE SODIUM SUCCINATE 40 MG: 40 INJECTION, POWDER, FOR SOLUTION INTRAMUSCULAR; INTRAVENOUS at 09:42

## 2019-05-16 RX ADMIN — ENOXAPARIN SODIUM 40 MG: 40 INJECTION SUBCUTANEOUS at 21:41

## 2019-05-16 RX ADMIN — INSULIN LISPRO 2 UNITS: 100 INJECTION, SOLUTION INTRAVENOUS; SUBCUTANEOUS at 12:03

## 2019-05-16 RX ADMIN — DILTIAZEM HYDROCHLORIDE 60 MG: 60 TABLET, FILM COATED ORAL at 08:39

## 2019-05-16 RX ADMIN — DILTIAZEM HYDROCHLORIDE 60 MG: 60 TABLET, FILM COATED ORAL at 13:43

## 2019-05-16 RX ADMIN — Medication 2 PUFF: at 19:53

## 2019-05-16 RX ADMIN — IPRATROPIUM BROMIDE AND ALBUTEROL SULFATE 1 AMPULE: .5; 3 SOLUTION RESPIRATORY (INHALATION) at 15:29

## 2019-05-16 NOTE — PROGRESS NOTES
Blood cultures are negative so sepsis has been ruled out  Sputum culture still hasn't been reported as yet, pt is being treated for probable multifocal pneumonia    /73   Pulse 83   Temp 97.7 °F (36.5 °C) (Oral)   Resp 16   Ht 5' 8\" (1.727 m)   Wt 118 lb (53.5 kg)   SpO2 95%   BMI 17.94 kg/m²   Scheduled Meds:   sodium chloride flush        methylPREDNISolone  40 mg Intravenous Q8H    fesoterodine  4 mg Oral Daily    enoxaparin  40 mg Subcutaneous Nightly    ipratropium-albuterol  1 ampule Inhalation 4x daily    diltiazem  60 mg Oral 4x Daily    mometasone-formoterol  2 puff Inhalation BID    gabapentin  300 mg Oral BID    guaiFENesin  1,200 mg Oral Daily    montelukast  10 mg Oral Nightly    insulin lispro  0-6 Units Subcutaneous TID WC    insulin lispro  0-3 Units Subcutaneous Nightly    levofloxacin  750 mg Intravenous Q24H     Continuous Infusions:   dextrose       PRN Meds:.glucose, dextrose, glucagon (rDNA), dextrose, traMADol    Pt ambulating in room, anxious to go home soon    Patient Active Problem List   Diagnosis    [facility]) [facility] [facility]    Lung cancer ([facility] [address] [facility]    Hypertension    Diverticulosis    Acute on chronic respiratory failure with hypoxia (HCC)    OAB (overactive bladder)    Pulmonary nodules    Ventral hernia    COPD, severe (HCC)    S/P lobectomy of lung    Shortness of breath    Chronic cough    COPD exacerbation (HCC)    Upper GI bleeding    Chronic respiratory failure with hypoxia (HCC)    HAP (hospital-acquired pneumonia)    Mucus plugging of bronchi    COPD with respiratory distress, acute ([facility] [address]     Plan: probably home tomorrow  Po abx depending on sputum culture results  Taper steroids again today    Addendum: Nl respiratory sonia on sputum culture so plan to finish abx treatment with po Levaquin since she seems to be responding to the empiric treatment  Soo Valencia

## 2019-05-16 NOTE — PLAN OF CARE
Problem: Falls - Risk of:  Goal: Will remain free from falls  Description  Will remain free from falls  5/16/2019 0849 by Isidra Stephenson RN  Outcome: Ongoing  5/16/2019 0029 by Fred Steinberg RN  Outcome: Ongoing  5/15/2019 1952 by Fred Steinberg RN  Outcome: Ongoing  Goal: Absence of physical injury  Description  Absence of physical injury  5/16/2019 0849 by Isidra Stephenson RN  Outcome: Ongoing  5/16/2019 0029 by Fred Steinberg RN  Outcome: Ongoing  5/15/2019 1952 by Fred Steinberg RN  Outcome: Ongoing     Problem: Pain:  Goal: Pain level will decrease  Description  Pain level will decrease  5/16/2019 0849 by Isidra Stephenson RN  Outcome: Ongoing  5/16/2019 0029 by rFed Steinberg RN  Outcome: Ongoing  5/15/2019 1952 by Fred Steinberg RN  Outcome: Ongoing  Goal: Control of acute pain  Description  Control of acute pain  5/16/2019 0849 by Isidra Stephenson RN  Outcome: Ongoing  5/16/2019 0029 by Fred Steinberg RN  Outcome: Ongoing  5/15/2019 1952 by Fred Steinberg RN  Outcome: Ongoing  Goal: Control of chronic pain  Description  Control of chronic pain  5/16/2019 0849 by Isidra Stephenson RN  Outcome: Ongoing  5/16/2019 0029 by Fred Steinberg RN  Outcome: Ongoing  5/15/2019 1952 by Fred Steinberg RN  Outcome: Ongoing     Problem: Breathing Pattern - Ineffective:  Goal: Ability to achieve and maintain a regular respiratory rate will improve  Description  Ability to achieve and maintain a regular respiratory rate will improve  5/16/2019 0849 by Isidra Stephenson RN  Outcome: Ongoing  5/16/2019 0029 by Fred Steinberg RN  Outcome: Ongoing  5/15/2019 1952 by Fred Steinberg RN  Outcome: Ongoing

## 2019-05-16 NOTE — PROGRESS NOTES
P Pulmonary and Critical Care   Progress Note      Reason for Consult: Shortness of breath, very severe COPD, respiratory failure   Requesting Physician: Dr Rivera [place]:   [address] [facility]:                The patient is a 69 y.o. female with significant past medical history of:      Diagnosis Date    Bronchitis     COPD     Hemoptysis     Hernia     Hypertension     Lung cancer ([facility] [address] 2008    left lung    Osteoporosis     Pneumonia     Rupture of colon ([facility] [address] 05/24/2013    Skin cancer 2014    ear     She has continued to improve.     Past Surgical History:        Procedure Laterality Date    BRONCHOSCOPY  08/05/2016    BRONCHOSCOPY  01/31/2018    CHOLECYSTECTOMY      COLONOSCOPY  8/20/13    KYPHOSIS SURGERY  02/04/2016    LOBECTOMY  0808    MANDIBLE SURGERY      upper jaw and lower jaw    OTHER SURGICAL HISTORY  5/30/13    secondary wound closure    SIGMOIDECTOMY  5/24/13    THORACOTOMY  0808    TONSILLECTOMY      UPPER GASTROINTESTINAL ENDOSCOPY  01/18/2018    UPPER GASTROINTESTINAL ENDOSCOPY N/A 10/31/2018    EGD BIOPSY performed by Selene Belcher MD at [place] [address] 1/30/2019    EGD performed by Selene Belcher MD at [address] [place] [address]     Current Medications:    Current Facility-Administered Medications: sodium chloride flush 0.9 % injection, , ,   methylPREDNISolone sodium (SOLU-MEDROL) injection 40 mg, 40 mg, Intravenous, Q8H  fesoterodine (TOVIAZ) ER tablet 4 mg PATIENT SUPPLIED, 4 mg, Oral, Daily  enoxaparin (LOVENOX) injection 40 mg, 40 mg, Subcutaneous, Nightly  ipratropium-albuterol (DUONEB) nebulizer solution 1 ampule, 1 ampule, Inhalation, 4x daily  diltiazem (CARDIZEM) tablet 60 mg, 60 mg, Oral, 4x Daily  mometasone-formoterol (DULERA) 200-5 MCG/ACT inhaler 2 puff, 2 puff, Inhalation, BID  gabapentin (NEURONTIN) capsule 300 mg, 300 mg, Oral, BID  guaiFENesin (MUCINEX) extended release tablet 1,200 mg, 1,200 mg, Oral, Daily  montelukast (SINGULAIR) tablet 10 mg, 10 mg, Oral, Nightly  glucose (GLUTOSE) 40 % oral gel 15 g, 15 g, Oral, PRN  dextrose 50 % solution 12.5 g, 12.5 g, Intravenous, PRN  glucagon (rDNA) injection 1 mg, 1 mg, Intramuscular, PRN  dextrose 5 % solution, 100 mL/hr, Intravenous, PRN  insulin lispro (HUMALOG) injection pen 0-6 Units, 0-6 Units, Subcutaneous, TID WC  insulin lispro (HUMALOG) injection pen 0-3 Units, 0-3 Units, Subcutaneous, Nightly  levofloxacin (LEVAQUIN) 750 MG/150ML infusion 750 mg, 750 mg, Intravenous, Q24H  traMADol (ULTRAM) tablet 50 mg, 50 mg, Oral, Q6H PRN    Allergies   Allergen Reactions    Wellbutrin [Bupropion Hcl] Palpitations       Social History:    TOBACCO:   reports that she has been smoking cigarettes. She has a 4.20 pack-year smoking history. She has never used smokeless tobacco.  ETOH:   reports that she does not drink alcohol. Patient currently lives independently  Environmental/chemical exposure: None known     Family History:       Problem Relation Age of Onset   [facility] Diabetes Father     Other Father         A-Fib    Cancer Father         prostate    Cancer Mother         ovarian    Other Sister         A-Fib     REVIEW OF SYSTEMS:    CONSTITUTIONAL:  negative for fevers, chills, diaphoresis, activity change, appetite change, fatigue, night sweats and unexpected weight change.    EYES:  negative for blurred vision, eye discharge, visual disturbance and icterus  HEENT:  negative for hearing loss, tinnitus, ear drainage, sinus pressure, nasal congestion, epistaxis and snoring  RESPIRATORY:  See HPI  CARDIOVASCULAR:  negative for chest pain, palpitations, exertional chest pressure/discomfort, edema, syncope  GASTROINTESTINAL:  negative for nausea, vomiting, diarrhea, constipation, blood in stool and abdominal pain  GENITOURINARY:  negative for frequency, dysuria, urinary incontinence, decreased urine volume, and hematuria  HEMATOLOGIC/LYMPHATIC:  negative for easy bruising, bleeding and lymphadenopathy  ALLERGIC/IMMUNOLOGIC:  negative for recurrent infections, angioedema, anaphylaxis and drug reactions  ENDOCRINE:  negative for weight changes and diabetic symptoms including polyuria, polydipsia and polyphagia  MUSCULOSKELETAL:  negative for  pain, joint swelling, decreased range of motion and muscle weakness  NEUROLOGICAL:  negative for headaches, slurred speech, unilateral weakness  PSYCHIATRIC/BEHAVIORAL: negative for hallucinations, behavioral problems, confusion and agitation. Objective:   PHYSICAL EXAM:      VITALS:  /73   Pulse 83   Temp 97.7 °F (36.5 °C) (Oral)   Resp 16   Ht 5' 8\" (1.727 m)   Wt 118 lb (53.5 kg)   SpO2 95%   BMI 17.94 kg/m²      24HR INTAKE/OUTPUT:      Intake/Output Summary (Last 24 hours) at 5/16/2019 1019  Last data filed at 5/16/2019 0959  Gross per 24 hour   Intake 600 ml   Output --   Net 600 ml     CONSTITUTIONAL:  awake, alert, cooperative, no apparent distress, and appears stated age  NECK:  Supple, symmetrical, trachea midline, no adenopathy, thyroid symmetric, not enlarged and no tenderness, skin normal  LUNGS:  no increased work of breathing and diminished at the bases and wheezes in the upper lobes to auscultation. No accessory muscle use  CARDIOVASCULAR: S1 and S2, no edema and no JVD  ABDOMEN:  normal bowel sounds, non-distended and no masses palpated, and no tenderness to palpation. No hepatospleenomegaly  LYMPHADENOPATHY:  no axillary or supraclavicular adenopathy. No cervical adnenopathy  PSYCHIATRIC: Oriented to person place and time. No obvious depression or anxiety. MUSCULOSKELETAL: No obvious misalignment or effusion of the joints. No clubbing, cyanosis of the digits. SKIN:  normal skin color, texture, turgor and no redness, warmth, or swelling.  No palpable nodules    DATA:    Old records have been reviewed    CBC:  Recent Labs     05/13/19  1334   WBC 13.7*   RBC 4.95   HGB 15.0   HCT 46.3      MCV 93.4   MCH 30.3   MCHC 32.4   RDW 14.0      BMP:  Recent Labs     05/13/19  1334      K 4.3      CO2 26   BUN 14   CREATININE <0.5*   CALCIUM 9.7   GLUCOSE 116*      ABG:  No results for input(s): PHART, MTF1JXV, PO2ART, BUG7GYX, O7TWHPXD, BEART in the last 72 hours. No results found for: BNP  Lab Results   Component Value Date    CKTOTAL 129 04/21/2010    TROPONINI <0.01 05/13/2019       Cultures:     Abx:    Radiology Review:  All pertinent images / reports were reviewed as a part of this visit. Assessment:     1. Acute on chronic hypoxemic respiratory failure  2. COPD exacerbation  3. Multifocal pneumonia  4. History of non-small cell lung cancer status post lobectomy 2008    I reviewed CT imaging which reveals patchy airspace disease and some elements of bronchiectasis and bronchial thickening. Certainly could have early pneumonia  Does not appear to be malignant  However, given her history, she will need CT imaging to document clearing. Okay with me if she is discharged home today    Discharge recommendations:  · Continue supplemental oxygen  · Discharge him prednisone 20 mg daily and continue this dose until she sees Keira Garsia in 1 week. Anticipate slow prednisone taper.   · Discharge with Levaquin 750 mg until she sees Keira Garsia in 1 week  · After that, we'll put her on azithromycin 250 mg every Monday, Wednesday and Friday  · Resume her normal inhaled medications

## 2019-05-16 NOTE — PROGRESS NOTES
Assessment completed and documented. Patient alert and oriented X4. Is pleasant and cooperative with staff. Ambulating in room independently and tolerating well. Tolerating diet and fluids well. Denies any pain at this time. No shortness of breath noted. Using O2 at 2L/NC. Call light in reach. The care plan and education has been reviewed and mutually agreed upon with the patient.

## 2019-05-17 VITALS
HEIGHT: 68 IN | WEIGHT: 118 LBS | BODY MASS INDEX: 17.88 KG/M2 | TEMPERATURE: 98 F | DIASTOLIC BLOOD PRESSURE: 65 MMHG | HEART RATE: 64 BPM | OXYGEN SATURATION: 88 % | RESPIRATION RATE: 16 BRPM | SYSTOLIC BLOOD PRESSURE: 119 MMHG

## 2019-05-17 LAB
GLUCOSE BLD-MCNC: 153 MG/DL (ref 70–99)
PERFORMED ON: ABNORMAL

## 2019-05-17 PROCEDURE — 94640 AIRWAY INHALATION TREATMENT: CPT

## 2019-05-17 PROCEDURE — 6360000002 HC RX W HCPCS: Performed by: INTERNAL MEDICINE

## 2019-05-17 PROCEDURE — 6370000000 HC RX 637 (ALT 250 FOR IP): Performed by: INTERNAL MEDICINE

## 2019-05-17 PROCEDURE — 94669 MECHANICAL CHEST WALL OSCILL: CPT

## 2019-05-17 PROCEDURE — 2700000000 HC OXYGEN THERAPY PER DAY

## 2019-05-17 RX ORDER — LEVOFLOXACIN 500 MG/1
500 TABLET, FILM COATED ORAL DAILY
Qty: 10 TABLET | Refills: 0 | Status: SHIPPED | OUTPATIENT
Start: 2019-05-17 | End: 2019-05-27

## 2019-05-17 RX ORDER — LEVOFLOXACIN 500 MG/1
500 TABLET, FILM COATED ORAL DAILY
Status: DISCONTINUED | OUTPATIENT
Start: 2019-05-17 | End: 2019-05-17 | Stop reason: HOSPADM

## 2019-05-17 RX ORDER — PREDNISONE 20 MG/1
20 TABLET ORAL 2 TIMES DAILY
Qty: 20 TABLET | Refills: 0 | Status: SHIPPED | OUTPATIENT
Start: 2019-05-17 | End: 2019-05-27

## 2019-05-17 RX ORDER — PREDNISONE 20 MG/1
20 TABLET ORAL 2 TIMES DAILY
Status: DISCONTINUED | OUTPATIENT
Start: 2019-05-17 | End: 2019-05-17 | Stop reason: HOSPADM

## 2019-05-17 RX ADMIN — INSULIN LISPRO 1 UNITS: 100 INJECTION, SOLUTION INTRAVENOUS; SUBCUTANEOUS at 08:13

## 2019-05-17 RX ADMIN — GUAIFENESIN 1200 MG: 600 TABLET, EXTENDED RELEASE ORAL at 10:27

## 2019-05-17 RX ADMIN — GABAPENTIN 300 MG: 300 CAPSULE ORAL at 08:13

## 2019-05-17 RX ADMIN — DILTIAZEM HYDROCHLORIDE 60 MG: 60 TABLET, FILM COATED ORAL at 08:13

## 2019-05-17 RX ADMIN — METHYLPREDNISOLONE SODIUM SUCCINATE 40 MG: 40 INJECTION, POWDER, FOR SOLUTION INTRAMUSCULAR; INTRAVENOUS at 08:13

## 2019-05-17 RX ADMIN — Medication 2 PUFF: at 08:22

## 2019-05-17 RX ADMIN — LEVOFLOXACIN 500 MG: 500 TABLET, FILM COATED ORAL at 10:27

## 2019-05-17 RX ADMIN — IPRATROPIUM BROMIDE AND ALBUTEROL SULFATE 1 AMPULE: .5; 3 SOLUTION RESPIRATORY (INHALATION) at 08:22

## 2019-05-17 NOTE — PROGRESS NOTES
Assessment completed and documented. Pt resting in bed. Pt wanting to go home today. The care plan and education has been reviewed and mutually agreed upon with the patient. O2 removed to monitor RA. 94%. No needs at this time. Call light in reach will monitor.

## 2019-05-17 NOTE — PROGRESS NOTES
Pt requesting to go home today    Will change steroids and Levaquin to po    /65   Pulse 64   Temp 98 °F (36.7 °C) (Oral)   Resp 16   Ht 5' 8\" (1.727 m)   Wt 118 lb (53.5 kg)   SpO2 (!) 88%   BMI 17.94 kg/m²       Intake/Output Summary (Last 24 hours) at 5/17/2019 0844  Last data filed at 5/17/2019 3866  Gross per 24 hour   Intake 840 ml   Output --   Net 840 ml     Scheduled Meds:   methylPREDNISolone  40 mg Intravenous BID    fesoterodine  4 mg Oral Daily    enoxaparin  40 mg Subcutaneous Nightly    ipratropium-albuterol  1 ampule Inhalation 4x daily    diltiazem  60 mg Oral 4x Daily    mometasone-formoterol  2 puff Inhalation BID    gabapentin  300 mg Oral BID    guaiFENesin  1,200 mg Oral Daily    montelukast  10 mg Oral Nightly    insulin lispro  0-6 Units Subcutaneous TID WC    insulin lispro  0-3 Units Subcutaneous Nightly    levofloxacin  750 mg Intravenous Q24H     Continuous Infusions:   dextrose       PRN Meds:.glucose, dextrose, glucagon (rDNA), dextrose, traMADol      Lungs definitely improved since admission, rhonchi much mproved    Sputum cx nl resp sonia    Patient Active Problem List   Diagnosis    [facility]    Lung cancer ([facility]    Hypertension    Diverticulosis    Acute on chronic respiratory failure with hypoxia (HCC)    OAB (overactive bladder)    Pulmonary nodules    Ventral hernia    COPD, severe (HCC)    S/P lobectomy of lung    Shortness of breath    Chronic cough    COPD exacerbation (HCC)    Upper GI bleeding    Chronic respiratory failure with hypoxia (HCC)    HAP (hospital-acquired pneumonia)    Mucus plugging of bronchi    COPD with respiratory distress, acute ([address]     Plan: Home today    Hosp f/u in 1 week    Natalie Marcos

## 2019-05-17 NOTE — PROGRESS NOTES
2000: pt ambulating in the hallway, tolerating well. 2140: Assessment complete, VSS, lungs clear but diminished, encouraged C/DB, pt wearing 2L O2 at overnight, pt is pt baseline, still SOB with exertion, denies pain or nausea, tolerating regular diet, dicussed care plan, pt mutually agrees, requests to skip 3am VS, no other needs at this time, will continue monitoring. 0300: pt sleeping, no s/s of distress. 2904: pt feeling well this AM, VSS, no needs at this time.   Tee Goldstein

## 2019-05-17 NOTE — DISCHARGE SUMMARY
Physician Discharge Summary     Patient ID:  Melba Small  [id number]  17 y.o.  1945    Admit date: 5/13/2019    Discharge date and time: 5/17/2019    Admitting Physician: Soo Valencia MD     Discharge Physician: Soo Valencia    Admission Diagnoses: COPD with respiratory distress, acute ([facility] [facility] [J44.9, R06.03]  COPD with respiratory distress, acute ([address] [J44.9, R06.03]    Discharge Diagnoses:   Patient Active Problem List   Diagnosis    COPD (chronic obstructive pulmonary disease) ([facility]    Lung cancer ([facility] [facility]    Hypertension    Diverticulosis    Acute on chronic respiratory failure with hypoxia ([facility] [facility]    OAB (overactive bladder)    Pulmonary nodules    Ventral hernia    COPD, severe ([facility] [address]    S/P lobectomy of lung    Shortness of breath    Chronic cough    COPD exacerbation (HCC)    Upper GI bleeding    Chronic respiratory failure with hypoxia (HCC)    HAP (hospital-acquired pneumonia)    Mucus plugging of bronchi    COPD with respiratory distress, acute ([facility]         Admission Condition: fair    Discharged Condition: good    Indication for Admission: respiratory distress    Hospital Course: Admitted from ED after she presented with cough/SOB unresponsive to outpatient management. She was sent to ED per Pulmonary, was found t have multiple opacities on CT scan felt to be due to multifocal pneumonia. She was treated empirically with IV Levaquin and has shown progressive improvement in her symptoms during hospital stay. Blood cultures were negative so sepsis was ruled out. Sputum culture showed normal respiratory sonia. Steroids have been tapered to po, changing antibiotics to po today.     Consults: pulmonary/intensive care    Significant Diagnostic Studies: microbiology: blood culture: negative and sputum culture: negative    Treatments: antibiotics: Levaquin    Discharge Exam:  /65   Pulse 64   Temp 98 °F (36.7 °C) (Oral)   Resp 16   Ht 5' 8\" (1.727 m)   Wt 118 lb (53.5 kg) SpO2 (!) 88%   BMI 17.94 kg/m²     General Appearance:    Alert, cooperative, no distress, appears stated age   Head:    Normocephalic, without obvious abnormality, atraumatic   Eyes:    PERRL, conjunctiva/corneas clear, EOM's intact, fundi     benign, both eyes   Ears:    Normal TM's and external ear canals, both ears   Nose:   Nares normal, septum midline, mucosa normal, no drainage    or sinus tenderness   Throat:   Lips, mucosa, and tongue normal; teeth and gums normal   Neck:   Supple, symmetrical, trachea midline, no adenopathy;     thyroid:  no enlargement/tenderness/nodules; no carotid    bruit or JVD   Back:     Symmetric, no curvature, ROM normal, no CVA tenderness   Lungs:     Clear to auscultation bilaterally, respirations unlabored   Chest Wall:    No tenderness or deformity    Heart:    Regular rate and rhythm, S1 and S2 normal, no murmur, rub   or gallop   Breast Exam:    No tenderness, masses, or nipple abnormality   Abdomen:     Soft, non-tender, bowel sounds active all four quadrants,     no masses, no organomegaly   Genitalia:    Normal female without lesion, discharge or tenderness   Rectal:    Normal tone ;guaiac negative stool   Extremities:   Extremities normal, atraumatic, no cyanosis or edema   Pulses:   2+ and symmetric all extremities   Skin:   Skin color, texture, turgor normal, no rashes or lesions   Lymph nodes:   Cervical, supraclavicular, and axillary nodes normal   Neurologic:   CNII-XII intact, normal strength, sensation and reflexes     throughout       Disposition: home    In process/preliminary results:  Outstanding Order Results     Date and Time Order Name Status Description    5/13/2019 1427 Culture blood #1 Preliminary     5/13/2019 1333 Culture blood #2 Preliminary           Patient Instructions:   Current Discharge Medication List      START taking these medications    Details   predniSONE (DELTASONE) 20 MG tablet Take 1 tablet by mouth 2 times daily for 10 days  Qty: 20 tablet, Refills: 0      levofloxacin (LEVAQUIN) 500 MG tablet Take 1 tablet by mouth daily for 10 days  Qty: 10 tablet, Refills: 0         CONTINUE these medications which have NOT CHANGED    Details   guaiFENesin (MUCINEX) 600 MG extended release tablet Take 1,200 mg by mouth daily      traMADol (ULTRAM) 50 MG tablet Take 50 mg by mouth 2 times daily as needed for Pain.      montelukast (SINGULAIR) 10 MG tablet TAKE ONE TABLET BY MOUTH ONCE NIGHTLY  Qty: 30 tablet, Refills: 10    Associated Diagnoses: Mucopurulent chronic bronchitis (HCC)      gabapentin (NEURONTIN) 300 MG capsule TAKE ONE CAPSULE BY MOUTH TWICE A DAY  Qty: 60 capsule, Refills: 4    Associated Diagnoses: Neuropathy; Chronic midline low back pain without sciatica      fluticasone-salmeterol (ADVAIR DISKUS) 250-50 MCG/DOSE AEPB Inhale 1 puff into the lungs 2 times daily  Qty: 60 each, Refills: 11    Associated Diagnoses: Chronic obstructive pulmonary disease with acute exacerbation ([facility])      albuterol-ipratropium (COMBIVENT RESPIMAT)  MCG/ACT AERS inhaler Inhale 1 puff into the lungs every 6 hours  Qty: 1 Inhaler, Refills: 11    Associated Diagnoses: Chronic obstructive pulmonary disease with acute exacerbation (HCC)      OXYGEN Inhale 3 L/min into the lungs continuous Use nightly as needed  Qty: 1 Bottle, Refills: 5      TOVIAZ 4 MG TB24 ER tablet TAKE ONE TABLET BY MOUTH DAILY  Qty: 30 tablet, Refills: 10    Associated Diagnoses: OAB (overactive bladder)      albuterol sulfate HFA (VENTOLIN HFA) 108 (90 Base) MCG/ACT inhaler Inhale 2 puffs into the lungs every 6 hours as needed for Wheezing  Qty: 1 Inhaler, Refills: 11plkeas      diltiazem (CARDIZEM) 60 MG tablet Take 60 mg by mouth 4 times daily       Denosumab (PROLIA SC) Inject into the skin Every 6 months         STOP taking these medications       acetaminophen (TYLENOL) 325 MG tablet Comments:   Reason for Stopping:             Activity: activity as tolerated  Diet: regular

## 2019-05-17 NOTE — PLAN OF CARE
Problem: Falls - Risk of:  Goal: Will remain free from falls  Description  Will remain free from falls  Outcome: Ongoing     Problem: Pain:  Goal: Pain level will decrease  Description  Pain level will decrease  Outcome: Ongoing     Problem: Breathing Pattern - Ineffective:  Goal: Ability to achieve and maintain a regular respiratory rate will improve  Description  Ability to achieve and maintain a regular respiratory rate will improve  Outcome: Ongoing

## 2019-05-17 NOTE — PROGRESS NOTES
CLINICAL PHARMACY NOTE: MEDS TO [address] [place] [address] Drive Select Patient?: No  Total # of Prescriptions Filled: 2   The following medications were delivered to the patient:  · Levofloxacin  · Prednisone  Total # of Interventions Completed: 0  Time Spent (min): 60    Additional Documentation:  Delivered to patient  Natan

## 2019-05-18 LAB
BLOOD CULTURE, ROUTINE: NORMAL
CULTURE, BLOOD 2: NORMAL

## 2019-05-20 ENCOUNTER — TELEPHONE (OUTPATIENT)
Dept: INTERNAL MEDICINE CLINIC | Age: 74
End: 2019-05-20

## 2019-05-20 NOTE — TELEPHONE ENCOUNTER
Stopped calcium due to steroid use as instructed in hospital d/c papers,Dr Ruth Monique recommended pt continue use of calcium for osteoporosis. Is it ok for pt to restart calcium? If so, should she restart previous therapy, 600mg BID or different therapy?   Pt # [id number]    TK

## 2019-05-20 NOTE — TELEPHONE ENCOUNTER
Pt called states she was treated in [facility] ER 05/'13 and they had her to stop taking her Calcium medication and was wonderning if she should start taking it again.

## 2019-05-20 NOTE — TELEPHONE ENCOUNTER
Spoke with the patient advised her to talk with her primary care physician regarding calcium. I did not see a clear-cut reason discontinue calcium during her hospitalization.

## 2019-05-23 ENCOUNTER — OFFICE VISIT (OUTPATIENT)
Dept: INTERNAL MEDICINE CLINIC | Age: 74
End: 2019-05-23

## 2019-05-23 VITALS
HEART RATE: 95 BPM | WEIGHT: 116 LBS | BODY MASS INDEX: 17.64 KG/M2 | DIASTOLIC BLOOD PRESSURE: 70 MMHG | OXYGEN SATURATION: 98 % | SYSTOLIC BLOOD PRESSURE: 120 MMHG

## 2019-05-23 DIAGNOSIS — J44.1 CHRONIC OBSTRUCTIVE PULMONARY DISEASE WITH ACUTE EXACERBATION (HCC): Primary | ICD-10-CM

## 2019-05-23 PROCEDURE — 99496 TRANSJ CARE MGMT HIGH F2F 7D: CPT | Performed by: INTERNAL MEDICINE

## 2019-05-23 NOTE — PROGRESS NOTES
Chief Complaint   Patient presents with   [facility] f/u visit s/p multifocal pneumonia     Subjective:      Patient ID: Mary Grant is a 69 y.o. female. HPI-recently D/C'd on Levaquin for multifocal pneumonia    Review of Systems     Current Outpatient Medications on File Prior to Visit   Medication Sig Dispense Refill    predniSONE (DELTASONE) 20 MG tablet Take 1 tablet by mouth 2 times daily for 10 days 20 tablet 0    levofloxacin (LEVAQUIN) 500 MG tablet Take 1 tablet by mouth daily for 10 days 10 tablet 0    guaiFENesin (MUCINEX) 600 MG extended release tablet Take 1,200 mg by mouth daily      traMADol (ULTRAM) 50 MG tablet Take 50 mg by mouth 2 times daily as needed for Pain.  montelukast (SINGULAIR) 10 MG tablet TAKE ONE TABLET BY MOUTH ONCE NIGHTLY 30 tablet 10    gabapentin (NEURONTIN) 300 MG capsule TAKE ONE CAPSULE BY MOUTH TWICE A DAY 60 capsule 4    Denosumab (PROLIA SC) Inject into the skin Every 6 months      fluticasone-salmeterol (ADVAIR DISKUS) 250-50 MCG/DOSE AEPB Inhale 1 puff into the lungs 2 times daily 60 each 11    albuterol-ipratropium (COMBIVENT RESPIMAT)  MCG/ACT AERS inhaler Inhale 1 puff into the lungs every 6 hours 1 Inhaler 11    OXYGEN Inhale 3 L/min into the lungs continuous Use nightly as needed 1 Bottle 5    TOVIAZ 4 MG TB24 ER tablet TAKE ONE TABLET BY MOUTH DAILY 30 tablet 10    albuterol sulfate HFA (VENTOLIN HFA) 108 (90 Base) MCG/ACT inhaler Inhale 2 puffs into the lungs every 6 hours as needed for Wheezing 1 Inhaler 11plkeas    diltiazem (CARDIZEM) 60 MG tablet Take 60 mg by mouth 4 times daily        No current facility-administered medications on file prior to visit. Objective:   Physical Exam   Constitutional: She is oriented to person, place, and time. She appears well-developed and well-nourished. /70   Pulse 95   Wt 116 lb (52.6 kg)   SpO2 98%   BMI 17.64 kg/m²      Neck: No JVD present. No thyromegaly present. Cardiovascular: Normal rate, regular rhythm and normal heart sounds. Pulmonary/Chest: Effort normal and breath sounds normal. No respiratory distress. Musculoskeletal: Normal range of motion. She exhibits no edema or tenderness. Neurological: She is alert and oriented to person, place, and time. She has normal reflexes. Psychiatric: She has a normal mood and affect. Her behavior is normal.   Nursing note and vitals reviewed.       Assessment:      Patient Active Problem List   Diagnosis    [facility]ronic obstructive pulmonary disease) [facility]    Lung cancer ([facility] [facility]    Hypertension    Diverticulosis    Acute on chronic respiratory failure with hypoxia (HCC)    OAB (overactive bladder)    Pulmonary nodules    Ventral hernia    COPD, severe ([facility] [facility]    S/P lobectomy of lung    Shortness of breath    Chronic cough    COPD exacerbation (HCC)    Upper GI bleeding    Chronic respiratory failure with hypoxia (HCC)    HAP (hospital-acquired pneumonia)    Mucus plugging of bronchi    COPD with respiratory distress, acute (HCC)           Plan:      As per Pulmonary, will defer to Dr. Justine Rodarte as of next week    F/u in 1 month    Goal wt 121 lbs    Gautam Carranza MD

## 2019-05-29 ENCOUNTER — OFFICE VISIT (OUTPATIENT)
Dept: PULMONOLOGY | Age: 74
End: 2019-05-29
Payer: COMMERCIAL

## 2019-05-29 VITALS
BODY MASS INDEX: 17.94 KG/M2 | OXYGEN SATURATION: 91 % | HEART RATE: 101 BPM | DIASTOLIC BLOOD PRESSURE: 69 MMHG | WEIGHT: 118 LBS | SYSTOLIC BLOOD PRESSURE: 108 MMHG

## 2019-05-29 DIAGNOSIS — J44.1 COPD EXACERBATION (HCC): ICD-10-CM

## 2019-05-29 DIAGNOSIS — J96.11 CHRONIC RESPIRATORY FAILURE WITH HYPOXIA (HCC): Primary | ICD-10-CM

## 2019-05-29 DIAGNOSIS — C34.12 MALIGNANT NEOPLASM OF UPPER LOBE OF LEFT LUNG (HCC): ICD-10-CM

## 2019-05-29 PROCEDURE — 99214 OFFICE O/P EST MOD 30 MIN: CPT | Performed by: NURSE PRACTITIONER

## 2019-05-29 RX ORDER — PREDNISONE 10 MG/1
TABLET ORAL
Qty: 11 TABLET | Refills: 0 | Status: SHIPPED | OUTPATIENT
Start: 2019-05-29 | End: 2019-06-08

## 2019-05-29 RX ORDER — AZITHROMYCIN 250 MG/1
TABLET, FILM COATED ORAL
Qty: 15 TABLET | Refills: 2 | Status: SHIPPED | OUTPATIENT
Start: 2019-05-29 | End: 2019-09-05 | Stop reason: SDUPTHER

## 2019-05-29 ASSESSMENT — ENCOUNTER SYMPTOMS
COUGH: 1
CONSTIPATION: 0
SHORTNESS OF BREATH: 1
COLOR CHANGE: 0
ABDOMINAL PAIN: 0

## 2019-05-29 NOTE — PROGRESS NOTES
[facility]    Subjective:   CHIEF COMPLAINT / HPI: Recent hospitalization -   The patient is 69 y.o. female who presents today for a routine follow up visit related to the above mentioned issues. There is a PMH significant for lung CA with left upper lobectomy, osteoporosis and COPD. She presented to the ED with increased SOB. CT imaging revealed patchy ASD concerning for early pneumonia. She was treated with Levaquin. Culture revealed NRF during this admission but she has grown pseudomonas in the past (2018). She was discharged on slow Prednisone taper and stopped 20 mg daily on Monday of this week. Presently she is still more SOB than baseline. She coughs occasionally. There are no fevers or chills. Her appitte has improved. She presents to the office today without supplemental O2 and oxygen saturations are in the mid 80s. This improves to > 90% on RA with a few minutes of rest.  She is compliant with Advair and Albuterol nebs. Past Medical History:   Diagnosis Date    Bronchitis     COPD     Hemoptysis     Hernia     Hypertension     Lung cancer ([facility] [address]     left lung    Osteoporosis     Pneumonia     Rupture of colon ([facility] [facility] 2013    Skin cancer 2014    ear     Social History:    Social History     Tobacco Use   Smoking Status Light Tobacco Smoker    Packs/day: 0.10    Years: 42.00    Pack years: 4.20    Types: Cigarettes    Last attempt to quit: 2010    Years since quittin.2   Smokeless Tobacco Never Used   Tobacco Comment    1 cigarette seun, pt like to quit, will quit after vacation      Current Medications:     Current Outpatient Medications on File Prior to Visit   Medication Sig Dispense Refill    guaiFENesin (MUCINEX) 600 MG extended release tablet Take 1,200 mg by mouth daily      traMADol (ULTRAM) 50 MG tablet Take 50 mg by mouth 2 times daily as needed for Pain.       montelukast (SINGULAIR) 10 MG tablet TAKE ONE TABLET BY MOUTH ONCE NIGHTLY 30 tablet 10    gabapentin (NEURONTIN) 300 MG capsule TAKE ONE CAPSULE BY MOUTH TWICE A DAY 60 capsule 4    Denosumab (PROLIA SC) Inject into the skin Every 6 months      fluticasone-salmeterol (ADVAIR DISKUS) 250-50 MCG/DOSE AEPB Inhale 1 puff into the lungs 2 times daily 60 each 11    albuterol-ipratropium (COMBIVENT RESPIMAT)  MCG/ACT AERS inhaler Inhale 1 puff into the lungs every 6 hours 1 Inhaler 11    OXYGEN Inhale 3 L/min into the lungs continuous Use nightly as needed 1 Bottle 5    TOVIAZ 4 MG TB24 ER tablet TAKE ONE TABLET BY MOUTH DAILY 30 tablet 10    albuterol sulfate HFA (VENTOLIN HFA) 108 (90 Base) MCG/ACT inhaler Inhale 2 puffs into the lungs every 6 hours as needed for Wheezing 1 Inhaler 11plkeas    diltiazem (CARDIZEM) 60 MG tablet Take 60 mg by mouth 4 times daily        No current facility-administered medications on file prior to visit. Review of Systems   Constitutional: Negative for chills and fever. HENT: Negative for congestion and postnasal drip. Respiratory: Positive for cough and shortness of breath. Cardiovascular: Negative for chest pain and leg swelling. Gastrointestinal: Negative for abdominal pain and constipation. Musculoskeletal: Negative for arthralgias and joint swelling. Skin: Negative for color change and pallor. Allergic/Immunologic: Negative for environmental allergies and food allergies. Psychiatric/Behavioral: Negative for agitation and confusion. Objective:       VITALS:  /69   Pulse 101   Wt 118 lb (53.5 kg)   SpO2 91%   BMI 17.94 kg/m²  on RA    Physical Exam   Constitutional: She is oriented to person, place, and time. She appears well-developed and well-nourished. No distress. HENT:   Head: Normocephalic. Mouth/Throat: No oropharyngeal exudate. Eyes: Pupils are equal, round, and reactive to light. Conjunctivae are normal. Right eye exhibits no discharge.  Left eye exhibits no discharge. Neck: Normal range of motion. Neck supple. No tracheal deviation present. Cardiovascular: Normal rate, regular rhythm and intact distal pulses. Exam reveals no friction rub. Pulmonary/Chest: Effort normal. No accessory muscle usage or stridor. No tachypnea. No respiratory distress. She has wheezes. She has no rales. She exhibits no tenderness and no crepitus. Abdominal: Soft. Bowel sounds are normal. She exhibits no distension. There is no tenderness. Musculoskeletal: Normal range of motion. She exhibits no edema. Lymphadenopathy:     She has no cervical adenopathy. Neurological: She is alert and oriented to person, place, and time. Skin: Skin is warm and dry. No erythema. Psychiatric: She has a normal mood and affect. Thought content normal.   Vitals reviewed. DATA:      Radiology Review:  Pertinent images / reports were reviewed as a part of this visit. CT chest done May 2019 reveals the following:  Multifocal airspace opacities as above favoring infection. Recommend short interval follow-up in 3 months to ensure resolution given background emphysema. A few scattered pulmonary nodules measuring up to 5 mm, not substantially changed. These have been stable for a year no further follow-up needed. Last PFTs done June 2018:  Spirometry reveals decreased FVC at 2.02 liters, which is 58% predicted. FEV1 is decreased at 0.9 liters, which is 34% predicted. FEV1/FVC ratio is  reduced at 44%. There is a 22% improvement in FEV1 after inhaled  bronchodilators. Lung volumes reveal normal total lung capacity. Vital  capacity is reduced. Diffusion capacity is reduced at 42% of predicted. In comparison to a study from 07/2015, diffusion capacity has decreased by  22%. IMPRESSION:  Severe obstructive lung disease. There is a good response to  inhaled bronchodilators. There has been reduction in diffusion capacity  since the preceding study. Assessment / Plan:   1. Chronic respiratory failure with hypoxia (HCC)  - Oxygen saturations on RA ambulating into office are in the mid 80s  - O2 saturations rebound to the low 90s on RA with a few minutes of rest  - She continues to benefit from 2-3L NC O2 with activity and sleep  - Educated about the importance of wearing O2    2. COPD [facility]ation ([facility]  - She is still diffusely wheezing  - I think she would benefit from a longer Prednisone taper  - Will resume 10 mg daily x 7 days then transition to 5 mg daily x 7 days then will re-evaluate at next visit    3. Malignant neoplasm of upper lobe of left lung ([facility] [address]  - She is s/p left upper lobectomy  - CT chest done while hospitalized was c/w pneumonia and she was treated with a full course of Levaquin  - Add MWF Zithromax for suppression   - Will plan for f/u CT chest sometime in August Return in about 1 month (around 6/26/2019). RTC sooner if symptoms worsen acutely.     Odilia Dumont MSN APRN-ACNP CCRN

## 2019-06-05 ENCOUNTER — OFFICE VISIT (OUTPATIENT)
Dept: ENT CLINIC | Age: 74
End: 2019-06-05
Payer: COMMERCIAL

## 2019-06-05 VITALS — SYSTOLIC BLOOD PRESSURE: 112 MMHG | OXYGEN SATURATION: 90 % | HEART RATE: 90 BPM | DIASTOLIC BLOOD PRESSURE: 68 MMHG

## 2019-06-05 DIAGNOSIS — H61.23 BILATERAL IMPACTED CERUMEN: Primary | ICD-10-CM

## 2019-06-05 PROCEDURE — 69210 REMOVE IMPACTED EAR WAX UNI: CPT | Performed by: OTOLARYNGOLOGY

## 2019-06-05 NOTE — PROGRESS NOTES
The patient presents with concerns about ear fullness  suspected to be a wax build up given her past history. She comes approximately every 6 months given the propensity to accumulate and obstructing amount of wax. This is  a recurring problem. She was recently hospitalized with pneumonia, and remains on oral antibiotics. This has not impacted her hearing. There has not been associated ear pain or otorrhea. Attempts at removal have included nothing to date  She is followed for COPD and history of lung cancer    There have been several infections of late out of concerns for immunodeficiency but no suspicion for otitis. I was unable to visualize  both tympanic membranes  due to excessive cerumen and debris in the ear canal. The ear was not sensitive to touch. There was not vijaya auricular involvement. The bilateral ear canal was cleaned of cerumen, keratinaceous and squamous debis by curettage, suction/irrigation. The patient tolerated this well. Following removal, the external auditory canal was normal. The tympanic membrane was intact and there was good aeration of the middle ear space. Immediate subjective relief noted after removal.     IMPRESSION: Cerumen impaction, both, resolved after cleaning    PLAN: Long term care of the ear canal was discussed. Earwax suggestions   1. Do not allow Q- tips to enter your ear canal   2. Place 2 or 3 drops of rubbing alcohol in each ear monthly. 3. Allow drops to stay in for a minute per side, then use cotton ball to \"mop up. \"

## 2019-06-12 DIAGNOSIS — N32.81 OAB (OVERACTIVE BLADDER): ICD-10-CM

## 2019-06-13 RX ORDER — FESOTERODINE FUMARATE 4 MG/1
TABLET, FILM COATED, EXTENDED RELEASE ORAL
Qty: 30 TABLET | Refills: 9 | Status: SHIPPED | OUTPATIENT
Start: 2019-06-13 | End: 2020-01-15 | Stop reason: SDUPTHER

## 2019-06-29 DIAGNOSIS — G62.9 NEUROPATHY: ICD-10-CM

## 2019-06-29 DIAGNOSIS — M54.50 CHRONIC MIDLINE LOW BACK PAIN WITHOUT SCIATICA: ICD-10-CM

## 2019-06-29 DIAGNOSIS — G89.29 CHRONIC MIDLINE LOW BACK PAIN WITHOUT SCIATICA: ICD-10-CM

## 2019-06-29 RX ORDER — GABAPENTIN 300 MG/1
CAPSULE ORAL
Qty: 60 CAPSULE | Refills: 11 | Status: SHIPPED | OUTPATIENT
Start: 2019-06-29 | End: 2019-07-08 | Stop reason: SDUPTHER

## 2019-07-08 DIAGNOSIS — M54.50 CHRONIC MIDLINE LOW BACK PAIN WITHOUT SCIATICA: ICD-10-CM

## 2019-07-08 DIAGNOSIS — G89.29 CHRONIC MIDLINE LOW BACK PAIN WITHOUT SCIATICA: ICD-10-CM

## 2019-07-08 DIAGNOSIS — G62.9 NEUROPATHY: ICD-10-CM

## 2019-07-08 RX ORDER — GABAPENTIN 300 MG/1
CAPSULE ORAL
Qty: 60 CAPSULE | Refills: 5 | Status: SHIPPED | OUTPATIENT
Start: 2019-07-08 | End: 2020-01-15 | Stop reason: SDUPTHER

## 2019-07-24 ENCOUNTER — OFFICE VISIT (OUTPATIENT)
Dept: PULMONOLOGY | Age: 74
End: 2019-07-24
Payer: COMMERCIAL

## 2019-07-24 VITALS
SYSTOLIC BLOOD PRESSURE: 119 MMHG | BODY MASS INDEX: 19.17 KG/M2 | HEIGHT: 68 IN | HEART RATE: 84 BPM | OXYGEN SATURATION: 92 % | DIASTOLIC BLOOD PRESSURE: 65 MMHG | WEIGHT: 126.5 LBS

## 2019-07-24 DIAGNOSIS — C34.12 MALIGNANT NEOPLASM OF UPPER LOBE OF LEFT LUNG (HCC): Primary | ICD-10-CM

## 2019-07-24 DIAGNOSIS — J44.9 COPD, SEVERE (HCC): ICD-10-CM

## 2019-07-24 DIAGNOSIS — J96.11 CHRONIC RESPIRATORY FAILURE WITH HYPOXIA (HCC): ICD-10-CM

## 2019-07-24 PROCEDURE — 99214 OFFICE O/P EST MOD 30 MIN: CPT | Performed by: NURSE PRACTITIONER

## 2019-07-24 ASSESSMENT — ENCOUNTER SYMPTOMS
COUGH: 1
CONSTIPATION: 0
COLOR CHANGE: 0
SHORTNESS OF BREATH: 1
ABDOMINAL PAIN: 0

## 2019-07-29 ENCOUNTER — TELEPHONE (OUTPATIENT)
Dept: RHEUMATOLOGY | Age: 74
End: 2019-07-29

## 2019-07-29 DIAGNOSIS — M81.0 AGE-RELATED OSTEOPOROSIS WITHOUT CURRENT PATHOLOGICAL FRACTURE: ICD-10-CM

## 2019-07-29 DIAGNOSIS — M81.0 AGE-RELATED OSTEOPOROSIS WITHOUT CURRENT PATHOLOGICAL FRACTURE: Primary | ICD-10-CM

## 2019-07-30 LAB
CALCIUM SERPL-MCNC: 9.9 MG/DL (ref 8.3–10.6)
CREAT SERPL-MCNC: 0.6 MG/DL (ref 0.6–1.2)
GFR AFRICAN AMERICAN: >60
GFR NON-AFRICAN AMERICAN: >60

## 2019-08-01 ENCOUNTER — TELEPHONE (OUTPATIENT)
Dept: RHEUMATOLOGY | Age: 74
End: 2019-08-01

## 2019-08-05 ENCOUNTER — OFFICE VISIT (OUTPATIENT)
Dept: PRIMARY CARE CLINIC | Age: 74
End: 2019-08-05
Payer: COMMERCIAL

## 2019-08-05 VITALS
RESPIRATION RATE: 17 BRPM | HEART RATE: 96 BPM | WEIGHT: 127.6 LBS | BODY MASS INDEX: 19.4 KG/M2 | SYSTOLIC BLOOD PRESSURE: 106 MMHG | TEMPERATURE: 98.2 F | DIASTOLIC BLOOD PRESSURE: 64 MMHG | OXYGEN SATURATION: 90 %

## 2019-08-05 DIAGNOSIS — J44.1 CHRONIC OBSTRUCTIVE PULMONARY DISEASE WITH ACUTE EXACERBATION (HCC): ICD-10-CM

## 2019-08-05 PROCEDURE — 99213 OFFICE O/P EST LOW 20 MIN: CPT | Performed by: INTERNAL MEDICINE

## 2019-08-05 RX ORDER — BUSPIRONE HYDROCHLORIDE 5 MG/1
5 TABLET ORAL 2 TIMES DAILY
Qty: 60 TABLET | Refills: 0 | Status: SHIPPED | OUTPATIENT
Start: 2019-08-05 | End: 2019-12-18

## 2019-08-12 ENCOUNTER — OFFICE VISIT (OUTPATIENT)
Dept: RHEUMATOLOGY | Age: 74
End: 2019-08-12
Payer: COMMERCIAL

## 2019-08-12 VITALS
HEART RATE: 76 BPM | BODY MASS INDEX: 20.18 KG/M2 | HEIGHT: 67 IN | SYSTOLIC BLOOD PRESSURE: 110 MMHG | WEIGHT: 128.6 LBS | DIASTOLIC BLOOD PRESSURE: 70 MMHG

## 2019-08-12 DIAGNOSIS — M81.0 AGE-RELATED OSTEOPOROSIS WITHOUT CURRENT PATHOLOGICAL FRACTURE: ICD-10-CM

## 2019-08-12 PROCEDURE — 99213 OFFICE O/P EST LOW 20 MIN: CPT | Performed by: INTERNAL MEDICINE

## 2019-08-12 RX ORDER — TRAMADOL HYDROCHLORIDE 50 MG/1
50 TABLET ORAL 2 TIMES DAILY
Qty: 60 TABLET | Refills: 2 | Status: SHIPPED | OUTPATIENT
Start: 2019-08-12 | End: 2019-11-19 | Stop reason: SDUPTHER

## 2019-08-29 ENCOUNTER — TELEPHONE (OUTPATIENT)
Dept: INTERNAL MEDICINE CLINIC | Age: 74
End: 2019-08-29

## 2019-08-29 NOTE — TELEPHONE ENCOUNTER
Sal Menon did this already on 8/1/2019. No precert needed. She came in but it wasn't due yet during her appt. Labs are good. She just needs to walk in for the injection. Deangelo Alcantar, would you have time tomorrow?

## 2019-08-30 ENCOUNTER — NURSE ONLY (OUTPATIENT)
Dept: RHEUMATOLOGY | Age: 74
End: 2019-08-30
Payer: COMMERCIAL

## 2019-08-30 DIAGNOSIS — M81.0 SENILE OSTEOPOROSIS: Primary | ICD-10-CM

## 2019-08-30 PROCEDURE — 96372 THER/PROPH/DIAG INJ SC/IM: CPT | Performed by: INTERNAL MEDICINE

## 2019-09-05 RX ORDER — AZITHROMYCIN 250 MG/1
TABLET, FILM COATED ORAL
Qty: 15 TABLET | Refills: 2 | Status: SHIPPED | OUTPATIENT
Start: 2019-09-05 | End: 2021-07-28

## 2019-10-16 ENCOUNTER — TELEPHONE (OUTPATIENT)
Dept: PULMONOLOGY | Age: 74
End: 2019-10-16

## 2019-10-16 ENCOUNTER — HOSPITAL ENCOUNTER (OUTPATIENT)
Dept: CT IMAGING | Age: 74
Discharge: HOME | End: 2019-10-16
Payer: COMMERCIAL

## 2019-10-16 DIAGNOSIS — R91.8 PULMONARY NODULES: Primary | ICD-10-CM

## 2019-10-16 DIAGNOSIS — C34.12 MALIGNANT NEOPLASM OF UPPER LOBE OF LEFT LUNG (HCC): ICD-10-CM

## 2019-10-16 PROCEDURE — 71250 CT THORAX DX C-: CPT

## 2019-10-23 ENCOUNTER — TELEPHONE (OUTPATIENT)
Dept: PULMONOLOGY | Age: 74
End: 2019-10-23

## 2019-10-23 ENCOUNTER — HOSPITAL ENCOUNTER (OUTPATIENT)
Dept: PET IMAGING | Age: 74
Discharge: HOME | End: 2019-10-23
Payer: COMMERCIAL

## 2019-10-23 VITALS — WEIGHT: 130 LBS | BODY MASS INDEX: 20.89 KG/M2 | HEIGHT: 66 IN

## 2019-10-23 DIAGNOSIS — C34.12 MALIGNANT NEOPLASM OF UPPER LOBE OF LEFT LUNG (HCC): ICD-10-CM

## 2019-10-23 DIAGNOSIS — R91.8 PULMONARY NODULES: ICD-10-CM

## 2019-10-23 PROCEDURE — 3430000000 HC RX DIAGNOSTIC RADIOPHARMACEUTICAL: Performed by: NURSE PRACTITIONER

## 2019-10-23 PROCEDURE — A9552 F18 FDG: HCPCS | Performed by: NURSE PRACTITIONER

## 2019-10-23 PROCEDURE — 78815 PET IMAGE W/CT SKULL-THIGH: CPT

## 2019-10-23 RX ORDER — FLUDEOXYGLUCOSE F 18 200 MCI/ML
14.32 INJECTION, SOLUTION INTRAVENOUS
Status: COMPLETED | OUTPATIENT
Start: 2019-10-23 | End: 2019-10-23

## 2019-10-23 RX ADMIN — FLUDEOXYGLUCOSE F 18 14.32 MILLICURIE: 200 INJECTION, SOLUTION INTRAVENOUS at 13:00

## 2019-10-30 ENCOUNTER — OFFICE VISIT (OUTPATIENT)
Dept: PULMONOLOGY | Age: 74
End: 2019-10-30
Payer: COMMERCIAL

## 2019-10-30 VITALS — SYSTOLIC BLOOD PRESSURE: 123 MMHG | OXYGEN SATURATION: 88 % | DIASTOLIC BLOOD PRESSURE: 77 MMHG | HEART RATE: 101 BPM

## 2019-10-30 DIAGNOSIS — J43.2 CENTRILOBULAR EMPHYSEMA (HCC): ICD-10-CM

## 2019-10-30 DIAGNOSIS — J44.9 COPD, SEVERE (HCC): ICD-10-CM

## 2019-10-30 DIAGNOSIS — J96.11 CHRONIC RESPIRATORY FAILURE WITH HYPOXIA (HCC): ICD-10-CM

## 2019-10-30 DIAGNOSIS — R06.02 SHORTNESS OF BREATH: ICD-10-CM

## 2019-10-30 DIAGNOSIS — R91.8 PULMONARY NODULES: Primary | ICD-10-CM

## 2019-10-30 PROCEDURE — 99214 OFFICE O/P EST MOD 30 MIN: CPT | Performed by: INTERNAL MEDICINE

## 2019-11-19 ENCOUNTER — OFFICE VISIT (OUTPATIENT)
Dept: RHEUMATOLOGY | Age: 74
End: 2019-11-19
Payer: COMMERCIAL

## 2019-11-19 VITALS
HEIGHT: 66 IN | WEIGHT: 128 LBS | DIASTOLIC BLOOD PRESSURE: 64 MMHG | BODY MASS INDEX: 20.57 KG/M2 | SYSTOLIC BLOOD PRESSURE: 110 MMHG | HEART RATE: 88 BPM

## 2019-11-19 DIAGNOSIS — M81.0 AGE-RELATED OSTEOPOROSIS WITHOUT CURRENT PATHOLOGICAL FRACTURE: ICD-10-CM

## 2019-11-19 PROCEDURE — 99213 OFFICE O/P EST LOW 20 MIN: CPT | Performed by: INTERNAL MEDICINE

## 2019-11-19 RX ORDER — TRAMADOL HYDROCHLORIDE 50 MG/1
50 TABLET ORAL 2 TIMES DAILY
Qty: 60 TABLET | Refills: 2 | Status: SHIPPED | OUTPATIENT
Start: 2019-11-19 | End: 2020-01-15

## 2019-12-18 RX ORDER — BUSPIRONE HYDROCHLORIDE 5 MG/1
TABLET ORAL
Qty: 60 TABLET | Refills: 0 | Status: SHIPPED | OUTPATIENT
Start: 2019-12-18 | End: 2019-12-18 | Stop reason: SDUPTHER

## 2019-12-18 RX ORDER — BUSPIRONE HYDROCHLORIDE 5 MG/1
TABLET ORAL
Qty: 60 TABLET | Refills: 3 | Status: SHIPPED | OUTPATIENT
Start: 2019-12-18 | End: 2020-03-17 | Stop reason: SDUPTHER

## 2020-01-15 ENCOUNTER — OFFICE VISIT (OUTPATIENT)
Dept: PRIMARY CARE CLINIC | Age: 75
End: 2020-01-15
Payer: COMMERCIAL

## 2020-01-15 VITALS
WEIGHT: 128 LBS | DIASTOLIC BLOOD PRESSURE: 62 MMHG | BODY MASS INDEX: 20.66 KG/M2 | OXYGEN SATURATION: 93 % | HEART RATE: 82 BPM | RESPIRATION RATE: 18 BRPM | SYSTOLIC BLOOD PRESSURE: 96 MMHG | TEMPERATURE: 97.1 F

## 2020-01-15 PROCEDURE — 99213 OFFICE O/P EST LOW 20 MIN: CPT | Performed by: INTERNAL MEDICINE

## 2020-01-15 RX ORDER — MONTELUKAST SODIUM 10 MG/1
TABLET ORAL
Qty: 30 TABLET | Refills: 11 | Status: SHIPPED | OUTPATIENT
Start: 2020-01-15 | End: 2021-02-08

## 2020-01-15 RX ORDER — FESOTERODINE FUMARATE 4 MG/1
TABLET, EXTENDED RELEASE ORAL
Qty: 30 TABLET | Refills: 11 | Status: SHIPPED | OUTPATIENT
Start: 2020-01-15 | End: 2021-01-25

## 2020-01-15 RX ORDER — GABAPENTIN 300 MG/1
CAPSULE ORAL
Qty: 60 CAPSULE | Refills: 5 | Status: SHIPPED | OUTPATIENT
Start: 2020-01-15 | End: 2020-09-22

## 2020-01-15 ASSESSMENT — ENCOUNTER SYMPTOMS
WHEEZING: 0
SHORTNESS OF BREATH: 1
COUGH: 0

## 2020-01-15 NOTE — PROGRESS NOTES
Chief Complaint   Patient presents with    COPD     follow up, pt states she is doing well, doesnt need refill. Subjective:      Patient ID: Becki Jackman is a 69 y.o. female. HPI-patient returns for 3-month follow-up of her COPD. She feels that she is actually doing well, currently using Advair as well as Combivent and Ventolin HFA HFA/hand-held nebulizer, she is also on nasal  oxygen 3 L/min continuous, but hasn't been using nasal oxygen since 12/31    thinks radiation finally kicked in with Dr. Nicolle Hopper due to empiric therapy for pulmonary nodule and past h/o squamous cell lung CA    She is also taking Zithromax chronically for Mycobacterium infection 3 times per week    Also  feel that BuSpar has helped with anxiety    Review of Systems   Constitutional: Negative for fatigue and fever. Respiratory: Positive for shortness of breath. Negative for cough and wheezing. Cardiovascular: Negative for chest pain. Current Outpatient Medications on File Prior to Visit   Medication Sig Dispense Refill    busPIRone (BUSPAR) 5 MG tablet TAKE ONE TABLET BY MOUTH TWICE A DAY 60 tablet 3    azithromycin (ZITHROMAX) 250 MG tablet Take 1 tab by mouth on Mondays Wednesday and Fridays 15 tablet 2    fluticasone-salmeterol (ADVAIR DISKUS) 250-50 MCG/DOSE AEPB Inhale 1 puff into the lungs 2 times daily 60 each 11    TOVIAZ 4 MG TB24 ER tablet TAKE ONE TABLET BY MOUTH DAILY 30 tablet 9    albuterol (PROVENTIL) (5 MG/ML) 0.5% nebulizer solution Take 0.5 mLs by nebulization 4 times daily as needed for Wheezing 120 each 3    guaiFENesin (MUCINEX) 600 MG extended release tablet Take 1,200 mg by mouth daily      traMADol (ULTRAM) 50 MG tablet Take 50 mg by mouth 2 times daily as needed for Pain.       montelukast (SINGULAIR) 10 MG tablet TAKE ONE TABLET BY MOUTH ONCE NIGHTLY 30 tablet 10    Denosumab (PROLIA SC) Inject into the skin Every 6 months      albuterol-ipratropium (COMBIVENT RESPIMAT)  MCG/ACT AERS

## 2020-02-11 ENCOUNTER — TELEPHONE (OUTPATIENT)
Dept: INTERNAL MEDICINE CLINIC | Age: 75
End: 2020-02-11

## 2020-02-20 ENCOUNTER — TELEPHONE (OUTPATIENT)
Dept: RHEUMATOLOGY | Age: 75
End: 2020-02-20

## 2020-02-25 DIAGNOSIS — M81.0 AGE-RELATED OSTEOPOROSIS WITHOUT CURRENT PATHOLOGICAL FRACTURE: ICD-10-CM

## 2020-02-25 DIAGNOSIS — K25.9 GASTRIC ULCER WITHOUT HEMORRHAGE OR PERFORATION, UNSPECIFIED CHRONICITY: ICD-10-CM

## 2020-02-25 LAB
BUN BLDV-MCNC: 18 MG/DL (ref 7–20)
CALCIUM SERPL-MCNC: 10.2 MG/DL (ref 8.3–10.6)
CREAT SERPL-MCNC: 0.6 MG/DL (ref 0.6–1.2)
GFR AFRICAN AMERICAN: >60
GFR NON-AFRICAN AMERICAN: >60
MAGNESIUM: 2.2 MG/DL (ref 1.8–2.4)

## 2020-03-02 ENCOUNTER — OFFICE VISIT (OUTPATIENT)
Dept: RHEUMATOLOGY | Age: 75
End: 2020-03-02
Payer: COMMERCIAL

## 2020-03-02 VITALS
SYSTOLIC BLOOD PRESSURE: 116 MMHG | HEIGHT: 66 IN | BODY MASS INDEX: 21.02 KG/M2 | WEIGHT: 130.8 LBS | HEART RATE: 68 BPM | DIASTOLIC BLOOD PRESSURE: 70 MMHG

## 2020-03-02 PROCEDURE — 96372 THER/PROPH/DIAG INJ SC/IM: CPT | Performed by: INTERNAL MEDICINE

## 2020-03-02 PROCEDURE — 99213 OFFICE O/P EST LOW 20 MIN: CPT | Performed by: INTERNAL MEDICINE

## 2020-03-02 RX ORDER — TRAMADOL HYDROCHLORIDE 50 MG/1
50 TABLET ORAL 2 TIMES DAILY
Qty: 60 TABLET | Refills: 2 | Status: ON HOLD | OUTPATIENT
Start: 2020-03-02 | End: 2020-05-31 | Stop reason: HOSPADM

## 2020-03-02 NOTE — PROGRESS NOTES
Subjective:      Patient ID: Phyllis Mijares is a 69 y.o. female. HPI  The patient returns for follow-up of osteoporosis. She is having progression of her cancer. She just completed a course of radiation. She is on oxygen. She denies clinical fractures. She is here today for Prolia injection. Review of Systems  Increased dyspnea. Denies injection site reactions. Objective:   Physical Exam /70 (Site: Left Upper Arm, Position: Sitting, Cuff Size: Medium Adult)   Pulse 68   Ht 5' 6\" (1.676 m)   Wt 130 lb 12.8 oz (59.3 kg)   BMI 21.11 kg/m²   Alert female in no acute distress. Lungs diffuse rhonchi cardiovascular exam normal sinus rhythm tender dorsal spine diffusely    Assessment:      Osteoporosis      Plan:      The patient will receive her Prolia injection as well as a prescription for tramadol. The patient's oarrs report was reviewed. I will see her back in 3 months time.      Kaylen Paez MD

## 2020-03-04 ENCOUNTER — HOSPITAL ENCOUNTER (OUTPATIENT)
Dept: CT IMAGING | Age: 75
Discharge: HOME | End: 2020-03-04
Payer: COMMERCIAL

## 2020-03-04 PROCEDURE — 71250 CT THORAX DX C-: CPT

## 2020-03-17 RX ORDER — BUSPIRONE HYDROCHLORIDE 5 MG/1
TABLET ORAL
Qty: 60 TABLET | Refills: 3 | Status: SHIPPED | OUTPATIENT
Start: 2020-03-17 | End: 2020-09-09 | Stop reason: SDUPTHER

## 2020-03-18 ENCOUNTER — TELEPHONE (OUTPATIENT)
Dept: PULMONOLOGY | Age: 75
End: 2020-03-18

## 2020-03-18 NOTE — TELEPHONE ENCOUNTER
Pt called in stating that [employer] has contacted her stating they need office visit notes from us for Pt to get her oxygen. Pt concerned with coming in to the hospital for the appt, but will if she needs to. Pt stated pedro told her to come in before 90 days, Pt asking when Sarah Beth Rosales thinks the best time for her to come in is.     Pt # [phone]

## 2020-03-27 ENCOUNTER — TELEPHONE (OUTPATIENT)
Dept: PULMONOLOGY | Age: 75
End: 2020-03-27

## 2020-03-27 RX ORDER — PREDNISONE 10 MG/1
TABLET ORAL
Qty: 30 TABLET | Refills: 1 | Status: SHIPPED | OUTPATIENT
Start: 2020-03-27 | End: 2020-04-06

## 2020-03-27 RX ORDER — DOXYCYCLINE HYCLATE 100 MG
100 TABLET ORAL DAILY
Qty: 10 TABLET | Refills: 1 | Status: SHIPPED | OUTPATIENT
Start: 2020-03-27 | End: 2020-04-06

## 2020-03-27 NOTE — TELEPHONE ENCOUNTER
Prednisone taper -- 40mg x 3 days; 30 mg x 3 days; 20 mg x 3 days; 10 mg x 3 days  Doxycycline 100mg, 10 days

## 2020-05-27 ENCOUNTER — TELEPHONE (OUTPATIENT)
Dept: PULMONOLOGY | Age: 75
End: 2020-05-27

## 2020-05-27 ENCOUNTER — HOSPITAL ENCOUNTER (INPATIENT)
Age: 75
LOS: 4 days | Discharge: HOME | DRG: 190 | End: 2020-05-31
Attending: INTERNAL MEDICINE | Admitting: INTERNAL MEDICINE
Payer: COMMERCIAL

## 2020-05-27 ENCOUNTER — APPOINTMENT (OUTPATIENT)
Dept: GENERAL RADIOLOGY | Age: 75
DRG: 190 | End: 2020-05-27
Payer: COMMERCIAL

## 2020-05-27 PROBLEM — Z20.822 SUSPECTED COVID-19 VIRUS INFECTION: Status: ACTIVE | Noted: 2020-05-27

## 2020-05-27 PROBLEM — J18.9 PNEUMONIA: Status: ACTIVE | Noted: 2020-05-27

## 2020-05-27 LAB
ANION GAP SERPL CALCULATED.3IONS-SCNC: 11 MMOL/L (ref 3–16)
BASOPHILS ABSOLUTE: 0.1 K/UL (ref 0–0.2)
BASOPHILS RELATIVE PERCENT: 0.4 %
BUN BLDV-MCNC: 16 MG/DL (ref 7–20)
CALCIUM SERPL-MCNC: 9.9 MG/DL (ref 8.3–10.6)
CHLORIDE BLD-SCNC: 101 MMOL/L (ref 99–110)
CO2: 27 MMOL/L (ref 21–32)
CREAT SERPL-MCNC: <0.5 MG/DL (ref 0.6–1.2)
EOSINOPHILS ABSOLUTE: 0.1 K/UL (ref 0–0.6)
EOSINOPHILS RELATIVE PERCENT: 0.4 %
GFR AFRICAN AMERICAN: >60
GFR NON-AFRICAN AMERICAN: >60
GLUCOSE BLD-MCNC: 138 MG/DL (ref 70–99)
HCT VFR BLD CALC: 43 % (ref 36–48)
HEMOGLOBIN: 13.9 G/DL (ref 12–16)
LACTATE DEHYDROGENASE: 157 U/L (ref 100–190)
LACTIC ACID, SEPSIS: 0.9 MMOL/L (ref 0.4–1.9)
LYMPHOCYTES ABSOLUTE: 1.2 K/UL (ref 1–5.1)
LYMPHOCYTES RELATIVE PERCENT: 8.4 %
MCH RBC QN AUTO: 30.1 PG (ref 26–34)
MCHC RBC AUTO-ENTMCNC: 32.4 G/DL (ref 31–36)
MCV RBC AUTO: 92.9 FL (ref 80–100)
MONOCYTES ABSOLUTE: 1.5 K/UL (ref 0–1.3)
MONOCYTES RELATIVE PERCENT: 10.6 %
NEUTROPHILS ABSOLUTE: 11.5 K/UL (ref 1.7–7.7)
NEUTROPHILS RELATIVE PERCENT: 80.2 %
PDW BLD-RTO: 14 % (ref 12.4–15.4)
PLATELET # BLD: 175 K/UL (ref 135–450)
PMV BLD AUTO: 10.2 FL (ref 5–10.5)
POTASSIUM SERPL-SCNC: 3.9 MMOL/L (ref 3.5–5.1)
PRO-BNP: 174 PG/ML (ref 0–124)
RBC # BLD: 4.63 M/UL (ref 4–5.2)
SODIUM BLD-SCNC: 139 MMOL/L (ref 136–145)
TROPONIN: <0.01 NG/ML
WBC # BLD: 14.4 K/UL (ref 4–11)

## 2020-05-27 PROCEDURE — 2700000000 HC OXYGEN THERAPY PER DAY

## 2020-05-27 PROCEDURE — 94760 N-INVAS EAR/PLS OXIMETRY 1: CPT

## 2020-05-27 PROCEDURE — 96366 THER/PROPH/DIAG IV INF ADDON: CPT

## 2020-05-27 PROCEDURE — 6360000002 HC RX W HCPCS: Performed by: NURSE PRACTITIONER

## 2020-05-27 PROCEDURE — 1200000000 HC SEMI PRIVATE

## 2020-05-27 PROCEDURE — 96365 THER/PROPH/DIAG IV INF INIT: CPT

## 2020-05-27 PROCEDURE — 83615 LACTATE (LD) (LDH) ENZYME: CPT

## 2020-05-27 PROCEDURE — 99285 EMERGENCY DEPT VISIT HI MDM: CPT

## 2020-05-27 PROCEDURE — 93005 ELECTROCARDIOGRAM TRACING: CPT

## 2020-05-27 PROCEDURE — 71045 X-RAY EXAM CHEST 1 VIEW: CPT

## 2020-05-27 PROCEDURE — 85025 COMPLETE CBC W/AUTO DIFF WBC: CPT

## 2020-05-27 PROCEDURE — 2580000003 HC RX 258: Performed by: NURSE PRACTITIONER

## 2020-05-27 PROCEDURE — 80048 BASIC METABOLIC PNL TOTAL CA: CPT

## 2020-05-27 PROCEDURE — 94640 AIRWAY INHALATION TREATMENT: CPT

## 2020-05-27 PROCEDURE — 96375 TX/PRO/DX INJ NEW DRUG ADDON: CPT

## 2020-05-27 PROCEDURE — U0003 INFECTIOUS AGENT DETECTION BY NUCLEIC ACID (DNA OR RNA); SEVERE ACUTE RESPIRATORY SYNDROME CORONAVIRUS 2 (SARS-COV-2) (CORONAVIRUS DISEASE [COVID-19]), AMPLIFIED PROBE TECHNIQUE, MAKING USE OF HIGH THROUGHPUT TECHNOLOGIES AS DESCRIBED BY CMS-2020-01-R: HCPCS

## 2020-05-27 PROCEDURE — 84484 ASSAY OF TROPONIN QUANT: CPT

## 2020-05-27 PROCEDURE — 83880 ASSAY OF NATRIURETIC PEPTIDE: CPT

## 2020-05-27 PROCEDURE — 6370000000 HC RX 637 (ALT 250 FOR IP): Performed by: NURSE PRACTITIONER

## 2020-05-27 PROCEDURE — 87040 BLOOD CULTURE FOR BACTERIA: CPT

## 2020-05-27 PROCEDURE — 83605 ASSAY OF LACTIC ACID: CPT

## 2020-05-27 RX ORDER — PREDNISONE 10 MG/1
TABLET ORAL
Qty: 30 TABLET | Refills: 0 | Status: ON HOLD
Start: 2020-05-27 | End: 2020-05-31 | Stop reason: HOSPADM

## 2020-05-27 RX ORDER — AZITHROMYCIN 500 MG/1
500 INJECTION, POWDER, LYOPHILIZED, FOR SOLUTION INTRAVENOUS ONCE
Status: DISCONTINUED | OUTPATIENT
Start: 2020-05-27 | End: 2020-05-27 | Stop reason: ALTCHOICE

## 2020-05-27 RX ORDER — IPRATROPIUM BROMIDE AND ALBUTEROL SULFATE 2.5; .5 MG/3ML; MG/3ML
1 SOLUTION RESPIRATORY (INHALATION) ONCE
Status: COMPLETED | OUTPATIENT
Start: 2020-05-27 | End: 2020-05-27

## 2020-05-27 RX ORDER — 0.9 % SODIUM CHLORIDE 0.9 %
1000 INTRAVENOUS SOLUTION INTRAVENOUS ONCE
Status: COMPLETED | OUTPATIENT
Start: 2020-05-27 | End: 2020-05-27

## 2020-05-27 RX ORDER — METHYLPREDNISOLONE SODIUM SUCCINATE 125 MG/2ML
125 INJECTION, POWDER, LYOPHILIZED, FOR SOLUTION INTRAMUSCULAR; INTRAVENOUS ONCE
Status: COMPLETED | OUTPATIENT
Start: 2020-05-27 | End: 2020-05-27

## 2020-05-27 RX ORDER — ALBUTEROL SULFATE 2.5 MG/3ML
5 SOLUTION RESPIRATORY (INHALATION) ONCE
Status: COMPLETED | OUTPATIENT
Start: 2020-05-27 | End: 2020-05-27

## 2020-05-27 RX ADMIN — Medication 1 G: at 21:37

## 2020-05-27 RX ADMIN — AZITHROMYCIN MONOHYDRATE 500 MG: 500 INJECTION, POWDER, LYOPHILIZED, FOR SOLUTION INTRAVENOUS at 21:38

## 2020-05-27 RX ADMIN — SODIUM CHLORIDE 1000 ML: 9 INJECTION, SOLUTION INTRAVENOUS at 21:37

## 2020-05-27 RX ADMIN — IPRATROPIUM BROMIDE AND ALBUTEROL SULFATE 1 AMPULE: .5; 3 SOLUTION RESPIRATORY (INHALATION) at 21:17

## 2020-05-27 RX ADMIN — ALBUTEROL SULFATE 5 MG: 2.5 SOLUTION RESPIRATORY (INHALATION) at 21:17

## 2020-05-27 RX ADMIN — METHYLPREDNISOLONE SODIUM SUCCINATE 125 MG: 125 INJECTION, POWDER, FOR SOLUTION INTRAMUSCULAR; INTRAVENOUS at 21:06

## 2020-05-27 ASSESSMENT — ENCOUNTER SYMPTOMS
WHEEZING: 1
NAUSEA: 0
DIARRHEA: 0
ABDOMINAL PAIN: 0
COUGH: 1
EYE PAIN: 0
EYE REDNESS: 0
CHEST TIGHTNESS: 1
VOMITING: 0
SHORTNESS OF BREATH: 1

## 2020-05-28 ENCOUNTER — APPOINTMENT (OUTPATIENT)
Dept: GENERAL RADIOLOGY | Age: 75
DRG: 190 | End: 2020-05-28
Payer: COMMERCIAL

## 2020-05-28 LAB
A/G RATIO: 1.6 (ref 1.1–2.2)
ALBUMIN SERPL-MCNC: 4 G/DL (ref 3.4–5)
ALP BLD-CCNC: 62 U/L (ref 40–129)
ALT SERPL-CCNC: 10 U/L (ref 10–40)
ANION GAP SERPL CALCULATED.3IONS-SCNC: 11 MMOL/L (ref 3–16)
AST SERPL-CCNC: 16 U/L (ref 15–37)
BASOPHILS ABSOLUTE: 0 K/UL (ref 0–0.2)
BASOPHILS RELATIVE PERCENT: 0.2 %
BILIRUB SERPL-MCNC: 0.5 MG/DL (ref 0–1)
BUN BLDV-MCNC: 15 MG/DL (ref 7–20)
CALCIUM SERPL-MCNC: 9.3 MG/DL (ref 8.3–10.6)
CHLORIDE BLD-SCNC: 103 MMOL/L (ref 99–110)
CO2: 25 MMOL/L (ref 21–32)
CREAT SERPL-MCNC: <0.5 MG/DL (ref 0.6–1.2)
EKG ATRIAL RATE: 111 BPM
EKG DIAGNOSIS: NORMAL
EKG P AXIS: 91 DEGREES
EKG P-R INTERVAL: 138 MS
EKG Q-T INTERVAL: 322 MS
EKG QRS DURATION: 68 MS
EKG QTC CALCULATION (BAZETT): 437 MS
EKG R AXIS: 37 DEGREES
EKG T AXIS: 63 DEGREES
EKG VENTRICULAR RATE: 111 BPM
EOSINOPHILS ABSOLUTE: 0 K/UL (ref 0–0.6)
EOSINOPHILS RELATIVE PERCENT: 0 %
GFR AFRICAN AMERICAN: >60
GFR NON-AFRICAN AMERICAN: >60
GLOBULIN: 2.5 G/DL
GLUCOSE BLD-MCNC: 190 MG/DL (ref 70–99)
HCT VFR BLD CALC: 41.8 % (ref 36–48)
HEMOGLOBIN: 13.9 G/DL (ref 12–16)
LYMPHOCYTES ABSOLUTE: 0.4 K/UL (ref 1–5.1)
LYMPHOCYTES RELATIVE PERCENT: 3.1 %
MCH RBC QN AUTO: 30.4 PG (ref 26–34)
MCHC RBC AUTO-ENTMCNC: 33.2 G/DL (ref 31–36)
MCV RBC AUTO: 91.7 FL (ref 80–100)
MONOCYTES ABSOLUTE: 0.2 K/UL (ref 0–1.3)
MONOCYTES RELATIVE PERCENT: 1.3 %
NEUTROPHILS ABSOLUTE: 12.4 K/UL (ref 1.7–7.7)
NEUTROPHILS RELATIVE PERCENT: 95.4 %
PDW BLD-RTO: 13.9 % (ref 12.4–15.4)
PLATELET # BLD: 164 K/UL (ref 135–450)
PMV BLD AUTO: 9.6 FL (ref 5–10.5)
POTASSIUM REFLEX MAGNESIUM: 3.8 MMOL/L (ref 3.5–5.1)
PROCALCITONIN: 0.04 NG/ML (ref 0–0.15)
RBC # BLD: 4.56 M/UL (ref 4–5.2)
REASON FOR REJECTION: NORMAL
REJECTED TEST: NORMAL
SARS-COV-2, PCR: NOT DETECTED
SODIUM BLD-SCNC: 139 MMOL/L (ref 136–145)
TOTAL PROTEIN: 6.5 G/DL (ref 6.4–8.2)
WBC # BLD: 13 K/UL (ref 4–11)

## 2020-05-28 PROCEDURE — 80053 COMPREHEN METABOLIC PANEL: CPT

## 2020-05-28 PROCEDURE — 6370000000 HC RX 637 (ALT 250 FOR IP): Performed by: INTERNAL MEDICINE

## 2020-05-28 PROCEDURE — 2580000003 HC RX 258: Performed by: INTERNAL MEDICINE

## 2020-05-28 PROCEDURE — 93010 ELECTROCARDIOGRAM REPORT: CPT | Performed by: INTERNAL MEDICINE

## 2020-05-28 PROCEDURE — 94640 AIRWAY INHALATION TREATMENT: CPT

## 2020-05-28 PROCEDURE — 1200000000 HC SEMI PRIVATE

## 2020-05-28 PROCEDURE — 36415 COLL VENOUS BLD VENIPUNCTURE: CPT

## 2020-05-28 PROCEDURE — 84145 PROCALCITONIN (PCT): CPT

## 2020-05-28 PROCEDURE — 2700000000 HC OXYGEN THERAPY PER DAY

## 2020-05-28 PROCEDURE — 71045 X-RAY EXAM CHEST 1 VIEW: CPT

## 2020-05-28 PROCEDURE — 6360000002 HC RX W HCPCS: Performed by: INTERNAL MEDICINE

## 2020-05-28 PROCEDURE — 87205 SMEAR GRAM STAIN: CPT

## 2020-05-28 PROCEDURE — 94761 N-INVAS EAR/PLS OXIMETRY MLT: CPT

## 2020-05-28 PROCEDURE — 87070 CULTURE OTHR SPECIMN AEROBIC: CPT

## 2020-05-28 PROCEDURE — 85025 COMPLETE CBC W/AUTO DIFF WBC: CPT

## 2020-05-28 RX ORDER — HYDRALAZINE HYDROCHLORIDE 20 MG/ML
10 INJECTION INTRAMUSCULAR; INTRAVENOUS EVERY 6 HOURS PRN
Status: DISCONTINUED | OUTPATIENT
Start: 2020-05-28 | End: 2020-05-31 | Stop reason: HOSPADM

## 2020-05-28 RX ORDER — GUAIFENESIN 600 MG/1
1200 TABLET, EXTENDED RELEASE ORAL DAILY
Status: DISCONTINUED | OUTPATIENT
Start: 2020-05-28 | End: 2020-05-31 | Stop reason: HOSPADM

## 2020-05-28 RX ORDER — ACETAMINOPHEN 650 MG/1
650 SUPPOSITORY RECTAL EVERY 6 HOURS PRN
Status: DISCONTINUED | OUTPATIENT
Start: 2020-05-28 | End: 2020-05-31 | Stop reason: HOSPADM

## 2020-05-28 RX ORDER — IPRATROPIUM BROMIDE AND ALBUTEROL SULFATE 2.5; .5 MG/3ML; MG/3ML
1 SOLUTION RESPIRATORY (INHALATION)
Status: DISCONTINUED | OUTPATIENT
Start: 2020-05-28 | End: 2020-05-28 | Stop reason: ALTCHOICE

## 2020-05-28 RX ORDER — GABAPENTIN 300 MG/1
300 CAPSULE ORAL 2 TIMES DAILY
Status: DISCONTINUED | OUTPATIENT
Start: 2020-05-28 | End: 2020-05-31 | Stop reason: HOSPADM

## 2020-05-28 RX ORDER — SODIUM CHLORIDE 0.9 % (FLUSH) 0.9 %
10 SYRINGE (ML) INJECTION EVERY 12 HOURS SCHEDULED
Status: DISCONTINUED | OUTPATIENT
Start: 2020-05-28 | End: 2020-05-31 | Stop reason: HOSPADM

## 2020-05-28 RX ORDER — ALBUTEROL SULFATE 90 UG/1
2 AEROSOL, METERED RESPIRATORY (INHALATION) EVERY 6 HOURS PRN
Status: DISCONTINUED | OUTPATIENT
Start: 2020-05-28 | End: 2020-05-31 | Stop reason: HOSPADM

## 2020-05-28 RX ORDER — DIPHENHYDRAMINE HCL 25 MG
25 TABLET ORAL NIGHTLY PRN
Status: DISCONTINUED | OUTPATIENT
Start: 2020-05-28 | End: 2020-05-31 | Stop reason: HOSPADM

## 2020-05-28 RX ORDER — MONTELUKAST SODIUM 10 MG/1
10 TABLET ORAL NIGHTLY
Status: DISCONTINUED | OUTPATIENT
Start: 2020-05-28 | End: 2020-05-31 | Stop reason: HOSPADM

## 2020-05-28 RX ORDER — POTASSIUM CHLORIDE 7.45 MG/ML
10 INJECTION INTRAVENOUS PRN
Status: DISCONTINUED | OUTPATIENT
Start: 2020-05-28 | End: 2020-05-31 | Stop reason: HOSPADM

## 2020-05-28 RX ORDER — PREDNISONE 10 MG/1
10 TABLET ORAL DAILY
Status: DISCONTINUED | OUTPATIENT
Start: 2020-05-28 | End: 2020-05-29

## 2020-05-28 RX ORDER — ALBUTEROL SULFATE 90 UG/1
2 AEROSOL, METERED RESPIRATORY (INHALATION) 4 TIMES DAILY
Status: DISCONTINUED | OUTPATIENT
Start: 2020-05-28 | End: 2020-05-29

## 2020-05-28 RX ORDER — ACETAMINOPHEN 325 MG/1
650 TABLET ORAL EVERY 6 HOURS PRN
Status: DISCONTINUED | OUTPATIENT
Start: 2020-05-28 | End: 2020-05-31 | Stop reason: HOSPADM

## 2020-05-28 RX ORDER — TROSPIUM CHLORIDE 20 MG/1
20 TABLET, FILM COATED ORAL
Status: DISCONTINUED | OUTPATIENT
Start: 2020-05-28 | End: 2020-05-31 | Stop reason: HOSPADM

## 2020-05-28 RX ORDER — FESOTERODINE FUMARATE 4 MG/1
1 TABLET, EXTENDED RELEASE ORAL DAILY
Status: DISCONTINUED | OUTPATIENT
Start: 2020-05-28 | End: 2020-05-28 | Stop reason: CLARIF

## 2020-05-28 RX ORDER — TRAMADOL HYDROCHLORIDE 50 MG/1
50 TABLET ORAL 2 TIMES DAILY PRN
Status: DISCONTINUED | OUTPATIENT
Start: 2020-05-28 | End: 2020-05-31 | Stop reason: HOSPADM

## 2020-05-28 RX ORDER — SODIUM CHLORIDE 0.9 % (FLUSH) 0.9 %
10 SYRINGE (ML) INJECTION PRN
Status: DISCONTINUED | OUTPATIENT
Start: 2020-05-28 | End: 2020-05-31 | Stop reason: HOSPADM

## 2020-05-28 RX ORDER — DILTIAZEM HYDROCHLORIDE 60 MG/1
60 TABLET, FILM COATED ORAL 4 TIMES DAILY
Status: DISCONTINUED | OUTPATIENT
Start: 2020-05-28 | End: 2020-05-31 | Stop reason: HOSPADM

## 2020-05-28 RX ORDER — POTASSIUM CHLORIDE 20 MEQ/1
40 TABLET, EXTENDED RELEASE ORAL PRN
Status: DISCONTINUED | OUTPATIENT
Start: 2020-05-28 | End: 2020-05-31 | Stop reason: HOSPADM

## 2020-05-28 RX ORDER — 0.9 % SODIUM CHLORIDE 0.9 %
500 INTRAVENOUS SOLUTION INTRAVENOUS PRN
Status: DISCONTINUED | OUTPATIENT
Start: 2020-05-28 | End: 2020-05-31 | Stop reason: HOSPADM

## 2020-05-28 RX ORDER — PROMETHAZINE HYDROCHLORIDE 25 MG/1
12.5 TABLET ORAL EVERY 6 HOURS PRN
Status: DISCONTINUED | OUTPATIENT
Start: 2020-05-28 | End: 2020-05-31 | Stop reason: HOSPADM

## 2020-05-28 RX ORDER — BUDESONIDE AND FORMOTEROL FUMARATE DIHYDRATE 160; 4.5 UG/1; UG/1
2 AEROSOL RESPIRATORY (INHALATION) 2 TIMES DAILY
Status: DISCONTINUED | OUTPATIENT
Start: 2020-05-28 | End: 2020-05-31 | Stop reason: HOSPADM

## 2020-05-28 RX ORDER — ONDANSETRON 2 MG/ML
4 INJECTION INTRAMUSCULAR; INTRAVENOUS EVERY 6 HOURS PRN
Status: DISCONTINUED | OUTPATIENT
Start: 2020-05-28 | End: 2020-05-31 | Stop reason: HOSPADM

## 2020-05-28 RX ORDER — BUSPIRONE HYDROCHLORIDE 5 MG/1
5 TABLET ORAL 2 TIMES DAILY
Status: DISCONTINUED | OUTPATIENT
Start: 2020-05-28 | End: 2020-05-31 | Stop reason: HOSPADM

## 2020-05-28 RX ORDER — SODIUM CHLORIDE 9 MG/ML
INJECTION, SOLUTION INTRAVENOUS CONTINUOUS
Status: DISCONTINUED | OUTPATIENT
Start: 2020-05-28 | End: 2020-05-29

## 2020-05-28 RX ORDER — AZITHROMYCIN 250 MG/1
500 TABLET, FILM COATED ORAL DAILY
Status: DISCONTINUED | OUTPATIENT
Start: 2020-05-28 | End: 2020-05-29

## 2020-05-28 RX ADMIN — Medication 2 PUFF: at 13:07

## 2020-05-28 RX ADMIN — ACETAMINOPHEN 650 MG: 325 TABLET, FILM COATED ORAL at 23:35

## 2020-05-28 RX ADMIN — DILTIAZEM HYDROCHLORIDE 60 MG: 60 TABLET, FILM COATED ORAL at 18:34

## 2020-05-28 RX ADMIN — TROSPIUM CHLORIDE 20 MG: 20 TABLET, FILM COATED ORAL at 18:34

## 2020-05-28 RX ADMIN — Medication 2 PUFF: at 21:32

## 2020-05-28 RX ADMIN — Medication 2 PUFF: at 17:11

## 2020-05-28 RX ADMIN — DILTIAZEM HYDROCHLORIDE 60 MG: 60 TABLET, FILM COATED ORAL at 23:00

## 2020-05-28 RX ADMIN — Medication 10 ML: at 09:38

## 2020-05-28 RX ADMIN — GABAPENTIN 300 MG: 300 CAPSULE ORAL at 09:38

## 2020-05-28 RX ADMIN — BUSPIRONE HYDROCHLORIDE 5 MG: 5 TABLET ORAL at 23:01

## 2020-05-28 RX ADMIN — Medication 2 PUFF: at 17:10

## 2020-05-28 RX ADMIN — AZITHROMYCIN 500 MG: 250 TABLET, FILM COATED ORAL at 15:25

## 2020-05-28 RX ADMIN — DIPHENHYDRAMINE HCL 25 MG: 25 TABLET ORAL at 23:35

## 2020-05-28 RX ADMIN — DILTIAZEM HYDROCHLORIDE 60 MG: 60 TABLET, FILM COATED ORAL at 09:38

## 2020-05-28 RX ADMIN — GABAPENTIN 300 MG: 300 CAPSULE ORAL at 23:00

## 2020-05-28 RX ADMIN — Medication 2 PUFF: at 09:44

## 2020-05-28 RX ADMIN — DILTIAZEM HYDROCHLORIDE 60 MG: 60 TABLET, FILM COATED ORAL at 15:26

## 2020-05-28 RX ADMIN — SODIUM CHLORIDE: 9 INJECTION, SOLUTION INTRAVENOUS at 02:14

## 2020-05-28 RX ADMIN — MONTELUKAST SODIUM 10 MG: 10 TABLET, FILM COATED ORAL at 23:01

## 2020-05-28 RX ADMIN — ENOXAPARIN SODIUM 40 MG: 40 INJECTION SUBCUTANEOUS at 09:36

## 2020-05-28 RX ADMIN — GUAIFENESIN 1200 MG: 600 TABLET, EXTENDED RELEASE ORAL at 09:36

## 2020-05-28 RX ADMIN — BUSPIRONE HYDROCHLORIDE 5 MG: 5 TABLET ORAL at 09:37

## 2020-05-28 RX ADMIN — Medication 2 PUFF: at 09:43

## 2020-05-28 RX ADMIN — Medication 2 PUFF: at 21:33

## 2020-05-28 RX ADMIN — PREDNISONE 10 MG: 10 TABLET ORAL at 09:38

## 2020-05-28 RX ADMIN — TROSPIUM CHLORIDE 20 MG: 20 TABLET, FILM COATED ORAL at 06:45

## 2020-05-28 ASSESSMENT — PAIN SCALES - GENERAL: PAINLEVEL_OUTOF10: 0

## 2020-05-28 NOTE — H&P
History and Physical  Dr. Radha Vasquez  5/27/2020      PCP: Randy Akins MD    Cc:   Chief Complaint   Patient presents with    Shortness of Breath     pt brought in by [facility] c/o increase trouble breathing since this morning. pt is a lung CA pt currently under radiation tx.  pt had Duo neb x2 per EMS and steroids PO by EMS       HPI:  Kameron Ochoa is a 70 y.o. female who has a past medical history of Bronchitis, COPD, Hemoptysis, Hernia, Hypertension, Lung cancer ([facility] [address] Osteoporosis, Pneumonia, Rupture of colon ([facility] [address] and Skin cancer. Patient presents with Pneumonia. HPI of presenting complaint in blockformat: (1-3 for expanded problem focused, ?4 for detailed/comprehensive)   Location: chest congestion and dyspnea  Duration: about 1 day  Timing: worsening during day  Context: 70 y.o. female presents to the emergency department with increasing shortness of breath and change in mucus color since this morning. She does have history of lung cancer and has completed radiation. States that she is due to go back in July for recheck. She did use her albuterol inhaler today without relief as well as her nebulizer, she was given 2 duo nebs in route per EMS. States that she is dyspneic at rest, denies fever. No recent travel or sick exposure. She does use supplemental oxygen as needed at home, states generally at nighttime. CXR from ER is reviewed by self - it shows Mild hazy opacity within right lung base, representing either atelectasis, pneumonia, or aspiration. Pt has been started on antibiotics for empiric treatment of pneumonia. Given her presentation, covid swab is also sent in ER  Severity: moderately severe, pt with increased o2 requirement  Quality:    Modifying Factors: nothing makes it better or worse  Associated Signs or Symptoms: no fevers. +dyspnea at rest + cough. No HA. Problem list of hospitalization thus far:   Active Hospital Problems    Diagnosis    Pneumonia [J18.9]    Suspected COVID-19 virus infection [Z20.828]    COPD, severe ([facility] [address] [J44.9]    OAB (overactive bladder) [N32.81]    Lung cancer ([facility] [address] [C34.90]    Hypertension [I10]         Review of Systems: (1 system for EPF, 2-9 for detailed, 10+ for comprehensive)  Review of Systems   Constitutional: Negative for chills and fever. HENT: Negative for ear pain and nosebleeds. Eyes: Negative for pain and redness. Respiratory: Positive for cough and shortness of breath. Cardiovascular: Negative for chest pain and leg swelling. Gastrointestinal: Negative for nausea and vomiting. Endocrine: Negative for cold intolerance and heat intolerance. Genitourinary: Negative for frequency and urgency. Musculoskeletal: Negative for joint swelling and neck pain. Skin: Negative for rash. Allergic/Immunologic: Negative for food allergies. Neurological: Negative for syncope and light-headedness. Hematological: Does not bruise/bleed easily. Psychiatric/Behavioral: Negative for agitation. The patient is not nervous/anxious.             Past Medical History:   Past Medical History:   Diagnosis Date    Bronchitis     COPD     Hemoptysis     Hernia     Hypertension     Lung cancer ([facility] [address] 2008    left lung    Osteoporosis     Pneumonia     Rupture of colon ([facility] [address] 05/24/2013    Skin cancer 2014    ear       Past Surgical History:   Past Surgical History:   Procedure Laterality Date    BRONCHOSCOPY  08/05/2016    BRONCHOSCOPY  01/31/2018    CHOLECYSTECTOMY      COLONOSCOPY  8/20/13    KYPHOSIS SURGERY  02/04/2016    LOBECTOMY  0808    MANDIBLE SURGERY      upper jaw and lower jaw    OTHER SURGICAL HISTORY  5/30/13    secondary wound closure    SIGMOIDECTOMY  5/24/13    THORACOTOMY  0808    TONSILLECTOMY      UPPER GASTROINTESTINAL ENDOSCOPY  01/18/2018    UPPER GASTROINTESTINAL ENDOSCOPY N/A 10/31/2018    EGD BIOPSY performed by Herb Smith MD at [facility] chart.  Xr Chest Portable    Result Date: 5/27/2020  EXAMINATION: ONE XRAY VIEW OF THE CHEST 5/27/2020 8:32 pm COMPARISON: 05/13/2019, 03/19/2019, 07/10/2008 HISTORY: ORDERING SYSTEM PROVIDED HISTORY: SOB TECHNOLOGIST PROVIDED HISTORY: Reason for exam:->SOB Reason for Exam: Shortness of Breath (pt brought in by [facility] EMS c/o increase trouble breathing since this morning. pt is a lung CA pt currently under radiation tx. pt had Duo neb x2 per EMS and steroids PO by EMS) Acuity: Unknown Type of Exam: Unknown FINDINGS: A single frontal view of the chest was performed. There is no acute skeletal abnormality. There are chronic healed left rib fractures. There is vertebroplasty cement stabilizing multiple midthoracic vertebral bodies. The heart size and mediastinal contours are stable, and within normal limits. There are stable postsurgical changes evident status post left upper lobectomy, including volume loss within the left hemithorax and mild superior retraction of the left hilum. There is chronic pleural and parenchymal opacity within the left lung base, likely reflecting chronic pleural and parenchymal scar. There is chronic biapical pleural and parenchymal scarring evident. There is a hazy opacity within the right lung base, which could represent either atelectasis or possibly pneumonia. Aspiration is considered less likely. There is no evidence of a pneumothorax. 1. Mild hazy opacity within right lung base, representing either atelectasis, pneumonia, or aspiration. 2. Stable chronic pleural and parenchymal opacity within left lung base, likely chronic pleural and parenchymal scar. 3. Stable chronic postsurgical changes status post left upper lobectomy. EKG: from ER, interpreted by self, sinus tachycardia at 111. No acute st elevation. No TWI noted.  Comparison made to ekg in old chart dated 5/13/19, appears similar, sinus rhythm at [address]:    Principal Problem: Pneumonia -New Problem to me. Pt with increased o2 requirement, opacities seen on cxr. WBC elevated at 14.4  Plan: iv abx started - rocephin/zmax. DuoNeb HHN ordered. O2 as needed. Will check serial CXR. WBC ordered for AM.  Respiratory therapy asked to see. Monitor for signs of antibiotic toxicity with serial exam and lab studies   Active Problems:    Lung cancer ([facility] [address] = Established problem. Stable. Plan: Continue present orders/plan. Hypertension -Established problem. Stable. On cardizem as outpt  Plan: Pt home BP meds reviewed and will be continued. IV Hydralazine ordered for control of extremely high blood pressures   Will monitor labs to assess Creat/K for possible complications of medications. OAB (overactive bladder) -Established problem. Stable. Plan: cont home meds    COPD, severe ([facility] [address] -Established problem. Stable. No wheezing. Pt with increased o2 requirement  Plan: cont home meds. Monitor for wheezing, will start steroids if needed    Suspected COVID-19 virus infection -New Problem to me. Plan: place in droplet plus isolation while awaiting swab results          Diagnoses as listed above, designated as new or established and plan outlined for each. Data Reviewed:   (1) Lab tests were reviewed or ordered. (1) Radiology tests were reviewed or ordered. (1) Medical test (Echo, EKG, PFT/noman) were ordered. (1)History was not obtained from someone other than patient  (1) Old records  were reviewed - see HPI/MDM for pertinent details if review done. (2) Case wasdiscussed with another health care provider: Yenifer BLAKE NP  (2) Imaging was viewed by myself. cxr  (2) EKG  was viewed by myself.        The patient isbeing placed in inpatient status with the expectation of requiring a hospital stay spanning at least two midnights for care and treatment of the problems noted in the problem list.  This determination is also based on thepatients comorbidities and past medical history, the

## 2020-05-28 NOTE — PROGRESS NOTES
 Hypertension [I10] 02/16/2010       Current Facility-Administered Medications: dilTIAZem (CARDIZEM) tablet 60 mg, 60 mg, Oral, 4x Daily  albuterol sulfate  (90 Base) MCG/ACT inhaler 2 puff, 2 puff, Inhalation, Q6H PRN  guaiFENesin (MUCINEX) extended release tablet 1,200 mg, 1,200 mg, Oral, Daily  montelukast (SINGULAIR) tablet 10 mg, 10 mg, Oral, Nightly  gabapentin (NEURONTIN) capsule 300 mg, 300 mg, Oral, BID  traMADol (ULTRAM) tablet 50 mg, 50 mg, Oral, BID PRN  busPIRone (BUSPAR) tablet 5 mg, 5 mg, Oral, BID  predniSONE (DELTASONE) tablet 10 mg, 10 mg, Oral, Daily  0.9 % sodium chloride infusion, , Intravenous, Continuous  sodium chloride flush 0.9 % injection 10 mL, 10 mL, Intravenous, 2 times per day  sodium chloride flush 0.9 % injection 10 mL, 10 mL, Intravenous, PRN  acetaminophen (TYLENOL) tablet 650 mg, 650 mg, Oral, Q6H PRN **OR** acetaminophen (TYLENOL) suppository 650 mg, 650 mg, Rectal, Q6H PRN  magnesium hydroxide (MILK OF MAGNESIA) 400 MG/5ML suspension 30 mL, 30 mL, Oral, Daily PRN  promethazine (PHENERGAN) tablet 12.5 mg, 12.5 mg, Oral, Q6H PRN **OR** ondansetron (ZOFRAN) injection 4 mg, 4 mg, Intravenous, Q6H PRN  enoxaparin (LOVENOX) injection 40 mg, 40 mg, Subcutaneous, Daily  azithromycin (ZITHROMAX) 500 mg in D5W 250ml Vial Mate, 500 mg, Intravenous, Q24H **AND** cefTRIAXone (ROCEPHIN) 1 g in sterile water 10 mL IV syringe, 1 g, Intravenous, Q24H  potassium chloride (KLOR-CON M) extended release tablet 40 mEq, 40 mEq, Oral, PRN **OR** potassium bicarb-citric acid (EFFER-K) effervescent tablet 40 mEq, 40 mEq, Oral, PRN **OR** potassium chloride 10 mEq/100 mL IVPB (Peripheral Line), 10 mEq, Intravenous, PRN  0.9 % sodium chloride bolus, 500 mL, Intravenous, PRN  hydrALAZINE (APRESOLINE) injection 10 mg, 10 mg, Intravenous, Q6H PRN  albuterol sulfate  (90 Base) MCG/ACT inhaler 2 puff, 2 puff, Inhalation, 4x daily **AND** ipratropium (ATROVENT HFA) 17 MCG/ACT inhaler 2 puff, 2 puff, Inhalation, 4x daily **AND** MDI Treatment, , , 4x daily  trospium (SANCTURA) tablet 20 mg, 20 mg, Oral, BID AC  budesonide-formoterol (SYMBICORT) 160-4.5 MCG/ACT inhaler 2 puff, 2 puff, Inhalation, BID         Objective:  /74   Pulse 110   Temp 98.5 °F (36.9 °C) (Oral)   Resp 20   Ht 5' 8\" (1.727 m)   Wt 131 lb 8 oz (59.6 kg)   SpO2 92%   BMI 19.99 kg/m²      Patient Vitals for the past 24 hrs:   BP Temp Temp src Pulse Resp SpO2 Height Weight   05/28/20 0945 -- -- -- -- 20 92 % -- --   05/28/20 0935 139/74 98.5 °F (36.9 °C) Oral 110 24 92 % -- --   05/28/20 0643 (!) 143/81 97.9 °F (36.6 °C) Oral 100 22 90 % -- --   05/28/20 0000 (!) 128/57 98.7 °F (37.1 °C) Oral 109 24 92 % 5' 8\" (1.727 m) 131 lb 8 oz (59.6 kg)   05/27/20 2330 107/72 -- -- 109 25 92 % -- --   05/27/20 2300 (!) 139/54 -- -- 109 23 91 % -- --   05/27/20 2230 (!) 129/52 -- -- 111 19 93 % -- --   05/27/20 2200 (!) 144/61 -- -- 111 26 92 % -- --   05/27/20 2119 -- -- -- -- 24 92 % -- --   05/27/20 2118 -- -- -- -- 24 92 % -- --   05/27/20 2100 (!) 141/58 -- -- 107 28 95 % -- --   05/27/20 1939 (!) 153/75 98.3 °F (36.8 °C) Oral 112 20 94 % 5' 8\" (1.727 m) 130 lb (59 kg)     Patient Vitals for the past 96 hrs (Last 3 readings):   Weight   05/28/20 0000 131 lb 8 oz (59.6 kg)   05/27/20 1939 130 lb (59 kg)         No intake or output data in the 24 hours ending 05/28/20 1033      Physical Exam:   S1, S2 normal, no murmur, rub or gallop, regular rate and rhythm  Crackles in bases  abdomen is soft without significant tenderness, masses, organomegaly or guarding  extremities normal, atraumatic, no cyanosis or edema    Labs:  Lab Results   Component Value Date    WBC 13.0 (H) 05/28/2020    HGB 13.9 05/28/2020    HCT 41.8 05/28/2020     05/28/2020    CHOL 214 (H) 03/05/2018    TRIG 129 03/05/2018    HDL 88 (H) 03/05/2018    ALT 10 05/28/2020    AST 16 05/28/2020     05/28/2020    K 3.8 05/28/2020     05/28/2020    CREATININE <0.5 (L) 05/28/2020    BUN 15 05/28/2020    CO2 25 05/28/2020    INR 1.09 05/13/2019    LABA1C 6.6 05/13/2019    LABMICR YES 01/29/2018     Lab Results   Component Value Date    CKTOTAL 129 04/21/2010    TROPONINI <0.01 05/27/2020       Recent Imaging Results are Reviewed:  Xr Chest Portable    Result Date: 5/28/2020  EXAMINATION: ONE XRAY VIEW OF THE CHEST 5/28/2020 6:19 am COMPARISON: 05/27/2020 HISTORY: ORDERING SYSTEM PROVIDED HISTORY: pna TECHNOLOGIST PROVIDED HISTORY: Reason for exam:->pna Reason for Exam: PNEUMONIA FOLLOW UP Acuity: Unknown Type of Exam: Subsequent/Follow-up     The lungs are hyperexpanded with multifocal bilateral pleuroparenchymal scarring again demonstrated. Postop changes of left upper lobectomy are noted. There is no acute lobar consolidation, pneumothorax or effusion. Heart size and vascularity are stable. RECOMMENDATION: No significant interval change. Xr Chest Portable    Result Date: 5/28/2020  EXAMINATION: ONE XRAY VIEW OF THE CHEST 5/27/2020 8:32 pm COMPARISON: 05/13/2019, 03/19/2019, 07/10/2008 HISTORY: ORDERING SYSTEM PROVIDED HISTORY: SOB TECHNOLOGIST PROVIDED HISTORY: Reason for exam:->SOB Reason for Exam: Shortness of Breath (pt brought in by [facility] c/o increase trouble breathing since this morning. pt is a lung CA pt currently under radiation tx. pt had Duo neb x2 per EMS and steroids PO by EMS) Acuity: Unknown Type of Exam: Unknown FINDINGS: A single frontal view of the chest was performed. There is no acute skeletal abnormality. There are chronic healed left rib fractures. There is vertebroplasty cement stabilizing multiple midthoracic vertebral bodies. The heart size and mediastinal contours are stable, and within normal limits. There are stable postsurgical changes evident status post left upper lobectomy, including volume loss within the left hemithorax and mild superior retraction of the left hilum.   There is chronic pleural and parenchymal opacity within the left lung base, likely reflecting chronic pleural and parenchymal scar. There is chronic biapical pleural and parenchymal scarring evident. There is a hazy opacity within the right lung base, which could represent either atelectasis or possibly pneumonia. Aspiration is considered less likely. There is no evidence of a pneumothorax. 1. Mild hazy opacity within the right lung base, representing either atelectasis, pneumonia, or aspiration. 2. Stable chronic pleural and parenchymal opacity within the left lung base, likely chronic pleural and parenchymal scar. 3. Stable chronic postsurgical changes status post left upper lobectomy. Assessment and Plan:  Principal Problem:    Pneumonia -Established problem. Resolved? Plan: repeat cxr normal, procalcitonin low. Will stop rocephin  Active Problems:    Lung cancer ([address] -Established problem. Stable. Plan: Continue present orders/plan. Hypertension -Established problem. Stable. 139/74  Plan: stay on bp meds    OAB (overactive bladder) - Established problem. Stable. Plan: Continue present orders/plan. COPD, severe ([address] -Established problem. Stable. On 2L, no wheezing  Plan: cont home meds    Suspected COVID-19 virus infection -Established problem. Stable.     Plan: cont in droplet isolation until swab result known            Bryan Leventhal  5/28/2020

## 2020-05-28 NOTE — CARE COORDINATION
Discharge Planning Assessment    SW discharge planner spoke with patient to discuss reason for admission, current living situation, and potential needs at the time of discharge. Demographics/Insurance verified:  Yes    Current type of dwelling: House    Patient from ECF/SW confirmed with: N/A    Living arrangements:     Level of function/Support: Independent    PCP: Dr. Feroz Gardiner    Last Visit to PCP: January 2020    DME: None    Active with any community resources/agencies/skilled home care: None    Medication compliance issues: None    Financial issues that could impact healthcare: None    Tentative discharge plan: Home, covid test pending. Transportation at the time of discharge: . Will continue to follow for support and discharge planning.  Hillary Stewart, MSW, LSW

## 2020-05-28 NOTE — ED NOTES
RN called floor for report, transferred to RN, and RN told ER RN she was not the correct nurse     Jeff Anaya RN  05/27/20 9627

## 2020-05-28 NOTE — ED PROVIDER NOTES
[facility]        Pt Name: Liliana Mercado  MRN: [id number]  [place] 1945  Date of evaluation: 5/27/2020  Provider: LESLEY Greenberg - CNP  PCP: Germán Mosher MD    Evaluation by MALI. My supervising physician was available for consultation. CHIEF COMPLAINT       Chief Complaint   Patient presents with    Shortness of Breath     pt brought in by [facility] EMS c/o increase trouble breathing since this morning. pt is a lung CA pt currently under radiation tx.  pt had Duo neb x2 per EMS and steroids PO by EMS       HISTORY OF PRESENT ILLNESS   (Location, Timing/Onset, Context/Setting, Quality, Duration, Modifying Factors, Severity, Associated Signs and Symptoms)  Note limiting factors. Liliana Mercado is a 70 y.o. female presents to the emergency department with increasing shortness of breath and change in mucus color since this morning. She does have history of lung cancer and has completed radiation. States that she is due to go back in July for recheck. She did use her albuterol inhaler today without relief as well as her nebulizer, she was given 2 duo nebs in route per EMS. States that she is dyspneic at rest, denies fever. No recent travel or sick exposure. She does use supplemental oxygen as needed at home, states generally at nighttime. Denies any headache, fever, lightheadedness, dizziness, visual disturbances. No chest pain or pressure. No neck or back pain. No abdominal pain, nausea, vomiting, diarrhea, constipation, or dysuria. No rash. Nursing Notes were all reviewed and agreed with or any disagreements were addressed in the HPI. REVIEW OF SYSTEMS    (2-9 systems for level 4, 10 or more for level 5)     Review of Systems   Constitutional: Positive for fatigue. Negative for activity change, chills and fever. HENT: Positive for congestion.     Respiratory: Positive for cough, chest none     Procedures    CRITICAL CARE TIME   N/A    CONSULTS:  IP CONSULT TO INTERNAL MEDICINE      EMERGENCY DEPARTMENT COURSE and DIFFERENTIAL DIAGNOSIS/MDM:   Vitals:    Vitals:    05/27/20 1939 05/27/20 2100   BP: (!) 153/75 (!) 141/58   Pulse: 112 107   Resp: 20 28   Temp: 98.3 °F (36.8 °C)    TempSrc: Oral    SpO2: 94% 95%   Weight: 130 lb (59 kg)    Height: 5' 8\" (1.727 m)        Patient was given the following medications:  Medications   cefTRIAXone (ROCEPHIN) 1 g in sterile water 10 mL IV syringe (has no administration in time range)   azithromycin (ZITHROMAX) 500 mg in D5W 250ml Vial Mate (has no administration in time range)   ipratropium-albuterol (DUONEB) nebulizer solution 1 ampule (1 ampule Inhalation Given 5/27/20 2117)   methylPREDNISolone sodium (SOLU-MEDROL) injection 125 mg (125 mg Intravenous Given 5/27/20 2106)   albuterol (PROVENTIL) nebulizer solution 5 mg (5 mg Nebulization Given 5/27/20 2117)       Briefly, this is a 70year old female that  presents to the emergency department with increasing shortness of breath and change in mucus color since this morning. She does have history of lung cancer and has completed radiation. States that she is due to go back in July for recheck. She did use her albuterol inhaler today without relief as well as her nebulizer, she was given 2 duo nebs in route per EMS. States that she is dyspneic at rest, denies fever. No recent travel or sick exposure. She does use supplemental oxygen as needed at home, states generally at nighttime. . BMP unremarkable. CBC does show leukocytosis with a left shift. Lactate normal.  LDH is unremarkable. COVID screen is pending. XR CHEST PORTABLE (Preliminary result)   Result time 05/27/20 39:20:65   Preliminary result by Madisyn Worrell MD (05/27/20 19:13:86)                Impression:    1. Mild hazy opacity within right lung base, representing either atelectasis,  pneumonia, or aspiration.   2.

## 2020-05-29 PROBLEM — R91.8 PULMONARY INFILTRATES: Status: ACTIVE | Noted: 2020-05-29

## 2020-05-29 PROCEDURE — 94761 N-INVAS EAR/PLS OXIMETRY MLT: CPT

## 2020-05-29 PROCEDURE — 6360000002 HC RX W HCPCS: Performed by: INTERNAL MEDICINE

## 2020-05-29 PROCEDURE — 6370000000 HC RX 637 (ALT 250 FOR IP): Performed by: INTERNAL MEDICINE

## 2020-05-29 PROCEDURE — 2700000000 HC OXYGEN THERAPY PER DAY

## 2020-05-29 PROCEDURE — 94640 AIRWAY INHALATION TREATMENT: CPT

## 2020-05-29 PROCEDURE — 99223 1ST HOSP IP/OBS HIGH 75: CPT | Performed by: INTERNAL MEDICINE

## 2020-05-29 PROCEDURE — 1200000000 HC SEMI PRIVATE

## 2020-05-29 PROCEDURE — 2580000003 HC RX 258: Performed by: INTERNAL MEDICINE

## 2020-05-29 RX ORDER — IPRATROPIUM BROMIDE AND ALBUTEROL SULFATE 2.5; .5 MG/3ML; MG/3ML
1 SOLUTION RESPIRATORY (INHALATION)
Status: DISCONTINUED | OUTPATIENT
Start: 2020-05-29 | End: 2020-05-29

## 2020-05-29 RX ORDER — IPRATROPIUM BROMIDE AND ALBUTEROL SULFATE 2.5; .5 MG/3ML; MG/3ML
1 SOLUTION RESPIRATORY (INHALATION) EVERY 4 HOURS PRN
Status: DISCONTINUED | OUTPATIENT
Start: 2020-05-29 | End: 2020-05-31 | Stop reason: HOSPADM

## 2020-05-29 RX ORDER — CIPROFLOXACIN 500 MG/1
500 TABLET, FILM COATED ORAL EVERY 12 HOURS SCHEDULED
Status: DISCONTINUED | OUTPATIENT
Start: 2020-05-29 | End: 2020-05-30

## 2020-05-29 RX ORDER — METHYLPREDNISOLONE SODIUM SUCCINATE 40 MG/ML
40 INJECTION, POWDER, LYOPHILIZED, FOR SOLUTION INTRAMUSCULAR; INTRAVENOUS EVERY 8 HOURS
Status: DISCONTINUED | OUTPATIENT
Start: 2020-05-29 | End: 2020-05-30

## 2020-05-29 RX ADMIN — DILTIAZEM HYDROCHLORIDE 60 MG: 60 TABLET, FILM COATED ORAL at 09:20

## 2020-05-29 RX ADMIN — MONTELUKAST SODIUM 10 MG: 10 TABLET, FILM COATED ORAL at 21:53

## 2020-05-29 RX ADMIN — Medication 2 PUFF: at 11:58

## 2020-05-29 RX ADMIN — DILTIAZEM HYDROCHLORIDE 60 MG: 60 TABLET, FILM COATED ORAL at 18:09

## 2020-05-29 RX ADMIN — GABAPENTIN 300 MG: 300 CAPSULE ORAL at 21:53

## 2020-05-29 RX ADMIN — Medication 2 PUFF: at 09:05

## 2020-05-29 RX ADMIN — TIOTROPIUM BROMIDE INHALATION SPRAY 2 PUFF: 3.12 SPRAY, METERED RESPIRATORY (INHALATION) at 15:30

## 2020-05-29 RX ADMIN — ENOXAPARIN SODIUM 40 MG: 40 INJECTION SUBCUTANEOUS at 09:20

## 2020-05-29 RX ADMIN — Medication 10 ML: at 22:43

## 2020-05-29 RX ADMIN — AZITHROMYCIN 500 MG: 250 TABLET, FILM COATED ORAL at 09:19

## 2020-05-29 RX ADMIN — TROSPIUM CHLORIDE 20 MG: 20 TABLET, FILM COATED ORAL at 15:54

## 2020-05-29 RX ADMIN — CIPROFLOXACIN HYDROCHLORIDE 500 MG: 500 TABLET, FILM COATED ORAL at 21:53

## 2020-05-29 RX ADMIN — TROSPIUM CHLORIDE 20 MG: 20 TABLET, FILM COATED ORAL at 05:29

## 2020-05-29 RX ADMIN — GABAPENTIN 300 MG: 300 CAPSULE ORAL at 09:19

## 2020-05-29 RX ADMIN — BUSPIRONE HYDROCHLORIDE 5 MG: 5 TABLET ORAL at 09:19

## 2020-05-29 RX ADMIN — DILTIAZEM HYDROCHLORIDE 60 MG: 60 TABLET, FILM COATED ORAL at 21:53

## 2020-05-29 RX ADMIN — BUSPIRONE HYDROCHLORIDE 5 MG: 5 TABLET ORAL at 21:53

## 2020-05-29 RX ADMIN — PREDNISONE 10 MG: 10 TABLET ORAL at 09:19

## 2020-05-29 RX ADMIN — Medication 2 PUFF: at 21:13

## 2020-05-29 RX ADMIN — METHYLPREDNISOLONE SODIUM SUCCINATE 40 MG: 40 INJECTION, POWDER, FOR SOLUTION INTRAMUSCULAR; INTRAVENOUS at 22:42

## 2020-05-29 RX ADMIN — DILTIAZEM HYDROCHLORIDE 60 MG: 60 TABLET, FILM COATED ORAL at 15:55

## 2020-05-29 RX ADMIN — GUAIFENESIN 1200 MG: 600 TABLET, EXTENDED RELEASE ORAL at 09:19

## 2020-05-29 ASSESSMENT — PAIN SCALES - GENERAL
PAINLEVEL_OUTOF10: 0
PAINLEVEL_OUTOF10: 0

## 2020-05-29 NOTE — CONSULTS
INPATIENT PULMONARY CRITICAL CARE CONSULT NOTE      Chief Complaint/Referring Provider:  Patient is being seen at the request of  for a consultation for COPD exacerbation, cough negative     Presenting HPI: Patient came to the hospital because of increasing shortness of breath    As per the admitting provider-75 y. o. female presents to the emergency department with increasing shortness of breath and change in mucus color since this morning.  She does have history of lung cancer and has completed radiation.  States that she is due to go back in July for recheck.  She did use her albuterol inhaler today without relief as well as her nebulizer, she was given 2 duo nebs in route per EMS.  States that she is dyspneic at rest, denies fever.  No recent travel or sick exposure. She does use supplemental oxygen as needed at home, states generally at nighttime. CXR from ER is reviewed by self - it shows Mild hazy opacity within right lung base, representing either atelectasis, pneumonia, or aspiration. Pt has been started on antibiotics for empiric treatment of pneumonia. Given her presentation, covid swab is also sent in ER  Severity: moderately severe, pt with increased o2 requirement  Quality:    Modifying Factors: nothing makes it better or worse  Associated Signs or Symptoms: no fevers. +dyspnea at rest + cough. No HA.     Patient when seen this morning continues to have increasing shortness of breath, patient has cough which is episodic in nature along with patient has respiratory secretions which are greenish-brown in color, patient has been having increasing shortness of breath and wheezing, patient also has chest tightness, patient does not have any significant rhinorrhea or nasal congestion, patient does not have any sore throat or difficulty in swallowing, patient did not have any epistaxis or hemoptysis, patient denies any difficulty in swallowing, no coughing or choking while eating, patient had some nausea Normal mood and affect. Behavior is normal.  No anxiety. Neurologic: Alert, awake and oriented. PERRL. Speech fluent        Results:  CBC:   Recent Labs     05/27/20 2013 05/28/20  0538   WBC 14.4* 13.0*   HGB 13.9 13.9   HCT 43.0 41.8   MCV 92.9 91.7    164     BMP:   Recent Labs     05/27/20 2013 05/28/20  0733    139   K 3.9 3.8    103   CO2 27 25   BUN 16 15   CREATININE <0.5* <0.5*     LIVER PROFILE:   Recent Labs     05/28/20  0733   AST 16   ALT 10   BILITOT 0.5   ALKPHOS 62       Imaging:  I have reviewed radiology images personally. XR CHEST PORTABLE   Final Result   The lungs are hyperexpanded with multifocal bilateral pleuroparenchymal   scarring again demonstrated. Postop changes of left upper lobectomy are   noted. There is no acute lobar consolidation, pneumothorax or effusion. Heart size and vascularity are stable. RECOMMENDATION:   No significant interval change. XR CHEST PORTABLE   Final Result   1. Mild hazy opacity within the right lung base, representing either   atelectasis, pneumonia, or aspiration. 2. Stable chronic pleural and parenchymal opacity within the left lung base,   likely chronic pleural and parenchymal scar. 3. Stable chronic postsurgical changes status post left upper lobectomy. Xr Chest Portable    Result Date: 5/28/2020  EXAMINATION: ONE XRAY VIEW OF THE CHEST 5/28/2020 6:19 am COMPARISON: 05/27/2020 HISTORY: ORDERING SYSTEM PROVIDED HISTORY: pna TECHNOLOGIST PROVIDED HISTORY: Reason for exam:->pna Reason for Exam: PNEUMONIA FOLLOW UP Acuity: Unknown Type of Exam: Subsequent/Follow-up     The lungs are hyperexpanded with multifocal bilateral pleuroparenchymal scarring again demonstrated. Postop changes of left upper lobectomy are noted. There is no acute lobar consolidation, pneumothorax or effusion. Heart size and vascularity are stable. RECOMMENDATION: No significant interval change.      Xr Chest Portable    Result Date: 5/28/2020  EXAMINATION: ONE XRAY VIEW OF THE CHEST 5/27/2020 8:32 pm COMPARISON: 05/13/2019, 03/19/2019, 07/10/2008 HISTORY: ORDERING SYSTEM PROVIDED HISTORY: SOB TECHNOLOGIST PROVIDED HISTORY: Reason for exam:->SOB Reason for Exam: Shortness of Breath (pt brought in by [facility] c/o increase trouble breathing since this morning. pt is a lung CA pt currently under radiation tx. pt had Duo neb x2 per EMS and steroids PO by EMS) Acuity: Unknown Type of Exam: Unknown FINDINGS: A single frontal view of the chest was performed. There is no acute skeletal abnormality. There are chronic healed left rib fractures. There is vertebroplasty cement stabilizing multiple midthoracic vertebral bodies. The heart size and mediastinal contours are stable, and within normal limits. There are stable postsurgical changes evident status post left upper lobectomy, including volume loss within the left hemithorax and mild superior retraction of the left hilum. There is chronic pleural and parenchymal opacity within the left lung base, likely reflecting chronic pleural and parenchymal scar. There is chronic biapical pleural and parenchymal scarring evident. There is a hazy opacity within the right lung base, which could represent either atelectasis or possibly pneumonia. Aspiration is considered less likely. There is no evidence of a pneumothorax. 1. Mild hazy opacity within the right lung base, representing either atelectasis, pneumonia, or aspiration. 2. Stable chronic pleural and parenchymal opacity within the left lung base, likely chronic pleural and parenchymal scar. 3. Stable chronic postsurgical changes status post left upper lobectomy. Results for Milton Flores (MRN [id number]) as of 5/29/2020 14:11   Ref.  Range 2/25/2020 12:11 5/27/2020 20:13 5/28/2020 07:33   Sodium Latest Ref Range: 136 - 145 mmol/L  139 139   Potassium Latest Ref Range: 3.5 - 5.1 mmol/L  3.9 3.8   Chloride Latest Ref Range: 99 - 110 mmol/L  101 103   CO2 Latest Ref Range: 21 - 32 mmol/L  27 25   BUN Latest Ref Range: 7 - 20 mg/dL 18 16 15   Creatinine Latest Ref Range: 0.6 - 1.2 mg/dL 0.6 <0.5 (L) <0.5 (L)   Anion Gap Latest Ref Range: 3 - 16   11 11   GFR Non- Latest Ref Range: >60  >60 >60 >60   GFR  Latest Ref Range: >60  >60 >60 >60   Magnesium Latest Ref Range: 1.80 - 2.40 mg/dL 2.20     Lactic Acid, Sepsis Latest Ref Range: 0.4 - 1.9 mmol/L  0.9    Glucose Latest Ref Range: 70 - 99 mg/dL  138 (H) 190 (H)   Calcium Latest Ref Range: 8.3 - 10.6 mg/dL 10.2 9.9 9.3   Total Protein Latest Ref Range: 6.4 - 8.2 g/dL   6.5   Procalcitonin Latest Ref Range: 0.00 - 0.15 ng/mL   0.04     Results for Jonah Gomez (MRN [id number]) as of 5/29/2020 14:11   Ref. Range 3/5/2018 11:07 7/1/2018 22:00 5/13/2019 13:34 5/27/2020 20:13   LD Latest Ref Range: 100 - 190 U/L    157   Pro-BNP Latest Ref Range: 0 - 124 pg/mL   253 (H) 174 (H)   Troponin Latest Ref Range: <0.01 ng/mL  <0.01 <0.01 <0.01   Cholesterol, Total Latest Ref Range: 0 - 199 mg/dL 214 (H)      HDL Cholesterol Latest Ref Range: 40 - 60 mg/dL 88 (H)      LDL Calculated Latest Ref Range: <100 mg/dL 100 (H)      Triglycerides Latest Ref Range: 0 - 150 mg/dL 129      VLDL Cholesterol Calculated Latest Ref Range: Not Established mg/dL 26        CT chest in March 2020-  Mucous plugging in the right lower lobe airways, with new ground-glass and   sub solid opacities, likely postinflammatory-infectious.  Consider 3 month   follow-up chest CT to document resolution.       Scattered nodularity throughout the lungs.  Dominant nodule right lower lobe   described on prior studies appears stable in size to slightly smaller     Results for Jonah Gomez (MRN [id number]) as of 5/29/2020 14:11   Ref.  Range 12/19/2018 08:25 12/19/2018 08:25 5/13/2019 13:33 5/13/2019 14:27 5/14/2019 14:00 5/27/2020 21:01 5/27/2020 21:40 5/28/2020 15:25   CULTURE BLOOD #1 Unknown    Rpt Pneumonia  Active Problems:    Lung cancer ([facility] [address]    Hypertension    OAB (overactive bladder)    COPD, severe (HCC)    Shortness of breath    COPD exacerbation (HCC)    Chronic respiratory failure with hypoxia (HCC)    Pulmonary infiltrates  Resolved Problems:    * No resolved hospital problems.  *          Plan:   · Oxygen supplementation to keep saturation being 90 to 94% only-discussed with nursing to decrease the oxygen as per these parameters  · Pulmonary toilet  · Patient has acute bronchospasm when seen  · Patient is on Symbicort inhaler along with DuoNeb  · Will change DuoNeb to every 4 hours PRN basis only  · Spiriva Respimat added  · Patient is on [facility] at this time-please monitor for any worsening anticholinergic side effects  · Patient is on low-dose of p.o. prednisone which is been changed to IV Solu-Medrol  · Patient has history of Pseudomonas pneumonia in the past  · Will change Zithromax to ciprofloxacin for now and reassessment as per clinical status and cultures  · The patient has increasing chest congestion and mucus plugging then bronchoscopy for diagnostic and therapeutic purposes can be contemplated in a few days time  · No need for any IV fluids at this time-being discontinued-to be reevaluated as per clinical status and cultures  · Mucinex can be continued  · Incentive spirometry and Acapella may help  · PUD and DVT prophylaxis as per IM    Case discussed with patient and nursing          Electronically signed by:  Tatiana Araujo MD    5/29/2020    2:16 PM.

## 2020-05-29 NOTE — PROGRESS NOTES
5/28/2020 6:19 am COMPARISON: 05/27/2020 HISTORY: ORDERING SYSTEM PROVIDED HISTORY: pna TECHNOLOGIST PROVIDED HISTORY: Reason for exam:->pna Reason for Exam: PNEUMONIA FOLLOW UP Acuity: Unknown Type of Exam: Subsequent/Follow-up     The lungs are hyperexpanded with multifocal bilateral pleuroparenchymal scarring again demonstrated. Postop changes of left upper lobectomy are noted. There is no acute lobar consolidation, pneumothorax or effusion. Heart size and vascularity are stable. RECOMMENDATION: No significant interval change. Xr Chest Portable    Result Date: 5/28/2020  EXAMINATION: ONE XRAY VIEW OF THE CHEST 5/27/2020 8:32 pm COMPARISON: 05/13/2019, 03/19/2019, 07/10/2008 HISTORY: ORDERING SYSTEM PROVIDED HISTORY: SOB TECHNOLOGIST PROVIDED HISTORY: Reason for exam:->SOB Reason for Exam: Shortness of Breath (pt brought in by [facility] c/o increase trouble breathing since this morning. pt is a lung CA pt currently under radiation tx. pt had Duo neb x2 per EMS and steroids PO by EMS) Acuity: Unknown Type of Exam: Unknown FINDINGS: A single frontal view of the chest was performed. There is no acute skeletal abnormality. There are chronic healed left rib fractures. There is vertebroplasty cement stabilizing multiple midthoracic vertebral bodies. The heart size and mediastinal contours are stable, and within normal limits. There are stable postsurgical changes evident status post left upper lobectomy, including volume loss within the left hemithorax and mild superior retraction of the left hilum. There is chronic pleural and parenchymal opacity within the left lung base, likely reflecting chronic pleural and parenchymal scar. There is chronic biapical pleural and parenchymal scarring evident. There is a hazy opacity within the right lung base, which could represent either atelectasis or possibly pneumonia. Aspiration is considered less likely. There is no evidence of a pneumothorax.      1. Mild hazy

## 2020-05-30 ENCOUNTER — APPOINTMENT (OUTPATIENT)
Dept: GENERAL RADIOLOGY | Age: 75
DRG: 190 | End: 2020-05-30
Payer: COMMERCIAL

## 2020-05-30 LAB
ANION GAP SERPL CALCULATED.3IONS-SCNC: 6 MMOL/L (ref 3–16)
BASOPHILS ABSOLUTE: 0 K/UL (ref 0–0.2)
BASOPHILS RELATIVE PERCENT: 0.1 %
BUN BLDV-MCNC: 18 MG/DL (ref 7–20)
CALCIUM SERPL-MCNC: 9.2 MG/DL (ref 8.3–10.6)
CHLORIDE BLD-SCNC: 105 MMOL/L (ref 99–110)
CO2: 28 MMOL/L (ref 21–32)
CREAT SERPL-MCNC: <0.5 MG/DL (ref 0.6–1.2)
CULTURE, RESPIRATORY: NORMAL
EOSINOPHILS ABSOLUTE: 0 K/UL (ref 0–0.6)
EOSINOPHILS RELATIVE PERCENT: 0 %
GFR AFRICAN AMERICAN: >60
GFR NON-AFRICAN AMERICAN: >60
GLUCOSE BLD-MCNC: 164 MG/DL (ref 70–99)
GRAM STAIN RESULT: NORMAL
HCT VFR BLD CALC: 41.2 % (ref 36–48)
HEMOGLOBIN: 13.5 G/DL (ref 12–16)
LYMPHOCYTES ABSOLUTE: 0.5 K/UL (ref 1–5.1)
LYMPHOCYTES RELATIVE PERCENT: 7.1 %
MCH RBC QN AUTO: 30.5 PG (ref 26–34)
MCHC RBC AUTO-ENTMCNC: 32.7 G/DL (ref 31–36)
MCV RBC AUTO: 93.1 FL (ref 80–100)
MONOCYTES ABSOLUTE: 0.2 K/UL (ref 0–1.3)
MONOCYTES RELATIVE PERCENT: 2.4 %
NEUTROPHILS ABSOLUTE: 6.3 K/UL (ref 1.7–7.7)
NEUTROPHILS RELATIVE PERCENT: 90.4 %
PDW BLD-RTO: 13.8 % (ref 12.4–15.4)
PLATELET # BLD: 163 K/UL (ref 135–450)
PMV BLD AUTO: 10.3 FL (ref 5–10.5)
POTASSIUM SERPL-SCNC: 4.6 MMOL/L (ref 3.5–5.1)
PROCALCITONIN: 0.04 NG/ML (ref 0–0.15)
RBC # BLD: 4.42 M/UL (ref 4–5.2)
SODIUM BLD-SCNC: 139 MMOL/L (ref 136–145)
WBC # BLD: 6.9 K/UL (ref 4–11)

## 2020-05-30 PROCEDURE — 85025 COMPLETE CBC W/AUTO DIFF WBC: CPT

## 2020-05-30 PROCEDURE — 6370000000 HC RX 637 (ALT 250 FOR IP): Performed by: INTERNAL MEDICINE

## 2020-05-30 PROCEDURE — 36415 COLL VENOUS BLD VENIPUNCTURE: CPT

## 2020-05-30 PROCEDURE — 94640 AIRWAY INHALATION TREATMENT: CPT

## 2020-05-30 PROCEDURE — 80048 BASIC METABOLIC PNL TOTAL CA: CPT

## 2020-05-30 PROCEDURE — 84145 PROCALCITONIN (PCT): CPT

## 2020-05-30 PROCEDURE — 94761 N-INVAS EAR/PLS OXIMETRY MLT: CPT

## 2020-05-30 PROCEDURE — 2580000003 HC RX 258: Performed by: INTERNAL MEDICINE

## 2020-05-30 PROCEDURE — 1200000000 HC SEMI PRIVATE

## 2020-05-30 PROCEDURE — 2700000000 HC OXYGEN THERAPY PER DAY

## 2020-05-30 PROCEDURE — 6360000002 HC RX W HCPCS: Performed by: INTERNAL MEDICINE

## 2020-05-30 PROCEDURE — 71045 X-RAY EXAM CHEST 1 VIEW: CPT

## 2020-05-30 RX ORDER — IPRATROPIUM BROMIDE AND ALBUTEROL SULFATE 2.5; .5 MG/3ML; MG/3ML
1 SOLUTION RESPIRATORY (INHALATION)
Status: DISCONTINUED | OUTPATIENT
Start: 2020-05-30 | End: 2020-05-31 | Stop reason: HOSPADM

## 2020-05-30 RX ORDER — PREDNISONE 20 MG/1
60 TABLET ORAL DAILY
Status: DISCONTINUED | OUTPATIENT
Start: 2020-05-31 | End: 2020-05-31 | Stop reason: HOSPADM

## 2020-05-30 RX ORDER — LEVOFLOXACIN 750 MG/1
750 TABLET ORAL DAILY
Status: DISCONTINUED | OUTPATIENT
Start: 2020-05-30 | End: 2020-05-31 | Stop reason: HOSPADM

## 2020-05-30 RX ADMIN — DILTIAZEM HYDROCHLORIDE 60 MG: 60 TABLET, FILM COATED ORAL at 08:20

## 2020-05-30 RX ADMIN — DILTIAZEM HYDROCHLORIDE 60 MG: 60 TABLET, FILM COATED ORAL at 17:58

## 2020-05-30 RX ADMIN — GABAPENTIN 300 MG: 300 CAPSULE ORAL at 21:53

## 2020-05-30 RX ADMIN — Medication 10 ML: at 06:21

## 2020-05-30 RX ADMIN — IPRATROPIUM BROMIDE AND ALBUTEROL SULFATE 1 AMPULE: .5; 3 SOLUTION RESPIRATORY (INHALATION) at 19:49

## 2020-05-30 RX ADMIN — DILTIAZEM HYDROCHLORIDE 60 MG: 60 TABLET, FILM COATED ORAL at 21:53

## 2020-05-30 RX ADMIN — IPRATROPIUM BROMIDE AND ALBUTEROL SULFATE 1 AMPULE: .5; 3 SOLUTION RESPIRATORY (INHALATION) at 16:10

## 2020-05-30 RX ADMIN — ACETAMINOPHEN 650 MG: 325 TABLET, FILM COATED ORAL at 21:54

## 2020-05-30 RX ADMIN — BUSPIRONE HYDROCHLORIDE 5 MG: 5 TABLET ORAL at 08:20

## 2020-05-30 RX ADMIN — METHYLPREDNISOLONE SODIUM SUCCINATE 40 MG: 40 INJECTION, POWDER, FOR SOLUTION INTRAMUSCULAR; INTRAVENOUS at 14:35

## 2020-05-30 RX ADMIN — TIOTROPIUM BROMIDE INHALATION SPRAY 2 PUFF: 3.12 SPRAY, METERED RESPIRATORY (INHALATION) at 08:49

## 2020-05-30 RX ADMIN — GABAPENTIN 300 MG: 300 CAPSULE ORAL at 08:21

## 2020-05-30 RX ADMIN — Medication 10 ML: at 08:21

## 2020-05-30 RX ADMIN — GUAIFENESIN 1200 MG: 600 TABLET, EXTENDED RELEASE ORAL at 08:21

## 2020-05-30 RX ADMIN — Medication 2 PUFF: at 19:49

## 2020-05-30 RX ADMIN — Medication 10 ML: at 21:53

## 2020-05-30 RX ADMIN — ENOXAPARIN SODIUM 40 MG: 40 INJECTION SUBCUTANEOUS at 08:21

## 2020-05-30 RX ADMIN — BUSPIRONE HYDROCHLORIDE 5 MG: 5 TABLET ORAL at 21:53

## 2020-05-30 RX ADMIN — TROSPIUM CHLORIDE 20 MG: 20 TABLET, FILM COATED ORAL at 17:59

## 2020-05-30 RX ADMIN — TROSPIUM CHLORIDE 20 MG: 20 TABLET, FILM COATED ORAL at 06:21

## 2020-05-30 RX ADMIN — LEVOFLOXACIN 750 MG: 750 TABLET, FILM COATED ORAL at 21:53

## 2020-05-30 RX ADMIN — MONTELUKAST SODIUM 10 MG: 10 TABLET, FILM COATED ORAL at 21:53

## 2020-05-30 RX ADMIN — DILTIAZEM HYDROCHLORIDE 60 MG: 60 TABLET, FILM COATED ORAL at 14:35

## 2020-05-30 RX ADMIN — CIPROFLOXACIN HYDROCHLORIDE 500 MG: 500 TABLET, FILM COATED ORAL at 08:21

## 2020-05-30 RX ADMIN — Medication 2 PUFF: at 08:49

## 2020-05-30 RX ADMIN — METHYLPREDNISOLONE SODIUM SUCCINATE 40 MG: 40 INJECTION, POWDER, FOR SOLUTION INTRAMUSCULAR; INTRAVENOUS at 06:21

## 2020-05-30 RX ADMIN — DIPHENHYDRAMINE HCL 25 MG: 25 TABLET ORAL at 21:54

## 2020-05-30 ASSESSMENT — PAIN SCALES - GENERAL
PAINLEVEL_OUTOF10: 0
PAINLEVEL_OUTOF10: 0

## 2020-05-30 NOTE — PROGRESS NOTES
Assessment and med pass complete. Meds taken whole in applesauce. Resting in bed watching TV. Ambulates independently, informed to call if needing assist. Denies needs, call light in reach.

## 2020-05-31 VITALS
DIASTOLIC BLOOD PRESSURE: 75 MMHG | RESPIRATION RATE: 18 BRPM | OXYGEN SATURATION: 96 % | WEIGHT: 125.9 LBS | BODY MASS INDEX: 19.08 KG/M2 | HEIGHT: 68 IN | HEART RATE: 101 BPM | TEMPERATURE: 99.1 F | SYSTOLIC BLOOD PRESSURE: 136 MMHG

## 2020-05-31 LAB
ANION GAP SERPL CALCULATED.3IONS-SCNC: 9 MMOL/L (ref 3–16)
BASOPHILS ABSOLUTE: 0 K/UL (ref 0–0.2)
BASOPHILS RELATIVE PERCENT: 0.3 %
BLOOD CULTURE, ROUTINE: NORMAL
BUN BLDV-MCNC: 24 MG/DL (ref 7–20)
CALCIUM SERPL-MCNC: 9 MG/DL (ref 8.3–10.6)
CHLORIDE BLD-SCNC: 106 MMOL/L (ref 99–110)
CO2: 25 MMOL/L (ref 21–32)
CREAT SERPL-MCNC: 0.6 MG/DL (ref 0.6–1.2)
CULTURE, BLOOD 2: NORMAL
EOSINOPHILS ABSOLUTE: 0 K/UL (ref 0–0.6)
EOSINOPHILS RELATIVE PERCENT: 0 %
GFR AFRICAN AMERICAN: >60
GFR NON-AFRICAN AMERICAN: >60
GLUCOSE BLD-MCNC: 137 MG/DL (ref 70–99)
HCT VFR BLD CALC: 40 % (ref 36–48)
HEMOGLOBIN: 13.2 G/DL (ref 12–16)
LYMPHOCYTES ABSOLUTE: 0.6 K/UL (ref 1–5.1)
LYMPHOCYTES RELATIVE PERCENT: 6.1 %
MCH RBC QN AUTO: 30.7 PG (ref 26–34)
MCHC RBC AUTO-ENTMCNC: 33.1 G/DL (ref 31–36)
MCV RBC AUTO: 92.7 FL (ref 80–100)
MONOCYTES ABSOLUTE: 1.2 K/UL (ref 0–1.3)
MONOCYTES RELATIVE PERCENT: 13.4 %
NEUTROPHILS ABSOLUTE: 7.3 K/UL (ref 1.7–7.7)
NEUTROPHILS RELATIVE PERCENT: 80.2 %
PDW BLD-RTO: 13.7 % (ref 12.4–15.4)
PLATELET # BLD: 174 K/UL (ref 135–450)
PMV BLD AUTO: 10 FL (ref 5–10.5)
POTASSIUM SERPL-SCNC: 4.1 MMOL/L (ref 3.5–5.1)
RBC # BLD: 4.31 M/UL (ref 4–5.2)
SODIUM BLD-SCNC: 140 MMOL/L (ref 136–145)
WBC # BLD: 9.1 K/UL (ref 4–11)

## 2020-05-31 PROCEDURE — 85025 COMPLETE CBC W/AUTO DIFF WBC: CPT

## 2020-05-31 PROCEDURE — 6360000002 HC RX W HCPCS: Performed by: INTERNAL MEDICINE

## 2020-05-31 PROCEDURE — 6370000000 HC RX 637 (ALT 250 FOR IP): Performed by: INTERNAL MEDICINE

## 2020-05-31 PROCEDURE — 94760 N-INVAS EAR/PLS OXIMETRY 1: CPT

## 2020-05-31 PROCEDURE — 80048 BASIC METABOLIC PNL TOTAL CA: CPT

## 2020-05-31 PROCEDURE — 94640 AIRWAY INHALATION TREATMENT: CPT

## 2020-05-31 PROCEDURE — 2700000000 HC OXYGEN THERAPY PER DAY

## 2020-05-31 PROCEDURE — 2580000003 HC RX 258: Performed by: INTERNAL MEDICINE

## 2020-05-31 PROCEDURE — 94761 N-INVAS EAR/PLS OXIMETRY MLT: CPT

## 2020-05-31 PROCEDURE — 99232 SBSQ HOSP IP/OBS MODERATE 35: CPT | Performed by: INTERNAL MEDICINE

## 2020-05-31 RX ORDER — PREDNISONE 10 MG/1
TABLET ORAL
Qty: 40 TABLET | Refills: 0 | Status: SHIPPED | OUTPATIENT
Start: 2020-05-31 | End: 2020-06-10

## 2020-05-31 RX ADMIN — TROSPIUM CHLORIDE 20 MG: 20 TABLET, FILM COATED ORAL at 06:15

## 2020-05-31 RX ADMIN — DILTIAZEM HYDROCHLORIDE 60 MG: 60 TABLET, FILM COATED ORAL at 08:54

## 2020-05-31 RX ADMIN — BUSPIRONE HYDROCHLORIDE 5 MG: 5 TABLET ORAL at 08:55

## 2020-05-31 RX ADMIN — GABAPENTIN 300 MG: 300 CAPSULE ORAL at 08:54

## 2020-05-31 RX ADMIN — IPRATROPIUM BROMIDE AND ALBUTEROL SULFATE 1 AMPULE: .5; 3 SOLUTION RESPIRATORY (INHALATION) at 08:01

## 2020-05-31 RX ADMIN — Medication 2 PUFF: at 08:01

## 2020-05-31 RX ADMIN — Medication 10 ML: at 08:56

## 2020-05-31 RX ADMIN — ENOXAPARIN SODIUM 40 MG: 40 INJECTION SUBCUTANEOUS at 08:54

## 2020-05-31 RX ADMIN — PREDNISONE 60 MG: 20 TABLET ORAL at 08:54

## 2020-05-31 RX ADMIN — IPRATROPIUM BROMIDE AND ALBUTEROL SULFATE 1 AMPULE: .5; 3 SOLUTION RESPIRATORY (INHALATION) at 11:37

## 2020-05-31 RX ADMIN — GUAIFENESIN 1200 MG: 600 TABLET, EXTENDED RELEASE ORAL at 08:55

## 2020-05-31 NOTE — PROGRESS NOTES
Progress Note - Dr. Helena Zee - Internal Medicine  PCP: Basilio Rios MD . [address] / [facility] [place] Day: 4  Code Status: Full Code  Current Diet: DIET GENERAL;        CC: follow up on medical issues    Subjective:   Bev Shea is a 70 y.o. female. She denies problems    Pt on lower o2 - now 2L  Normally only wears at night  Her pulmonologist has been consulted - appreciate eval per pulm services    Still wheezing, though pt states she wheezes all the time at home, she feels like she is near her baseline    procalcitonin is low 0.04  covid swab is negative 5/29        She denies chest pain, complains of shortness of breath, denies nausea,  denies emesis. 10 system Review of Systems is reviewed with patient, and pertinent positives are listed here: None . Otherwise, Review of systems is negative. I have reviewed the patient's medical and social history in detail and updated the computerized patient record. To recap: She  has a past medical history of Bronchitis, COPD, Hemoptysis, Hernia, Hypertension, Lung cancer ([address] Osteoporosis, Pneumonia, Rupture of colon ([facility] [address] and Skin cancer. . She  has a past surgical history that includes thoracotomy (7200); lobectomy (0378); Cholecystectomy; Tonsillectomy; Mandible surgery; sigmoidectomy (5/24/13); other surgical history (5/30/13); Colonoscopy (8/20/13); bronchoscopy (08/05/2016); Kyphosis surgery (02/04/2016); Upper gastrointestinal endoscopy (01/18/2018); bronchoscopy (01/31/2018); Upper gastrointestinal endoscopy (N/A, 10/31/2018); and Upper gastrointestinal endoscopy (N/A, 1/30/2019). . She  reports that she has been smoking cigarettes. She has a 0.42 pack-year smoking history. She has never used smokeless tobacco. She reports that she does not drink alcohol or use drugs. .        Active Hospital Problems    Diagnosis Date Noted    Pulmonary infiltrates [R91.8] 05/29/2020    Pneumonia [J18.9] 05/27/2020    Chronic  05/31/2020    K 4.1 05/31/2020     05/31/2020    CREATININE 0.6 05/31/2020    BUN 24 (H) 05/31/2020    CO2 25 05/31/2020    INR 1.09 05/13/2019    LABA1C 6.6 05/13/2019    LABMICR YES 01/29/2018     Lab Results   Component Value Date    CKTOTAL 129 04/21/2010    TROPONINI <0.01 05/27/2020       Recent Imaging Results are Reviewed:  Xr Chest Portable    Result Date: 5/28/2020  EXAMINATION: ONE XRAY VIEW OF THE CHEST 5/28/2020 6:19 am COMPARISON: 05/27/2020 HISTORY: ORDERING SYSTEM PROVIDED HISTORY: pna TECHNOLOGIST PROVIDED HISTORY: Reason for exam:->pna Reason for Exam: PNEUMONIA FOLLOW UP Acuity: Unknown Type of Exam: Subsequent/Follow-up     The lungs are hyperexpanded with multifocal bilateral pleuroparenchymal scarring again demonstrated. Postop changes of left upper lobectomy are noted. There is no acute lobar consolidation, pneumothorax or effusion. Heart size and vascularity are stable. RECOMMENDATION: No significant interval change. Xr Chest Portable    Result Date: 5/28/2020  EXAMINATION: ONE XRAY VIEW OF THE CHEST 5/27/2020 8:32 pm COMPARISON: 05/13/2019, 03/19/2019, 07/10/2008 HISTORY: ORDERING SYSTEM PROVIDED HISTORY: SOB TECHNOLOGIST PROVIDED HISTORY: Reason for exam:->SOB Reason for Exam: Shortness of Breath (pt brought in by [facility] EMS c/o increase trouble breathing since this morning. pt is a lung CA pt currently under radiation tx. pt had Duo neb x2 per EMS and steroids PO by EMS) Acuity: Unknown Type of Exam: Unknown FINDINGS: A single frontal view of the chest was performed. There is no acute skeletal abnormality. There are chronic healed left rib fractures. There is vertebroplasty cement stabilizing multiple midthoracic vertebral bodies. The heart size and mediastinal contours are stable, and within normal limits.  There are stable postsurgical changes evident status post left upper lobectomy, including volume loss within the left hemithorax and mild superior retraction of the left hilum. There is chronic pleural and parenchymal opacity within the left lung base, likely reflecting chronic pleural and parenchymal scar. There is chronic biapical pleural and parenchymal scarring evident. There is a hazy opacity within the right lung base, which could represent either atelectasis or possibly pneumonia. Aspiration is considered less likely. There is no evidence of a pneumothorax. 1. Mild hazy opacity within the right lung base, representing either atelectasis, pneumonia, or aspiration. 2. Stable chronic pleural and parenchymal opacity within the left lung base, likely chronic pleural and parenchymal scar. 3. Stable chronic postsurgical changes status post left upper lobectomy. Assessment and Plan:  Principal Problem:    Pneumonia -Established problem. Resolved? Plan: repeat cxr normal, procalcitonin low. Will stop rocephin  Active Problems:    Lung cancer ([address] -Established problem. Stable. Plan: Continue present orders/plan. Hypertension -Established problem. Stable. 117/67  Plan: stay on bp meds    OAB (overactive bladder) - Established problem. Stable. Plan: Continue present orders/plan. COPD, severe ([address] -Established problem. On 2L, some wheezing  Plan: cont home meds, pulm on board    Suspected COVID-19 virus infection -Established problem. Resolved  Plan: take out of droplet isolation as covid test neg 5/29      Disp - is improving, if cont to do bettter, possibly home in next 1-2d. Defer to pulm on decision to discharge or not as they are more familiar with her baseline.       Theresa Whelan  5/31/2020

## 2020-05-31 NOTE — DISCHARGE SUMMARY
vascularity are stable. RECOMMENDATION: No significant interval change. Xr Chest Portable    Result Date: 5/28/2020  EXAMINATION: ONE XRAY VIEW OF THE CHEST 5/27/2020 8:32 pm COMPARISON: 05/13/2019, 03/19/2019, 07/10/2008 HISTORY: ORDERING SYSTEM PROVIDED HISTORY: SOB TECHNOLOGIST PROVIDED HISTORY: Reason for exam:->SOB Reason for Exam: Shortness of Breath (pt brought in by [facility] c/o increase trouble breathing since this morning. pt is a lung CA pt currently under radiation tx. pt had Duo neb x2 per EMS and steroids PO by EMS) Acuity: Unknown Type of Exam: Unknown FINDINGS: A single frontal view of the chest was performed. There is no acute skeletal abnormality. There are chronic healed left rib fractures. There is vertebroplasty cement stabilizing multiple midthoracic vertebral bodies. The heart size and mediastinal contours are stable, and within normal limits. There are stable postsurgical changes evident status post left upper lobectomy, including volume loss within the left hemithorax and mild superior retraction of the left hilum. There is chronic pleural and parenchymal opacity within the left lung base, likely reflecting chronic pleural and parenchymal scar. There is chronic biapical pleural and parenchymal scarring evident. There is a hazy opacity within the right lung base, which could represent either atelectasis or possibly pneumonia. Aspiration is considered less likely. There is no evidence of a pneumothorax. 1. Mild hazy opacity within the right lung base, representing either atelectasis, pneumonia, or aspiration. 2. Stable chronic pleural and parenchymal opacity within the left lung base, likely chronic pleural and parenchymal scar. 3. Stable chronic postsurgical changes status post left upper lobectomy.          Discharge Exam:  /75   Pulse 101   Temp 99.1 °F (37.3 °C) (Oral)   Resp 18   Ht 5' 8\" (1.727 m)   Wt 125 lb 14.4 oz (57.1 kg)   SpO2 96%   BMI 19.14 kg/m² General appearance: alert, appears stated age and cooperative  Head: Normocephalic, without obvious abnormality, atraumatic  Lungs: clear to auscultation bilaterally  Heart: regular rate and rhythm, S1, S2 normal, no murmur, click, rub or gallop  Abdomen: soft, non-tender; bowel sounds normal; no masses,  no organomegaly  Extremities: extremities normal, atraumatic, no cyanosis or edema    Disposition: home    Condition: stable    Discharge Medications:   Antony Ascencio   [place] Medication Instructions HBI:[id number]    Printed on:05/31/20 1226   Medication Information                      albuterol (PROVENTIL) (5 MG/ML) 0.5% nebulizer solution  Take 0.5 mLs by nebulization 4 times daily as needed for Wheezing             albuterol sulfate HFA (VENTOLIN HFA) 108 (90 Base) MCG/ACT inhaler  Inhale 2 puffs into the lungs every 6 hours as needed for Wheezing             albuterol-ipratropium (COMBIVENT RESPIMAT)  MCG/ACT AERS inhaler  Inhale 1 puff into the lungs every 6 hours             azithromycin (ZITHROMAX) 250 MG tablet  Take 1 tab by mouth on Mondays Wednesday and Fridays             busPIRone (BUSPAR) 5 MG tablet  TAKE ONE TABLET BY MOUTH TWICE A DAY             Denosumab (PROLIA SC)  Inject into the skin Every 6 months             diltiazem (CARDIZEM) 60 MG tablet  Take 60 mg by mouth 4 times daily              fesoterodine (TOVIAZ) 4 MG TB24 ER tablet  TAKE ONE TABLET BY MOUTH DAILY             fluticasone-salmeterol (ADVAIR DISKUS) 250-50 MCG/DOSE AEPB  Inhale 1 puff into the lungs 2 times daily             gabapentin (NEURONTIN) 300 MG capsule  TAKE ONE CAPSULE BY MOUTH TWICE A DAY             guaiFENesin (MUCINEX) 600 MG extended release tablet  Take 1,200 mg by mouth daily             montelukast (SINGULAIR) 10 MG tablet  TAKE ONE TABLET BY MOUTH ONCE NIGHTLY             OXYGEN  Inhale 3 L/min into the lungs continuous Use nightly as needed             predniSONE (DELTASONE) 10 MG tablet  4 tab

## 2020-05-31 NOTE — PROGRESS NOTES
improved wheezes. No rhonchi. No crackles. ABDOMEN: Soft, nontender, nondistended, normal bowel sounds  EXTREMITIES: No clubbing. No cyanosis. No edema of lower extremities  NEUROLOGIC: Awake, alert oriented x3. Nonfocal exam grossly. LABS:  Hematology  Recent Labs     05/31/20  0625   WBC 9.1   HCT 40.0        No results for input(s): PROTIME, INR, PTT in the last 72 hours. Chemistries  Recent Labs     05/31/20  0625      K 4.1      CO2 25   BUN 24*   CREATININE 0.6     Recent Labs     05/31/20  0625   CALCIUM 9.0       LFTs  No results for input(s): AST, ALT, ALKPHOS in the last 72 hours. Invalid input(s): BILITOTAL  No results for input(s): AMYLASE, LIPASE in the last 72 hours. Arterial Blood Gasses  No results for input(s): PH, PCO2, PO2 in the last 72 hours. Invalid input(s): I3BRCWIBNKIM, INSPIREDO2    Cardiac Enzymes  No results for input(s): TROPONINI, MYOGLOBIN, BNP in the last 72 hours. Invalid input(s): Edith Mathur, [address]    Microbiology  [unfilled]    Imaging (chest imaging was personally reviewed)  [unfilled]    [unfilled]      ASSESSMENT   1. COPD with exacerbation, improving. Doubt pneumonia  2. Chronic hypoxic respiratory failure  3. Shortness of breath decreasing    PLAN   1. Prednisone taper  2. Restart Spiriva at the time of the discharge  3. Symbicort  4. Complete total of 7 days of antibiotics  5. Oxygen, wean for pulse oximetry 89% or above  6. Discharge planning as per primary service. No contraindication for discharge from pulmonary perspectives. Case discussed with: Patient, patient's nurse    Please note that this chart was generated using dragon dictation software. Although every effort was made to ensure the accuracy of this automated transcription, some errors in transcription may have occurred.        Electronically Signed:  Didi Villagran MD 5/31/2020 12:07 PM

## 2020-06-01 ENCOUNTER — CARE COORDINATION (OUTPATIENT)
Dept: CASE MANAGEMENT | Age: 75
End: 2020-06-01

## 2020-06-02 ENCOUNTER — VIRTUAL VISIT (OUTPATIENT)
Dept: RHEUMATOLOGY | Age: 75
End: 2020-06-02
Payer: COMMERCIAL

## 2020-06-02 PROCEDURE — 99213 OFFICE O/P EST LOW 20 MIN: CPT | Performed by: INTERNAL MEDICINE

## 2020-06-02 RX ORDER — TRAMADOL HYDROCHLORIDE 50 MG/1
50 TABLET ORAL 2 TIMES DAILY
Qty: 60 TABLET | Refills: 2 | Status: SHIPPED | OUTPATIENT
Start: 2020-06-13 | End: 2020-09-02 | Stop reason: SDUPTHER

## 2020-06-02 NOTE — PROGRESS NOTES
Subjective:      Patient ID: Antolin Steinberg is a 70 y.o. female. HPI                      patient returns for follow-up of osteoporosis and to have her tramadol renewed. She is due for Prolia in September. She is also due for a DEXA her last was in 2017. She was just in the hospital with pneumonia but was COVID negative    Review of Systems  No clinical fractures since the last visit  Objective:   Physical Exam alert female no acute distress wearing oxygen    Assessment:      Osteoporosis   Plan:      Patient's oarrs report was reviewed. She will get a DEXA scan before next visit in 3 months. Blood work will be obtained at the next office visit so that she can get her Prolia.         Elena Grewal MD

## 2020-06-02 NOTE — ADT AUTH CERT
Pneumonia - Care Day 4 (5/30/2020) by Carie Umanzor RN         Review Status Review Entered   Completed 6/2/2020 13:45       Criteria Review      Care Day: 4 Care Date: 5/30/2020 Level of Care:    Guideline Day 3    Clinical Status    (X) * Hemodynamic stability    (X) * Afebrile, or temperature acceptable for next level of care    (X) * Tachypnea absent    ( ) * Hypoxemia absent    ( ) * Mental status at baseline    ( ) * Antibiotic regimen acceptable for next level of care    ( ) * Discharge plans and education understood    Activity    ( ) * Ambulatory    Routes    ( ) * Oral hydration, medications, and diet    Interventions    ( ) * Oxygen absent or at baseline need    * Milestone   Additional Notes   Day 4- 5/30/2020      Vitals-   Temp- 98 oral    Pulse- 59   Resp- 20   BP- 151/71   SpO2- 95% on 2L NC      Labs-   Glucose- 164 (H)       Chest XR- No significant change in appearance of the chest.  Persistent small left   pleural effusion      Medications-  budesonide-formoterol BID, buspirone BID, cipro 500 mg PO BID, diltiazem 4x daily, enoxaparin daily, gabapentin BID, guaifenesin daily, ipratropium every 4 hours, levofloxacin 750 mg PO daily, solumedrol 40 mg IV every 8 hours, montelukast daily, tiotropium daily, trospium BID, acetaminophen x1, diphenhydramine 25 mg PO x1      Internal Medicine-   Principal Problem:     Pneumonia -Established problem. Resolved? Plan: repeat cxr normal, procalcitonin low. Will stop rocephin   Active Problems:     Lung cancer ([address] -Established problem. Stable.     Plan: Continue present orders/plan.      Hypertension -Established problem. Stable.  150/82   Plan: stay on bp meds     OAB (overactive bladder) - Established problem. Stable.     Plan: Continue present orders/plan.      COPD, severe ([address] -Established problem.  On 2-3L, some wheezing   Plan: cont home meds, pulm on board     Suspected COVID-19 virus infection -Established problem.  Resolved   Plan: take out of

## 2020-06-09 ENCOUNTER — VIRTUAL VISIT (OUTPATIENT)
Dept: PRIMARY CARE CLINIC | Age: 75
End: 2020-06-09
Payer: COMMERCIAL

## 2020-06-09 PROCEDURE — 99213 OFFICE O/P EST LOW 20 MIN: CPT | Performed by: INTERNAL MEDICINE

## 2020-06-09 ASSESSMENT — ENCOUNTER SYMPTOMS
SHORTNESS OF BREATH: 0
COUGH: 0
DIARRHEA: 0
BACK PAIN: 0
CHEST TIGHTNESS: 0
ABDOMINAL PAIN: 0
NAUSEA: 0
ABDOMINAL DISTENTION: 0

## 2020-06-09 NOTE — PROGRESS NOTES
azithromycin (ZITHROMAX) 250 MG tablet Take 1 tab by mouth on Mondays Wednesday and Fridays  Annette Niño APRN - CNP   albuterol (PROVENTIL) (5 MG/ML) 0.5% nebulizer solution Take 0.5 mLs by nebulization 4 times daily as needed for Wheezing  Rosayou Jasmin Niño APRN - CNP   guaiFENesin (MUCINEX) 600 MG extended release tablet Take 1,200 mg by mouth daily  Historical Provider, MD   traMADol (ULTRAM) 50 MG tablet Take 50 mg by mouth 2 times daily as needed for Pain.   Historical Provider, MD   Denosumab (PROLIA SC) Inject into the skin Every 6 months  Historical Provider, MD   albuterol-ipratropium (COMBIVENT RESPIMAT)  MCG/ACT AERS inhaler Inhale 1 puff into the lungs every 6 hours  Errol Vargas MD   OXYGEN Inhale 3 L/min into the lungs continuous Use nightly as needed  Errol Vargas MD   albuterol sulfate HFA (VENTOLIN HFA) 108 (90 Base) MCG/ACT inhaler Inhale 2 puffs into the lungs every 6 hours as needed for Wheezing  Jered MD Maria Del Carmen   diltiazem (CARDIZEM) 60 MG tablet Take 60 mg by mouth 4 times daily   Historical Provider, MD       Social History     Tobacco Use    Smoking status: Light Tobacco Smoker     Packs/day: 0.01     Years: 42.00     Pack years: 0.42     Types: Cigarettes     Last attempt to quit: 2/28/2010     Years since quitting: 10.2    Smokeless tobacco: Never Used    Tobacco comment: 1 cigarette month, maybe will have one if she is upset   Substance Use Topics    Alcohol use: No     Alcohol/week: 0.0 standard drinks    Drug use: No        Allergies   Allergen Reactions    Wellbutrin [Bupropion Hcl] Palpitations   ,   Past Medical History:   Diagnosis Date    Bronchitis     COPD     Hemoptysis     Hernia     Hypertension     Lung cancer ([facility] 2008    left lung    Osteoporosis     Pneumonia     Rupture of colon ([facility] 05/24/2013    Skin cancer 2014    ear   ,   Past Surgical History:   Procedure Laterality Date    BRONCHOSCOPY  08/05/2016    BRONCHOSCOPY  01/31/2018    CHOLECYSTECTOMY      COLONOSCOPY  8/20/13    KYPHOSIS SURGERY  02/04/2016    LOBECTOMY  0808    MANDIBLE SURGERY      upper jaw and lower jaw    OTHER SURGICAL HISTORY  5/30/13    secondary wound closure    SIGMOIDECTOMY  5/24/13    THORACOTOMY  0808    TONSILLECTOMY      UPPER GASTROINTESTINAL ENDOSCOPY  01/18/2018    UPPER GASTROINTESTINAL ENDOSCOPY N/A 10/31/2018    EGD BIOPSY performed by Varsha Davis MD at [place] [id number] N/A 1/30/2019    EGD performed by Varsha Davis MD at [address] [place] [address]   ,   Social History     Tobacco Use    Smoking status: Light Tobacco Smoker     Packs/day: 0.01     Years: 42.00     Pack years: 0.42     Types: Cigarettes     Last attempt to quit: 2/28/2010     Years since quitting: 10.2    Smokeless tobacco: Never Used    Tobacco comment: 1 cigarette month, maybe will have one if she is upset   Substance Use Topics    Alcohol use: No     Alcohol/week: 0.0 standard drinks    Drug use: No   ,   Family History   Problem Relation Age of Onset    Diabetes Father     Other Father         A-Fib    Cancer Father         prostate    Cancer Mother         ovarian    Other Sister         A-Fib   ,   Immunization History   Administered Date(s) Administered    Influenza Vaccine, unspecified formulation 10/31/2017    Influenza Virus Vaccine 08/08/2009, 08/15/2014, 11/03/2015, 10/01/2018    Pneumococcal Conjugate 13-valent ([id number]) 11/21/2014    Pneumococcal Conjugate 7-valent (Prevnar7) 04/08/2009    Pneumococcal Polysaccharide ([id number]) 08/21/2014, 03/18/2015   ,   Health Maintenance   Topic Date Due    Hepatitis C screen  1945    Diabetic foot exam  04/17/1957    Diabetic retinal exam  04/17/1957    Diabetic microalbuminuria test  04/17/1965    DTaP/Tdap/Td vaccine (1 - Tdap) 04/17/1966    Shingles Vaccine (1 of 2) 04/17/1997    Lipid screen  03/05/2019    Breast cancer screen 09/19/2019    A1C test (Diabetic or Prediabetic)  05/13/2020    Colon cancer screen colonoscopy  08/01/2023    Flu vaccine  Completed    Pneumococcal 65+ years Vaccine  Completed    DEXA (modify frequency per FRAX score)  Addressed    Hepatitis A vaccine  Aged Out    Hepatitis B vaccine  Aged Out    Hib vaccine  Aged Out    Meningococcal (ACWY) vaccine  Aged Out       PHYSICAL EXAMINATION:  [ INSTRUCTIONS:  \"[x]\" Indicates a positive item  \"[]\" Indicates a negative item  -- DELETE ALL ITEMS NOT EXAMINED]  Vital Signs: (As obtained by patient/caregiver or practitioner observation)    Blood pressure-  Heart rate-    Respiratory rate-    Temperature-  Pulse oximetry-     Constitutional: [x] Appears well-developed and well-nourished [x] No apparent distress      [] Abnormal-   Mental status  [x] Alert and awake  [x] Oriented to person/place/time [x]Able to follow commands      Eyes:  EOM    []  Normal  [] Abnormal-  Sclera  []  Normal  [] Abnormal -         Discharge []  None visible  [] Abnormal -    HENT:   [] Normocephalic, atraumatic.   [] Abnormal   [] Mouth/Throat: Mucous membranes are moist.     External Ears [] Normal  [] Abnormal-     Neck: [] No visualized mass     Pulmonary/Chest: [x] Respiratory effort normal.  [x] No visualized signs of difficulty breathing or respiratory distress        [x] Abnormal- rhonchi   Musculoskeletal:   [] Normal gait with no signs of ataxia         [] Normal range of motion of neck        [] Abnormal-       Neurological:        [x] No Facial Asymmetry (Cranial nerve 7 motor function) (limited exam to video visit)          [] No gaze palsy        [] Abnormal-         Skin:        [x] No significant exanthematous lesions or discoloration noted on facial skin         [] Abnormal-            Psychiatric:       [x] Normal Affect [] No Hallucinations        [] Abnormal-     Other pertinent observable physical exam findings-     ASSESSMENT/PLAN:    1.) COPD stable on 2l/min nc

## 2020-07-10 ENCOUNTER — TELEPHONE (OUTPATIENT)
Dept: PULMONOLOGY | Age: 75
End: 2020-07-10

## 2020-07-10 RX ORDER — PREDNISONE 10 MG/1
TABLET ORAL
Qty: 30 TABLET | Refills: 0 | Status: SHIPPED | OUTPATIENT
Start: 2020-07-10 | End: 2020-07-20

## 2020-07-10 RX ORDER — LEVOFLOXACIN 750 MG/1
750 TABLET ORAL DAILY
Qty: 7 TABLET | Refills: 0 | Status: SHIPPED | OUTPATIENT
Start: 2020-07-10 | End: 2020-07-17

## 2020-07-10 NOTE — TELEPHONE ENCOUNTER
Pt calling because she started with green phlegm last night and thought she should call and get on her usual round iof antibiotics and Pred Taper. No fever.

## 2020-07-28 ENCOUNTER — OFFICE VISIT (OUTPATIENT)
Dept: PULMONOLOGY | Age: 75
End: 2020-07-28
Payer: COMMERCIAL

## 2020-07-28 VITALS — DIASTOLIC BLOOD PRESSURE: 87 MMHG | OXYGEN SATURATION: 98 % | HEART RATE: 98 BPM | SYSTOLIC BLOOD PRESSURE: 141 MMHG

## 2020-07-28 PROCEDURE — 99214 OFFICE O/P EST MOD 30 MIN: CPT | Performed by: INTERNAL MEDICINE

## 2020-07-28 RX ORDER — ALBUTEROL SULFATE 90 UG/1
2 AEROSOL, METERED RESPIRATORY (INHALATION) 4 TIMES DAILY PRN
Qty: 1 INHALER | Refills: 11 | Status: SHIPPED | OUTPATIENT
Start: 2020-07-28 | End: 2022-06-17 | Stop reason: ALTCHOICE

## 2020-07-28 RX ORDER — ALBUTEROL SULFATE 2.5 MG/3ML
2.5 SOLUTION RESPIRATORY (INHALATION) EVERY 6 HOURS PRN
Qty: 360 ML | Refills: 11 | Status: SHIPPED | OUTPATIENT
Start: 2020-07-28 | End: 2022-06-17 | Stop reason: ALTCHOICE

## 2020-07-28 RX ORDER — AZITHROMYCIN 250 MG/1
TABLET, FILM COATED ORAL
Qty: 12 TABLET | Refills: 11 | Status: SHIPPED | OUTPATIENT
Start: 2020-07-28 | End: 2020-08-07

## 2020-07-28 NOTE — PROGRESS NOTES
[facility]  Follow Up Note  MD Claire Cronintammy Oreilly   YOB: 1945    Date of Visit:  7/28/2020    Assessment/Plan:  1. Pulmonary nodules  I reviewed all pertinent imaging with the patient and her  today. Her last CT was 3/20  Next scan is next week    2. Shortness of breath  Severe but stable    3. COPD, severe ([address]  I reviewed pulmonary function testing  This reveals very severe COPD  Continuing current inhaled medications including Advair, albuterol nebulizer and Combivent. Azithromycin MWF    4. Centrilobular emphysema ([facility]  I reviewed CT imaging today and this does reveal very severe emphysema which appears stable    5. Chronic respiratory failure with hypoxia (HCC)  Stable  Benefits from supplemental oxygen and portable oxygen concentrator    Follow-up in 3 month      Chief Complaint   Patient presents with    Shortness of Breath     HFU       HPI  The patient presents with a chief complaint of enlarging pulmonary nodule located in the right lung. This is relatively new and review of previous CT imaging. There was an abnormality noted in May but the CT scan prior to that was clear. Patient also subsequently had PET scan which did not reveal significant activity within the lesion. She denies associated cough, sputum, hemoptysis, anorexia or weight loss. She is also known to have very severe COPD of many years duration. This causes severe exertional dyspnea. Modifying factors include bronchitis and weather change. The patient has chronic hypoxemic respiratory failure of many years duration. This is been stable. 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 There is no complaint of chest pain, nausea or vomiting. Review of Systems  As reviewed in HPI    History  I have reviewed past medical, surgical, social and family history.  This is documented elsewhere in the medical record. Physical Exam:  Well developed, well nourished  Alert and oriented  Sclera is clear  No cervical adenopathy  No JVD. Chest examination is clear. Cardiac examination reveals regular rate and rhythm without murmur, gallop or rub. The abdomen is soft, nontender and nondistended. There is no clubbing, cyanosis or edema of the extremities. There is no obvious skin rash. No focal neuro deficicts  Normal mood and affect    Allergies   Allergen Reactions    Wellbutrin [Bupropion Hcl] Palpitations     Prior to Visit Medications    Medication Sig Taking? Authorizing Provider   fluticasone-salmeterol (ADVAIR DISKUS) 250-50 MCG/DOSE AEPB Inhale 1 puff into the lungs 2 times daily Yes Tesha Gomes MD   albuterol sulfate HFA (VENTOLIN HFA) 108 (90 Base) MCG/ACT inhaler Inhale 2 puffs into the lungs 4 times daily as needed for Wheezing Yes Tesha Gomes MD   azithromycin (ZITHROMAX) 250 MG tablet Take 1 tablet three times a week Yes Tesha Gomes MD   albuterol-ipratropium (COMBIVENT RESPIMAT)  MCG/ACT AERS inhaler Inhale 1 puff into the lungs every 6 hours Yes Tesha Gomes MD   albuterol (PROVENTIL) (2.5 MG/3ML) 0.083% nebulizer solution Take 3 mLs by nebulization every 6 hours as needed for Wheezing DX:COPD J44.9 Yes Tesha Gomes MD   fluticasone-salmeterol (ADVAIR DISKUS) 250-50 MCG/DOSE AEPB Inhale 1 puff into the lungs 2 times daily  Derrick Lala MD   traMADol (ULTRAM) 50 MG tablet Take 1 tablet by mouth 2 times daily for 90 days.  Earliest Fill DATE 6.13.2020  Gwendloyn Spurling, MD   busPIRone (BUSPAR) 5 MG tablet TAKE ONE TABLET BY MOUTH TWICE A DAY  Derrick Lala MD   fesoterodine (TOVIAZ) 4 MG TB24 ER tablet TAKE ONE TABLET BY MOUTH DAILY  Derrick Lala MD   montelukast (SINGULAIR) 10 MG tablet TAKE ONE TABLET BY MOUTH ONCE NIGHTLY  Derrick Lala MD   gabapentin (NEURONTIN) 300 MG capsule TAKE ONE CAPSULE BY MOUTH TWICE A DAY  Derrick Lala MD

## 2020-07-29 ENCOUNTER — HOSPITAL ENCOUNTER (OUTPATIENT)
Dept: CT IMAGING | Age: 75
Discharge: HOME | End: 2020-07-29
Payer: COMMERCIAL

## 2020-07-29 PROCEDURE — 71250 CT THORAX DX C-: CPT

## 2020-08-12 ENCOUNTER — TELEPHONE (OUTPATIENT)
Dept: RHEUMATOLOGY | Age: 75
End: 2020-08-12

## 2020-08-12 NOTE — TELEPHONE ENCOUNTER
Prolia approved thru 2/25/21  Pt due for Prolia in Sept has appt with Dr at that time   Needs labs Order placed Pt aware

## 2020-08-25 DIAGNOSIS — M81.0 AGE-RELATED OSTEOPOROSIS WITHOUT CURRENT PATHOLOGICAL FRACTURE: ICD-10-CM

## 2020-08-25 LAB
CALCIUM SERPL-MCNC: 10.2 MG/DL (ref 8.3–10.6)
CREAT SERPL-MCNC: 0.5 MG/DL (ref 0.6–1.2)
GFR AFRICAN AMERICAN: >60
GFR NON-AFRICAN AMERICAN: >60

## 2020-09-02 ENCOUNTER — OFFICE VISIT (OUTPATIENT)
Dept: RHEUMATOLOGY | Age: 75
End: 2020-09-02
Payer: COMMERCIAL

## 2020-09-02 VITALS
TEMPERATURE: 97.8 F | DIASTOLIC BLOOD PRESSURE: 70 MMHG | WEIGHT: 128 LBS | BODY MASS INDEX: 19.4 KG/M2 | HEIGHT: 68 IN | SYSTOLIC BLOOD PRESSURE: 124 MMHG | HEART RATE: 80 BPM

## 2020-09-02 PROCEDURE — 99213 OFFICE O/P EST LOW 20 MIN: CPT | Performed by: INTERNAL MEDICINE

## 2020-09-02 PROCEDURE — 96372 THER/PROPH/DIAG INJ SC/IM: CPT | Performed by: INTERNAL MEDICINE

## 2020-09-02 RX ORDER — TRAMADOL HYDROCHLORIDE 50 MG/1
50 TABLET ORAL 2 TIMES DAILY
Qty: 60 TABLET | Refills: 2 | Status: SHIPPED | OUTPATIENT
Start: 2020-09-11 | End: 2020-12-09 | Stop reason: SDUPTHER

## 2020-09-02 RX ORDER — TRAMADOL HYDROCHLORIDE 50 MG/1
50 TABLET ORAL 2 TIMES DAILY
Qty: 60 TABLET | Refills: 2 | Status: SHIPPED | OUTPATIENT
Start: 2020-09-11 | End: 2020-09-02 | Stop reason: SDUPTHER

## 2020-09-02 NOTE — PROGRESS NOTES
Pt received Prolia 60mg/ml sc left upper outer arm. [address] [facility], lot [id number], exp 10/22. Tolerated well.

## 2020-09-02 NOTE — PROGRESS NOTES
Subjective:      Patient ID: Ronak Thacker is a 70 y.o. female. HPI             the patient returns for follow-up of osteoporosis and to receive a Prolia injection. She still uses 2 tramadol daily    Review of Systems denies injection site reactions. Denies clinical fractures    Objective:   Physical Exam  /70   Pulse 80   Temp 97.8 °F (36.6 °C)   Ht 5' 8\" (1.727 m)   Wt 128 lb (58.1 kg)   BMI 19.46 kg/m²   Alert female no acute distress no vertebral body tenderness  Assessment:      Osteoporosis      Plan:      The patient's oarrs report was reviewed. She received her Prolia injection without difficulty. I will see her back in 3 months time.         Alexandre Silva MD

## 2020-09-09 ENCOUNTER — OFFICE VISIT (OUTPATIENT)
Dept: PRIMARY CARE CLINIC | Age: 75
End: 2020-09-09
Payer: COMMERCIAL

## 2020-09-09 VITALS
OXYGEN SATURATION: 96 % | SYSTOLIC BLOOD PRESSURE: 120 MMHG | TEMPERATURE: 98.1 F | DIASTOLIC BLOOD PRESSURE: 76 MMHG | BODY MASS INDEX: 19.25 KG/M2 | WEIGHT: 126.6 LBS | HEART RATE: 76 BPM

## 2020-09-09 DIAGNOSIS — I10 ESSENTIAL HYPERTENSION: ICD-10-CM

## 2020-09-09 DIAGNOSIS — J44.1 CHRONIC OBSTRUCTIVE PULMONARY DISEASE WITH ACUTE EXACERBATION (HCC): ICD-10-CM

## 2020-09-09 DIAGNOSIS — Z87.898 HISTORY OF PREDIABETES: ICD-10-CM

## 2020-09-09 DIAGNOSIS — Z00.00 WELL ADULT EXAM: ICD-10-CM

## 2020-09-09 PROCEDURE — 90472 IMMUNIZATION ADMIN EACH ADD: CPT | Performed by: INTERNAL MEDICINE

## 2020-09-09 PROCEDURE — 90715 TDAP VACCINE 7 YRS/> IM: CPT | Performed by: INTERNAL MEDICINE

## 2020-09-09 PROCEDURE — 90471 IMMUNIZATION ADMIN: CPT | Performed by: INTERNAL MEDICINE

## 2020-09-09 PROCEDURE — 99214 OFFICE O/P EST MOD 30 MIN: CPT | Performed by: INTERNAL MEDICINE

## 2020-09-09 PROCEDURE — 90750 HZV VACC RECOMBINANT IM: CPT | Performed by: INTERNAL MEDICINE

## 2020-09-09 RX ORDER — BUSPIRONE HYDROCHLORIDE 5 MG/1
TABLET ORAL
Qty: 60 TABLET | Refills: 3 | Status: SHIPPED | OUTPATIENT
Start: 2020-09-09 | End: 2021-05-18

## 2020-09-09 ASSESSMENT — ENCOUNTER SYMPTOMS
NAUSEA: 0
CHEST TIGHTNESS: 0
DIARRHEA: 0
ABDOMINAL DISTENTION: 0
BACK PAIN: 0
SHORTNESS OF BREATH: 0
ABDOMINAL PAIN: 0
COUGH: 0

## 2020-09-09 ASSESSMENT — PATIENT HEALTH QUESTIONNAIRE - PHQ9
2. FEELING DOWN, DEPRESSED OR HOPELESS: 0
SUM OF ALL RESPONSES TO PHQ QUESTIONS 1-9: 0
SUM OF ALL RESPONSES TO PHQ QUESTIONS 1-9: 0
SUM OF ALL RESPONSES TO PHQ9 QUESTIONS 1 & 2: 0
1. LITTLE INTEREST OR PLEASURE IN DOING THINGS: 0

## 2020-09-09 NOTE — PROGRESS NOTES
9/9/2020   Aranam Main  1945    The patients PMH, surgical history, family history, medications, allergies were all reviewed and updated as appropriate today. Current Outpatient Medications on File Prior to Visit   Medication Sig Dispense Refill    [START ON 9/11/2020] traMADol (ULTRAM) 50 MG tablet Take 1 tablet by mouth 2 times daily for 90 days. Earliest Fill DATE 9/11/20 60 tablet 2    fluticasone-salmeterol (ADVAIR DISKUS) 250-50 MCG/DOSE AEPB Inhale 1 puff into the lungs 2 times daily 1 each 11    albuterol sulfate HFA (VENTOLIN HFA) 108 (90 Base) MCG/ACT inhaler Inhale 2 puffs into the lungs 4 times daily as needed for Wheezing 1 Inhaler 11    albuterol-ipratropium (COMBIVENT RESPIMAT)  MCG/ACT AERS inhaler Inhale 1 puff into the lungs every 6 hours 1 Inhaler 11    albuterol (PROVENTIL) (2.5 MG/3ML) 0.083% nebulizer solution Take 3 mLs by nebulization every 6 hours as needed for Wheezing DX:COPD J44.9 360 mL 11    fluticasone-salmeterol (ADVAIR DISKUS) 250-50 MCG/DOSE AEPB Inhale 1 puff into the lungs 2 times daily 60 each 5    busPIRone (BUSPAR) 5 MG tablet TAKE ONE TABLET BY MOUTH TWICE A DAY 60 tablet 3    fesoterodine (TOVIAZ) 4 MG TB24 ER tablet TAKE ONE TABLET BY MOUTH DAILY 30 tablet 11    montelukast (SINGULAIR) 10 MG tablet TAKE ONE TABLET BY MOUTH ONCE NIGHTLY 30 tablet 11    azithromycin (ZITHROMAX) 250 MG tablet Take 1 tab by mouth on Mondays Wednesday and Fridays 15 tablet 2    guaiFENesin (MUCINEX) 600 MG extended release tablet Take 1,200 mg by mouth daily      traMADol (ULTRAM) 50 MG tablet Take 50 mg by mouth 2 times daily as needed for Pain.       Denosumab (PROLIA SC) Inject into the skin Every 6 months      albuterol-ipratropium (COMBIVENT RESPIMAT)  MCG/ACT AERS inhaler Inhale 1 puff into the lungs every 6 hours 1 Inhaler 11    OXYGEN Inhale 3 L/min into the lungs continuous Use nightly as needed 1 Bottle 5    albuterol sulfate HFA (VENTOLIN HFA) 108 (90 Base) MCG/ACT inhaler Inhale 2 puffs into the lungs every 6 hours as needed for Wheezing 1 Inhaler 11plkeas    diltiazem (CARDIZEM) 60 MG tablet Take 60 mg by mouth 4 times daily       gabapentin (NEURONTIN) 300 MG capsule TAKE ONE CAPSULE BY MOUTH TWICE A DAY 60 capsule 5    albuterol (PROVENTIL) (5 MG/ML) 0.5% nebulizer solution Take 0.5 mLs by nebulization 4 times daily as needed for Wheezing 120 each 3     No current facility-administered medications on file prior to visit. Chief Complaint   Patient presents with    COPD     F/U, NEEDS REFILLS, LABS DONE 8/25/20       HPI:  Needs refill on BuSpar 5mg BID today  Needs form completed for work at [employer] recent lab results printed but they were only for Cr and Ca    Review of Systems   Constitutional: Negative for appetite change, chills, fatigue and fever. Respiratory: Negative for cough, chest tightness and shortness of breath. Cardiovascular: Negative for chest pain. Gastrointestinal: Negative for abdominal distention, abdominal pain, diarrhea and nausea. Genitourinary: Negative for difficulty urinating and dysuria. Musculoskeletal: Negative for back pain. Neurological: Negative for dizziness and headaches. Psychiatric/Behavioral: Negative for agitation and behavioral problems. The patient is not nervous/anxious. OBJECTIVE:  /76 (Site: Right Upper Arm, Position: Sitting, Cuff Size: Medium Adult)   Pulse 76   Temp 98.1 °F (36.7 °C) (Temporal)   Wt 126 lb 9.6 oz (57.4 kg)   SpO2 96%   BMI 19.25 kg/m²    Physical Exam  Vitals signs and nursing note reviewed. Constitutional:       General: She is not in acute distress. Appearance: Normal appearance. She is well-developed. HENT:      Head: Normocephalic and atraumatic. Right Ear: Tympanic membrane, ear canal and external ear normal.      Left Ear: Tympanic membrane, ear canal and external ear normal.      Nose: Nose normal. No rhinorrhea. Mouth/Throat:      Pharynx: No oropharyngeal exudate or posterior oropharyngeal erythema. Eyes:      General:         Right eye: No discharge. Left eye: No discharge. Extraocular Movements: Extraocular movements intact. Conjunctiva/sclera: Conjunctivae normal.      Pupils: Pupils are equal, round, and reactive to light. Neck:      Musculoskeletal: Normal range of motion. No muscular tenderness. Thyroid: No thyromegaly. Vascular: No carotid bruit or JVD. Cardiovascular:      Rate and Rhythm: Normal rate and regular rhythm. Pulses: Normal pulses. Heart sounds: Normal heart sounds. No murmur. Pulmonary:      Effort: Pulmonary effort is normal. No respiratory distress. Breath sounds: Normal breath sounds. No wheezing, rhonchi or rales. Abdominal:      General: Bowel sounds are normal. There is no distension. Palpations: Abdomen is soft. Tenderness: There is no abdominal tenderness. There is no rebound. Musculoskeletal:         General: No swelling. Right lower leg: No edema. Left lower leg: No edema. Comments: FROM x 4   Lymphadenopathy:      Cervical: No cervical adenopathy. Skin:     General: Skin is warm and dry. Capillary Refill: Capillary refill takes 2 to 3 seconds. Coloration: Skin is not pale. Findings: No erythema or rash. Neurological:      Mental Status: She is alert and oriented to person, place, and time. Cranial Nerves: No cranial nerve deficit. Sensory: No sensory deficit. Motor: No abnormal muscle tone. Deep Tendon Reflexes: Reflexes normal.   Psychiatric:         Mood and Affect: Mood normal.         Behavior: Behavior normal.         Thought Content:  Thought content normal.         Judgment: Judgment normal.         Data Review:   CBC:   Lab Results   Component Value Date    WBC 9.1 05/31/2020    WBC 6.9 05/30/2020    WBC 13.0 05/28/2020    HGB 13.2 05/31/2020    HGB 13.5 05/30/2020    HGB 13.9 05/28/2020    HCT 40.0 05/31/2020    HCT 41.2 05/30/2020    HCT 41.8 05/28/2020    MCV 92.7 05/31/2020    MCV 93.1 05/30/2020    MCV 91.7 05/28/2020     05/31/2020     05/30/2020     05/28/2020     Chemistry:   Lab Results   Component Value Date     05/31/2020     05/30/2020     05/28/2020    K 4.1 05/31/2020    K 4.6 05/30/2020    K 3.8 05/28/2020    K 3.9 05/27/2020    K 4.3 05/13/2019    K 4.1 01/18/2018     05/31/2020     05/30/2020     05/28/2020    CO2 25 05/31/2020    CO2 28 05/30/2020    CO2 25 05/28/2020    PHOS 2.6 01/31/2018    PHOS 3.8 07/08/2016    BUN 24 05/31/2020    BUN 18 05/30/2020    BUN 15 05/28/2020    CREATININE 0.5 08/25/2020    CREATININE 0.6 05/31/2020    CREATININE <0.5 05/30/2020     Hepatic Function:   Lab Results   Component Value Date    AST 16 05/28/2020    AST 13 05/13/2019    AST 16 03/05/2018    ALT 10 05/28/2020    ALT 9 05/13/2019    ALT 14 03/05/2018    BILIDIR <0.2 01/29/2018    BILIDIR <0.2 03/07/2017    BILIDIR 0.3 12/30/2014    BILITOT 0.5 05/28/2020    BILITOT 0.8 05/13/2019    BILITOT 0.3 03/05/2018    ALKPHOS 62 05/28/2020    ALKPHOS 57 05/13/2019    ALKPHOS 68 03/05/2018     Lab Results   Component Value Date    LIPASE 18 05/24/2013     Lipids:   Lab Results   Component Value Date    CHOL 214 03/05/2018    HDL 88 03/05/2018    TRIG 129 03/05/2018       ASSESSMENT/PLAN  1.) COPD- annual fluvax is advised  On Combivent, Advair, Combivent, albuterol prn  ON CONTINUOUS NASAL OXYGEN 3 L/MIN 97% SAT  HIGH DOSE FLUVAX   Fasting labs ordered with Hep C FOR TODAY    2.) Lung CA    3.) Screening mammogram ordered     f/u in 6 months with labs prior as well  Annual fluvax advised HIGH DOSE    WANTS FASTING LABS DONE THIS AM    Consider Shingrix- WANTS GIVEN TODAY  Consider tdap booster-PT REQUESTS BE GIVEN TODAY    Electronically signed by Kayli Escobar MD on 9/9/2020 at 10:24 AM

## 2020-09-10 LAB
A/G RATIO: 2.1 (ref 1.1–2.2)
ALBUMIN SERPL-MCNC: 4.7 G/DL (ref 3.4–5)
ALP BLD-CCNC: 57 U/L (ref 40–129)
ALT SERPL-CCNC: 8 U/L (ref 10–40)
ANION GAP SERPL CALCULATED.3IONS-SCNC: 8 MMOL/L (ref 3–16)
AST SERPL-CCNC: 18 U/L (ref 15–37)
BASOPHILS ABSOLUTE: 0 K/UL (ref 0–0.2)
BASOPHILS RELATIVE PERCENT: 0.5 %
BILIRUB SERPL-MCNC: 0.5 MG/DL (ref 0–1)
BUN BLDV-MCNC: 17 MG/DL (ref 7–20)
CALCIUM SERPL-MCNC: 10.7 MG/DL (ref 8.3–10.6)
CHLORIDE BLD-SCNC: 99 MMOL/L (ref 99–110)
CHOLESTEROL, FASTING: 213 MG/DL (ref 0–199)
CO2: 35 MMOL/L (ref 21–32)
CREAT SERPL-MCNC: 0.7 MG/DL (ref 0.6–1.2)
EOSINOPHILS ABSOLUTE: 0.1 K/UL (ref 0–0.6)
EOSINOPHILS RELATIVE PERCENT: 2.1 %
ESTIMATED AVERAGE GLUCOSE: 134.1 MG/DL
GFR AFRICAN AMERICAN: >60
GFR NON-AFRICAN AMERICAN: >60
GLOBULIN: 2.2 G/DL
GLUCOSE FASTING: 94 MG/DL (ref 70–99)
HBA1C MFR BLD: 6.3 %
HCT VFR BLD CALC: 46.3 % (ref 36–48)
HDLC SERPL-MCNC: 75 MG/DL (ref 40–60)
HEMOGLOBIN: 14.5 G/DL (ref 12–16)
HEPATITIS C ANTIBODY INTERPRETATION: NORMAL
LDL CHOLESTEROL CALCULATED: 114 MG/DL
LYMPHOCYTES ABSOLUTE: 1.8 K/UL (ref 1–5.1)
LYMPHOCYTES RELATIVE PERCENT: 26.9 %
MCH RBC QN AUTO: 30 PG (ref 26–34)
MCHC RBC AUTO-ENTMCNC: 31.3 G/DL (ref 31–36)
MCV RBC AUTO: 95.9 FL (ref 80–100)
MONOCYTES ABSOLUTE: 0.9 K/UL (ref 0–1.3)
MONOCYTES RELATIVE PERCENT: 12.9 %
NEUTROPHILS ABSOLUTE: 3.9 K/UL (ref 1.7–7.7)
NEUTROPHILS RELATIVE PERCENT: 57.6 %
PDW BLD-RTO: 15.4 % (ref 12.4–15.4)
PLATELET # BLD: 151 K/UL (ref 135–450)
PMV BLD AUTO: 11 FL (ref 5–10.5)
POTASSIUM SERPL-SCNC: 4.1 MMOL/L (ref 3.5–5.1)
RBC # BLD: 4.83 M/UL (ref 4–5.2)
SODIUM BLD-SCNC: 142 MMOL/L (ref 136–145)
TOTAL PROTEIN: 6.9 G/DL (ref 6.4–8.2)
TRIGLYCERIDE, FASTING: 120 MG/DL (ref 0–150)
VLDLC SERPL CALC-MCNC: 24 MG/DL
WBC # BLD: 6.9 K/UL (ref 4–11)

## 2020-09-24 ENCOUNTER — OFFICE VISIT (OUTPATIENT)
Dept: ENT CLINIC | Age: 75
End: 2020-09-24
Payer: COMMERCIAL

## 2020-09-24 VITALS — TEMPERATURE: 97.7 F | HEART RATE: 79 BPM | SYSTOLIC BLOOD PRESSURE: 117 MMHG | DIASTOLIC BLOOD PRESSURE: 60 MMHG

## 2020-09-24 PROCEDURE — 69210 REMOVE IMPACTED EAR WAX UNI: CPT | Performed by: OTOLARYNGOLOGY

## 2020-09-24 NOTE — PROGRESS NOTES
Patient normally has her ears cleaned every 6 months due to cerumen accumulation but due to the viral emergency, she has not had it for at least a year. She is noticing more congestion greater on the left than the right. No vertigo is been noted. She has had no change in tinnitus. During obviously has been diminished due to likely accumulation. She is on oxygen therapy. No fevers been noted recently. Currently, she appears in no acute distress although is chronically ill with oxygen flowing nasally. Ear examination reveals significant cerumen accumulation on both sides. This is removed with a combination of irrigation, suctioning, curettage, and cleaning with peroxide. Upon removal, the tympanic membranes appear normal and the ear canals are now clear. Hearing seems improved. Oral examination is unremarkable. The nasal mucosa is normal although somewhat dry. No lesions are noted and there is no obvious infection present. No obstruction is noted. The neck is free of adenopathy, mass, thyroid enlargement. I have suggested that she return in 6 months for repeat cleaning of her ears. Is also suggested that she use moisturization for her nose including saline spray.

## 2020-09-29 ENCOUNTER — NURSE ONLY (OUTPATIENT)
Dept: PRIMARY CARE CLINIC | Age: 75
End: 2020-09-29
Payer: COMMERCIAL

## 2020-09-29 PROCEDURE — 90694 VACC AIIV4 NO PRSRV 0.5ML IM: CPT | Performed by: INTERNAL MEDICINE

## 2020-09-29 PROCEDURE — 90471 IMMUNIZATION ADMIN: CPT | Performed by: INTERNAL MEDICINE

## 2020-09-29 NOTE — PROGRESS NOTES
Vaccine Information Sheet, \"Influenza - Inactivated\"  given to Ronnell Dias, or parent/legal guardian of  Ronnell Dias and verbalized understanding. Patient responses:    Have you ever had a reaction to a flu vaccine? No  Do you have any current illness? No  Have you ever had Guillian Gadsden Syndrome? No  Do you have a serious allergy to any of the follow: Neomycin, Polymyxin, Thimerosal, eggs or egg products? No    Flu vaccine given per order. Please see immunization tab. Risks and benefits explained. Current VIS given.

## 2020-10-14 ENCOUNTER — OFFICE VISIT (OUTPATIENT)
Dept: PRIMARY CARE CLINIC | Age: 75
End: 2020-10-14
Payer: COMMERCIAL

## 2020-10-14 PROCEDURE — 99211 OFF/OP EST MAY X REQ PHY/QHP: CPT | Performed by: NURSE PRACTITIONER

## 2020-10-14 NOTE — PATIENT INSTRUCTIONS

## 2020-10-14 NOTE — PROGRESS NOTES
Dorita Beck received a viral test for COVID-19. They were educated on isolation and quarantine as appropriate. For any symptoms, they were directed to seek care from their PCP, given contact information to establish with a doctor, directed to an urgent care or the emergency room.

## 2020-10-15 LAB — SARS-COV-2, NAA: NOT DETECTED

## 2020-10-16 NOTE — RESULT ENCOUNTER NOTE

## 2020-10-30 ENCOUNTER — TELEPHONE (OUTPATIENT)
Dept: PULMONOLOGY | Age: 75
End: 2020-10-30

## 2020-10-30 RX ORDER — PREDNISONE 10 MG/1
TABLET ORAL
Qty: 30 TABLET | Refills: 0 | Status: SHIPPED | OUTPATIENT
Start: 2020-10-30 | End: 2020-11-09 | Stop reason: ALTCHOICE

## 2020-10-30 RX ORDER — LEVOFLOXACIN 750 MG/1
750 TABLET ORAL DAILY
Qty: 7 TABLET | Refills: 0 | Status: SHIPPED | OUTPATIENT
Start: 2020-10-30 | End: 2020-11-06

## 2020-10-30 NOTE — TELEPHONE ENCOUNTER
Spoke with pt and she has started some wheezing since she called earlier today as well. Per Dr Bree Story and Levaquin sent to pharmacy for her.

## 2020-11-09 ENCOUNTER — VIRTUAL VISIT (OUTPATIENT)
Dept: PULMONOLOGY | Age: 75
End: 2020-11-09
Payer: COMMERCIAL

## 2020-11-09 PROCEDURE — 99214 OFFICE O/P EST MOD 30 MIN: CPT | Performed by: INTERNAL MEDICINE

## 2020-11-09 RX ORDER — PREDNISONE 10 MG/1
TABLET ORAL
Qty: 30 TABLET | Refills: 3 | Status: SHIPPED | OUTPATIENT
Start: 2020-11-09 | End: 2020-11-19

## 2020-11-09 RX ORDER — LEVOFLOXACIN 750 MG/1
750 TABLET ORAL DAILY
Qty: 10 TABLET | Refills: 3 | Status: SHIPPED | OUTPATIENT
Start: 2020-11-09 | End: 2020-11-19

## 2020-11-09 NOTE — PROGRESS NOTES
[facility]  Follow Up Note  Denilson Yoon MD    Pulmonology Video Visit    Pursuant to the emergency declaration under the [facility], [facility] waiver authority and the Coronavirus Preparedness and Response Supplemental Appropriations Act this Video Visit was insisted, with patient's consent, to reduce the patient's risk of exposure to COVID-19 and provide continuity of care for an established patient. The patient was at home, while the provider was at the clinic. Services were provided through a synchronous discussion through a Video Visit to substitute for in-person clinic visit, and coded as such. Arielle Diane   YOB: 1945    Date of Visit:  11/9/2020    Assessment/Plan:  1. Pulmonary nodules  Having CT imaging in advance of her visit with radiation oncology in December. I asked her to put our name on that so the report would come here as well    2. Shortness of breath  Improved after completing treatment for an acute bronchitis    3. COPD, severe ([address]  I reviewed pulmonary function testing  This reveals very severe COPD    Medication management:  Advair  Combivent  DuoNeb    I gave her a prescription for Levaquin and prednisone to hold onto at home    4. Centrilobular emphysema ([address]  I reviewed CT imaging today and this does reveal very severe emphysema which appears stable    5. Chronic respiratory failure with hypoxia (HCC)  Stable  Benefits from supplemental oxygen and portable oxygen concentrator  Now needing up to 4 L/min continuous flow oxygen    She has had her flu shot    Follow-up in 3 month      Chief Complaint   Patient presents with    Shortness of Breath     started the Levaquin 10-30-20 feeling a little better Does not have any antibiotics now to have on hand.  Has to have O2 on 4 liters now with activity Drops     Wheezing     still wheezing alot and alot of congestion    Chest Pain yesterday it hurt for her to breathe. HPI  The patient presents with a chief complaint of moderate to severe shortness of breath related to very severe COPD/emphysema of many years duration. She also has a history of lung cancer. We have been following an enlarging pulmonary nodule. She has CT imaging scheduled for this. Modifying factor for her is a recent episode of bronchitis. She has finished Levaquin and prednisone. She felt those medicines worked pretty good. She still has a congested, productive cough associated with this. However the sputum is clear. She is oxygen dependent. Now requiring up to 4 L/min oxygen supplement continuously. Review of Systems  No complaint of chest pain, nausea or vomiting    History  I have reviewed past medical, surgical, social and family history. This is documented elsewhere in the medical record. Physical Exam:   Video visit    Allergies   Allergen Reactions    Wellbutrin [Bupropion Hcl] Palpitations     Prior to Visit Medications    Medication Sig Taking? Authorizing Provider   gabapentin (NEURONTIN) 300 MG capsule TAKE ONE CAPSULE BY MOUTH TWICE A DAY  Collin Reardon MD   busPIRone (BUSPAR) 5 MG tablet TAKE ONE TABLET BY MOUTH TWICE A DAY  Annabella Yuan MD   traMADol (ULTRAM) 50 MG tablet Take 1 tablet by mouth 2 times daily for 90 days.  Earliest Fill DATE 9/11/20  Gunjan Wu MD   fluticasone-salmeterol (ADVAIR DISKUS) 250-50 MCG/DOSE AEPB Inhale 1 puff into the lungs 2 times daily  Chiquita Romero MD   albuterol sulfate HFA (VENTOLIN HFA) 108 (90 Base) MCG/ACT inhaler Inhale 2 puffs into the lungs 4 times daily as needed for Wheezing  Chiquita Romero MD   albuterol (PROVENTIL) (2.5 MG/3ML) 0.083% nebulizer solution Take 3 mLs by nebulization every 6 hours as needed for Wheezing DX:COPD J44.9  Chiquita Romero MD   fesoterodine (TOVIAZ) 4 MG TB24 ER tablet TAKE ONE TABLET BY MOUTH DAILY  Annabella Yuan MD   montelukast (SINGULAIR) 10 MG tablet TAKE ONE TABLET BY MOUTH ONCE NIGHTLY  Enriqueta Brewster MD   azithromycin [facility]) 250 MG tablet Take 1 tab by mouth on Mondays Wednesday and Fridays  LESLEY Dodd CNP   guaiFENesin (MUCINEX) 600 MG extended release tablet Take 1,200 mg by mouth daily  Historical Provider, MD   Denosumab (PROLIA SC) Inject into the skin Every 6 months  Historical Provider, MD   albuterol-ipratropium (COMBIVENT RESPIMAT)  MCG/ACT AERS inhaler Inhale 1 puff into the lungs every 6 hours  Enriqueta Brewster MD   OXYGEN Inhale 3 L/min into the lungs continuous Use nightly as needed  Enriqueta Brewster MD   diltiazem (CARDIZEM) 60 MG tablet Take 60 mg by mouth 4 times daily   Historical Provider, MD       There were no vitals filed for this visit. There is no height or weight on file to calculate BMI.      Wt Readings from Last 3 Encounters:   09/09/20 126 lb 9.6 oz (57.4 kg)   09/02/20 128 lb (58.1 kg)   05/31/20 125 lb 14.4 oz (57.1 kg)     BP Readings from Last 3 Encounters:   09/24/20 117/60   09/09/20 120/76   09/02/20 124/70        Social History     Tobacco Use   Smoking Status Light Tobacco Smoker    Packs/day: 0.01    Years: 42.00    Pack years: 0.42    Types: Cigarettes    Last attempt to quit: 2/28/2010    Years since quitting: 10.7   Smokeless Tobacco Never Used   Tobacco Comment    1 cigarette month, maybe will have one if she is upset

## 2020-11-12 RX ORDER — GABAPENTIN 300 MG/1
CAPSULE ORAL
Qty: 60 CAPSULE | Refills: 0 | Status: SHIPPED | OUTPATIENT
Start: 2020-11-12 | End: 2020-12-31

## 2020-12-09 ENCOUNTER — VIRTUAL VISIT (OUTPATIENT)
Dept: RHEUMATOLOGY | Age: 75
End: 2020-12-09
Payer: COMMERCIAL

## 2020-12-09 PROCEDURE — 99441 PR PHYS/QHP TELEPHONE EVALUATION 5-10 MIN: CPT | Performed by: INTERNAL MEDICINE

## 2020-12-09 RX ORDER — TRAMADOL HYDROCHLORIDE 50 MG/1
50 TABLET ORAL 2 TIMES DAILY
Qty: 60 TABLET | Refills: 2 | Status: SHIPPED | OUTPATIENT
Start: 2020-12-09 | End: 2021-03-02 | Stop reason: SDUPTHER

## 2020-12-09 NOTE — PROGRESS NOTES
Subjective:      Patient ID: Tamar Huff is a 70 y.o. female. HPI   the patient returns for follow-up of osteoporosis and to renew her tramadol. She receives Prolia every 6 months she did not get her DEXA scan that was ordered in June    Review of Systems  No clinical fractures since the last visit  Objective:   Physical Exam alert female in no acute distress respiratory rate within normal limits. Assessment:      Osteoporosis      Plan:      The patient will arrange for DEXA scan. The patient's oarrs report was reviewed. I will see the patient back in 3 months time.         Wing Askew MD

## 2020-12-09 NOTE — PROGRESS NOTES
Scooter Steward is a 70 y.o. female evaluated via telephone on 12/9/2020. Consent:  She and/or health care decision maker is aware that that she may receive a bill for this telephone service, depending on her insurance coverage, and has provided verbal consent to proceed: Yes      Documentation:  I communicated with the patient and/or health care decision maker about . Details of this discussion including any medical advice provided:     I affirm this is a Patient Initiated Episode with a Patient who has not had a related appointment within my department in the past 7 days or scheduled within the next 24 hours.     Patient identification was verified at the start of the visit: Yes    Total Time: minutes: 5-10 minutes    Note: not billable if this call serves to triage the patient into an appointment for the relevant concern      Ainsley Benito

## 2020-12-15 ENCOUNTER — HOSPITAL ENCOUNTER (OUTPATIENT)
Dept: CT IMAGING | Age: 75
Discharge: HOME | End: 2020-12-15
Payer: COMMERCIAL

## 2020-12-15 PROCEDURE — 71250 CT THORAX DX C-: CPT

## 2020-12-31 RX ORDER — GABAPENTIN 300 MG/1
CAPSULE ORAL
Qty: 60 CAPSULE | Refills: 3 | Status: SHIPPED | OUTPATIENT
Start: 2020-12-31 | End: 2021-06-16

## 2021-01-25 DIAGNOSIS — N32.81 OAB (OVERACTIVE BLADDER): ICD-10-CM

## 2021-01-25 RX ORDER — FESOTERODINE FUMARATE 4 MG/1
TABLET, FILM COATED, EXTENDED RELEASE ORAL
Qty: 30 TABLET | Refills: 10 | Status: SHIPPED | OUTPATIENT
Start: 2021-01-25 | End: 2021-12-07

## 2021-01-25 NOTE — TELEPHONE ENCOUNTER
Requested Prescriptions     Pending Prescriptions Disp Refills    fesoterodine (TOVIAZ) 4 MG TB24 ER tablet [Pharmacy Med Name: Brown Isbell ER 4 MG TABLET] 30 tablet 10     Sig: TAKE ONE TABLET BY MOUTH DAILY      Last OV 9/9/2020

## 2021-01-27 ENCOUNTER — TELEPHONE (OUTPATIENT)
Dept: RHEUMATOLOGY | Age: 76
End: 2021-01-27

## 2021-01-27 DIAGNOSIS — M81.0 AGE-RELATED OSTEOPOROSIS WITHOUT CURRENT PATHOLOGICAL FRACTURE: Primary | ICD-10-CM

## 2021-01-27 NOTE — TELEPHONE ENCOUNTER
Pt is scheduled 03/02/21. The pt's prior auth ends 02/25/21. Pt will need safety labs. No changes in insurance. Pt will go get labs a couple weeks before the appt.  Orders placed

## 2021-01-28 NOTE — TELEPHONE ENCOUNTER
PA has been submitted via [facility] for Leno STACY Case ID: [id number]  Status: pending    Approved on February 3  PA Case: [id number], Status: Approved, Coverage Starts on: 1/26/2021 12:00:00 AM, Coverage Ends on: 2/3/2022 12:00:00 AM.

## 2021-02-03 NOTE — TELEPHONE ENCOUNTER
The medication is \"Approved, Coverage Starts on: 1/26/2021 12:00:00 AM, Coverage Ends on: 2/3/2022\"    If this requires a response please respond to the pool ( [id number] [facility]). Not the person sending the telephone encounter. Thank you please advise patient.

## 2021-02-05 DIAGNOSIS — J41.1 MUCOPURULENT CHRONIC BRONCHITIS (HCC): ICD-10-CM

## 2021-02-05 NOTE — TELEPHONE ENCOUNTER
Requested Prescriptions     Pending Prescriptions Disp Refills    montelukast (SINGULAIR) 10 MG tablet [Pharmacy Med Name: MONTELUKAST SOD 10 MG TABLET] 30 tablet 10     Sig: TAKE ONE TABLET BY MOUTH ONCE NIGHTLY      Last ov 9/9/2020

## 2021-02-08 RX ORDER — MONTELUKAST SODIUM 10 MG/1
TABLET ORAL
Qty: 30 TABLET | Refills: 10 | Status: SHIPPED | OUTPATIENT
Start: 2021-02-08 | End: 2022-02-15 | Stop reason: SDUPTHER

## 2021-02-10 NOTE — TELEPHONE ENCOUNTER
Elizabeth, we haven't sent prescription for the prolia to a pharmacy for her before.   Do you know which one we would send it to?

## 2021-02-11 NOTE — TELEPHONE ENCOUNTER
The PA was done under pharmacy benefit. Will need to call the pharmacy number on the back of her card to ask what her specialty pharmacy is.

## 2021-02-19 ENCOUNTER — TELEPHONE (OUTPATIENT)
Dept: RHEUMATOLOGY | Age: 76
End: 2021-02-19

## 2021-02-26 ENCOUNTER — HOSPITAL ENCOUNTER (OUTPATIENT)
Age: 76
Discharge: HOME | End: 2021-02-26
Payer: COMMERCIAL

## 2021-02-26 ENCOUNTER — HOSPITAL ENCOUNTER (OUTPATIENT)
Dept: GENERAL RADIOLOGY | Age: 76
Discharge: HOME | End: 2021-02-26
Payer: COMMERCIAL

## 2021-02-26 DIAGNOSIS — M81.0 AGE-RELATED OSTEOPOROSIS WITHOUT CURRENT PATHOLOGICAL FRACTURE: ICD-10-CM

## 2021-02-26 LAB
CALCIUM SERPL-MCNC: 9.7 MG/DL (ref 8.3–10.6)
CREAT SERPL-MCNC: 0.6 MG/DL (ref 0.6–1.2)
GFR AFRICAN AMERICAN: >60
GFR NON-AFRICAN AMERICAN: >60

## 2021-02-26 PROCEDURE — 77080 DXA BONE DENSITY AXIAL: CPT

## 2021-02-26 PROCEDURE — 82565 ASSAY OF CREATININE: CPT

## 2021-02-26 PROCEDURE — 82310 ASSAY OF CALCIUM: CPT

## 2021-02-26 PROCEDURE — 36415 COLL VENOUS BLD VENIPUNCTURE: CPT

## 2021-03-02 ENCOUNTER — OFFICE VISIT (OUTPATIENT)
Dept: RHEUMATOLOGY | Age: 76
End: 2021-03-02
Payer: COMMERCIAL

## 2021-03-02 VITALS
BODY MASS INDEX: 19.29 KG/M2 | DIASTOLIC BLOOD PRESSURE: 80 MMHG | TEMPERATURE: 96.8 F | HEART RATE: 100 BPM | HEIGHT: 66 IN | WEIGHT: 120 LBS | SYSTOLIC BLOOD PRESSURE: 128 MMHG

## 2021-03-02 DIAGNOSIS — M81.0 AGE-RELATED OSTEOPOROSIS WITHOUT CURRENT PATHOLOGICAL FRACTURE: ICD-10-CM

## 2021-03-02 PROCEDURE — 99213 OFFICE O/P EST LOW 20 MIN: CPT | Performed by: INTERNAL MEDICINE

## 2021-03-02 RX ORDER — TRAMADOL HYDROCHLORIDE 50 MG/1
50 TABLET ORAL 2 TIMES DAILY
Qty: 60 TABLET | Refills: 2 | Status: SHIPPED | OUTPATIENT
Start: 2021-03-02 | End: 2021-06-03 | Stop reason: SDUPTHER

## 2021-03-02 NOTE — PROGRESS NOTES
Subjective:       Kenia Jenkins is a 70 y.o. female the patient returns for follow-up of osteoporosis and to receive her Prolia injection. She is scheduled for her second Covid vaccination this Thursday and she is scheduled for Shingrix her second injection next week because of this I am going to hold her Prolia for a couple of weeks. DEXA scan revealed stable bone mass. Patient's height today was five 6-1/4    Review of Systems:    Denies clinical fractures    Objective:     /80   Pulse 100   Temp 96.8 °F (36 °C)   Ht 5' 8\" (1.727 m)   Wt 120 lb (54.4 kg)   BMI 18.25 kg/m²     Alert female no acute distress respiratory rate within normal limits no vertebral body tenderness  Assessment:      Osteoporosis    Plan:      Patient will return in proximately 2 weeks to get her Prolia injection. I will see her back in 6 months time. The patient's OARRS report was reviewed and prescription for tramadol was written.         Dov Gipson MD, 3/2/2021 10:30 AM

## 2021-03-04 ENCOUNTER — TELEPHONE (OUTPATIENT)
Dept: RHEUMATOLOGY | Age: 76
End: 2021-03-04

## 2021-03-04 NOTE — TELEPHONE ENCOUNTER
PA submitted via [employer]mm for traMADol HCl 50MG tablets  Key: [id number] - PA Case ID: [id number] - Rx #: [id number]  Approved today  PA Case: [id number], Status: Approved, Coverage Starts on: 3/4/2021 12:00:00 AM, Coverage Ends on: 8/31/2021 12:00:00 AM.

## 2021-03-10 ENCOUNTER — OFFICE VISIT (OUTPATIENT)
Dept: PRIMARY CARE CLINIC | Age: 76
End: 2021-03-10
Payer: COMMERCIAL

## 2021-03-10 VITALS
TEMPERATURE: 97.8 F | DIASTOLIC BLOOD PRESSURE: 76 MMHG | BODY MASS INDEX: 19.13 KG/M2 | WEIGHT: 119 LBS | OXYGEN SATURATION: 98 % | HEART RATE: 103 BPM | HEIGHT: 66 IN | SYSTOLIC BLOOD PRESSURE: 126 MMHG

## 2021-03-10 DIAGNOSIS — I10 ESSENTIAL HYPERTENSION: Primary | ICD-10-CM

## 2021-03-10 DIAGNOSIS — J44.9 COPD, SEVERE (HCC): ICD-10-CM

## 2021-03-10 PROCEDURE — 99213 OFFICE O/P EST LOW 20 MIN: CPT | Performed by: INTERNAL MEDICINE

## 2021-03-10 ASSESSMENT — PATIENT HEALTH QUESTIONNAIRE - PHQ9: SUM OF ALL RESPONSES TO PHQ9 QUESTIONS 1 & 2: 0

## 2021-03-10 NOTE — PROGRESS NOTES
3/10/2021   Tanya All  1945    The patients PMH, surgical history, family history, medications, allergies were all reviewed and updated as appropriate today. Current Outpatient Medications on File Prior to Visit   Medication Sig Dispense Refill    traMADol (ULTRAM) 50 MG tablet Take 1 tablet by mouth 2 times daily for 90 days. 60 tablet 2    montelukast (SINGULAIR) 10 MG tablet TAKE ONE TABLET BY MOUTH ONCE NIGHTLY 30 tablet 10    fesoterodine (TOVIAZ) 4 MG TB24 ER tablet TAKE ONE TABLET BY MOUTH DAILY 30 tablet 10    gabapentin (NEURONTIN) 300 MG capsule TAKE ONE CAPSULE BY MOUTH TWICE A DAY 60 capsule 3    busPIRone (BUSPAR) 5 MG tablet TAKE ONE TABLET BY MOUTH TWICE A DAY 60 tablet 3    fluticasone-salmeterol (ADVAIR DISKUS) 250-50 MCG/DOSE AEPB Inhale 1 puff into the lungs 2 times daily 1 each 11    albuterol sulfate HFA (VENTOLIN HFA) 108 (90 Base) MCG/ACT inhaler Inhale 2 puffs into the lungs 4 times daily as needed for Wheezing 1 Inhaler 11    albuterol (PROVENTIL) (2.5 MG/3ML) 0.083% nebulizer solution Take 3 mLs by nebulization every 6 hours as needed for Wheezing DX:COPD J44.9 360 mL 11    azithromycin (ZITHROMAX) 250 MG tablet Take 1 tab by mouth on Mondays Wednesday and Fridays 15 tablet 2    guaiFENesin (MUCINEX) 600 MG extended release tablet Take 1,200 mg by mouth daily      Denosumab (PROLIA SC) Inject into the skin Every 6 months      albuterol-ipratropium (COMBIVENT RESPIMAT)  MCG/ACT AERS inhaler Inhale 1 puff into the lungs every 6 hours 1 Inhaler 11    OXYGEN Inhale 3 L/min into the lungs continuous Use nightly as needed 1 Bottle 5    diltiazem (CARDIZEM) 60 MG tablet Take 60 mg by mouth 4 times daily        No current facility-administered medications on file prior to visit.          Chief Complaint   Patient presents with    COPD       HPI:  Due for fasting labs again today, here for her COPD    Had COVID vaccines already  On 3 l/min nc chronically but has spells of SOB that don't respond to increasing nasal oxygen    Wants 2nd Shingrix shot next  Week    Due for Prolia    Had DXA WNL    Why does my nose run. .-advised Flonase    No refills needed    Review of Systems-will be completing Shingrix,next week  will RTC in 1 week for 2nd Shingrix vaccination    OBJECTIVE:  /76 (Site: Left Upper Arm, Position: Sitting, Cuff Size: Small Adult)   Pulse 103   Temp 97.8 °F (36.6 °C)   Ht 5' 6\" (1.676 m)   Wt 119 lb (54 kg)   SpO2 98% Comment: 4 L o2  BMI 19.21 kg/m²    Physical Exam  Vitals signs and nursing note reviewed. Constitutional:       General: She is not in acute distress. Appearance: Normal appearance. She is well-developed. HENT:      Head: Normocephalic and atraumatic. Right Ear: Tympanic membrane, ear canal and external ear normal.      Left Ear: Tympanic membrane, ear canal and external ear normal.      Nose: Nose normal. No rhinorrhea. Mouth/Throat:      Pharynx: No oropharyngeal exudate or posterior oropharyngeal erythema. Eyes:      General:         Right eye: No discharge. Left eye: No discharge. Extraocular Movements: Extraocular movements intact. Conjunctiva/sclera: Conjunctivae normal.      Pupils: Pupils are equal, round, and reactive to light. Neck:      Musculoskeletal: Normal range of motion. No muscular tenderness. Thyroid: No thyromegaly. Vascular: No carotid bruit or JVD. Cardiovascular:      Rate and Rhythm: Normal rate and regular rhythm. Pulses: Normal pulses. Heart sounds: Normal heart sounds. No murmur. Pulmonary:      Effort: Pulmonary effort is normal. No respiratory distress. Breath sounds: Normal breath sounds. No wheezing, rhonchi or rales. Comments: Remains on chronic nasal oxygen  Abdominal:      General: Bowel sounds are normal. There is no distension. Palpations: Abdomen is soft. Tenderness: There is no abdominal tenderness.  There is no BILIDIR <0.2 01/29/2018    BILIDIR <0.2 03/07/2017    BILIDIR 0.3 12/30/2014    BILITOT 0.5 09/09/2020    BILITOT 0.5 05/28/2020    BILITOT 0.8 05/13/2019    ALKPHOS 57 09/09/2020    ALKPHOS 62 05/28/2020    ALKPHOS 57 05/13/2019     Lab Results   Component Value Date    LIPASE 18 05/24/2013     Lipids:   Lab Results   Component Value Date    CHOL 214 03/05/2018    HDL 75 09/09/2020    TRIG 129 03/05/2018       ASSESSMENT/PLAN  1.) COPD- pt still requires chronic nasal oxygen but seems stable clinically  Takes Zithromax chronically for suppressant  On Combivent, Mucinex, Advair  Sees [facility]  Advised ER eval is hypoxia persists > 30 min in future    2.) HTN-BP loooks perfectly well controlled today    Fasting labs ordered today and in 6 months    Consider Shingrix vaccination series of 2 shots over 2-6 months if desired for protection from shingles-check with [employer] first re: coverage before beginning series  Will stop in next week for 2nd Shingrix vaccination and to get labs drawn      Electronically signed by Ramesh Michaels MD on 3/10/2021 at 10:29 AM

## 2021-03-16 ENCOUNTER — NURSE ONLY (OUTPATIENT)
Dept: RHEUMATOLOGY | Age: 76
End: 2021-03-16
Payer: COMMERCIAL

## 2021-03-16 DIAGNOSIS — M81.0 AGE-RELATED OSTEOPOROSIS WITHOUT CURRENT PATHOLOGICAL FRACTURE: Primary | ICD-10-CM

## 2021-03-16 PROCEDURE — 96372 THER/PROPH/DIAG INJ SC/IM: CPT | Performed by: INTERNAL MEDICINE

## 2021-03-16 NOTE — PROGRESS NOTES
Pt came in for Prolia injection. [employer] [facility]  NDC# 16132-994-24  Lot# [id number]  1 syringe given SQ Left arm. Injection tolerated well.

## 2021-03-24 ENCOUNTER — NURSE ONLY (OUTPATIENT)
Dept: PRIMARY CARE CLINIC | Age: 76
End: 2021-03-24
Payer: COMMERCIAL

## 2021-03-24 DIAGNOSIS — Z23 NEED FOR VACCINATION: Primary | ICD-10-CM

## 2021-03-24 DIAGNOSIS — J44.9 COPD, SEVERE (HCC): ICD-10-CM

## 2021-03-24 DIAGNOSIS — I10 ESSENTIAL HYPERTENSION: ICD-10-CM

## 2021-03-24 DIAGNOSIS — Z87.898 HISTORY OF PREDIABETES: ICD-10-CM

## 2021-03-24 LAB
BASOPHILS ABSOLUTE: 0 K/UL (ref 0–0.2)
BASOPHILS RELATIVE PERCENT: 0.2 %
EOSINOPHILS ABSOLUTE: 0 K/UL (ref 0–0.6)
EOSINOPHILS RELATIVE PERCENT: 0.2 %
HCT VFR BLD CALC: 42.6 % (ref 36–48)
HEMOGLOBIN: 13.6 G/DL (ref 12–16)
LYMPHOCYTES ABSOLUTE: 1 K/UL (ref 1–5.1)
LYMPHOCYTES RELATIVE PERCENT: 11.1 %
MCH RBC QN AUTO: 30.6 PG (ref 26–34)
MCHC RBC AUTO-ENTMCNC: 32 G/DL (ref 31–36)
MCV RBC AUTO: 95.6 FL (ref 80–100)
MONOCYTES ABSOLUTE: 0.7 K/UL (ref 0–1.3)
MONOCYTES RELATIVE PERCENT: 7.5 %
NEUTROPHILS ABSOLUTE: 7.2 K/UL (ref 1.7–7.7)
NEUTROPHILS RELATIVE PERCENT: 81 %
PDW BLD-RTO: 15 % (ref 12.4–15.4)
PLATELET # BLD: 159 K/UL (ref 135–450)
PMV BLD AUTO: 10.4 FL (ref 5–10.5)
RBC # BLD: 4.45 M/UL (ref 4–5.2)
WBC # BLD: 8.9 K/UL (ref 4–11)

## 2021-03-24 PROCEDURE — 90750 HZV VACC RECOMBINANT IM: CPT | Performed by: INTERNAL MEDICINE

## 2021-03-24 PROCEDURE — 90471 IMMUNIZATION ADMIN: CPT | Performed by: INTERNAL MEDICINE

## 2021-03-25 LAB
A/G RATIO: 1.9 (ref 1.1–2.2)
ALBUMIN SERPL-MCNC: 4.4 G/DL (ref 3.4–5)
ALP BLD-CCNC: 62 U/L (ref 40–129)
ALT SERPL-CCNC: 12 U/L (ref 10–40)
ANION GAP SERPL CALCULATED.3IONS-SCNC: 13 MMOL/L (ref 3–16)
AST SERPL-CCNC: 16 U/L (ref 15–37)
BILIRUB SERPL-MCNC: 0.5 MG/DL (ref 0–1)
BUN BLDV-MCNC: 26 MG/DL (ref 7–20)
CALCIUM SERPL-MCNC: 10.3 MG/DL (ref 8.3–10.6)
CHLORIDE BLD-SCNC: 98 MMOL/L (ref 99–110)
CHOLESTEROL, FASTING: 213 MG/DL (ref 0–199)
CO2: 35 MMOL/L (ref 21–32)
CREAT SERPL-MCNC: 0.7 MG/DL (ref 0.6–1.2)
ESTIMATED AVERAGE GLUCOSE: 134.1 MG/DL
GFR AFRICAN AMERICAN: >60
GFR NON-AFRICAN AMERICAN: >60
GLOBULIN: 2.3 G/DL
GLUCOSE FASTING: 150 MG/DL (ref 70–99)
HBA1C MFR BLD: 6.3 %
HDLC SERPL-MCNC: 92 MG/DL (ref 40–60)
LDL CHOLESTEROL CALCULATED: 95 MG/DL
POTASSIUM SERPL-SCNC: 4.8 MMOL/L (ref 3.5–5.1)
SARS-COV-2 ANTIBODY, TOTAL: NEGATIVE
SODIUM BLD-SCNC: 146 MMOL/L (ref 136–145)
TOTAL PROTEIN: 6.7 G/DL (ref 6.4–8.2)
TRIGLYCERIDE, FASTING: 132 MG/DL (ref 0–150)
VLDLC SERPL CALC-MCNC: 26 MG/DL

## 2021-04-27 ENCOUNTER — TELEPHONE (OUTPATIENT)
Dept: RHEUMATOLOGY | Age: 76
End: 2021-04-27

## 2021-04-27 NOTE — TELEPHONE ENCOUNTER
Prolia 60 mg     Vaughn Sharp and Suma Dewitt  DOS 03/16/2021   Claim denied: No PA on file. Prior Auth approval was for pharmacy only and not through medical benefit. Spoke with [employer]. Advised that I need to call [place] for any time of PA on any medications. # [phone]. Was transferred but it is not the correct number. Called Vaughn Willoughby at [address] [place] [address]. She states that they can only do retro precerts only if the pt is 30 days from DOS. This is more than 30 days post service and the office has to call the plan and initiate Appeal Process.

## 2021-04-27 NOTE — TELEPHONE ENCOUNTER
Called Layla. Mailing   [phone]  [place], [address] [facility] [address]    Fax# [phone]     Claim# [id number]    Medical records needed. Atten appeals.

## 2021-05-18 DIAGNOSIS — F41.9 ANXIETY: ICD-10-CM

## 2021-05-18 RX ORDER — BUSPIRONE HYDROCHLORIDE 5 MG/1
TABLET ORAL
Qty: 60 TABLET | Refills: 2 | Status: SHIPPED | OUTPATIENT
Start: 2021-05-18 | End: 2022-03-11 | Stop reason: SDUPTHER

## 2021-05-18 NOTE — TELEPHONE ENCOUNTER
Requested Prescriptions     Pending Prescriptions Disp Refills    busPIRone (BUSPAR) 5 MG tablet [Pharmacy Med Name: BUSPIRONE HCL 5 MG TABLET] 60 tablet 2     Sig: TAKE ONE TABLET BY MOUTH TWICE A DAY      Last OV 3/10/2021

## 2021-05-20 NOTE — TELEPHONE ENCOUNTER
Called member services to follow up on the appeal. # [phone]. Spoke with Claudean House. Ref# [id number]. Appeal is still pending at this time. Was advised to call back in 7-10 business days.

## 2021-05-25 ENCOUNTER — OFFICE VISIT (OUTPATIENT)
Dept: PULMONOLOGY | Age: 76
End: 2021-05-25
Payer: COMMERCIAL

## 2021-05-25 VITALS — HEART RATE: 88 BPM | TEMPERATURE: 97.4 F | OXYGEN SATURATION: 98 %

## 2021-05-25 DIAGNOSIS — J44.9 COPD, SEVERE (HCC): ICD-10-CM

## 2021-05-25 DIAGNOSIS — J96.11 CHRONIC RESPIRATORY FAILURE WITH HYPOXIA (HCC): ICD-10-CM

## 2021-05-25 DIAGNOSIS — R91.8 PULMONARY NODULES: Primary | ICD-10-CM

## 2021-05-25 DIAGNOSIS — R06.02 SHORTNESS OF BREATH: ICD-10-CM

## 2021-05-25 DIAGNOSIS — J43.2 CENTRILOBULAR EMPHYSEMA (HCC): ICD-10-CM

## 2021-05-25 PROCEDURE — 99214 OFFICE O/P EST MOD 30 MIN: CPT | Performed by: INTERNAL MEDICINE

## 2021-05-25 RX ORDER — LEVOFLOXACIN 750 MG/1
750 TABLET ORAL DAILY
Qty: 10 TABLET | Refills: 3 | Status: SHIPPED | OUTPATIENT
Start: 2021-05-25 | End: 2021-06-04

## 2021-05-25 RX ORDER — PREDNISONE 10 MG/1
TABLET ORAL
Qty: 30 TABLET | Refills: 3 | Status: SHIPPED | OUTPATIENT
Start: 2021-05-25 | End: 2021-06-04

## 2021-05-28 ENCOUNTER — TELEPHONE (OUTPATIENT)
Dept: PULMONOLOGY | Age: 76
End: 2021-05-28

## 2021-05-28 NOTE — TELEPHONE ENCOUNTER
Pt needs a prior auth for her albuterol inhaler that was sent to [employer] pt with any questions at [phone]

## 2021-06-03 ENCOUNTER — OFFICE VISIT (OUTPATIENT)
Dept: RHEUMATOLOGY | Age: 76
End: 2021-06-03
Payer: COMMERCIAL

## 2021-06-03 VITALS
DIASTOLIC BLOOD PRESSURE: 64 MMHG | HEIGHT: 66 IN | SYSTOLIC BLOOD PRESSURE: 124 MMHG | OXYGEN SATURATION: 98 % | BODY MASS INDEX: 20.25 KG/M2 | WEIGHT: 126 LBS | HEART RATE: 98 BPM

## 2021-06-03 DIAGNOSIS — M81.0 AGE-RELATED OSTEOPOROSIS WITHOUT CURRENT PATHOLOGICAL FRACTURE: ICD-10-CM

## 2021-06-03 PROCEDURE — 99213 OFFICE O/P EST LOW 20 MIN: CPT | Performed by: INTERNAL MEDICINE

## 2021-06-03 RX ORDER — TRAMADOL HYDROCHLORIDE 50 MG/1
50 TABLET ORAL 2 TIMES DAILY
Qty: 60 TABLET | Refills: 2 | Status: SHIPPED | OUTPATIENT
Start: 2021-06-03 | End: 2021-08-31 | Stop reason: SDUPTHER

## 2021-06-03 RX ORDER — PANTOPRAZOLE SODIUM 40 MG/1
TABLET, DELAYED RELEASE ORAL
COMMUNITY
Start: 2021-05-18 | End: 2022-03-11 | Stop reason: SDUPTHER

## 2021-06-03 NOTE — PROGRESS NOTES
Subjective:       Brown Luciano is a 78 y.o. female patient returns for follow-up of osteoporosis and to renew her pain medications. She tolerated her Prolia injection and has had no clinical fractures since the last office visit. Review of Systems:      Denies injection site reactions  Objective:     /64 (Site: Left Upper Arm, Position: Sitting, Cuff Size: Medium Adult)   Pulse 98   Ht 5' 6\" (1.676 m)   Wt 126 lb (57.2 kg)   SpO2 98%   BMI 20.34 kg/m²   Alert female no acute distress. No vertebral body tenderness    Assessment:      Osteoporosis    Plan:    The patient's OARRS report was reviewed. Scripts were written. I will see her back in 3 months time.           Jared Uriarte MD, MD, 6/3/2021 4:50 PM

## 2021-06-04 ENCOUNTER — HOSPITAL ENCOUNTER (OUTPATIENT)
Dept: CT IMAGING | Age: 76
Discharge: HOME | End: 2021-06-04
Payer: COMMERCIAL

## 2021-06-04 DIAGNOSIS — C34.31 SQUAMOUS CELL CARCINOMA OF BRONCHUS IN RIGHT LOWER LOBE (HCC): ICD-10-CM

## 2021-06-04 PROCEDURE — 71250 CT THORAX DX C-: CPT

## 2021-06-14 NOTE — TELEPHONE ENCOUNTER
Spoke with reps with member services who advised that I need to speak with Provider services. Then was transferred to claims. Was on call total 1 hr and 20 minutes. Was unable to continue with call.

## 2021-06-15 DIAGNOSIS — M54.50 CHRONIC MIDLINE LOW BACK PAIN WITHOUT SCIATICA: ICD-10-CM

## 2021-06-15 DIAGNOSIS — G89.29 CHRONIC MIDLINE LOW BACK PAIN WITHOUT SCIATICA: ICD-10-CM

## 2021-06-15 DIAGNOSIS — G62.9 NEUROPATHY: ICD-10-CM

## 2021-06-16 RX ORDER — GABAPENTIN 300 MG/1
CAPSULE ORAL
Qty: 60 CAPSULE | Refills: 2 | Status: SHIPPED | OUTPATIENT
Start: 2021-06-16 | End: 2021-10-27

## 2021-06-23 NOTE — TELEPHONE ENCOUNTER
Called to follow up on getting the appeal information. Spoke with Christopher Taylor      Ref# [id number]  She reports that the claim was billed for $2,942.00. she doesn't show that this was denied. This claim is finalized and was paid 6/11/21 for $1,567. 54. she was unable to find denial or appeal that was filed.

## 2021-07-28 DIAGNOSIS — J44.9 COPD, SEVERE (HCC): Primary | ICD-10-CM

## 2021-07-28 RX ORDER — AZITHROMYCIN 250 MG/1
TABLET, FILM COATED ORAL
Qty: 12 TABLET | Refills: 10 | Status: SHIPPED | OUTPATIENT
Start: 2021-07-28 | End: 2022-06-17 | Stop reason: ALTCHOICE

## 2021-08-19 ENCOUNTER — TELEPHONE (OUTPATIENT)
Dept: RHEUMATOLOGY | Age: 76
End: 2021-08-19

## 2021-08-19 DIAGNOSIS — M81.0 AGE-RELATED OSTEOPOROSIS WITHOUT CURRENT PATHOLOGICAL FRACTURE: Primary | ICD-10-CM

## 2021-08-23 NOTE — TELEPHONE ENCOUNTER
I see an approval in [employer] for Prolia under pharmacy benefit   PA Case: [id number], Status: Approved, Coverage Starts on: 1/26/2021 12:00:00 AM, Coverage Ends on: 2/3/2022 12:00:00 AM.

## 2021-08-31 ENCOUNTER — OFFICE VISIT (OUTPATIENT)
Dept: RHEUMATOLOGY | Age: 76
End: 2021-08-31
Payer: COMMERCIAL

## 2021-08-31 VITALS
WEIGHT: 120 LBS | HEART RATE: 100 BPM | DIASTOLIC BLOOD PRESSURE: 72 MMHG | SYSTOLIC BLOOD PRESSURE: 110 MMHG | BODY MASS INDEX: 19.29 KG/M2 | HEIGHT: 66 IN

## 2021-08-31 DIAGNOSIS — M81.0 AGE-RELATED OSTEOPOROSIS WITHOUT CURRENT PATHOLOGICAL FRACTURE: ICD-10-CM

## 2021-08-31 LAB
CALCIUM SERPL-MCNC: 11 MG/DL (ref 8.3–10.6)
CREAT SERPL-MCNC: 0.6 MG/DL (ref 0.6–1.2)
GFR AFRICAN AMERICAN: >60
GFR NON-AFRICAN AMERICAN: >60

## 2021-08-31 PROCEDURE — 99213 OFFICE O/P EST LOW 20 MIN: CPT | Performed by: INTERNAL MEDICINE

## 2021-08-31 RX ORDER — TRAMADOL HYDROCHLORIDE 50 MG/1
50 TABLET ORAL 2 TIMES DAILY
Qty: 60 TABLET | Refills: 2 | Status: SHIPPED | OUTPATIENT
Start: 2021-08-31 | End: 2021-12-01 | Stop reason: SDUPTHER

## 2021-08-31 NOTE — PROGRESS NOTES
Subjective:       Lynda Jackson is a 78 y.o. female the patient returns for follow-up of osteoporosis. She is due for Prolia. She recently fell into the wall at her home but did not have any fractures just bruising. She uses tramadol for pain. Review of Systems:    Denies bone pain denies mucosal sores    Objective:     /72   Pulse 100   Ht 5' 6\" (1.676 m)   Wt 120 lb (54.4 kg)   BMI 19.37 kg/m²   Alert female no acute distress. Evidence of bruising about the eyes. No vertebral body tenderness    Assessment:      Osteoporosis    Plan:      Blood work will be obtained to ensure that she can take the Prolia. Patient's OARRS report was reviewed. I will see patient back in 6 months time. Last from her previous visit was reviewed. I will see her back in 3 months time.       Aureliano Tineo MD, MD, 8/31/2021 11:41 AM

## 2021-09-08 ENCOUNTER — TELEPHONE (OUTPATIENT)
Dept: RHEUMATOLOGY | Age: 76
End: 2021-09-08

## 2021-09-08 DIAGNOSIS — M81.0 AGE-RELATED OSTEOPOROSIS WITHOUT CURRENT PATHOLOGICAL FRACTURE: ICD-10-CM

## 2021-09-08 NOTE — TELEPHONE ENCOUNTER
Patient called regarding Prolia. Advised that she can sign up for co pay card since she has commercial insurance and the script was sent to the pharmacy. She advised that she usually doesn't have a copay and wants to come in to just get the shot. Please call pt and discuss.

## 2021-09-09 NOTE — TELEPHONE ENCOUNTER
Lucero Chappell spoke with the pt again today. The patient advised that she would be here tomorrow to get her prolia. We do not have her medication here. Advised araceli to let her know we do not have medication yet. Someone will reach out to her. [facility] portal shows the script should go to another pharmacy. Printed update out for processing.

## 2021-09-10 ENCOUNTER — OFFICE VISIT (OUTPATIENT)
Dept: PRIMARY CARE CLINIC | Age: 76
End: 2021-09-10
Payer: COMMERCIAL

## 2021-09-10 VITALS
OXYGEN SATURATION: 98 % | BODY MASS INDEX: 19.16 KG/M2 | SYSTOLIC BLOOD PRESSURE: 96 MMHG | HEIGHT: 66 IN | DIASTOLIC BLOOD PRESSURE: 60 MMHG | WEIGHT: 119.2 LBS | HEART RATE: 107 BPM

## 2021-09-10 DIAGNOSIS — Z23 NEED FOR INFLUENZA VACCINATION: Primary | ICD-10-CM

## 2021-09-10 DIAGNOSIS — I10 ESSENTIAL HYPERTENSION: ICD-10-CM

## 2021-09-10 DIAGNOSIS — Z12.31 ENCOUNTER FOR SCREENING MAMMOGRAM FOR MALIGNANT NEOPLASM OF BREAST: ICD-10-CM

## 2021-09-10 PROCEDURE — 90471 IMMUNIZATION ADMIN: CPT | Performed by: INTERNAL MEDICINE

## 2021-09-10 PROCEDURE — 99213 OFFICE O/P EST LOW 20 MIN: CPT | Performed by: INTERNAL MEDICINE

## 2021-09-10 PROCEDURE — 90694 VACC AIIV4 NO PRSRV 0.5ML IM: CPT | Performed by: INTERNAL MEDICINE

## 2021-09-10 NOTE — PROGRESS NOTES
9/10/2021   Svetlana Lyman  1945    The patients PMH, surgical history, family history, medications, allergies were all reviewed and updated as appropriate today. Current Outpatient Medications on File Prior to Visit   Medication Sig Dispense Refill    traMADol (ULTRAM) 50 MG tablet Take 1 tablet by mouth 2 times daily for 90 days.  60 tablet 2    azithromycin (ZITHROMAX) 250 MG tablet TAKE ONE TABLET BY MOUTH THREE TIMES A WEEK 12 tablet 10    pantoprazole (PROTONIX) 40 MG tablet       albuterol-ipratropium (COMBIVENT RESPIMAT)  MCG/ACT AERS inhaler Inhale 1 puff into the lungs every 6 hours 1 Inhaler 11    VENTOLIN  (90 Base) MCG/ACT inhaler Inhale 2 puffs into the lungs every 6 hours as needed for Wheezing 1 Inhaler 11    busPIRone (BUSPAR) 5 MG tablet TAKE ONE TABLET BY MOUTH TWICE A DAY 60 tablet 2    montelukast (SINGULAIR) 10 MG tablet TAKE ONE TABLET BY MOUTH ONCE NIGHTLY 30 tablet 10    fesoterodine (TOVIAZ) 4 MG TB24 ER tablet TAKE ONE TABLET BY MOUTH DAILY 30 tablet 10    fluticasone-salmeterol (ADVAIR DISKUS) 250-50 MCG/DOSE AEPB Inhale 1 puff into the lungs 2 times daily 1 each 11    albuterol sulfate HFA (VENTOLIN HFA) 108 (90 Base) MCG/ACT inhaler Inhale 2 puffs into the lungs 4 times daily as needed for Wheezing 1 Inhaler 11    albuterol (PROVENTIL) (2.5 MG/3ML) 0.083% nebulizer solution Take 3 mLs by nebulization every 6 hours as needed for Wheezing DX:COPD J44.9 360 mL 11    guaiFENesin (MUCINEX) 600 MG extended release tablet Take 1,200 mg by mouth daily      Denosumab (PROLIA SC) Inject into the skin Every 6 months      albuterol-ipratropium (COMBIVENT RESPIMAT)  MCG/ACT AERS inhaler Inhale 1 puff into the lungs every 6 hours 1 Inhaler 11    OXYGEN Inhale 3 L/min into the lungs continuous Use nightly as needed 1 Bottle 5    diltiazem (CARDIZEM) 60 MG tablet Take 60 mg by mouth 4 times daily       denosumab (PROLIA) 60 MG/ML SOSY SC injection Inject 1 mL into the skin once for 1 dose Every 6 months 1 each 1    gabapentin (NEURONTIN) 300 MG capsule TAKE ONE CAPSULE BY MOUTH TWICE A DAY 60 capsule 2     No current facility-administered medications on file prior to visit. Chief Complaint   Patient presents with    Hypertension       HPI: Patient returns today for her 6-month follow-up visit. She is due her flu vaccination today which is administered. She is also due for routine breast cancer screening so therefore mammogram is ordered today. Review of Systems    OBJECTIVE:  BP 96/60 (Site: Left Upper Arm, Position: Sitting, Cuff Size: Medium Adult)   Pulse 107   Ht 5' 6\" (1.676 m)   Wt 119 lb 3.2 oz (54.1 kg)   SpO2 98% Comment: 4L o2  BMI 19.24 kg/m²    Physical Exam  Vitals and nursing note reviewed. Constitutional:       General: She is not in acute distress. Appearance: Normal appearance. She is well-developed. HENT:      Head: Normocephalic and atraumatic. Right Ear: Tympanic membrane, ear canal and external ear normal.      Left Ear: Tympanic membrane, ear canal and external ear normal.      Nose: Nose normal. No rhinorrhea. Mouth/Throat:      Pharynx: No oropharyngeal exudate or posterior oropharyngeal erythema. Eyes:      General:         Right eye: No discharge. Left eye: No discharge. Extraocular Movements: Extraocular movements intact. Conjunctiva/sclera: Conjunctivae normal.      Pupils: Pupils are equal, round, and reactive to light. Neck:      Thyroid: No thyromegaly. Vascular: No carotid bruit or JVD. Cardiovascular:      Rate and Rhythm: Normal rate and regular rhythm. Pulses: Normal pulses. Heart sounds: Normal heart sounds. No murmur heard. Pulmonary:      Effort: Pulmonary effort is normal. No respiratory distress. Breath sounds: Normal breath sounds. No wheezing, rhonchi or rales. Abdominal:      General: Bowel sounds are normal. There is no distension. Palpations: Abdomen is soft. Tenderness: There is no abdominal tenderness. There is no rebound. Musculoskeletal:         General: No swelling. Cervical back: Normal range of motion. No muscular tenderness. Right lower leg: No edema. Left lower leg: No edema. Comments: FROM x 4   Lymphadenopathy:      Cervical: No cervical adenopathy. Skin:     General: Skin is warm and dry. Capillary Refill: Capillary refill takes 2 to 3 seconds. Coloration: Skin is not pale. Findings: No erythema or rash. Neurological:      Mental Status: She is alert and oriented to person, place, and time. Cranial Nerves: No cranial nerve deficit. Sensory: No sensory deficit. Motor: No abnormal muscle tone. Deep Tendon Reflexes: Reflexes normal.   Psychiatric:         Mood and Affect: Mood normal.         Behavior: Behavior normal.         Thought Content:  Thought content normal.         Judgment: Judgment normal.         Data Review:   CBC:   Lab Results   Component Value Date    WBC 8.9 03/24/2021    WBC 6.9 09/09/2020    WBC 9.1 05/31/2020    HGB 13.6 03/24/2021    HGB 14.5 09/09/2020    HGB 13.2 05/31/2020    HCT 42.6 03/24/2021    HCT 46.3 09/09/2020    HCT 40.0 05/31/2020    MCV 95.6 03/24/2021    MCV 95.9 09/09/2020    MCV 92.7 05/31/2020     03/24/2021     09/09/2020     05/31/2020     Chemistry:   Lab Results   Component Value Date     03/24/2021     09/09/2020     05/31/2020    K 4.8 03/24/2021    K 4.1 09/09/2020    K 4.1 05/31/2020    K 3.8 05/28/2020    K 4.3 05/13/2019    K 4.1 01/18/2018    CL 98 03/24/2021    CL 99 09/09/2020     05/31/2020    CO2 35 03/24/2021    CO2 35 09/09/2020    CO2 25 05/31/2020    PHOS 2.6 01/31/2018    PHOS 3.8 07/08/2016    BUN 26 03/24/2021    BUN 17 09/09/2020    BUN 24 05/31/2020    CREATININE 0.6 08/31/2021    CREATININE 0.7 03/24/2021    CREATININE 0.6 02/26/2021     Hepatic Function:   Lab Results   Component Value Date    AST 16 03/24/2021    AST 18 09/09/2020    AST 16 05/28/2020    ALT 12 03/24/2021    ALT 8 09/09/2020    ALT 10 05/28/2020    BILIDIR <0.2 01/29/2018    BILIDIR <0.2 03/07/2017    BILIDIR 0.3 12/30/2014    BILITOT 0.5 03/24/2021    BILITOT 0.5 09/09/2020    BILITOT 0.5 05/28/2020    ALKPHOS 62 03/24/2021    ALKPHOS 57 09/09/2020    ALKPHOS 62 05/28/2020     Lab Results   Component Value Date    LIPASE 18 05/24/2013     Lipids:   Lab Results   Component Value Date    CHOL 214 03/05/2018    HDL 92 03/24/2021    TRIG 129 03/05/2018       ASSESSMENT/PLAN  1.  Need for influenza vaccination  FLUAD today  - INFLUENZA, QUADV, ADJUVANTED, 72 YRS =, IM, PF, PREFILL SYR, 0.5ML (FLUAD)     2.) COPD- refills OK'd on Advair, Combivent, Proventil/Ventolin, Singulair  Sees Adam next week    3.) Breast CA screen- mammogram ordered    4.) hypotensive- on Cardizem 60 TID currently, decrease to Cardizem 30mg TID    5.) H/O Lung CA    Requires 3-4 l/min nc supplemental oxygen   fasting labs due today and in 6 months    Jean-Pierre Sharma MD       Electronically signed by Jean-Pierre Sharma MD on 9/10/2021 at 9:50 AM

## 2021-09-20 ENCOUNTER — OFFICE VISIT (OUTPATIENT)
Dept: ENT CLINIC | Age: 76
End: 2021-09-20
Payer: COMMERCIAL

## 2021-09-20 VITALS — HEART RATE: 54 BPM | DIASTOLIC BLOOD PRESSURE: 65 MMHG | SYSTOLIC BLOOD PRESSURE: 123 MMHG

## 2021-09-20 DIAGNOSIS — H61.23 BILATERAL IMPACTED CERUMEN: Primary | ICD-10-CM

## 2021-09-20 DIAGNOSIS — H91.90 HEARING LOSS, UNSPECIFIED HEARING LOSS TYPE, UNSPECIFIED LATERALITY: Chronic | ICD-10-CM

## 2021-09-20 DIAGNOSIS — H90.0 CONDUCTIVE HEARING LOSS, BILATERAL: ICD-10-CM

## 2021-09-20 DIAGNOSIS — H93.13 SUBJECTIVE TINNITUS OF BOTH EARS: Chronic | ICD-10-CM

## 2021-09-20 PROCEDURE — 99212 OFFICE O/P EST SF 10 MIN: CPT | Performed by: OTOLARYNGOLOGY

## 2021-09-20 PROCEDURE — 69210 REMOVE IMPACTED EAR WAX UNI: CPT | Performed by: OTOLARYNGOLOGY

## 2021-09-20 NOTE — PATIENT INSTRUCTIONS
1. Schedule an audiogram (hearing test), if your hearing is not back to your usual ability, or if hearing loss or tinnitus (ringing or other noise) persists, despite removal of ear wax,.  2. You may use an over the counter ear wax removal kit (such as Murine, Bausch and Lomb, NeilMed, or Debrox wax removal system) for ear wax removal, as needed. 3. It may help to use Debrox (OTC) for 4 days prior to future visits for ear cleaning. This may soften your ear wax and facilitate removal of the wax. NO Q-TIPS OR OTHER INSTRUMENTS/OBJECTS IN THE EARS   You should never clean your ears with a Q-tip, cotton tipped applicator, Kimberly pin, paper clip, safety pin, pen cap, or any other instrument. This will tend to push wax in deeper and pack the ear canal with wax. There is a high risk and danger of this practice, especially rupture of ear drum, dislocation or other damage to ossicles, and permanent, irreversible, and irreparable hearing loss. It may cause inflammation and irritation of the ear canal and cause itching or pain. I recommend only use of one the several ear wax removal kits available \"over the counter\" if you feel a need to try to remove ear wax. For example, Murine, Bausch and Lomb, NeilMed, or Debrox ear wax removal kits may be used for ear wax removal, as needed. No other methods should be self used for cleaning your ears.

## 2021-09-20 NOTE — PROGRESS NOTES
Chief Complaint   Patient presents with    Cerumen Impaction     wax removal        HISTORY:    Areli Leiva stated that the hearing is decreased in both ears, which are plugged up with ear wax. EXAMINATION:    Both external ear canals were occluded by cerumen impaction, obscuring visualization of the TMs. PROCEDURE - REMOVAL OF BILATERAL CERUMEN IMPACTION:   The cerumen impaction was removed from both of the EACs, under otomicroscopy visualization, with instrumentation, using a Billeau wire loop, alligator forceps and a Nunez suction. After successful cerumen removal, the EACs appeared to be normal and clear bilaterally without mass, exudate, or edema. The tympanic membranes appeared to be normal.  There was no evidence of acute disease. Areli Leiva reported improved hearing, back to usual level, and \"the ringing is gone\", after cerumen removal.          HEARING ASSESSMENT:  Finger rub:  Able to hear finger rub bilaterally. Tuning fork tests, [address] tuning fork:  Sandy was heard in the right ear. Rinne air > bone bilaterally. IMPRESSION / Kaylee Marink / Yasmeen Hcolo / PROCEDURES:       Areli Leiva was seen today for cerumen impaction. Diagnoses and all orders for this visit:    Bilateral impacted cerumen    Conductive hearing loss, bilateral    Hearing loss, unspecified hearing loss type, unspecified laterality  -     Cmaron Granda. AGUILAR, Audiology, [facility]    Subjective tinnitus of both ears  -     [facility] [place] [place]. AGUILAR, Audiology, [facility]           RECOMMENDATIONS / PLAN:   1. See Patient Instructions on file for this visit. 2. Return in about 6 months (around 3/20/2022) for recheck/follow-up, cleaning of ears, and sooner if condition worsens.

## 2021-09-24 ENCOUNTER — TELEPHONE (OUTPATIENT)
Dept: RHEUMATOLOGY | Age: 76
End: 2021-09-24

## 2021-09-27 ENCOUNTER — NURSE ONLY (OUTPATIENT)
Dept: RHEUMATOLOGY | Age: 76
End: 2021-09-27
Payer: COMMERCIAL

## 2021-09-27 DIAGNOSIS — M81.0 AGE-RELATED OSTEOPOROSIS WITHOUT CURRENT PATHOLOGICAL FRACTURE: Primary | ICD-10-CM

## 2021-09-27 PROCEDURE — 96372 THER/PROPH/DIAG INJ SC/IM: CPT | Performed by: INTERNAL MEDICINE

## 2021-09-27 NOTE — PROGRESS NOTES
Patient is here for her Prolia injection. Injected Prolia 60mg into right upper arm SQ. Monitored patient for 10-15 minutes and patient tolerated well.     Patient supplied Everett Rowan 47 [phone]  Lot [id number]  Exp 12/2023

## 2021-10-24 NOTE — TELEPHONE ENCOUNTER
Prolia 60 mg/ml syringe has been approved from 02/26/2020 till 02/25/2021.   Approval letter attached
Speaking Coherently

## 2021-10-27 DIAGNOSIS — M54.50 CHRONIC MIDLINE LOW BACK PAIN WITHOUT SCIATICA: ICD-10-CM

## 2021-10-27 DIAGNOSIS — G89.29 CHRONIC MIDLINE LOW BACK PAIN WITHOUT SCIATICA: ICD-10-CM

## 2021-10-27 DIAGNOSIS — G62.9 NEUROPATHY: ICD-10-CM

## 2021-10-27 RX ORDER — GABAPENTIN 300 MG/1
CAPSULE ORAL
Qty: 60 CAPSULE | Refills: 2 | Status: SHIPPED | OUTPATIENT
Start: 2021-10-27 | End: 2022-02-09

## 2021-11-17 ENCOUNTER — OFFICE VISIT (OUTPATIENT)
Dept: PULMONOLOGY | Age: 76
End: 2021-11-17
Payer: COMMERCIAL

## 2021-11-17 VITALS — HEART RATE: 81 BPM | BODY MASS INDEX: 19.53 KG/M2 | WEIGHT: 121 LBS | OXYGEN SATURATION: 98 %

## 2021-11-17 DIAGNOSIS — J44.9 COPD, SEVERE (HCC): ICD-10-CM

## 2021-11-17 DIAGNOSIS — J43.2 CENTRILOBULAR EMPHYSEMA (HCC): ICD-10-CM

## 2021-11-17 DIAGNOSIS — J96.11 CHRONIC RESPIRATORY FAILURE WITH HYPOXIA (HCC): ICD-10-CM

## 2021-11-17 DIAGNOSIS — R06.02 SHORTNESS OF BREATH: ICD-10-CM

## 2021-11-17 DIAGNOSIS — R91.8 PULMONARY NODULES: Primary | ICD-10-CM

## 2021-11-17 PROCEDURE — 99214 OFFICE O/P EST MOD 30 MIN: CPT | Performed by: INTERNAL MEDICINE

## 2021-11-17 RX ORDER — ALBUTEROL SULFATE 2.5 MG/3ML
2.5 SOLUTION RESPIRATORY (INHALATION) EVERY 6 HOURS PRN
Qty: 360 ML | Refills: 11 | Status: SHIPPED
Start: 2021-11-17 | End: 2022-03-17 | Stop reason: SDUPTHER

## 2021-11-17 NOTE — PROGRESS NOTES
[facility]  Follow Up Note  Mabel Escobar MD         Calixto Valentine   YOB: 1945    Date of Visit:  11/17/2021    Assessment/Plan:  1. Pulmonary nodules  CT Chest 6/21:    Impression   Right lower lobe waxing waning parabronchial ground-glass opacities and   increase in 2 nodules.  The pattern is more suggestive of inflammation than   progressive neoplasm.  Continued surveillance recommended.       Other pulmonary nodules and asymmetrical right apical pleural thickening are   stable in the interval.     Sees Dr Darling Cho soon with another CT chest    2. Shortness of breath  Improved after completing treatment for an acute bronchitis    3. COPD, severe ([address]  I reviewed pulmonary function testing  This reveals very severe COPD    Medication management:  Advair  Combivent  DuoNeb    I gave her a prescription for Levaquin and prednisone to hold onto at home    4. Centrilobular emphysema ([address]  I reviewed CT imaging today and this does reveal very severe emphysema which appears stable    5. Chronic respiratory failure with hypoxia (HCC)  Stable  Benefits from supplemental oxygen and portable oxygen concentrator  Now needing up to 4 L/min continuous flow oxygen    6. Immunization  She has had her flu shot  COVID UTD      Follow-up in 3 month      No chief complaint on file. HPI  The patient presents with a chief complaint of moderate to severe shortness of breath related to very severe COPD/emphysema of many years duration. She also has a history of lung cancer. We have been following an enlarging pulmonary nodule. She has CT imaging scheduled for this. Modifying factor for her is a recent episode of bronchitis. She is oxygen dependent. Now requiring up to 4 L/min oxygen supplement continuously. Review of Systems  No complaint of chest pain, nausea or vomiting    History  I have reviewed past medical, surgical, social and family history.  This is documented elsewhere in the medical record. Physical Exam:  Well developed, well nourished  Alert and oriented  Sclera is clear  No cervical adenopathy  No JVD. Chest examination is congested and wheeze. Cardiac examination reveals regular rate and rhythm without murmur, gallop or rub. The abdomen is soft, nontender and nondistended. There is no clubbing, cyanosis or edema of the extremities. There is no obvious skin rash. No focal neuro deficicts  Normal mood and affect      Allergies   Allergen Reactions    Wellbutrin [Bupropion Hcl] Palpitations     Prior to Visit Medications    Medication Sig Taking? Authorizing Provider   albuterol (PROVENTIL) (2.5 MG/3ML) 0.083% nebulizer solution Take 3 mLs by nebulization every 6 hours as needed for Wheezing Yes Shubham Arthur MD   fluticasone-salmeterol (ADVAIR) 250-50 MCG/DOSE AEPB INHALE ONE PUFF BY MOUTH TWICE A DAY Yes Shubham Arthur MD   gabapentin (NEURONTIN) 300 MG capsule TAKE ONE CAPSULE BY MOUTH TWICE A DAY Yes Wen Tabor MD   dilTIAZem (CARDIZEM) 30 MG tablet Take 1 tablet by mouth 3 times daily Yes Wen Tabor MD   traMADol (ULTRAM) 50 MG tablet Take 1 tablet by mouth 2 times daily for 90 days.  Yes Valentín Costa MD   azithromycin (ZITHROMAX) 250 MG tablet TAKE ONE TABLET BY MOUTH THREE TIMES A WEEK Yes Shubham Arthur MD   pantoprazole (PROTONIX) 40 MG tablet  Yes Historical Provider, MD   albuterol-ipratropium (COMBIVENT RESPIMAT)  MCG/ACT AERS inhaler Inhale 1 puff into the lungs every 6 hours Yes Shubham Arthur MD   VENTOLIN  (90 Base) MCG/ACT inhaler Inhale 2 puffs into the lungs every 6 hours as needed for Wheezing Yes Shubham Arthur MD   busPIRone (BUSPAR) 5 MG tablet TAKE ONE TABLET BY MOUTH TWICE A DAY Yes Wen Tabor MD   montelukast (SINGULAIR) 10 MG tablet TAKE ONE TABLET BY MOUTH ONCE NIGHTLY Yes Wen Tabor MD   fesoterodine (TOVIAZ) 4 MG TB24 ER tablet TAKE ONE TABLET BY MOUTH DAILY Yes Lizbet Davis MD   albuterol sulfate HFA (VENTOLIN HFA) 108 (90 Base) MCG/ACT inhaler Inhale 2 puffs into the lungs 4 times daily as needed for Wheezing Yes Burton Hemphill MD   albuterol (PROVENTIL) (2.5 MG/3ML) 0.083% nebulizer solution Take 3 mLs by nebulization every 6 hours as needed for Wheezing DX:COPD J44.9 Yes Burton Hemphill MD   guaiFENesin (MUCINEX) 600 MG extended release tablet Take 1,200 mg by mouth daily Yes Glenis Nichols MD   albuterol-ipratropium (COMBIVENT RESPIMAT)  MCG/ACT AERS inhaler Inhale 1 puff into the lungs every 6 hours Yes Lizbet Davis MD   OXYGEN Inhale 3 L/min into the lungs continuous Use nightly as needed Yes Lizbet Davis MD   denosumab (PROLIA) 60 MG/ML SOSY SC injection Inject 1 mL into the skin once for 1 dose Every 6 months  Latanya Rain MD       Vitals:    21 1543   Pulse: 81   SpO2: 98%   Weight: 121 lb (54.9 kg)     Body mass index is 19.53 kg/m².      Wt Readings from Last 3 Encounters:   21 121 lb (54.9 kg)   09/10/21 119 lb 3.2 oz (54.1 kg)   21 120 lb (54.4 kg)     BP Readings from Last 3 Encounters:   21 123/65   09/10/21 96/60   21 110/72        Social History     Tobacco Use   Smoking Status Light Tobacco Smoker    Packs/day: 0.01    Years: 42.00    Pack years: 0.42    Types: Cigarettes    Last attempt to quit: 2010    Years since quittin.7   Smokeless Tobacco Never Used   Tobacco Comment    1 cigarette month, maybe will have one if she is upset

## 2021-12-01 ENCOUNTER — OFFICE VISIT (OUTPATIENT)
Dept: RHEUMATOLOGY | Age: 76
End: 2021-12-01
Payer: COMMERCIAL

## 2021-12-01 VITALS
WEIGHT: 120 LBS | BODY MASS INDEX: 19.29 KG/M2 | HEIGHT: 66 IN | HEART RATE: 96 BPM | DIASTOLIC BLOOD PRESSURE: 80 MMHG | SYSTOLIC BLOOD PRESSURE: 132 MMHG

## 2021-12-01 DIAGNOSIS — M81.0 AGE-RELATED OSTEOPOROSIS WITHOUT CURRENT PATHOLOGICAL FRACTURE: ICD-10-CM

## 2021-12-01 PROCEDURE — 99213 OFFICE O/P EST LOW 20 MIN: CPT | Performed by: INTERNAL MEDICINE

## 2021-12-01 RX ORDER — TRAMADOL HYDROCHLORIDE 50 MG/1
50 TABLET ORAL 2 TIMES DAILY
Qty: 60 TABLET | Refills: 2 | Status: SHIPPED | OUTPATIENT
Start: 2021-12-01 | End: 2022-03-02 | Stop reason: SDUPTHER

## 2021-12-01 NOTE — PROGRESS NOTES
Subjective:       Jeff Alvarez is a 78 y.o. female the patient returns for follow-up of osteoporosis. She had her first Prolia injection and tolerated it without difficulty. She continues to use tramadol 50 mg 2 times daily      Review of Systems:      Denies injection site reactions denies recent fracture  Objective:     /80   Pulse 96   Ht 5' 6\" (1.676 m)   Wt 120 lb (54.4 kg)   BMI 19.37 kg/m²   Alert female no acute distress no vertebral body tenderness. Dorsal kyphosis    Assessment:      Osteoporosis    Plan:      The patient's OARRS report was reviewed. Prescription for tramadol renewed. She will return in 3 months time at which point she will be due for her second injection. She will come in 2 to 3 weeks before I have blood work drawn.         Tristian Patterson MD, MD, 12/1/2021 12:04 PM

## 2021-12-07 DIAGNOSIS — N32.81 OAB (OVERACTIVE BLADDER): ICD-10-CM

## 2021-12-07 RX ORDER — FESOTERODINE FUMARATE 4 MG/1
TABLET, FILM COATED, EXTENDED RELEASE ORAL
Qty: 30 TABLET | Refills: 10 | Status: SHIPPED | OUTPATIENT
Start: 2021-12-07 | End: 2022-09-20 | Stop reason: SDUPTHER

## 2022-01-03 ENCOUNTER — HOSPITAL ENCOUNTER (OUTPATIENT)
Dept: CT IMAGING | Age: 77
Discharge: HOME | End: 2022-01-03
Payer: COMMERCIAL

## 2022-01-03 DIAGNOSIS — C34.31 SQUAMOUS CELL CARCINOMA OF BRONCHUS IN RIGHT LOWER LOBE (HCC): ICD-10-CM

## 2022-01-03 PROCEDURE — 71250 CT THORAX DX C-: CPT

## 2022-01-11 ENCOUNTER — TELEPHONE (OUTPATIENT)
Dept: RHEUMATOLOGY | Age: 77
End: 2022-01-11

## 2022-01-11 NOTE — TELEPHONE ENCOUNTER
PA COVER MY MEDS  Medication:Prolia 60MG/ML syringes  Curt [id number]:  Status: The member recently filled this medication and will be able to return for their next refill according to their plan limits.

## 2022-01-24 ENCOUNTER — HOSPITAL ENCOUNTER (OUTPATIENT)
Dept: PET IMAGING | Age: 77
Discharge: HOME | End: 2022-01-24
Payer: COMMERCIAL

## 2022-01-24 DIAGNOSIS — C34.12 CANCER OF BRONCHUS OF LEFT UPPER LOBE (HCC): ICD-10-CM

## 2022-01-24 PROCEDURE — A9552 F18 FDG: HCPCS | Performed by: RADIOLOGY

## 2022-01-24 PROCEDURE — 3430000000 HC RX DIAGNOSTIC RADIOPHARMACEUTICAL: Performed by: RADIOLOGY

## 2022-01-24 PROCEDURE — 78815 PET IMAGE W/CT SKULL-THIGH: CPT

## 2022-01-24 RX ORDER — FLUDEOXYGLUCOSE F 18 200 MCI/ML
13.91 INJECTION, SOLUTION INTRAVENOUS
Status: COMPLETED | OUTPATIENT
Start: 2022-01-24 | End: 2022-01-24

## 2022-01-24 RX ADMIN — FLUDEOXYGLUCOSE F 18 13.91 MILLICURIE: 200 INJECTION, SOLUTION INTRAVENOUS at 09:02

## 2022-02-09 DIAGNOSIS — G62.9 NEUROPATHY: ICD-10-CM

## 2022-02-09 DIAGNOSIS — M54.50 CHRONIC MIDLINE LOW BACK PAIN WITHOUT SCIATICA: ICD-10-CM

## 2022-02-09 DIAGNOSIS — G89.29 CHRONIC MIDLINE LOW BACK PAIN WITHOUT SCIATICA: ICD-10-CM

## 2022-02-09 RX ORDER — GABAPENTIN 300 MG/1
CAPSULE ORAL
Qty: 60 CAPSULE | Refills: 2 | Status: SHIPPED | OUTPATIENT
Start: 2022-02-09 | End: 2022-06-07

## 2022-02-15 ENCOUNTER — OFFICE VISIT (OUTPATIENT)
Dept: PULMONOLOGY | Age: 77
End: 2022-02-15
Payer: COMMERCIAL

## 2022-02-15 VITALS — BODY MASS INDEX: 20.01 KG/M2 | OXYGEN SATURATION: 96 % | WEIGHT: 124 LBS | HEART RATE: 84 BPM

## 2022-02-15 DIAGNOSIS — J96.11 CHRONIC RESPIRATORY FAILURE WITH HYPOXIA (HCC): ICD-10-CM

## 2022-02-15 DIAGNOSIS — C34.12 MALIGNANT NEOPLASM OF UPPER LOBE OF LEFT LUNG (HCC): ICD-10-CM

## 2022-02-15 DIAGNOSIS — J41.1 MUCOPURULENT CHRONIC BRONCHITIS (HCC): ICD-10-CM

## 2022-02-15 DIAGNOSIS — J44.9 COPD, SEVERE (HCC): ICD-10-CM

## 2022-02-15 DIAGNOSIS — J43.2 CENTRILOBULAR EMPHYSEMA (HCC): ICD-10-CM

## 2022-02-15 DIAGNOSIS — R91.8 PULMONARY NODULES: Primary | ICD-10-CM

## 2022-02-15 DIAGNOSIS — R06.02 SHORTNESS OF BREATH: ICD-10-CM

## 2022-02-15 PROCEDURE — 99214 OFFICE O/P EST MOD 30 MIN: CPT | Performed by: INTERNAL MEDICINE

## 2022-02-15 RX ORDER — LEVOFLOXACIN 750 MG/1
750 TABLET ORAL DAILY
Qty: 10 TABLET | Refills: 6 | Status: SHIPPED | OUTPATIENT
Start: 2022-02-15 | End: 2022-02-25

## 2022-02-15 RX ORDER — ALBUTEROL SULFATE 2.5 MG/3ML
2.5 SOLUTION RESPIRATORY (INHALATION) EVERY 6 HOURS PRN
Qty: 120 EACH | Refills: 3 | Status: SHIPPED
Start: 2022-02-15 | End: 2022-03-17 | Stop reason: SDUPTHER

## 2022-02-15 RX ORDER — MONTELUKAST SODIUM 10 MG/1
10 TABLET ORAL NIGHTLY
Qty: 30 TABLET | Refills: 11 | Status: SHIPPED | OUTPATIENT
Start: 2022-02-15 | End: 2022-09-20 | Stop reason: SDUPTHER

## 2022-02-15 NOTE — PROGRESS NOTES
following an enlarging pulmonary nodule. CT imaging showed enlargement of the nodule. She returns to the office today for follow-up of recent PET scan. Dr. Jerilyn Castillo had contacted me asking about obtaining a biopsy. Review of Systems  No complaint of chest pain, nausea or vomiting    History  I have reviewed past medical, surgical, social and family history. This is documented elsewhere in the medical record. Physical Exam:  Well developed, well nourished  Alert and oriented  Sclera is clear  No cervical adenopathy  No JVD. Chest examination is congested and wheeze. Cardiac examination reveals regular rate and rhythm without murmur, gallop or rub. The abdomen is soft, nontender and nondistended. There is no clubbing, cyanosis or edema of the extremities. There is no obvious skin rash. No focal neuro deficicts  Normal mood and affect      Allergies   Allergen Reactions    Wellbutrin [Bupropion Hcl] Palpitations     Prior to Visit Medications    Medication Sig Taking? Authorizing Provider   albuterol (PROVENTIL) (2.5 MG/3ML) 0.083% nebulizer solution Take 3 mLs by nebulization every 6 hours as needed for Wheezing Yes Giancarlo Jiang MD   montelukast (SINGULAIR) 10 MG tablet Take 1 tablet by mouth nightly Yes Giancarlo Jiang MD   fluticasone-salmeterol (ADVAIR) 250-50 MCG/DOSE AEPB INHALE ONE PUFF BY MOUTH TWICE A DAY Yes Giancarlo Jiang MD   levoFLOXacin (LEVAQUIN) 750 MG tablet Take 1 tablet by mouth daily for 10 days Yes Giancarlo Jiang MD   gabapentin (NEURONTIN) 300 MG capsule TAKE ONE CAPSULE BY MOUTH TWICE A DAY  Flory Nogueira MD   dilTIAZem (CARDIZEM) 30 MG tablet TAKE ONE TABLET BY MOUTH THREE TIMES A DAY  Flory Nogueira MD   TOVIAZ 4 MG TB24 ER tablet TAKE ONE TABLET BY MOUTH DAILY  Flory Nogueira MD   traMADol (ULTRAM) 50 MG tablet Take 1 tablet by mouth 2 times daily for 90 days.   Mirna Pimentel MD   albuterol (PROVENTIL) (2.5 MG/3ML) 0.083% nebulizer solution Take 3 mLs by nebulization every 6 hours as needed for Wheezing  Giancarlo Jiang MD   denosumab (PROLIA) 60 MG/ML SOSY SC injection Inject 1 mL into the skin once for 1 dose Every 6 months  Mirna Pimentel MD   azithromycin (ZITHROMAX) 250 MG tablet TAKE ONE TABLET BY MOUTH THREE TIMES A WEEK  Giancarlo Jiang MD   pantoprazole (PROTONIX) 40 MG tablet   Historical Provider, MD   albuterol-ipratropium (COMBIVENT RESPIMAT)  MCG/ACT AERS inhaler Inhale 1 puff into the lungs every 6 hours  Giancarlo Jiang MD   VENTOLIN  (90 Base) MCG/ACT inhaler Inhale 2 puffs into the lungs every 6 hours as needed for Wheezing  Giancarlo Jiang MD   busPIRone (BUSPAR) 5 MG tablet TAKE ONE TABLET BY MOUTH TWICE A DAY  Flory Nogueira MD   albuterol sulfate HFA (VENTOLIN HFA) 108 (90 Base) MCG/ACT inhaler Inhale 2 puffs into the lungs 4 times daily as needed for Wheezing  Giancarlo Jiang MD   albuterol (PROVENTIL) (2.5 MG/3ML) 0.083% nebulizer solution Take 3 mLs by nebulization every 6 hours as needed for Wheezing DX:COPD J44.9  Giancarlo Jiang MD   guaiFENesin (MUCINEX) 600 MG extended release tablet Take 1,200 mg by mouth daily  Historical Provider, MD   albuterol-ipratropium (COMBIVENT RESPIMAT)  MCG/ACT AERS inhaler Inhale 1 puff into the lungs every 6 hours  Flory Nogueira MD   OXYGEN Inhale 3 L/min into the lungs continuous Use nightly as needed  Flory Nogueira MD       Vitals:    02/15/22 1429   Pulse: 84   SpO2: 96%   Weight: 124 lb (56.2 kg)     Body mass index is 20.01 kg/m².      Wt Readings from Last 3 Encounters:   02/15/22 124 lb (56.2 kg)   12/01/21 120 lb (54.4 kg)   11/17/21 121 lb (54.9 kg)     BP Readings from Last 3 Encounters:   12/01/21 132/80   09/20/21 123/65   09/10/21 96/60        Social History     Tobacco Use   Smoking Status Light Tobacco Smoker    Packs/day: 0.01    Years: 42.00    Pack years: 0.42    Types: Cigarettes    Last attempt to quit: 2/28/2010    Years since quitting: 11.9   Smokeless Tobacco Never Used   Tobacco Comment    1 cigarette month, maybe will have one if she is upset

## 2022-02-28 ENCOUNTER — TELEPHONE (OUTPATIENT)
Dept: PULMONOLOGY | Age: 77
End: 2022-02-28

## 2022-02-28 NOTE — TELEPHONE ENCOUNTER
Pt informed Pre Admission working on this and if they cannot get approval then they will ask Dr Catarino Angel to do a Peer to Peer pt is not scheduled until Friday so we have time.

## 2022-03-02 ENCOUNTER — OFFICE VISIT (OUTPATIENT)
Dept: RHEUMATOLOGY | Age: 77
End: 2022-03-02
Payer: COMMERCIAL

## 2022-03-02 ENCOUNTER — TELEPHONE (OUTPATIENT)
Dept: PULMONOLOGY | Age: 77
End: 2022-03-02

## 2022-03-02 VITALS
SYSTOLIC BLOOD PRESSURE: 118 MMHG | DIASTOLIC BLOOD PRESSURE: 64 MMHG | HEIGHT: 66 IN | WEIGHT: 121.38 LBS | HEART RATE: 92 BPM | BODY MASS INDEX: 19.51 KG/M2

## 2022-03-02 DIAGNOSIS — M81.0 AGE-RELATED OSTEOPOROSIS WITHOUT CURRENT PATHOLOGICAL FRACTURE: ICD-10-CM

## 2022-03-02 LAB
CALCIUM SERPL-MCNC: 9.7 MG/DL (ref 8.3–10.6)
CREAT SERPL-MCNC: 0.6 MG/DL (ref 0.6–1.2)
GFR AFRICAN AMERICAN: >60
GFR NON-AFRICAN AMERICAN: >60
VITAMIN D 25-HYDROXY: 71.2 NG/ML

## 2022-03-02 PROCEDURE — 99213 OFFICE O/P EST LOW 20 MIN: CPT | Performed by: INTERNAL MEDICINE

## 2022-03-02 RX ORDER — TRAMADOL HYDROCHLORIDE 50 MG/1
50 TABLET ORAL 2 TIMES DAILY
Qty: 60 TABLET | Refills: 2 | Status: SHIPPED | OUTPATIENT
Start: 2022-03-02 | End: 2022-06-16 | Stop reason: SDUPTHER

## 2022-03-02 NOTE — TELEPHONE ENCOUNTER
Called and was able to get CT Chest approved.  Auth # [id number] validated from 2-20-22 3-21-22 Peri with [facility] Dept informed

## 2022-03-02 NOTE — PROGRESS NOTES
Subjective:       Brown Luciano is a 78 y.o. female the tramadol 50 mg twice daily. Patient returns for follow-up of osteoporosis. She is currently on Prolia every 6 months. She denies clinical fracture since the last visit. Unfortunately it appears that her cancer has recurred. Review of Systems:      Denies injection site reactions. Objective:     /64   Pulse 92   Ht 5' 6\" (1.676 m)   Wt 121 lb 6 oz (55.1 kg)   BMI 19.59 kg/m²   Alert female no acute distress dorsal kyphosis no vertebral body tenderness to direct  compression    Assessment:    Osteoporosis      Plan:    Blood work will be obtained to screen for hypocalcemia renal insufficiency. She will also have a vitamin D level checked. Patient's OARRS report was reviewed. Scripts were written for tramadol.  I will see the      Jared Uriarte MD, MD, 3/2/2022 11:22 AM

## 2022-03-02 NOTE — TELEPHONE ENCOUNTER
Cathy from [facility] would like to speak to someone about patient's PA for his CT. Please call [facility] at [phone].

## 2022-03-11 ENCOUNTER — OFFICE VISIT (OUTPATIENT)
Dept: PRIMARY CARE CLINIC | Age: 77
End: 2022-03-11
Payer: COMMERCIAL

## 2022-03-11 VITALS
BODY MASS INDEX: 19.32 KG/M2 | OXYGEN SATURATION: 98 % | HEIGHT: 66 IN | HEART RATE: 100 BPM | DIASTOLIC BLOOD PRESSURE: 58 MMHG | WEIGHT: 120.2 LBS | SYSTOLIC BLOOD PRESSURE: 122 MMHG

## 2022-03-11 DIAGNOSIS — J44.9 COPD, SEVERE (HCC): ICD-10-CM

## 2022-03-11 DIAGNOSIS — I10 PRIMARY HYPERTENSION: ICD-10-CM

## 2022-03-11 DIAGNOSIS — C34.12 MALIGNANT NEOPLASM OF UPPER LOBE OF LEFT LUNG (HCC): Primary | ICD-10-CM

## 2022-03-11 DIAGNOSIS — R91.8 PULMONARY NODULES: ICD-10-CM

## 2022-03-11 DIAGNOSIS — J96.21 ACUTE ON CHRONIC RESPIRATORY FAILURE WITH HYPOXIA (HCC): ICD-10-CM

## 2022-03-11 DIAGNOSIS — F41.9 ANXIETY: ICD-10-CM

## 2022-03-11 DIAGNOSIS — K21.01 GASTROESOPHAGEAL REFLUX DISEASE WITH ESOPHAGITIS AND HEMORRHAGE: ICD-10-CM

## 2022-03-11 PROCEDURE — 99213 OFFICE O/P EST LOW 20 MIN: CPT | Performed by: INTERNAL MEDICINE

## 2022-03-11 RX ORDER — BUSPIRONE HYDROCHLORIDE 5 MG/1
TABLET ORAL
Qty: 60 TABLET | Refills: 2 | Status: SHIPPED | OUTPATIENT
Start: 2022-03-11 | End: 2022-06-17 | Stop reason: SDUPTHER

## 2022-03-11 RX ORDER — PANTOPRAZOLE SODIUM 40 MG/1
40 TABLET, DELAYED RELEASE ORAL DAILY
Qty: 30 TABLET | Refills: 5 | Status: SHIPPED | OUTPATIENT
Start: 2022-03-11 | End: 2022-06-17 | Stop reason: SDUPTHER

## 2022-03-11 ASSESSMENT — PATIENT HEALTH QUESTIONNAIRE - PHQ9
SUM OF ALL RESPONSES TO PHQ QUESTIONS 1-9: 0
8. MOVING OR SPEAKING SO SLOWLY THAT OTHER PEOPLE COULD HAVE NOTICED. OR THE OPPOSITE, BEING SO FIGETY OR RESTLESS THAT YOU HAVE BEEN MOVING AROUND A LOT MORE THAN USUAL: 0
10. IF YOU CHECKED OFF ANY PROBLEMS, HOW DIFFICULT HAVE THESE PROBLEMS MADE IT FOR YOU TO DO YOUR WORK, TAKE CARE OF THINGS AT HOME, OR GET ALONG WITH OTHER PEOPLE: 0
SUM OF ALL RESPONSES TO PHQ QUESTIONS 1-9: 0
3. TROUBLE FALLING OR STAYING ASLEEP: 0
1. LITTLE INTEREST OR PLEASURE IN DOING THINGS: 0
5. POOR APPETITE OR OVEREATING: 0
2. FEELING DOWN, DEPRESSED OR HOPELESS: 0
SUM OF ALL RESPONSES TO PHQ9 QUESTIONS 1 & 2: 0
4. FEELING TIRED OR HAVING LITTLE ENERGY: 0
9. THOUGHTS THAT YOU WOULD BE BETTER OFF DEAD, OR OF HURTING YOURSELF: 0
SUM OF ALL RESPONSES TO PHQ QUESTIONS 1-9: 0
7. TROUBLE CONCENTRATING ON THINGS, SUCH AS READING THE NEWSPAPER OR WATCHING TELEVISION: 0
6. FEELING BAD ABOUT YOURSELF - OR THAT YOU ARE A FAILURE OR HAVE LET YOURSELF OR YOUR FAMILY DOWN: 0
SUM OF ALL RESPONSES TO PHQ QUESTIONS 1-9: 0

## 2022-03-11 NOTE — PROGRESS NOTES
3/11/2022   Nehal Tsang  1945    The patients PMH, surgical history, family history, medications, allergies were all reviewed and updated as appropriate today. Current Outpatient Medications on File Prior to Visit   Medication Sig Dispense Refill    traMADol (ULTRAM) 50 MG tablet Take 1 tablet by mouth 2 times daily for 90 days.  60 tablet 2    albuterol (PROVENTIL) (2.5 MG/3ML) 0.083% nebulizer solution Take 3 mLs by nebulization every 6 hours as needed for Wheezing 120 each 3    montelukast (SINGULAIR) 10 MG tablet Take 1 tablet by mouth nightly 30 tablet 11    fluticasone-salmeterol (ADVAIR) 250-50 MCG/DOSE AEPB INHALE ONE PUFF BY MOUTH TWICE A DAY 1 each 11    gabapentin (NEURONTIN) 300 MG capsule TAKE ONE CAPSULE BY MOUTH TWICE A DAY 60 capsule 2    dilTIAZem (CARDIZEM) 30 MG tablet TAKE ONE TABLET BY MOUTH THREE TIMES A DAY 90 tablet 3    TOVIAZ 4 MG TB24 ER tablet TAKE ONE TABLET BY MOUTH DAILY 30 tablet 10    albuterol (PROVENTIL) (2.5 MG/3ML) 0.083% nebulizer solution Take 3 mLs by nebulization every 6 hours as needed for Wheezing 360 mL 11    azithromycin (ZITHROMAX) 250 MG tablet TAKE ONE TABLET BY MOUTH THREE TIMES A WEEK 12 tablet 10    pantoprazole (PROTONIX) 40 MG tablet       albuterol-ipratropium (COMBIVENT RESPIMAT)  MCG/ACT AERS inhaler Inhale 1 puff into the lungs every 6 hours 1 Inhaler 11    VENTOLIN  (90 Base) MCG/ACT inhaler Inhale 2 puffs into the lungs every 6 hours as needed for Wheezing 1 Inhaler 11    busPIRone (BUSPAR) 5 MG tablet TAKE ONE TABLET BY MOUTH TWICE A DAY 60 tablet 2    albuterol sulfate HFA (VENTOLIN HFA) 108 (90 Base) MCG/ACT inhaler Inhale 2 puffs into the lungs 4 times daily as needed for Wheezing 1 Inhaler 11    albuterol (PROVENTIL) (2.5 MG/3ML) 0.083% nebulizer solution Take 3 mLs by nebulization every 6 hours as needed for Wheezing DX:COPD J44.9 360 mL 11    guaiFENesin (MUCINEX) 600 MG extended release tablet Take 1,200 mg by mouth daily      albuterol-ipratropium (COMBIVENT RESPIMAT)  MCG/ACT AERS inhaler Inhale 1 puff into the lungs every 6 hours 1 Inhaler 11    OXYGEN Inhale 3 L/min into the lungs continuous Use nightly as needed 1 Bottle 5    denosumab (PROLIA) 60 MG/ML SOSY SC injection Inject 1 mL into the skin once for 1 dose Every 6 months 1 each 1     No current facility-administered medications on file prior to visit. Chief Complaint   Patient presents with    COPD       HPI:  COPD/dyspnea essentially unchanged, on Combivent, Advair, Proventil/Ventolin nebulizer/HFA, still requires 3 l/min nc continuous oxygen in order to maintain adequate oxygenation  Due for bronchoscopy Adam. They think her CA might be back    Wants refills pantoprazole and BuSpar refills    Review of Systems    OBJECTIVE:  BP (!) 122/58 (Site: Left Upper Arm, Position: Sitting, Cuff Size: Medium Adult)   Pulse 100   Ht 5' 6\" (1.676 m)   Wt 120 lb 3.2 oz (54.5 kg)   SpO2 98% Comment: 4L o2  BMI 19.40 kg/m²    Physical Exam  Vitals and nursing note reviewed. Exam conducted with a chaperone present (spouse). Constitutional:       General: She is not in acute distress. Appearance: Normal appearance. She is well-developed. HENT:      Head: Normocephalic and atraumatic. Right Ear: Tympanic membrane, ear canal and external ear normal.      Left Ear: Tympanic membrane, ear canal and external ear normal.      Nose: Nose normal. No rhinorrhea. Mouth/Throat:      Pharynx: No oropharyngeal exudate or posterior oropharyngeal erythema. Eyes:      General:         Right eye: No discharge. Left eye: No discharge. Extraocular Movements: Extraocular movements intact. Conjunctiva/sclera: Conjunctivae normal.      Pupils: Pupils are equal, round, and reactive to light. Neck:      Thyroid: No thyromegaly. Vascular: No carotid bruit or JVD.    Cardiovascular:      Rate and Rhythm: Normal rate and regular rhythm. Pulses: Normal pulses. Heart sounds: Normal heart sounds. No murmur heard. Pulmonary:      Effort: Pulmonary effort is normal. No respiratory distress. Breath sounds: Normal breath sounds. No wheezing, rhonchi or rales. Abdominal:      General: Bowel sounds are normal. There is no distension. Palpations: Abdomen is soft. Tenderness: There is no abdominal tenderness. There is no rebound. Musculoskeletal:         General: No swelling. Cervical back: Normal range of motion. No muscular tenderness. Right lower leg: No edema. Left lower leg: No edema. Comments: FROM x 4   Lymphadenopathy:      Cervical: No cervical adenopathy. Skin:     General: Skin is warm and dry. Capillary Refill: Capillary refill takes 2 to 3 seconds. Coloration: Skin is not pale. Findings: No erythema or rash. Neurological:      Mental Status: She is alert and oriented to person, place, and time. Cranial Nerves: No cranial nerve deficit. Sensory: No sensory deficit. Motor: No abnormal muscle tone. Deep Tendon Reflexes: Reflexes normal.   Psychiatric:         Mood and Affect: Mood normal.         Behavior: Behavior normal.         Thought Content:  Thought content normal.         Judgment: Judgment normal.         Data Review:   CBC:   Lab Results   Component Value Date    WBC 8.2 09/10/2021    WBC 8.9 03/24/2021    WBC 6.9 09/09/2020    HGB 13.1 09/10/2021    HGB 13.6 03/24/2021    HGB 14.5 09/09/2020    HCT 41.2 09/10/2021    HCT 42.6 03/24/2021    HCT 46.3 09/09/2020    MCV 93.3 09/10/2021    MCV 95.6 03/24/2021    MCV 95.9 09/09/2020     09/10/2021     03/24/2021     09/09/2020     Chemistry:   Lab Results   Component Value Date     09/10/2021     03/24/2021     09/09/2020    K 4.5 09/10/2021    K 4.8 03/24/2021    K 4.1 09/09/2020    K 3.8 05/28/2020    K 4.3 05/13/2019    K 4.1 01/18/2018    CL 98

## 2022-03-15 ENCOUNTER — HOSPITAL ENCOUNTER (OUTPATIENT)
Dept: CT IMAGING | Age: 77
Discharge: HOME | End: 2022-03-15
Payer: COMMERCIAL

## 2022-03-15 DIAGNOSIS — R91.8 PULMONARY NODULES: ICD-10-CM

## 2022-03-15 PROCEDURE — 71250 CT THORAX DX C-: CPT

## 2022-03-20 ENCOUNTER — APPOINTMENT (OUTPATIENT)
Dept: GENERAL RADIOLOGY | Age: 77
DRG: 871 | End: 2022-03-20
Payer: COMMERCIAL

## 2022-03-20 ENCOUNTER — HOSPITAL ENCOUNTER (INPATIENT)
Age: 77
LOS: 6 days | Discharge: HOME HEALTH | DRG: 871 | End: 2022-03-26
Attending: EMERGENCY MEDICINE | Admitting: INTERNAL MEDICINE
Payer: COMMERCIAL

## 2022-03-20 DIAGNOSIS — J96.21 ACUTE ON CHRONIC RESPIRATORY FAILURE WITH HYPOXIA (HCC): ICD-10-CM

## 2022-03-20 DIAGNOSIS — J18.9 PNEUMONIA OF RIGHT LOWER LOBE DUE TO INFECTIOUS ORGANISM: Primary | ICD-10-CM

## 2022-03-20 DIAGNOSIS — J44.1 COPD EXACERBATION (HCC): ICD-10-CM

## 2022-03-20 LAB
A/G RATIO: 1.8 (ref 1.1–2.2)
ALBUMIN SERPL-MCNC: 4.5 G/DL (ref 3.4–5)
ALP BLD-CCNC: 75 U/L (ref 40–129)
ALT SERPL-CCNC: 10 U/L (ref 10–40)
ANION GAP SERPL CALCULATED.3IONS-SCNC: 13 MMOL/L (ref 3–16)
AST SERPL-CCNC: 22 U/L (ref 15–37)
BASE EXCESS ARTERIAL: 7.2 MMOL/L (ref -3–3)
BASOPHILS ABSOLUTE: 0.1 K/UL (ref 0–0.2)
BASOPHILS RELATIVE PERCENT: 0.4 %
BILIRUB SERPL-MCNC: 0.7 MG/DL (ref 0–1)
BUN BLDV-MCNC: 16 MG/DL (ref 7–20)
CALCIUM SERPL-MCNC: 10 MG/DL (ref 8.3–10.6)
CARBOXYHEMOGLOBIN ARTERIAL: 3.2 % (ref 0–1.5)
CHLORIDE BLD-SCNC: 97 MMOL/L (ref 99–110)
CO2: 28 MMOL/L (ref 21–32)
CREAT SERPL-MCNC: 0.5 MG/DL (ref 0.6–1.2)
EOSINOPHILS ABSOLUTE: 0 K/UL (ref 0–0.6)
EOSINOPHILS RELATIVE PERCENT: 0.1 %
GFR AFRICAN AMERICAN: >60
GFR NON-AFRICAN AMERICAN: >60
GLUCOSE BLD-MCNC: 201 MG/DL (ref 70–99)
HCO3 ARTERIAL: 34.2 MMOL/L (ref 21–29)
HCT VFR BLD CALC: 44 % (ref 36–48)
HEMOGLOBIN, ART, EXTENDED: 13.2 G/DL (ref 12–16)
HEMOGLOBIN: 13.9 G/DL (ref 12–16)
LACTIC ACID, SEPSIS: 1.7 MMOL/L (ref 0.4–1.9)
LYMPHOCYTES ABSOLUTE: 0.3 K/UL (ref 1–5.1)
LYMPHOCYTES RELATIVE PERCENT: 1.6 %
MCH RBC QN AUTO: 28.4 PG (ref 26–34)
MCHC RBC AUTO-ENTMCNC: 31.7 G/DL (ref 31–36)
MCV RBC AUTO: 89.6 FL (ref 80–100)
METHEMOGLOBIN ARTERIAL: 0.3 %
MONOCYTES ABSOLUTE: 0.7 K/UL (ref 0–1.3)
MONOCYTES RELATIVE PERCENT: 3.7 %
NEUTROPHILS ABSOLUTE: 18.5 K/UL (ref 1.7–7.7)
NEUTROPHILS RELATIVE PERCENT: 94.2 %
O2 SAT, ARTERIAL: 99.3 %
O2 THERAPY: ABNORMAL
PCO2 ARTERIAL: 58.2 MMHG (ref 35–45)
PDW BLD-RTO: 14.2 % (ref 12.4–15.4)
PH ARTERIAL: 7.38 (ref 7.35–7.45)
PLATELET # BLD: 195 K/UL (ref 135–450)
PMV BLD AUTO: 9.5 FL (ref 5–10.5)
PO2 ARTERIAL: 147 MMHG (ref 75–108)
POTASSIUM REFLEX MAGNESIUM: 4.7 MMOL/L (ref 3.5–5.1)
PRO-BNP: 274 PG/ML (ref 0–449)
RAPID INFLUENZA  B AGN: NEGATIVE
RAPID INFLUENZA A AGN: NEGATIVE
RBC # BLD: 4.91 M/UL (ref 4–5.2)
SODIUM BLD-SCNC: 138 MMOL/L (ref 136–145)
TCO2 ARTERIAL: 80.7 MMOL/L
TOTAL PROTEIN: 7 G/DL (ref 6.4–8.2)
TROPONIN: <0.01 NG/ML
WBC # BLD: 19.7 K/UL (ref 4–11)

## 2022-03-20 PROCEDURE — 2580000003 HC RX 258: Performed by: PHYSICIAN ASSISTANT

## 2022-03-20 PROCEDURE — 96375 TX/PRO/DX INJ NEW DRUG ADDON: CPT

## 2022-03-20 PROCEDURE — 6370000000 HC RX 637 (ALT 250 FOR IP): Performed by: EMERGENCY MEDICINE

## 2022-03-20 PROCEDURE — 71045 X-RAY EXAM CHEST 1 VIEW: CPT

## 2022-03-20 PROCEDURE — 83605 ASSAY OF LACTIC ACID: CPT

## 2022-03-20 PROCEDURE — 93005 ELECTROCARDIOGRAM TRACING: CPT | Performed by: EMERGENCY MEDICINE

## 2022-03-20 PROCEDURE — 82803 BLOOD GASES ANY COMBINATION: CPT

## 2022-03-20 PROCEDURE — 85025 COMPLETE CBC W/AUTO DIFF WBC: CPT

## 2022-03-20 PROCEDURE — U0005 INFEC AGEN DETEC AMPLI PROBE: HCPCS

## 2022-03-20 PROCEDURE — 36415 COLL VENOUS BLD VENIPUNCTURE: CPT

## 2022-03-20 PROCEDURE — U0003 INFECTIOUS AGENT DETECTION BY NUCLEIC ACID (DNA OR RNA); SEVERE ACUTE RESPIRATORY SYNDROME CORONAVIRUS 2 (SARS-COV-2) (CORONAVIRUS DISEASE [COVID-19]), AMPLIFIED PROBE TECHNIQUE, MAKING USE OF HIGH THROUGHPUT TECHNOLOGIES AS DESCRIBED BY CMS-2020-01-R: HCPCS

## 2022-03-20 PROCEDURE — 6360000002 HC RX W HCPCS: Performed by: EMERGENCY MEDICINE

## 2022-03-20 PROCEDURE — 84484 ASSAY OF TROPONIN QUANT: CPT

## 2022-03-20 PROCEDURE — 2580000003 HC RX 258: Performed by: EMERGENCY MEDICINE

## 2022-03-20 PROCEDURE — 96374 THER/PROPH/DIAG INJ IV PUSH: CPT

## 2022-03-20 PROCEDURE — 6370000000 HC RX 637 (ALT 250 FOR IP): Performed by: PHYSICIAN ASSISTANT

## 2022-03-20 PROCEDURE — 1200000000 HC SEMI PRIVATE

## 2022-03-20 PROCEDURE — 80053 COMPREHEN METABOLIC PANEL: CPT

## 2022-03-20 PROCEDURE — 94660 CPAP INITIATION&MGMT: CPT

## 2022-03-20 PROCEDURE — 94761 N-INVAS EAR/PLS OXIMETRY MLT: CPT

## 2022-03-20 PROCEDURE — 87040 BLOOD CULTURE FOR BACTERIA: CPT

## 2022-03-20 PROCEDURE — 87804 INFLUENZA ASSAY W/OPTIC: CPT

## 2022-03-20 PROCEDURE — 94640 AIRWAY INHALATION TREATMENT: CPT

## 2022-03-20 PROCEDURE — 83880 ASSAY OF NATRIURETIC PEPTIDE: CPT

## 2022-03-20 PROCEDURE — 2700000000 HC OXYGEN THERAPY PER DAY

## 2022-03-20 PROCEDURE — 99285 EMERGENCY DEPT VISIT HI MDM: CPT

## 2022-03-20 RX ORDER — ACETAMINOPHEN 500 MG
1000 TABLET ORAL ONCE
Status: COMPLETED | OUTPATIENT
Start: 2022-03-20 | End: 2022-03-20

## 2022-03-20 RX ORDER — METHYLPREDNISOLONE SODIUM SUCCINATE 125 MG/2ML
125 INJECTION, POWDER, LYOPHILIZED, FOR SOLUTION INTRAMUSCULAR; INTRAVENOUS ONCE
Status: COMPLETED | OUTPATIENT
Start: 2022-03-20 | End: 2022-03-20

## 2022-03-20 RX ORDER — IPRATROPIUM BROMIDE AND ALBUTEROL SULFATE 2.5; .5 MG/3ML; MG/3ML
1 SOLUTION RESPIRATORY (INHALATION) ONCE
Status: COMPLETED | OUTPATIENT
Start: 2022-03-20 | End: 2022-03-20

## 2022-03-20 RX ORDER — 0.9 % SODIUM CHLORIDE 0.9 %
30 INTRAVENOUS SOLUTION INTRAVENOUS ONCE
Status: COMPLETED | OUTPATIENT
Start: 2022-03-20 | End: 2022-03-21

## 2022-03-20 RX ADMIN — METHYLPREDNISOLONE SODIUM SUCCINATE 125 MG: 125 INJECTION, POWDER, FOR SOLUTION INTRAMUSCULAR; INTRAVENOUS at 21:51

## 2022-03-20 RX ADMIN — AZITHROMYCIN MONOHYDRATE 500 MG: 500 INJECTION, POWDER, LYOPHILIZED, FOR SOLUTION INTRAVENOUS at 23:33

## 2022-03-20 RX ADMIN — ACETAMINOPHEN 1000 MG: 500 TABLET ORAL at 23:28

## 2022-03-20 RX ADMIN — SODIUM CHLORIDE 1632 ML: 9 INJECTION, SOLUTION INTRAVENOUS at 23:30

## 2022-03-20 RX ADMIN — Medication 1000 MG: at 23:28

## 2022-03-20 RX ADMIN — IPRATROPIUM BROMIDE AND ALBUTEROL SULFATE 1 AMPULE: .5; 3 SOLUTION RESPIRATORY (INHALATION) at 21:52

## 2022-03-20 ASSESSMENT — ENCOUNTER SYMPTOMS
NAUSEA: 0
RHINORRHEA: 0
SHORTNESS OF BREATH: 1
WHEEZING: 0
COUGH: 0
DIARRHEA: 0
VOMITING: 0
ABDOMINAL PAIN: 0

## 2022-03-20 ASSESSMENT — PAIN SCALES - GENERAL: PAINLEVEL_OUTOF10: 2

## 2022-03-21 ENCOUNTER — APPOINTMENT (OUTPATIENT)
Dept: CT IMAGING | Age: 77
DRG: 871 | End: 2022-03-21
Payer: COMMERCIAL

## 2022-03-21 LAB
A/G RATIO: 1.8 (ref 1.1–2.2)
ALBUMIN SERPL-MCNC: 4 G/DL (ref 3.4–5)
ALP BLD-CCNC: 59 U/L (ref 40–129)
ALT SERPL-CCNC: 11 U/L (ref 10–40)
ANION GAP SERPL CALCULATED.3IONS-SCNC: 13 MMOL/L (ref 3–16)
AST SERPL-CCNC: 30 U/L (ref 15–37)
ATYPICAL LYMPHOCYTE RELATIVE PERCENT: 2 % (ref 0–6)
BANDED NEUTROPHILS RELATIVE PERCENT: 36 % (ref 0–7)
BASOPHILS ABSOLUTE: 0 K/UL (ref 0–0.2)
BASOPHILS RELATIVE PERCENT: 0 %
BILIRUB SERPL-MCNC: 0.5 MG/DL (ref 0–1)
BUN BLDV-MCNC: 21 MG/DL (ref 7–20)
CALCIUM SERPL-MCNC: 9 MG/DL (ref 8.3–10.6)
CHLORIDE BLD-SCNC: 102 MMOL/L (ref 99–110)
CO2: 26 MMOL/L (ref 21–32)
CREAT SERPL-MCNC: 0.7 MG/DL (ref 0.6–1.2)
EKG ATRIAL RATE: 140 BPM
EKG DIAGNOSIS: NORMAL
EKG P AXIS: 84 DEGREES
EKG P-R INTERVAL: 118 MS
EKG Q-T INTERVAL: 342 MS
EKG QRS DURATION: 66 MS
EKG QTC CALCULATION (BAZETT): 522 MS
EKG R AXIS: 39 DEGREES
EKG T AXIS: 71 DEGREES
EKG VENTRICULAR RATE: 140 BPM
EOSINOPHILS ABSOLUTE: 0 K/UL (ref 0–0.6)
EOSINOPHILS RELATIVE PERCENT: 0 %
ESTIMATED AVERAGE GLUCOSE: 119.8 MG/DL
GFR AFRICAN AMERICAN: >60
GFR NON-AFRICAN AMERICAN: >60
GLUCOSE BLD-MCNC: 109 MG/DL (ref 70–99)
GLUCOSE BLD-MCNC: 137 MG/DL (ref 70–99)
GLUCOSE BLD-MCNC: 170 MG/DL (ref 70–99)
GLUCOSE BLD-MCNC: 181 MG/DL (ref 70–99)
HBA1C MFR BLD: 5.8 %
HCT VFR BLD CALC: 38.4 % (ref 36–48)
HEMOGLOBIN: 12.1 G/DL (ref 12–16)
LYMPHOCYTES ABSOLUTE: 0.5 K/UL (ref 1–5.1)
LYMPHOCYTES RELATIVE PERCENT: 1 %
MCH RBC QN AUTO: 29.1 PG (ref 26–34)
MCHC RBC AUTO-ENTMCNC: 31.6 G/DL (ref 31–36)
MCV RBC AUTO: 91.9 FL (ref 80–100)
METAMYELOCYTES RELATIVE PERCENT: 2 %
MONOCYTES ABSOLUTE: 0.7 K/UL (ref 0–1.3)
MONOCYTES RELATIVE PERCENT: 4 %
NEUTROPHILS ABSOLUTE: 16.3 K/UL (ref 1.7–7.7)
NEUTROPHILS RELATIVE PERCENT: 55 %
PDW BLD-RTO: 14.4 % (ref 12.4–15.4)
PERFORMED ON: ABNORMAL
PLATELET # BLD: 121 K/UL (ref 135–450)
PLATELET SLIDE REVIEW: ABNORMAL
PMV BLD AUTO: 10.1 FL (ref 5–10.5)
POTASSIUM REFLEX MAGNESIUM: 5 MMOL/L (ref 3.5–5.1)
PROCALCITONIN: 5.81 NG/ML (ref 0–0.15)
RBC # BLD: 4.18 M/UL (ref 4–5.2)
RBC # BLD: NORMAL 10*6/UL
SARS-COV-2: NOT DETECTED
SLIDE REVIEW: ABNORMAL
SODIUM BLD-SCNC: 141 MMOL/L (ref 136–145)
TOTAL PROTEIN: 6.2 G/DL (ref 6.4–8.2)
WBC # BLD: 17.5 K/UL (ref 4–11)

## 2022-03-21 PROCEDURE — 36415 COLL VENOUS BLD VENIPUNCTURE: CPT

## 2022-03-21 PROCEDURE — 2060000000 HC ICU INTERMEDIATE R&B

## 2022-03-21 PROCEDURE — 80053 COMPREHEN METABOLIC PANEL: CPT

## 2022-03-21 PROCEDURE — 94660 CPAP INITIATION&MGMT: CPT

## 2022-03-21 PROCEDURE — 99223 1ST HOSP IP/OBS HIGH 75: CPT | Performed by: INTERNAL MEDICINE

## 2022-03-21 PROCEDURE — 93010 ELECTROCARDIOGRAM REPORT: CPT | Performed by: INTERNAL MEDICINE

## 2022-03-21 PROCEDURE — 6370000000 HC RX 637 (ALT 250 FOR IP): Performed by: NURSE PRACTITIONER

## 2022-03-21 PROCEDURE — 6370000000 HC RX 637 (ALT 250 FOR IP): Performed by: INTERNAL MEDICINE

## 2022-03-21 PROCEDURE — 2700000000 HC OXYGEN THERAPY PER DAY

## 2022-03-21 PROCEDURE — 85025 COMPLETE CBC W/AUTO DIFF WBC: CPT

## 2022-03-21 PROCEDURE — 84145 PROCALCITONIN (PCT): CPT

## 2022-03-21 PROCEDURE — 6360000002 HC RX W HCPCS: Performed by: NURSE PRACTITIONER

## 2022-03-21 PROCEDURE — 94640 AIRWAY INHALATION TREATMENT: CPT

## 2022-03-21 PROCEDURE — 94761 N-INVAS EAR/PLS OXIMETRY MLT: CPT

## 2022-03-21 PROCEDURE — 83036 HEMOGLOBIN GLYCOSYLATED A1C: CPT

## 2022-03-21 PROCEDURE — 87449 NOS EACH ORGANISM AG IA: CPT

## 2022-03-21 PROCEDURE — 2580000003 HC RX 258: Performed by: NURSE PRACTITIONER

## 2022-03-21 RX ORDER — IPRATROPIUM BROMIDE AND ALBUTEROL SULFATE 2.5; .5 MG/3ML; MG/3ML
1 SOLUTION RESPIRATORY (INHALATION) EVERY 4 HOURS
Status: DISCONTINUED | OUTPATIENT
Start: 2022-03-21 | End: 2022-03-22

## 2022-03-21 RX ORDER — INSULIN LISPRO 100 [IU]/ML
0-3 INJECTION, SOLUTION INTRAVENOUS; SUBCUTANEOUS NIGHTLY
Status: DISCONTINUED | OUTPATIENT
Start: 2022-03-21 | End: 2022-03-26 | Stop reason: HOSPADM

## 2022-03-21 RX ORDER — ALBUTEROL SULFATE 90 UG/1
2 AEROSOL, METERED RESPIRATORY (INHALATION) EVERY 6 HOURS PRN
Status: DISCONTINUED | OUTPATIENT
Start: 2022-03-21 | End: 2022-03-26 | Stop reason: HOSPADM

## 2022-03-21 RX ORDER — SODIUM CHLORIDE 0.9 % (FLUSH) 0.9 %
5-40 SYRINGE (ML) INJECTION EVERY 12 HOURS SCHEDULED
Status: DISCONTINUED | OUTPATIENT
Start: 2022-03-21 | End: 2022-03-26 | Stop reason: HOSPADM

## 2022-03-21 RX ORDER — POLYETHYLENE GLYCOL 3350 17 G/17G
17 POWDER, FOR SOLUTION ORAL DAILY PRN
Status: DISCONTINUED | OUTPATIENT
Start: 2022-03-21 | End: 2022-03-26 | Stop reason: HOSPADM

## 2022-03-21 RX ORDER — NICOTINE POLACRILEX 4 MG
15 LOZENGE BUCCAL PRN
Status: DISCONTINUED | OUTPATIENT
Start: 2022-03-21 | End: 2022-03-26 | Stop reason: HOSPADM

## 2022-03-21 RX ORDER — BUDESONIDE AND FORMOTEROL FUMARATE DIHYDRATE 160; 4.5 UG/1; UG/1
2 AEROSOL RESPIRATORY (INHALATION) 2 TIMES DAILY
Status: DISCONTINUED | OUTPATIENT
Start: 2022-03-21 | End: 2022-03-26 | Stop reason: HOSPADM

## 2022-03-21 RX ORDER — GABAPENTIN 300 MG/1
300 CAPSULE ORAL 2 TIMES DAILY
Status: DISCONTINUED | OUTPATIENT
Start: 2022-03-21 | End: 2022-03-26 | Stop reason: HOSPADM

## 2022-03-21 RX ORDER — LANOLIN ALCOHOL/MO/W.PET/CERES
3 CREAM (GRAM) TOPICAL NIGHTLY PRN
Status: DISCONTINUED | OUTPATIENT
Start: 2022-03-21 | End: 2022-03-26 | Stop reason: HOSPADM

## 2022-03-21 RX ORDER — DEXTROSE MONOHYDRATE 50 MG/ML
100 INJECTION, SOLUTION INTRAVENOUS PRN
Status: DISCONTINUED | OUTPATIENT
Start: 2022-03-21 | End: 2022-03-26 | Stop reason: HOSPADM

## 2022-03-21 RX ORDER — ACETAMINOPHEN 650 MG/1
650 SUPPOSITORY RECTAL EVERY 6 HOURS PRN
Status: DISCONTINUED | OUTPATIENT
Start: 2022-03-21 | End: 2022-03-26 | Stop reason: HOSPADM

## 2022-03-21 RX ORDER — TRAMADOL HYDROCHLORIDE 50 MG/1
50 TABLET ORAL 2 TIMES DAILY PRN
Status: DISCONTINUED | OUTPATIENT
Start: 2022-03-21 | End: 2022-03-26 | Stop reason: HOSPADM

## 2022-03-21 RX ORDER — ONDANSETRON 2 MG/ML
4 INJECTION INTRAMUSCULAR; INTRAVENOUS EVERY 6 HOURS PRN
Status: DISCONTINUED | OUTPATIENT
Start: 2022-03-21 | End: 2022-03-26 | Stop reason: HOSPADM

## 2022-03-21 RX ORDER — SODIUM CHLORIDE 0.9 % (FLUSH) 0.9 %
5-40 SYRINGE (ML) INJECTION PRN
Status: DISCONTINUED | OUTPATIENT
Start: 2022-03-21 | End: 2022-03-26 | Stop reason: HOSPADM

## 2022-03-21 RX ORDER — ONDANSETRON 4 MG/1
4 TABLET, ORALLY DISINTEGRATING ORAL EVERY 8 HOURS PRN
Status: DISCONTINUED | OUTPATIENT
Start: 2022-03-21 | End: 2022-03-26 | Stop reason: HOSPADM

## 2022-03-21 RX ORDER — INSULIN LISPRO 100 [IU]/ML
0-6 INJECTION, SOLUTION INTRAVENOUS; SUBCUTANEOUS
Status: DISCONTINUED | OUTPATIENT
Start: 2022-03-21 | End: 2022-03-26 | Stop reason: HOSPADM

## 2022-03-21 RX ORDER — ACETAMINOPHEN 325 MG/1
650 TABLET ORAL EVERY 6 HOURS PRN
Status: DISCONTINUED | OUTPATIENT
Start: 2022-03-21 | End: 2022-03-26 | Stop reason: HOSPADM

## 2022-03-21 RX ORDER — TROSPIUM CHLORIDE 20 MG/1
20 TABLET, FILM COATED ORAL
Status: DISCONTINUED | OUTPATIENT
Start: 2022-03-21 | End: 2022-03-26 | Stop reason: HOSPADM

## 2022-03-21 RX ORDER — SODIUM CHLORIDE 9 MG/ML
25 INJECTION, SOLUTION INTRAVENOUS PRN
Status: DISCONTINUED | OUTPATIENT
Start: 2022-03-21 | End: 2022-03-26 | Stop reason: HOSPADM

## 2022-03-21 RX ORDER — ALBUTEROL SULFATE 2.5 MG/3ML
2.5 SOLUTION RESPIRATORY (INHALATION) EVERY 6 HOURS PRN
Status: DISCONTINUED | OUTPATIENT
Start: 2022-03-21 | End: 2022-03-26 | Stop reason: HOSPADM

## 2022-03-21 RX ORDER — PANTOPRAZOLE SODIUM 40 MG/1
40 TABLET, DELAYED RELEASE ORAL
Status: DISCONTINUED | OUTPATIENT
Start: 2022-03-21 | End: 2022-03-26 | Stop reason: HOSPADM

## 2022-03-21 RX ORDER — BUSPIRONE HYDROCHLORIDE 5 MG/1
5 TABLET ORAL 2 TIMES DAILY
Status: DISCONTINUED | OUTPATIENT
Start: 2022-03-21 | End: 2022-03-26 | Stop reason: HOSPADM

## 2022-03-21 RX ADMIN — PANTOPRAZOLE SODIUM 40 MG: 40 TABLET, DELAYED RELEASE ORAL at 10:49

## 2022-03-21 RX ADMIN — Medication 2 PUFF: at 08:58

## 2022-03-21 RX ADMIN — GABAPENTIN 300 MG: 300 CAPSULE ORAL at 21:23

## 2022-03-21 RX ADMIN — IPRATROPIUM BROMIDE AND ALBUTEROL SULFATE 1 AMPULE: .5; 2.5 SOLUTION RESPIRATORY (INHALATION) at 11:58

## 2022-03-21 RX ADMIN — ENOXAPARIN SODIUM 40 MG: 40 INJECTION SUBCUTANEOUS at 10:49

## 2022-03-21 RX ADMIN — DILTIAZEM HYDROCHLORIDE 30 MG: 30 TABLET, FILM COATED ORAL at 23:18

## 2022-03-21 RX ADMIN — INSULIN LISPRO 1 UNITS: 100 INJECTION, SOLUTION INTRAVENOUS; SUBCUTANEOUS at 21:25

## 2022-03-21 RX ADMIN — Medication 2 PUFF: at 20:41

## 2022-03-21 RX ADMIN — IPRATROPIUM BROMIDE AND ALBUTEROL SULFATE 1 AMPULE: .5; 2.5 SOLUTION RESPIRATORY (INHALATION) at 08:58

## 2022-03-21 RX ADMIN — BUSPIRONE HYDROCHLORIDE 5 MG: 5 TABLET ORAL at 10:49

## 2022-03-21 RX ADMIN — TROSPIUM CHLORIDE 20 MG: 20 TABLET, FILM COATED ORAL at 15:31

## 2022-03-21 RX ADMIN — AZITHROMYCIN MONOHYDRATE 500 MG: 500 INJECTION, POWDER, LYOPHILIZED, FOR SOLUTION INTRAVENOUS at 23:44

## 2022-03-21 RX ADMIN — TRAMADOL HYDROCHLORIDE 50 MG: 50 TABLET, FILM COATED ORAL at 15:31

## 2022-03-21 RX ADMIN — Medication 10 ML: at 21:23

## 2022-03-21 RX ADMIN — DILTIAZEM HYDROCHLORIDE 30 MG: 30 TABLET, FILM COATED ORAL at 10:48

## 2022-03-21 RX ADMIN — GABAPENTIN 300 MG: 300 CAPSULE ORAL at 10:49

## 2022-03-21 RX ADMIN — IPRATROPIUM BROMIDE AND ALBUTEROL SULFATE 1 AMPULE: .5; 2.5 SOLUTION RESPIRATORY (INHALATION) at 23:40

## 2022-03-21 RX ADMIN — DILTIAZEM HYDROCHLORIDE 30 MG: 30 TABLET, FILM COATED ORAL at 15:30

## 2022-03-21 RX ADMIN — ACETAMINOPHEN 325MG 650 MG: 325 TABLET ORAL at 15:30

## 2022-03-21 RX ADMIN — Medication 1000 MG: at 23:18

## 2022-03-21 RX ADMIN — Medication 10 ML: at 10:49

## 2022-03-21 RX ADMIN — IPRATROPIUM BROMIDE AND ALBUTEROL SULFATE 1 AMPULE: .5; 2.5 SOLUTION RESPIRATORY (INHALATION) at 16:22

## 2022-03-21 RX ADMIN — IPRATROPIUM BROMIDE AND ALBUTEROL SULFATE 1 AMPULE: .5; 2.5 SOLUTION RESPIRATORY (INHALATION) at 20:41

## 2022-03-21 RX ADMIN — BUSPIRONE HYDROCHLORIDE 5 MG: 5 TABLET ORAL at 21:23

## 2022-03-21 RX ADMIN — TROSPIUM CHLORIDE 20 MG: 20 TABLET, FILM COATED ORAL at 10:48

## 2022-03-21 ASSESSMENT — ENCOUNTER SYMPTOMS
SHORTNESS OF BREATH: 1
COUGH: 1
EYES NEGATIVE: 1
GASTROINTESTINAL NEGATIVE: 1

## 2022-03-21 ASSESSMENT — PAIN SCALES - GENERAL: PAINLEVEL_OUTOF10: 0

## 2022-03-21 NOTE — ED PROVIDER NOTES
[id number] [facility]    Physician Attestation    Pt Name: Vicente Rodriguez  MRN: [id number]  Rachanagfsim 1945  Date of evaluation: 3/20/22        Physician Note:    I havepersonally performed and/or participated in the history, exam and medical decision making and agree with all pertinent clinical information. I have also reviewed and agree with the past medical, family and social historyunless otherwise noted. I have personally performed a face to face diagnostic evaluation onthis patient. I have reviewed the mid-levels findings and agree. History: She became short of breath today unable to provide a lot of information since she is currently on BiPAP patient is usually on several liters of O2 as needed presents with a fever allergies on the BiPAP she is breathing a little better and seems more alert sats of improved      REVIEW OF SYSTEMS    Constitutional:   fever, chills,  weakness   Eyes:  Denies vision changes  HENT:  Denies sore throat or neck pain   Respiratory:   cough  shortness of breath   Cardiovascular:  Denies chest pain  GI:  Denies abdominal pain, nausea, vomiting, or diarrhea   Musculoskeletal:  Denies back pain   Skin: no rash or vesicles   Neurologic:  no headache weakness focal    Lymphatic:  no swollen  nodes   Psychiatric: no si or hs thoughts     All systems negative except as marked. General Appearance:  Alert, cooperative, moderate distress, appears stated age. Head:  Normocephalic, without obvious abnormality, atraumatic. Eyes:  conjunctiva/corneas clear, EOM's intact. Sclera anicteric. ENT: Mucous membranes moist.   Neck: Supple, symmetrical, trachea midline, no adenopathy. No jugular venous distention. Lungs:   No Respiratory Distress. no rale  some   rhonchi  No rub   Chest Wall:  Nontender  no deformity   Heart:  Rsr no murmer gallop    Abdomen:   Soft nontender no organomegally    Extremities:  Full range of motion. no deformity Pulses: Equal  upper and lower    Skin:  No rashes or lesions to exposed skin. Neurologic: Alert and oriented X 3. Motor grossly normal.  Speech clear. Placed on BiPAP given Solu-Medrol cultures obtained    EKG Interpretation    Interpreted by emergency department physician    Rhythm: sinus tachycardia  Rate: normal  Axis: normal  Ectopy: premature ventricular contractions (infrequent)  Conduction: normal  ST Segments: no acute change  T Waves: no acute change  Q Waves: none    Clinical Impression: Sinus tachycardia  Patient received Solu-Medrol breathing treatments BiPAP antibiotics and is stabilized critical care time was 30 minutes exclusive of procedures and  evaluation and treatment of patient  Selin Austin MD    1. Pneumonia of right lower lobe due to infectious organism    2. COPD exacerbation ([facility] [address]    3. Acute on chronic respiratory failure with hypoxia (HCC)          DISPOSITION/PLAN  PATIENT REFERRED TO:  No follow-up provider specified.   DISCHARGE MEDICATIONS:  Current Discharge Medication List            MD Selin Friedman MD  03/21/22 1714

## 2022-03-21 NOTE — RT PROTOCOL NOTE
RT Inhaler-Nebulizer Bronchodilator Protocol Note    There is a bronchodilator order in the chart from a provider indicating to follow the RT Bronchodilator Protocol and there is an Initiate RT Inhaler-Nebulizer Bronchodilator Protocol order as well (see protocol at bottom of note). CXR Findings:  XR CHEST PORTABLE    Result Date: 3/20/2022  Developing infiltrate in the right lung base, more prominent since recent chest CT. The findings from the last RT Protocol Assessment were as follows:   History Pulmonary Disease: Chronic pulmonary disease  Respiratory Pattern: Dyspnea on exertion or RR 21-25 bpm  Breath Sounds: Slightly diminished and/or crackles  Cough: Strong, productive  Indication for Bronchodilator Therapy: On home bronchodilators  Bronchodilator Assessment Score: 7    Aerosolized bronchodilator medication orders have been revised according to the RT Inhaler-Nebulizer Bronchodilator Protocol below. Respiratory Therapist to perform RT Therapy Protocol Assessment initially then follow the protocol. Repeat RT Therapy Protocol Assessment PRN for score 0-3 or on second treatment, BID, and PRN for scores above 3. No Indications  adjust the frequency to every 6 hours PRN wheezing or bronchospasm, if no treatments needed after 48 hours then discontinue using Per Protocol order mode. If indication present, adjust the RT bronchodilator orders based on the Bronchodilator Assessment Score as indicated below. Use Inhaler orders unless patient has one or more of the following: on home nebulizer, not able to hold breath for 10 seconds, is not alert and oriented, cannot activate and use MDI correctly, or respiratory rate 25 breaths per minute or more, then use the equivalent nebulizer order(s) with same Frequency and PRN reasons based on the score. If a patient is on this medication at home then do not decrease Frequency below that used at home.     0-3  enter or revise RT bronchodilator order(s) to equivalent RT Bronchodilator order with Frequency of every 4 hours PRN for wheezing or increased work of breathing using Per Protocol order mode. 4-6  enter or revise RT Bronchodilator order(s) to two equivalent RT bronchodilator orders with one order with BID Frequency and one order with Frequency of every 4 hours PRN wheezing or increased work of breathing using Per Protocol order mode. 7-10  enter or revise RT Bronchodilator order(s) to two equivalent RT bronchodilator orders with one order with TID Frequency and one order with Frequency of every 4 hours PRN wheezing or increased work of breathing using Per Protocol order mode. 11-13  enter or revise RT Bronchodilator order(s) to one equivalent RT bronchodilator order with QID Frequency and an Albuterol order with Frequency of every 4 hours PRN wheezing or increased work of breathing using Per Protocol order mode. Greater than 13  enter or revise RT Bronchodilator order(s) to one equivalent RT bronchodilator order with every 4 hours Frequency and an Albuterol order with Frequency of every 2 hours PRN wheezing or increased work of breathing using Per Protocol order mode. RT to enter RT Home Evaluation for COPD & MDI Assessment order using Per Protocol order mode.     Electronically signed by Mame Cuevas RCP on 3/21/2022 at 2:51 AM

## 2022-03-21 NOTE — H&P
HOSPITALISTS HISTORY AND PHYSICAL    3/20/2022 11:27 PM    Patient Information:  Cedric Yates is a 78 y.o. female [id number]  PCP:  Christiano Tobias MD (Tel: [phone] )    Chief complaint:    Chief Complaint   Patient presents with    Shortness of Breath     Patient in by [employer]  EMS from home for SOB. Patient was 56% on RA at home. Placed on NRB by EMS with o2 sat 86% at time of arrival.         History of Present Illness:  Linell Frankel is a 78 y.o. female history of COPD on chronic oxygen of 4 L, hypertension, and tobacco use. Patient presents the emergency room for increasing shortness of breath and hypoxia. Patient states over the last 2 days she has had increasing shortness of breath she wears 4 L nasal cannula at baseline. She denies any change in her cough which occurs on a daily basis. She denies any fevers. She had her grandkids visiting today so was trying to hold off on coming to ER. Per EMS she was 56% on room air at home. She was 86% on nonrebreather in the emergency room she was placed immediately on BiPAP. Patient does states she was wearing her oxygen today. She does unfortunately still continue to smoke but has decreased her use she is down to 2 to 3 cigarettes a day. She was febrile in the emergency room at 102.5. She feels her breathing has improved since starting bipap. History obtained from patient       REVIEW OF SYSTEMS:   Review of Systems   Constitutional: Negative for chills, fatigue and fever. HENT: Negative. Eyes: Negative. Respiratory: Positive for cough and shortness of breath. Cough unchanged   Cardiovascular: Negative. Gastrointestinal: Negative. Genitourinary: Negative. Musculoskeletal: Negative. Skin: Negative. Neurological: Negative. Hematological: Negative. Psychiatric/Behavioral: Negative.     All other systems reviewed and are negative. Past Medical History:   has a past medical history of Bronchitis, COPD, Emphysema lung ([facility] [address] Hemoptysis, Hernia, Hypertension, Lung cancer ([address] Osteoporosis, Pneumonia, Rupture of colon ([facility] [address] and Skin cancer. Past Surgical History:   has a past surgical history that includes thoracotomy (7866); lobectomy (3479); Cholecystectomy; Tonsillectomy; Mandible surgery; sigmoidectomy (5/24/13); other surgical history (5/30/13); Colonoscopy (8/20/13); bronchoscopy (08/05/2016); Kyphosis surgery (02/04/2016); Upper gastrointestinal endoscopy (01/18/2018); bronchoscopy (01/31/2018); Upper gastrointestinal endoscopy (N/A, 10/31/2018); and Upper gastrointestinal endoscopy (N/A, 1/30/2019). Medications:  No current facility-administered medications on file prior to encounter. Current Outpatient Medications on File Prior to Encounter   Medication Sig Dispense Refill    busPIRone (BUSPAR) 5 MG tablet One tablet twice a day 60 tablet 2    pantoprazole (PROTONIX) 40 MG tablet Take 1 tablet by mouth daily 30 tablet 5    traMADol (ULTRAM) 50 MG tablet Take 1 tablet by mouth 2 times daily for 90 days.  (Patient taking differently: Take 50 mg by mouth 2 times daily as needed. ) 60 tablet 2    montelukast (SINGULAIR) 10 MG tablet Take 1 tablet by mouth nightly 30 tablet 11    fluticasone-salmeterol (ADVAIR) 250-50 MCG/DOSE AEPB INHALE ONE PUFF BY MOUTH TWICE A DAY 1 each 11    gabapentin (NEURONTIN) 300 MG capsule TAKE ONE CAPSULE BY MOUTH TWICE A DAY 60 capsule 2    dilTIAZem (CARDIZEM) 30 MG tablet TAKE ONE TABLET BY MOUTH THREE TIMES A DAY 90 tablet 3    TOVIAZ 4 MG TB24 ER tablet TAKE ONE TABLET BY MOUTH DAILY 30 tablet 10    denosumab (PROLIA) 60 MG/ML SOSY SC injection Inject 1 mL into the skin once for 1 dose Every 6 months 1 each 1    azithromycin (ZITHROMAX) 250 MG tablet TAKE ONE TABLET BY MOUTH THREE TIMES A WEEK 12 tablet 10    VENTOLIN  (90 Base) MCG/ACT inhaler Inhale 2 puffs into the lungs every 6 hours as needed for Wheezing 1 Inhaler 11    albuterol sulfate HFA (VENTOLIN HFA) 108 (90 Base) MCG/ACT inhaler Inhale 2 puffs into the lungs 4 times daily as needed for Wheezing 1 Inhaler 11    albuterol (PROVENTIL) (2.5 MG/3ML) 0.083% nebulizer solution Take 3 mLs by nebulization every 6 hours as needed for Wheezing DX:COPD J44.9 360 mL 11    guaiFENesin (MUCINEX) 600 MG extended release tablet Take 1,200 mg by mouth daily      albuterol-ipratropium (COMBIVENT RESPIMAT)  MCG/ACT AERS inhaler Inhale 1 puff into the lungs every 6 hours (Patient taking differently: Inhale 1 puff into the lungs every 6 hours as needed ) 1 Inhaler 11    OXYGEN Inhale 3 L/min into the lungs continuous Use nightly as needed (Patient taking differently: Inhale 4 L/min into the lungs continuous ) 1 Bottle 5       Allergies: Allergies   Allergen Reactions    Wellbutrin [Bupropion Hcl] Palpitations        Social History:  Patient Lives with    reports that she has been smoking cigarettes. She has a 0.42 pack-year smoking history. She has never used smokeless tobacco. She reports that she does not drink alcohol and does not use drugs. Family History:  family history includes Cancer in her father and mother; Diabetes in her father; Other in her father and sister. ,     Physical Exam:  BP (!) 149/84   Pulse 144   Temp 102.5 °F (39.2 °C) (Axillary)   Resp (!) 35   SpO2 97%   Physical Exam  Vitals and nursing note reviewed. Constitutional:       Appearance: She is ill-appearing. Comments: Patient alert, bipap on   HENT:      Head: Normocephalic. Eyes:      Pupils: Pupils are equal, round, and reactive to light. Cardiovascular:      Rate and Rhythm: Regular rhythm. Tachycardia present. Pulses: Normal pulses. Heart sounds: No murmur heard. Pulmonary:      Effort: Respiratory distress present. Breath sounds: Rhonchi present. No wheezing or rales. Abdominal:      General: Abdomen is flat. Bowel sounds are normal. There is no distension. Palpations: Abdomen is soft. Tenderness: There is no abdominal tenderness. Musculoskeletal:         General: Normal range of motion. Cervical back: Normal range of motion. Right lower leg: No edema. Left lower leg: No edema. Skin:     General: Skin is warm and dry. Capillary Refill: Capillary refill takes less than 2 seconds. Coloration: Skin is not jaundiced. Neurological:      General: No focal deficit present. Mental Status: She is alert and oriented to person, place, and time. Cranial Nerves: No cranial nerve deficit. Psychiatric:         Mood and Affect: Mood normal.         Behavior: Behavior normal.         Labs:  CBC:   Lab Results   Component Value Date    WBC 19.7 03/20/2022    RBC 4.91 03/20/2022    HGB 13.9 03/20/2022    HCT 44.0 03/20/2022    MCV 89.6 03/20/2022    MCH 28.4 03/20/2022    MCHC 31.7 03/20/2022    RDW 14.2 03/20/2022     03/20/2022    MPV 9.5 03/20/2022     BMP:    Lab Results   Component Value Date     03/20/2022    K 4.7 03/20/2022    CL 97 03/20/2022    CO2 28 03/20/2022    BUN 16 03/20/2022    CREATININE 0.5 03/20/2022    CALCIUM 10.0 03/20/2022    GFRAA >60 03/20/2022    GFRAA >60 05/27/2013    LABGLOM >60 03/20/2022    GLUCOSE 201 03/20/2022     XR CHEST PORTABLE   Final Result   Developing infiltrate in the right lung base, more prominent since recent   chest CT. Chest Xray:   EKG:    I visualized CXR images and EKG strips    Problem List  Active Problems:    * No active hospital problems. *  Resolved Problems:    * No resolved hospital problems. *        Assessment/Plan:   1.  Acute on Chronic Respiratory Failure with Hypoxia secondary to Right Lower Lung Pneumonia  Will be admitted to [address] [place] [address] with close monitoring and telemetry  We will continue BiPAP overnight will wean in the morning  IV azithromycin and Rocephin daily was given a dose in the emergency room  Blood cultures x2  Urine cultures  Oxygen to keep sats greater than 90%  CBC, CMP in am  Urine strep, legionella     2. COPD  No wheezing on exam,   Continue to monitor  Continue home meds     3. Left lung nodule  Patient was scheduled to have biopsy this Thursday     4. Hypertension  Continue home meds. 5. Tobacco Abuse  Patient continue to smoke 3 cigarettes a day    DVT prophylaxis Lovenox   Code status Full   Diet Full liquid  IV access peripheral   Macias Catheter none    Admit as inpatient. I anticipate hospitalization spanning more than two midnights for investigation and treatment of the above medically necessary diagnoses. Please note that some part of this chart was generated using Dragon dictation software. Although every effort was made to ensure the accuracy of this automated transcription, some errors in transcription may have occurred inadvertently. If you may need any clarification, please do not hesitate to contact me through [facility].        LESLEY Hunter CNP    3/20/2022 11:27 PM

## 2022-03-21 NOTE — PROGRESS NOTES
4 Eyes Skin Assessment     NAME:  Linell Frankel  YOB: 1945  MEDICAL RECORD NUMBER:  [id number]    The patient is being assess for  Wounds    I agree that 2 RN's have performed a thorough Head to Toe Skin Assessment on the patient. ALL assessment sites listed below have been assessed. Areas assessed by both nurses: All areas        Does the Patient have a Wound?  yes       Lele Prevention initiated: Yes   Wound Care Orders initiated:  Yes    Pressure Injury (Stage 3,4, Unstageable, DTI, NWPT, and Complex wounds) if present place consult order under [de-identified] No    New and Established Ostomies if present place consult order under : N/A      Nurse 1 eSignature: Mayco Hughes RN, CRRN, 3/21/2022,7:31 AM    **SHARE this note so that the co-signing nurse is able to place an eSignature**    Nurse 2 eSignature: Electronically signed by Lynsey Mayers RN on 3/21/22 at 2:40 PM EDT

## 2022-03-21 NOTE — ED NOTES
This RN to bedside. Patient disconnect BiPAP hose. Hose reattached at this time.       Ruthy Love RN  03/20/22 2840

## 2022-03-21 NOTE — ED NOTES
Report given to [facility], all questions answered at this time.       Denilson Valera RN  03/21/22 6571

## 2022-03-21 NOTE — ED PROVIDER NOTES
[facility]        Pt Name: Ho Chakraborty  MRN: [id number]  Rachanagfsim 1945  Date of evaluation: 3/20/2022  Provider: Taylor Stallings PA-C  PCP: Chet Zeng MD  Note Started: 10:27 PM EDT        I have seen and evaluated this patient with my supervising physician Joseph Rahman MD.    [address] [place] [address]       Chief Complaint   Patient presents with    Shortness of Breath     Patient in by [facility] from home for SOB. Patient was 56% on RA at home. Placed on NRB by EMS with o2 sat 86% at time of arrival.        HISTORY OF PRESENT ILLNESS   (Location, Timing/Onset, Context/Setting, Quality, Duration, Modifying Factors, Severity, Associated Signs and Symptoms)  Note limiting factors. Chief Complaint: SOB     Ho Coronar is a 78 y.o. female who presents for evaluation of increasing shortness of breath over the past several days, significantly worsened today. History is difficult as patient is on BiPAP upon time of my evaluation. States that symptoms started last Monday. She denies any significant cough. She denies any known fevers or chills but is febrile at this time. She does have a history of COPD and has oxygen that she uses at home as needed only. EMS reports oxygen 56% upon arrival.  She was 86% on nonrebreather at time of arrival here. No additional information is able to obtained at this time. Nursing Notes were all reviewed and agreed with or any disagreements were addressed in the HPI. REVIEW OF SYSTEMS    (2-9 systems for level 4, 10 or more for level 5)     Review of Systems   Unable to perform ROS: Acuity of condition   Constitutional: Negative for appetite change, chills and fever. HENT: Negative for congestion and rhinorrhea. Respiratory: Positive for shortness of breath. Negative for cough and wheezing. Cardiovascular: Negative for chest pain.    Gastrointestinal: Negative for abdominal pain, diarrhea, nausea and vomiting. Genitourinary: Negative for difficulty urinating, dysuria and hematuria. Musculoskeletal: Negative for neck pain and neck stiffness. Skin: Negative for rash. Neurological: Negative for headaches. Positives and Pertinent negatives as per HPI. Except as noted above in the ROS, all other systems were reviewed and negative.        PAST MEDICAL HISTORY     Past Medical History:   Diagnosis Date    Bronchitis     COPD     severe    Emphysema lung ([address]     severe    Hemoptysis     Hernia     Hypertension     Lung cancer ([address] 2008    left lung    Osteoporosis     Pneumonia     Rupture of [address] [address] 05/24/2013    Skin cancer 2014    ear         SURGICAL HISTORY     Past Surgical History:   Procedure Laterality Date    BRONCHOSCOPY  08/05/2016    BRONCHOSCOPY  01/31/2018    CHOLECYSTECTOMY      COLONOSCOPY  8/20/13    KYPHOSIS SURGERY  02/04/2016    LOBECTOMY  0808    MANDIBLE SURGERY      upper jaw and lower jaw    OTHER SURGICAL HISTORY  5/30/13    secondary wound closure    SIGMOIDECTOMY  5/24/13    THORACOTOMY  0808    TONSILLECTOMY      UPPER GASTROINTESTINAL ENDOSCOPY  01/18/2018    UPPER GASTROINTESTINAL ENDOSCOPY N/A 10/31/2018    EGD BIOPSY performed by Heather Akhtar MD at [place] [address] 1/30/2019    EGD performed by Heather Akhtar MD at [facility]       Previous Medications    ALBUTEROL (PROVENTIL) (2.5 MG/3ML) 0.083% NEBULIZER SOLUTION    Take 3 mLs by nebulization every 6 hours as needed for Wheezing DX:COPD J44.9    ALBUTEROL SULFATE HFA (VENTOLIN HFA) 108 (90 BASE) MCG/ACT INHALER    Inhale 2 puffs into the lungs 4 times daily as needed for Wheezing    ALBUTEROL-IPRATROPIUM (COMBIVENT RESPIMAT)  MCG/ACT AERS INHALER    Inhale 1 puff into the lungs every 6 hours    AZITHROMYCIN (ZITHROMAX) 250 MG TABLET    TAKE ONE TABLET BY MOUTH THREE TIMES A WEEK    BUSPIRONE (BUSPAR) 5 MG TABLET    One tablet twice a day    DENOSUMAB (PROLIA) 60 MG/ML SOSY SC INJECTION    Inject 1 mL into the skin once for 1 dose Every 6 months    DILTIAZEM (CARDIZEM) 30 MG TABLET    TAKE ONE TABLET BY MOUTH THREE TIMES A DAY    FLUTICASONE-SALMETEROL (ADVAIR) 250-50 MCG/DOSE AEPB    INHALE ONE PUFF BY MOUTH TWICE A DAY    GABAPENTIN (NEURONTIN) 300 MG CAPSULE    TAKE ONE CAPSULE BY MOUTH TWICE A DAY    GUAIFENESIN (MUCINEX) 600 MG EXTENDED RELEASE TABLET    Take 1,200 mg by mouth daily    MONTELUKAST (SINGULAIR) 10 MG TABLET    Take 1 tablet by mouth nightly    OXYGEN    Inhale 3 L/min into the lungs continuous Use nightly as needed    PANTOPRAZOLE (PROTONIX) 40 MG TABLET    Take 1 tablet by mouth daily    TOVIAZ 4 MG TB24 ER TABLET    TAKE ONE TABLET BY MOUTH DAILY    TRAMADOL (ULTRAM) 50 MG TABLET    Take 1 tablet by mouth 2 times daily for 90 days.     VENTOLIN  (90 BASE) MCG/ACT INHALER    Inhale 2 puffs into the lungs every 6 hours as needed for Wheezing         ALLERGIES     Wellbutrin [bupropion hcl]    FAMILYHISTORY       Family History   Problem Relation Age of Onset    Diabetes Father     Other Father         A-Fib    Cancer Father         prostate    Cancer Mother         ovarian    Other Sister         A-Fib          SOCIAL HISTORY       Social History     Tobacco Use    Smoking status: Light Tobacco Smoker     Packs/day: 0.01     Years: 42.00     Pack years: 0.42     Types: Cigarettes     Last attempt to quit: 2010     Years since quittin.0    Smokeless tobacco: Never Used    Tobacco comment: 1 cigarette month, maybe will have one if she is upset   Vaping Use    Vaping Use: Never used   Substance Use Topics    Alcohol use: No     Alcohol/week: 0.0 standard drinks    Drug use: No       SCREENINGS             PHYSICAL EXAM    (up to 7 for level 4, 8 or more for level 5)     ED Triage Vitals   BP Temp Temp Source Pulse Resp SpO2 Height Weight 03/20/22 2144 03/20/22 2155 03/20/22 2155 03/20/22 2144 03/20/22 2148 03/20/22 2144 -- --   (!) 149/84 102.5 °F (39.2 °C) Axillary 144 17 91 %         Physical Exam  Vitals and nursing note reviewed. Constitutional:       General: She is in acute distress. Appearance: She is well-developed. She is ill-appearing. She is not diaphoretic. HENT:      Head: Normocephalic and atraumatic. Right Ear: External ear normal.      Left Ear: External ear normal.      Nose: Nose normal.   Eyes:      General:         Right eye: No discharge. Left eye: No discharge. Cardiovascular:      Rate and Rhythm: Regular rhythm. Tachycardia present. Heart sounds: Normal heart sounds. Pulmonary:      Effort: Tachypnea and respiratory distress present. Breath sounds: Wheezing and rhonchi present. Abdominal:      General: There is no distension. Palpations: Abdomen is soft. Tenderness: There is no abdominal tenderness. Musculoskeletal:         General: Normal range of motion. Cervical back: Normal range of motion and neck supple. Skin:     General: Skin is warm and dry. Neurological:      Mental Status: She is alert and oriented to person, place, and time.    Psychiatric:         Behavior: Behavior normal.         DIAGNOSTIC RESULTS   LABS:    Labs Reviewed   CBC WITH AUTO DIFFERENTIAL - Abnormal; Notable for the following components:       Result Value    WBC 19.7 (*)     Neutrophils Absolute 18.5 (*)     Lymphocytes Absolute 0.3 (*)     All other components within normal limits   COMPREHENSIVE METABOLIC PANEL W/ REFLEX TO MG FOR LOW K - Abnormal; Notable for the following components:    Chloride 97 (*)     Glucose 201 (*)     CREATININE 0.5 (*)     All other components within normal limits   BLOOD GAS, ARTERIAL - Abnormal; Notable for the following components:    pCO2, Arterial 58.2 (*)     pO2, Arterial 147.0 (*)     HCO3, Arterial 34.2 (*)     Base Excess, Arterial 7.2 (*) Carboxyhgb, Arterial 3.2 (*)     All other components within normal limits   RAPID INFLUENZA A/B ANTIGENS   CULTURE, BLOOD 1   CULTURE, BLOOD 2   BRAIN NATRIURETIC PEPTIDE   TROPONIN   LACTATE, SEPSIS   COVID-19       When ordered only abnormal lab results are displayed. All other labs were within normal range or not returned as of this dictation. EKG: When ordered, EKG's are interpreted by the Emergency Department Physician in the absence of a cardiologist.  Please see their note for interpretation of EKG. RADIOLOGY:   Non-plain film images such as CT, Ultrasound and MRI are read by the radiologist. Plain radiographic images are visualized and preliminarily interpreted by the ED Provider with the below findings:        Interpretation per the Radiologist below, if available at the time of this note:    XR CHEST PORTABLE   Final Result   Developing infiltrate in the right lung base, more prominent since recent   chest CT. CT CHEST WO CONTRAST    Result Date: 3/15/2022  EXAMINATION: CT OF THE CHEST WITHOUT CONTRAST 3/15/2022 5:44 pm TECHNIQUE: CT of the chest was performed without the administration of intravenous contrast. Multiplanar reformatted images are provided for review. Dose modulation, iterative reconstruction, and/or weight based adjustment of the mA/kV was utilized to reduce the radiation dose to as low as reasonably achievable. COMPARISON: CT chest dated 01/03/2022 HISTORY: ORDERING SYSTEM PROVIDED HISTORY: Pulmonary nodules TECHNOLOGIST PROVIDED HISTORY: Reason for exam:->Pulmonary Nodules Reason for Exam: Pulmonary Nodules FINDINGS: Mediastinum: Thyroid is homogeneous in attenuation. No bulky mediastinal adenopathy. Central airways are grossly patent; however, minimal area of likely mucous plugging in the proximal right mainstem bronchus series 3, image 57. Esophagus is normal course and caliber. Patent nonaneurysmal thoracic aorta.  Cardiac size within normal limits without pericardial effusion. Lungs/pleura: Chronic changes including moderate to severe upper lobe predominant centrilobular emphysema with biapical pleuroparenchymal scarring. Two separate nodular densities adjacent in the right lower lobe with adjacent architectural distortion unchanged in size measuring up to 2 cm similar to prior. Smaller nodular densities in the right mid and upper lung also similar to prior with patchy appearance. No new nodule or mass. No interval progression. No pleural effusion. Upper Abdomen: Visualized portions of the upper abdomen unremarkable. Soft Tissues/Bones: No acute osseous or soft tissue findings. No aggressive osseous lesion. Secretions versus mucous without resultant volume loss noted in the distal trachea and proximal right mainstem bronchus. Stable appearance of right lower lobe nodularity without interval progression of these dominant areas measuring up to 2 cm similar to prior along with smaller opacities scattered throughout the bilateral lungs with extensive chronic changes. RECOMMENDATIONS: CT chest as clinically warranted to follow-up for right lower lobe pulmonary nodules. XR CHEST PORTABLE    Result Date: 3/20/2022  EXAMINATION: ONE XRAY VIEW OF THE CHEST 3/20/2022 10:01 pm COMPARISON: Chest CT 03/15/2022. Chest radiograph 03/30/2020. HISTORY: ORDERING SYSTEM PROVIDED HISTORY: short of breath TECHNOLOGIST PROVIDED HISTORY: Reason for exam:->short of breath FINDINGS: Increasing opacity in the right lung base. Generalized interstitial prominence and chronic blunting of the left costophrenic angle again noted. Apical pleuroparenchymal scarring. No pneumothorax. The cardiac and mediastinal contours appear unchanged. Multilevel vertebroplasty. Developing infiltrate in the right lung base, more prominent since recent chest CT.            PROCEDURES   Unless otherwise noted below, none     Procedures    CRITICAL CARE TIME   There was a high probability of life-threatening clinical deterioration in the patient's condition requiring my urgent intervention. I personally saw the patient and independently provided 42 minutes of non-concurrent critical care out of the total shared critical care time provided, excluding separately reportable procedures. CONSULTS:  None      EMERGENCY DEPARTMENT COURSE and DIFFERENTIAL DIAGNOSIS/MDM:   Vitals:    Vitals:    03/20/22 2148 03/20/22 2150 03/20/22 2155 03/20/22 2156   BP:       Pulse:       Resp: 17 (!) 35  (!) 35   Temp:   102.5 °F (39.2 °C)    TempSrc:   Axillary    SpO2:    97%       Patient was given the following medications:  Medications   acetaminophen (TYLENOL) tablet 1,000 mg (has no administration in time range)   0.9 % sodium chloride bolus (has no administration in time range)   cefTRIAXone (ROCEPHIN) 1000 mg in sterile water 10 mL IV syringe (has no administration in time range)   azithromycin (ZITHROMAX) 500 mg in D5W 250ml Vial Mate (has no administration in time range)   ipratropium-albuterol (DUONEB) nebulizer solution 1 ampule (1 ampule Inhalation Given 3/20/22 2152)   methylPREDNISolone sodium (SOLU-MEDROL) injection 125 mg (125 mg IntraVENous Given 3/20/22 2151)           Patient presents for evaluation of increasing shortness of breath and hypoxia. On exam, she is in respiratory distress. She is tachypneic, tachycardic and febrile at 102.5. She was satting 86% on nonrebreather, switched to BiPAP and came up to 93%. Sounds bilaterally expiratory wheezing. Given DuoNeb reading treatment IV Solu-Medrol and will be reevaluated. Also given Tylenol for fever and started on fluid resuscitation antibiotics due to concern for sepsis. Please see attending note for EKG interpretation    CBC and CMP remarkable for leukocytosis of 19.7. She is hyperglycemic at 201 with no evidence of diabetic ketoacidosis. . Troponin negative. Lactic acid 1.7. Blood cultures are pending. Rapid flu negative.   Covid swab is pending. Chest x-ray shows right lower lobe airspace disease concerning for pneumonia. Started empirically on azithromycin and Rocephin. She will be admitted for further evaluation management of respiratory care with hypoxia likely secondary to pneumonia and component of COPD exacerbation. Also resume care the patient at this time. Patient was informed and agreeable. She is stable for admission. FINAL IMPRESSION      1. Pneumonia of right lower lobe due to infectious organism    2. COPD exacerbation ([facility] [address]    3. Acute on chronic respiratory failure with hypoxia [facility])          DISPOSITION/PLAN   DISPOSITION Decision To Admit 03/20/2022 10:27:35 PM      PATIENT REFERRED TO:  No follow-up provider specified.     DISCHARGE MEDICATIONS:  New Prescriptions    No medications on file       DISCONTINUED MEDICATIONS:  Discontinued Medications    No medications on file              (Please note that portions of this note were completed with a voice recognition program.  Efforts were made to edit the dictations but occasionally words are mis-transcribed.)    Rashad Sparks PA-C (electronically signed)           Denia Peterosn PA-C  03/20/22 0280

## 2022-03-21 NOTE — PROGRESS NOTES
Patient SpO2 decreased to low-mid 80s while on 4L. Patient coached to deep breath. No change in SpO2. RT to the bedside. Venturi mask attempted without success. Patient placed on 15L HFNC. SpO2 up to 88-92% SpO2. Dr. Naheed Brasher and Dr. Lisa Baires notified. Oxygen decreased to 86% and maintained while on 15L. Placed on BiPAP. Will continue to monitor.

## 2022-03-21 NOTE — CONSULTS
P Pulmonary and Critical Care   Consult Note      Reason for Consult: Acute on chronic hypoxemic respiratory failure, pneumonia, pulmonary nodule, very severe COPD/emphysema  Requesting Physician: Dr Ella Ackerman:   Ashish Brooks / HPI:                The patient is a 78 y.o. female with significant past medical history of:      Diagnosis Date    Bronchitis     COPD     severe    Emphysema lung ([address]     severe    Hemoptysis     Hernia     Hypertension     Lung cancer ([address] 2008    left lung    Osteoporosis     Pneumonia     Rupture of colon ([facility] [address] 05/24/2013    Skin cancer 2014    ear     Patient presents by squad to the emergency department with a chief complaint of shortness of breath. She is felt like she was sick for the preceding 48 hours. She had worsening dyspnea and cough. She also was febrile. She is known to have very severe COPD many years duration. She is oxygen dependent wearing 4 L/min continuously. She also has a history of lung cancer. We have been following an enlarging nodule in the right lower lobe. She was planned for navigational bronchoscopy this coming Thursday. She complains of fatigue. She is also had significant weight loss.      Past Surgical History:        Procedure Laterality Date    BRONCHOSCOPY  08/05/2016    BRONCHOSCOPY  01/31/2018    CHOLECYSTECTOMY      COLONOSCOPY  8/20/13    KYPHOSIS SURGERY  02/04/2016    LOBECTOMY  0808    MANDIBLE SURGERY      upper jaw and lower jaw    OTHER SURGICAL HISTORY  5/30/13    secondary wound closure    SIGMOIDECTOMY  5/24/13    THORACOTOMY  0808    TONSILLECTOMY      UPPER GASTROINTESTINAL ENDOSCOPY  01/18/2018    UPPER GASTROINTESTINAL ENDOSCOPY N/A 10/31/2018    EGD BIOPSY performed by Kimberly Whitmore MD at [facility] N/A 1/30/2019    EGD performed by Kimberly Whitmore MD at [address] [place] [address]     Current Medications:    Current Facility-Administered Medications: albuterol (PROVENTIL) nebulizer solution 2.5 mg, 2.5 mg, Nebulization, Q6H PRN  busPIRone (BUSPAR) tablet 5 mg, 5 mg, Oral, BID  gabapentin (NEURONTIN) capsule 300 mg, 300 mg, Oral, BID  traMADol (ULTRAM) tablet 50 mg, 50 mg, Oral, BID PRN  trospium (SANCTURA) tablet 20 mg, 20 mg, Oral, BID AC  pantoprazole (PROTONIX) tablet 40 mg, 40 mg, Oral, QAM AC  sodium chloride flush 0.9 % injection 5-40 mL, 5-40 mL, IntraVENous, 2 times per day  sodium chloride flush 0.9 % injection 5-40 mL, 5-40 mL, IntraVENous, PRN  0.9 % sodium chloride infusion, 25 mL, IntraVENous, PRN  enoxaparin (LOVENOX) injection 40 mg, 40 mg, SubCUTAneous, Daily  ondansetron (ZOFRAN-ODT) disintegrating tablet 4 mg, 4 mg, Oral, Q8H PRN **OR** ondansetron (ZOFRAN) injection 4 mg, 4 mg, IntraVENous, Q6H PRN  polyethylene glycol (GLYCOLAX) packet 17 g, 17 g, Oral, Daily PRN  acetaminophen (TYLENOL) tablet 650 mg, 650 mg, Oral, Q6H PRN **OR** acetaminophen (TYLENOL) suppository 650 mg, 650 mg, Rectal, Q6H PRN  azithromycin (ZITHROMAX) 500 mg in D5W 250ml Vial Mate, 500 mg, IntraVENous, Q24H  cefTRIAXone (ROCEPHIN) 1000 mg in sterile water 10 mL IV syringe, 1,000 mg, IntraVENous, Q24H  insulin lispro (1 Unit Dial) 0-6 Units, 0-6 Units, SubCUTAneous, TID WC  insulin lispro (1 Unit Dial) 0-3 Units, 0-3 Units, SubCUTAneous, Nightly  ipratropium-albuterol (DUONEB) nebulizer solution 1 ampule, 1 ampule, Inhalation, Q4H  glucose (GLUTOSE) 40 % oral gel 15 g, 15 g, Oral, PRN  dextrose bolus (hypoglycemia) 10%, , IntraVENous, PRN  glucagon (rDNA) injection 1 mg, 1 mg, IntraMUSCular, PRN  dextrose 5 % solution, 100 mL/hr, IntraVENous, PRN  albuterol sulfate  (90 Base) MCG/ACT inhaler 2 puff, 2 puff, Inhalation, Q6H PRN **AND** ipratropium (ATROVENT HFA) 17 MCG/ACT inhaler 2 puff, 2 puff, Inhalation, Q6H PRN  budesonide-formoterol (SYMBICORT) 160-4.5 MCG/ACT inhaler 2 puff, 2 puff, Inhalation, BID  dilTIAZem (CARDIZEM) tablet 30 mg, 30 mg, Oral, 3 times per day    Allergies   Allergen Reactions    Wellbutrin [Bupropion Hcl] Palpitations       Social History:    TOBACCO:   reports that she has been smoking cigarettes. She has a 0.42 pack-year smoking history. She has never used smokeless tobacco.  ETOH:   reports no history of alcohol use. Patient currently lives independently  Environmental/chemical exposure: None known    Family History:       Problem Relation Age of Onset   Anibal Outhouse Diabetes Father     Other Father         A-Fib    Cancer Father         prostate    Cancer Mother         ovarian    Other Sister         A-Fib     REVIEW OF SYSTEMS:    CONSTITUTIONAL: See HPI   EYES:  negative for blurred vision, eye discharge, visual disturbance and icterus  HEENT:  negative for hearing loss, tinnitus, ear drainage, sinus pressure, nasal congestion, epistaxis and snoring  RESPIRATORY:  See HPI  CARDIOVASCULAR:  negative for chest pain, palpitations, exertional chest pressure/discomfort, edema, syncope  GASTROINTESTINAL:  negative for nausea, vomiting, diarrhea, constipation, blood in stool and abdominal pain  GENITOURINARY:  negative for frequency, dysuria, urinary incontinence, decreased urine volume, and hematuria  HEMATOLOGIC/LYMPHATIC:  negative for easy bruising, bleeding and lymphadenopathy  ALLERGIC/IMMUNOLOGIC:  negative for recurrent infections, angioedema, anaphylaxis and drug reactions  ENDOCRINE:  negative for weight changes and diabetic symptoms including polyuria, polydipsia and polyphagia  MUSCULOSKELETAL:  negative for  pain, joint swelling, decreased range of motion and muscle weakness  NEUROLOGICAL:  negative for headaches, slurred speech, unilateral weakness  PSYCHIATRIC/BEHAVIORAL: negative for hallucinations, behavioral problems, confusion and agitation.      Objective:   PHYSICAL EXAM:      VITALS:  BP (!) 101/49   Pulse 82   Temp 97.3 °F (36.3 °C) (Temporal)   Resp 25   Ht 5' 8\" (1.727 m)   Wt 126 lb 8.7 oz (57.4 kg)   SpO2 94% BMI 19.24 kg/m²      24HR INTAKE/OUTPUT:      Intake/Output Summary (Last 24 hours) at 3/21/2022 1232  Last data filed at 3/21/2022 4670  Gross per 24 hour   Intake 240 ml   Output    Net 240 ml     CONSTITUTIONAL:  awake, alert, cooperative, no apparent distress, and appears stated age  NECK:  Supple, symmetrical, trachea midline, no adenopathy, thyroid symmetric, not enlarged and no tenderness, skin normal  LUNGS:  no increased work of breathing and diminished to auscultation. No accessory muscle use  CARDIOVASCULAR: S1 and S2, no edema and no JVD  ABDOMEN:  normal bowel sounds, non-distended and no masses palpated, and no tenderness to palpation. No hepatospleenomegaly  LYMPHADENOPATHY:  no axillary or supraclavicular adenopathy. No cervical adnenopathy  PSYCHIATRIC: Oriented to person place and time. No obvious depression or anxiety. MUSCULOSKELETAL: No obvious misalignment or effusion of the joints. No clubbing, cyanosis of the digits. SKIN:  normal skin color, texture, turgor and no redness, warmth, or swelling. No palpable nodules    DATA:    Old records have been reviewed    CBC:  Recent Labs     03/20/22 2145 03/21/22  0716   WBC 19.7* 17.5*   RBC 4.91 4.18   HGB 13.9 12.1   HCT 44.0 38.4    121*   MCV 89.6 91.9   MCH 28.4 29.1   MCHC 31.7 31.6   RDW 14.2 14.4   BANDSPCT  --  36*      BMP:  Recent Labs     03/20/22 2145 03/21/22  0626    141   K 4.7 5.0   CL 97* 102   CO2 28 26   BUN 16 21*   CREATININE 0.5* 0.7   CALCIUM 10.0 9.0   GLUCOSE 201* 170*      ABG:  Recent Labs     03/20/22  2205   PHART 7.378   BKT8LNO 58.2*   PO2ART 147.0*   [id number] 34.2*   [id number] 99.3   BEART 7.2*     Procalcitonin  Recent Labs     03/21/22  1042   PROCAL 5.81*       No results found for: BNP  Lab Results   Component Value Date    CKTOTAL 129 04/21/2010    TROPONINI <0.01 03/20/2022           Radiology Review:  All pertinent images / reports were reviewed as a part of this visit.      Assessment: 1. Acute exacerbation COPD  2. Right lower lobe pneumonia  3. Right lower lobe pulmonary nodule  4. COPD, very severe/chronic hypoxemic respiratory failure  5. History of lung cancer      Plan:     Patient presented with leukocytosis, fever to 102.5 degrees and a procalcitonin greater than 5. Likely an element of pneumonia  Chest x-ray does reveal increasing density in the right lower lobe. This is the region of her pulmonary nodule.   Cultures are pending  Continue azithromycin and Rocephin pending culture results  Repeat CT imaging    Patient does have very severe COPD now with acute exacerbation precipitated by her pneumonia  Not wheezing  Does not need steroids at this point  On Symbicort and scheduled duo nebs    She does have chronic hypoxemic respiratory failure requiring 4 L/min nasal cannula oxygen supplement  ABG is 7.3 8/58/147 on BiPAP  Likely can do without the BiPAP  Try her on nasal cannula oxygen supplement

## 2022-03-21 NOTE — ED PROVIDER NOTES
I performed a medical screening history and physical exam on this patient. HISTORY OF PRESENT ILLNESS  Richy Doyle is a 78 y.o. female who presents in acute respiratory stress.  called EMS from home for severe shortness of breath. Patient was hypoxic to the 50s in route. After receiving 2 treatments and being placed on high flow oxygen on a nonrebreather, patient's O2 saturation is now 88%. Patient denies having cough, chest pain, or fever. Maryan Wu PHYSICAL EXAM  ED Triage Vitals [03/20/22 2144]   BP Temp Temp src Pulse Resp SpO2 Height Weight   (!) 149/84 -- -- 144 -- 91 % -- --       On exam, patient is in acute respiratory distress with increased work of breathing, accessory muscle use, tachypnea. She has diminished breath sounds with faint wheezes bilaterally throughout both lungs. Heart is tachycardic, but regular rhythm. She is awake, although weary appearing secondary to illness. MDM  Patient presents to the emergency department in acute respiratory stress, likely secondary to COPD exacerbation. I initiated treatment with additional nebulizer, steroids, and BiPAP for supportive care. Chest x-ray, BNP, CBC, and CMP started to initiate work-up care. Highly suspect acute COPD exacerbation to be the patient's etiology.        Chuck Grewal MD  03/20/22 3672

## 2022-03-21 NOTE — ED NOTES
Patient transported to [facility] in stable condition, alert and oriented x4 by this RN with fluids infusing, on tele and on a NRB with respiratory.       Sonia Marquez RN  03/21/22 4564

## 2022-03-21 NOTE — ACP (ADVANCE CARE PLANNING)
Advanced Care Planning Note.     Purpose of Encounter: Advanced care planning in light of Acute on Chronic Respiratory failure  Parties In Attendance: Patient, ,  (RN)  Decisional Capacity: Yes  Subjective:   Objective:   Goals of Care Determination:Patient wants to be a full code  Code Status:Full    Time spent on Advanced care Plannin S LESLEY Weir CNP  3/21/2022 12:14 AM

## 2022-03-21 NOTE — PROGRESS NOTES
Hospitalist Progress Note      PCP: Greg Ray MD    Date of Admission: 3/20/2022      Subjective: Covid testing pending, no acute event overnight, currently on nasal cannula with acceptable saturation. Medications:  Reviewed    Infusion Medications    sodium chloride      dextrose       Scheduled Medications    busPIRone  5 mg Oral BID    gabapentin  300 mg Oral BID    trospium  20 mg Oral BID AC    pantoprazole  40 mg Oral QAM AC    sodium chloride flush  5-40 mL IntraVENous 2 times per day    enoxaparin  40 mg SubCUTAneous Daily    azithromycin  500 mg IntraVENous Q24H    cefTRIAXone (ROCEPHIN) IV  1,000 mg IntraVENous Q24H    insulin lispro  0-6 Units SubCUTAneous TID WC    insulin lispro  0-3 Units SubCUTAneous Nightly    ipratropium-albuterol  1 ampule Inhalation Q4H    budesonide-formoterol  2 puff Inhalation BID    dilTIAZem  30 mg Oral 3 times per day     PRN Meds: albuterol, traMADol, sodium chloride flush, sodium chloride, ondansetron **OR** ondansetron, polyethylene glycol, acetaminophen **OR** acetaminophen, glucose, dextrose bolus (hypoglycemia), glucagon (rDNA), dextrose, albuterol sulfate HFA **AND** ipratropium      Intake/Output Summary (Last 24 hours) at 3/21/2022 1234  Last data filed at 3/21/2022 8341  Gross per 24 hour   Intake 240 ml   Output    Net 240 ml       Physical Exam Performed:    BP (!) 101/49   Pulse 82   Temp 97.3 °F (36.3 °C) (Temporal)   Resp 25   Ht 5' 8\" (1.727 m)   Wt 126 lb 8.7 oz (57.4 kg)   SpO2 94%   BMI 19.24 kg/m²     General appearance: No apparent distress, appears stated age and cooperative. HEENT: Pupils equal, round, and reactive to light. Conjunctivae/corneas clear. Neck: Supple, with full range of motion. No jugular venous distention. Trachea midline. Respiratory:  Normal respiratory effort. Clear to auscultation, bilaterally without Rales/Wheezes/Rhonchi.   Cardiovascular: Regular rate and rhythm with normal S1/S2 without murmurs, rubs or gallops. Abdomen: Soft, non-tender, non-distended with normal bowel sounds. Musculoskeletal: No clubbing, cyanosis or edema bilaterally. Full range of motion without deformity. Skin: Skin color, texture, turgor normal.  No rashes or lesions. Neurologic:  Neurovascularly intact without any focal sensory/motor deficits. Cranial nerves: II-XII intact, grossly non-focal.  Psychiatric: Alert and oriented, thought content appropriate, normal insight  Capillary Refill: Brisk,3 seconds, normal   Peripheral Pulses: +2 palpable, equal bilaterally       Labs:   Recent Labs     03/20/22 2145 03/21/22  0716   WBC 19.7* 17.5*   HGB 13.9 12.1   HCT 44.0 38.4    121*     Recent Labs     03/20/22 2145 03/21/22  0626    141   K 4.7 5.0   CL 97* 102   CO2 28 26   BUN 16 21*   CREATININE 0.5* 0.7   CALCIUM 10.0 9.0     Recent Labs     03/20/22 2145 03/21/22  0626   AST 22 30   ALT 10 11   BILITOT 0.7 0.5   ALKPHOS 75 59     No results for input(s): INR in the last 72 hours. Recent Labs     03/20/22 2145   TROPONINI <0.01       Urinalysis:      Lab Results   Component Value Date    NITRU Negative 01/29/2018    WBCUA 37 01/29/2018    BACTERIA Rare 05/24/2013    RBCUA None seen 01/29/2018    BLOODU neg 07/23/2018    BLOODU Negative 01/29/2018    SPECGRAV 1.010 07/23/2018    SPECGRAV >1.030 01/29/2018    GLUCOSEU neg 07/23/2018    GLUCOSEU Negative 01/29/2018       Radiology:  XR CHEST PORTABLE   Final Result   Developing infiltrate in the right lung base, more prominent since recent   chest CT.          CT CHEST WO CONTRAST    (Results Pending)           Assessment/Plan:    Active Hospital Problems    Diagnosis     Acute on chronic respiratory failure with hypoxia (HCC) [J96.21]      Acute on chronic respiratory failure with hypoxia: Likely secondary to right lower lobe pneumonia, and use BiPAP overnight, pulmonary on board, continue antibiotic azithromycin and Rocephin, follow-up blood culture urine antigen. Acute exacerbation of COPD: Precipitated by pneumonia, currently not wheezing, pulmonary following, off steroids, on Symbicort and DuoNeb. Right lower lobe nodule: Patient was scheduled to have biopsy this Thursday, pulmonary is following, history of lung cancer    History of hypertension, continue home meds    Current nicotine use: Counseled for smoking cessation, currently smokes about 3 cigarettes a day. History of diabetes mellitus, continue sliding scale insulin    Sacral wound stage I, present on admission,: Wound care consulted    DVT Prophylaxis: Lovenox  Diet: ADULT DIET; Full Liquid  Code Status: Full Code    PT/OT Eval Status:  For evaluation    Dispo -continue inpatient care    Selin Mcqueen MD

## 2022-03-21 NOTE — PROGRESS NOTES
Patient admitted to unit from ER. Transported by bed. Bedside handoff received. Admission and assessment completed.

## 2022-03-21 NOTE — CARE COORDINATION
[facility] Wound Ostomy Continence Nurse  Consult Note       NAME:  Bonnie Zee  MEDICAL RECORD NUMBER:  [id number]  AGE: 78 y.o. GENDER: female  : 1945  TODAY'S DATE:  3/21/2022    Subjective   Reason for WOCN Evaluation and Assessment: questionable pressure injury stage 2      Bonnie Zee is a 78 y.o. female referred by:   [x] Physician  [x] Nursing  [] Other:     Wound Identification:  Wound Type: pressure  Contributing Factors: chronic pressure, decreased mobility and shear force    Wound History: Patient was unaware that she had a pressure injury on sacrum or buttocks.  Present on admission  Current Wound Care Treatment:  Mepilex border    Patient Goal of Care:  [x] Wound Healing  [] Odor Control  [] Palliative Care  [] Pain Control   [] Other:         PAST MEDICAL HISTORY        Diagnosis Date    Bronchitis     COPD     severe    Emphysema lung ([address]     severe    Hemoptysis     Hernia     Hypertension     Lung cancer ([address]     left lung    Osteoporosis     Pneumonia     Rupture of colon [facility] [address] 2013    Skin cancer 2014    ear       PAST SURGICAL HISTORY    Past Surgical History:   Procedure Laterality Date    BRONCHOSCOPY  2016    BRONCHOSCOPY  2018    CHOLECYSTECTOMY      COLONOSCOPY  13    KYPHOSIS SURGERY  2016    LOBECTOMY  0808    MANDIBLE SURGERY      upper jaw and lower jaw    OTHER SURGICAL HISTORY  13    secondary wound closure    SIGMOIDECTOMY  13    THORACOTOMY  0808    TONSILLECTOMY      UPPER GASTROINTESTINAL ENDOSCOPY  2018    UPPER GASTROINTESTINAL ENDOSCOPY N/A 10/31/2018    EGD BIOPSY performed by Heather Akhtar MD at [address] N/A 2019    EGD performed by Heather Akhtar MD at [address] HISTORY    Family History   Problem Relation Age of Onset    Diabetes Father     Other Father         A-Fib    Cancer Father         prostate  Cancer Mother         ovarian    Other Sister         A-Fib       SOCIAL HISTORY    Social History     Tobacco Use    Smoking status: Light Tobacco Smoker     Packs/day: 0.01     Years: 42.00     Pack years: 0.42     Types: Cigarettes     Last attempt to quit: 2010     Years since quittin.0    Smokeless tobacco: Never Used    Tobacco comment: 1 cigarette month, maybe will have one if she is upset   Vaping Use    Vaping Use: Never used   Substance Use Topics    Alcohol use: No     Alcohol/week: 0.0 standard drinks    Drug use: No       ALLERGIES    Allergies   Allergen Reactions    Wellbutrin [Bupropion Hcl] Palpitations       MEDICATIONS    No current facility-administered medications on file prior to encounter. Current Outpatient Medications on File Prior to Encounter   Medication Sig Dispense Refill    busPIRone (BUSPAR) 5 MG tablet One tablet twice a day 60 tablet 2    pantoprazole (PROTONIX) 40 MG tablet Take 1 tablet by mouth daily 30 tablet 5    traMADol (ULTRAM) 50 MG tablet Take 1 tablet by mouth 2 times daily for 90 days.  (Patient taking differently: Take 50 mg by mouth 2 times daily as needed. ) 60 tablet 2    montelukast (SINGULAIR) 10 MG tablet Take 1 tablet by mouth nightly 30 tablet 11    fluticasone-salmeterol (ADVAIR) 250-50 MCG/DOSE AEPB INHALE ONE PUFF BY MOUTH TWICE A DAY 1 each 11    gabapentin (NEURONTIN) 300 MG capsule TAKE ONE CAPSULE BY MOUTH TWICE A DAY 60 capsule 2    dilTIAZem (CARDIZEM) 30 MG tablet TAKE ONE TABLET BY MOUTH THREE TIMES A DAY 90 tablet 3    TOVIAZ 4 MG TB24 ER tablet TAKE ONE TABLET BY MOUTH DAILY 30 tablet 10    denosumab (PROLIA) 60 MG/ML SOSY SC injection Inject 1 mL into the skin once for 1 dose Every 6 months 1 each 1    azithromycin (ZITHROMAX) 250 MG tablet TAKE ONE TABLET BY MOUTH THREE TIMES A WEEK 12 tablet 10    VENTOLIN  (90 Base) MCG/ACT inhaler Inhale 2 puffs into the lungs every 6 hours as needed for Wheezing 1 Inhaler 11    albuterol sulfate HFA (VENTOLIN HFA) 108 (90 Base) MCG/ACT inhaler Inhale 2 puffs into the lungs 4 times daily as needed for Wheezing 1 Inhaler 11    albuterol (PROVENTIL) (2.5 MG/3ML) 0.083% nebulizer solution Take 3 mLs by nebulization every 6 hours as needed for Wheezing DX:COPD J44.9 360 mL 11    guaiFENesin (MUCINEX) 600 MG extended release tablet Take 1,200 mg by mouth daily      albuterol-ipratropium (COMBIVENT RESPIMAT)  MCG/ACT AERS inhaler Inhale 1 puff into the lungs every 6 hours (Patient taking differently: Inhale 1 puff into the lungs every 6 hours as needed ) 1 Inhaler 11    OXYGEN Inhale 3 L/min into the lungs continuous Use nightly as needed (Patient taking differently: Inhale 4 L/min into the lungs continuous ) 1 Bottle 5       Objective    BP (!) 101/49   Pulse 82   Temp 97.3 °F (36.3 °C) (Temporal)   Resp 25   Ht 5' 8\" (1.727 m)   Wt 126 lb 8.7 oz (57.4 kg)   SpO2 94%   BMI 19.24 kg/m²     LABS:  WBC:    Lab Results   Component Value Date    WBC 17.5 03/21/2022     H/H:    Lab Results   Component Value Date    HGB 12.1 03/21/2022    HCT 38.4 03/21/2022     PTT:    Lab Results   Component Value Date    APTT 24.6 01/18/2018   [APTT}  PT/INR:    Lab Results   Component Value Date    PROTIME 12.4 05/13/2019    INR 1.09 05/13/2019     HgBA1c:    Lab Results   Component Value Date    LABA1C 6.3 03/24/2021       Assessment   Lele Risk Score: Lele Scale Score: 20    Patient Active Problem List   Diagnosis Code    Lung cancer ([facility] C34.90    Hypertension I10    Diverticulosis K57.90    Acute on chronic respiratory failure with hypoxia (HCC) J96.21    OAB (overactive bladder) N32.81    Pulmonary nodules R91.8    Ventral hernia K43.9    COPD, severe (HCC) J44.9    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    Upper GI bleeding K92.2    Chronic respiratory failure with hypoxia (HCC) J96.11    Mucus plugging of bronchi T17.500A    COPD with respiratory distress, acute ([facility]) J44.1    Centrilobular emphysema (HCC) J43.2       Measurements:  Wound 03/21/22 Buttocks Left Stage 1, non-blanchable erythema (Active)   Wound Image   03/21/22 1307   Wound Etiology Pressure Stage  1 03/21/22 1307   Dressing Status Clean;Dry; Intact 03/21/22 1307   Dressing/Treatment Foam;Zinc paste 03/21/22 1307   Dressing Change Due 03/23/22 03/21/22 1307   Wound Length (cm) 1.5 cm 03/21/22 1307   Wound Width (cm) 2 cm 03/21/22 1307   Wound Surface Area (cm^2) 3 cm^2 03/21/22 1307   Wound Assessment Non-blanchable erythema 03/21/22 1307   Elena-wound Assessment Intact 03/21/22 1307   Number of days: 0     Patient agreeable to wound care visit and wound to be photographed. Patient has a stage 1 pressure injury to left buttocks over a bony prominence. MASD (intertigo) to gluteal fold. Stage 2 pressure injury to right side of coccyx. Applied zinc oxide and covered with bordered foam. Instructed on importance to turn from side to side to decrease the pressure to the coccyx, sacrum, and buttocks              Response to treatment:  Well tolerated by patient. Pain Assessment:  Severity:  0 / 10  Quality of pain: N/A  Wound Pain Timing/Severity: none  Premedicated: N/A    Plan   Plan of Care: Wound 03/21/22 Buttocks Left Stage 1, non-blanchable erythema-Dressing/Treatment: Foam,Zinc paste  Wound 03/21/22 Coccyx Right Stage 2 Pressure Injury with crusted area-Dressing/Treatment: Foam,Zinc paste    Specialty Bed Required : No   [] Low Air Loss   [] Pressure Redistribution  [] Fluid Immersion  [] Bariatric  [] Total Pressure Relief  [] Other:     Current Diet: ADULT DIET;  Full Liquid  Dietician consult:  Yes    Discharge Plan:  Placement for patient upon discharge: home with support    Patient appropriate for Outpatient [address] [facility]: N/A    Referrals:  [x]   [] [address] [employer] [facility]  [] Supplies  [] Other    Patient/Caregiver Teaching:  Level of patient/caregiver understanding able to:   [x] Indicates understanding       [x] Needs reinforcement  [] Unsuccessful      [] Verbal Understanding  [] Demonstrated understanding       [] No evidence of learning  [] Refused teaching         [] N/A       Electronically signed by Luke Andrade RN, [id number]  on 3/21/2022 at 1:27 PM

## 2022-03-22 ENCOUNTER — APPOINTMENT (OUTPATIENT)
Dept: CT IMAGING | Age: 77
DRG: 871 | End: 2022-03-22
Payer: COMMERCIAL

## 2022-03-22 LAB
ANION GAP SERPL CALCULATED.3IONS-SCNC: 8 MMOL/L (ref 3–16)
BANDED NEUTROPHILS RELATIVE PERCENT: 9 % (ref 0–7)
BASOPHILS ABSOLUTE: 0 K/UL (ref 0–0.2)
BASOPHILS RELATIVE PERCENT: 0 %
BUN BLDV-MCNC: 26 MG/DL (ref 7–20)
CALCIUM SERPL-MCNC: 8.8 MG/DL (ref 8.3–10.6)
CHLORIDE BLD-SCNC: 99 MMOL/L (ref 99–110)
CO2: 33 MMOL/L (ref 21–32)
CREAT SERPL-MCNC: 0.5 MG/DL (ref 0.6–1.2)
EOSINOPHILS ABSOLUTE: 0 K/UL (ref 0–0.6)
EOSINOPHILS RELATIVE PERCENT: 0 %
GFR AFRICAN AMERICAN: >60
GFR NON-AFRICAN AMERICAN: >60
GLUCOSE BLD-MCNC: 107 MG/DL (ref 70–99)
GLUCOSE BLD-MCNC: 115 MG/DL (ref 70–99)
GLUCOSE BLD-MCNC: 137 MG/DL (ref 70–99)
GLUCOSE BLD-MCNC: 165 MG/DL (ref 70–99)
HCT VFR BLD CALC: 35.8 % (ref 36–48)
HEMOGLOBIN: 11.4 G/DL (ref 12–16)
LYMPHOCYTES ABSOLUTE: 0.7 K/UL (ref 1–5.1)
LYMPHOCYTES RELATIVE PERCENT: 4 %
MCH RBC QN AUTO: 28.9 PG (ref 26–34)
MCHC RBC AUTO-ENTMCNC: 31.9 G/DL (ref 31–36)
MCV RBC AUTO: 90.7 FL (ref 80–100)
MONOCYTES ABSOLUTE: 1.5 K/UL (ref 0–1.3)
MONOCYTES RELATIVE PERCENT: 9 %
NEUTROPHILS ABSOLUTE: 14.8 K/UL (ref 1.7–7.7)
NEUTROPHILS RELATIVE PERCENT: 78 %
PDW BLD-RTO: 14.1 % (ref 12.4–15.4)
PERFORMED ON: ABNORMAL
PLATELET # BLD: 106 K/UL (ref 135–450)
PLATELET SLIDE REVIEW: ABNORMAL
PMV BLD AUTO: 10.2 FL (ref 5–10.5)
POTASSIUM SERPL-SCNC: 4.3 MMOL/L (ref 3.5–5.1)
RBC # BLD: 3.95 M/UL (ref 4–5.2)
RBC # BLD: NORMAL 10*6/UL
SLIDE REVIEW: ABNORMAL
SODIUM BLD-SCNC: 140 MMOL/L (ref 136–145)
WBC # BLD: 17 K/UL (ref 4–11)

## 2022-03-22 PROCEDURE — 6370000000 HC RX 637 (ALT 250 FOR IP): Performed by: INTERNAL MEDICINE

## 2022-03-22 PROCEDURE — 80048 BASIC METABOLIC PNL TOTAL CA: CPT

## 2022-03-22 PROCEDURE — 2580000003 HC RX 258: Performed by: NURSE PRACTITIONER

## 2022-03-22 PROCEDURE — 36415 COLL VENOUS BLD VENIPUNCTURE: CPT

## 2022-03-22 PROCEDURE — 71250 CT THORAX DX C-: CPT

## 2022-03-22 PROCEDURE — 2700000000 HC OXYGEN THERAPY PER DAY

## 2022-03-22 PROCEDURE — 6360000002 HC RX W HCPCS: Performed by: NURSE PRACTITIONER

## 2022-03-22 PROCEDURE — 94640 AIRWAY INHALATION TREATMENT: CPT

## 2022-03-22 PROCEDURE — 99233 SBSQ HOSP IP/OBS HIGH 50: CPT | Performed by: INTERNAL MEDICINE

## 2022-03-22 PROCEDURE — 6370000000 HC RX 637 (ALT 250 FOR IP): Performed by: NURSE PRACTITIONER

## 2022-03-22 PROCEDURE — 85025 COMPLETE CBC W/AUTO DIFF WBC: CPT

## 2022-03-22 PROCEDURE — 87070 CULTURE OTHR SPECIMN AEROBIC: CPT

## 2022-03-22 PROCEDURE — 2060000000 HC ICU INTERMEDIATE R&B

## 2022-03-22 PROCEDURE — 87205 SMEAR GRAM STAIN: CPT

## 2022-03-22 PROCEDURE — 94761 N-INVAS EAR/PLS OXIMETRY MLT: CPT

## 2022-03-22 PROCEDURE — 6370000000 HC RX 637 (ALT 250 FOR IP): Performed by: PHYSICIAN ASSISTANT

## 2022-03-22 RX ORDER — IPRATROPIUM BROMIDE AND ALBUTEROL SULFATE 2.5; .5 MG/3ML; MG/3ML
1 SOLUTION RESPIRATORY (INHALATION)
Status: DISCONTINUED | OUTPATIENT
Start: 2022-03-22 | End: 2022-03-26 | Stop reason: HOSPADM

## 2022-03-22 RX ADMIN — INSULIN LISPRO 1 UNITS: 100 INJECTION, SOLUTION INTRAVENOUS; SUBCUTANEOUS at 20:04

## 2022-03-22 RX ADMIN — Medication 10 ML: at 08:16

## 2022-03-22 RX ADMIN — IPRATROPIUM BROMIDE AND ALBUTEROL SULFATE 1 AMPULE: .5; 2.5 SOLUTION RESPIRATORY (INHALATION) at 03:46

## 2022-03-22 RX ADMIN — IPRATROPIUM BROMIDE AND ALBUTEROL SULFATE 1 AMPULE: 2.5; .5 SOLUTION RESPIRATORY (INHALATION) at 12:22

## 2022-03-22 RX ADMIN — TRAMADOL HYDROCHLORIDE 50 MG: 50 TABLET, FILM COATED ORAL at 10:01

## 2022-03-22 RX ADMIN — GABAPENTIN 300 MG: 300 CAPSULE ORAL at 20:04

## 2022-03-22 RX ADMIN — IPRATROPIUM BROMIDE AND ALBUTEROL SULFATE 1 AMPULE: 2.5; .5 SOLUTION RESPIRATORY (INHALATION) at 16:32

## 2022-03-22 RX ADMIN — Medication 2 PUFF: at 19:51

## 2022-03-22 RX ADMIN — BUSPIRONE HYDROCHLORIDE 5 MG: 5 TABLET ORAL at 20:04

## 2022-03-22 RX ADMIN — TROSPIUM CHLORIDE 20 MG: 20 TABLET, FILM COATED ORAL at 16:24

## 2022-03-22 RX ADMIN — DILTIAZEM HYDROCHLORIDE 30 MG: 30 TABLET, FILM COATED ORAL at 16:24

## 2022-03-22 RX ADMIN — DILTIAZEM HYDROCHLORIDE 30 MG: 30 TABLET, FILM COATED ORAL at 23:06

## 2022-03-22 RX ADMIN — TROSPIUM CHLORIDE 20 MG: 20 TABLET, FILM COATED ORAL at 06:21

## 2022-03-22 RX ADMIN — Medication 2 PUFF: at 09:11

## 2022-03-22 RX ADMIN — ENOXAPARIN SODIUM 40 MG: 40 INJECTION SUBCUTANEOUS at 08:12

## 2022-03-22 RX ADMIN — DILTIAZEM HYDROCHLORIDE 30 MG: 30 TABLET, FILM COATED ORAL at 08:13

## 2022-03-22 RX ADMIN — PANTOPRAZOLE SODIUM 40 MG: 40 TABLET, DELAYED RELEASE ORAL at 06:21

## 2022-03-22 RX ADMIN — IPRATROPIUM BROMIDE AND ALBUTEROL SULFATE 1 AMPULE: 2.5; .5 SOLUTION RESPIRATORY (INHALATION) at 19:50

## 2022-03-22 RX ADMIN — ACETAMINOPHEN 325MG 650 MG: 325 TABLET ORAL at 10:01

## 2022-03-22 RX ADMIN — Medication 10 ML: at 20:09

## 2022-03-22 RX ADMIN — AZITHROMYCIN MONOHYDRATE 500 MG: 500 INJECTION, POWDER, LYOPHILIZED, FOR SOLUTION INTRAVENOUS at 23:02

## 2022-03-22 RX ADMIN — GABAPENTIN 300 MG: 300 CAPSULE ORAL at 08:13

## 2022-03-22 RX ADMIN — MELATONIN TAB 3 MG 3 MG: 3 TAB at 00:02

## 2022-03-22 RX ADMIN — IPRATROPIUM BROMIDE AND ALBUTEROL SULFATE 1 AMPULE: .5; 2.5 SOLUTION RESPIRATORY (INHALATION) at 09:08

## 2022-03-22 RX ADMIN — INSULIN LISPRO 1 UNITS: 100 INJECTION, SOLUTION INTRAVENOUS; SUBCUTANEOUS at 08:16

## 2022-03-22 RX ADMIN — BUSPIRONE HYDROCHLORIDE 5 MG: 5 TABLET ORAL at 08:14

## 2022-03-22 RX ADMIN — Medication 1000 MG: at 22:57

## 2022-03-22 ASSESSMENT — PAIN SCALES - GENERAL
PAINLEVEL_OUTOF10: 0
PAINLEVEL_OUTOF10: 5
PAINLEVEL_OUTOF10: 0
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
## 2022-03-22 NOTE — PROGRESS NOTES
I spoke to patient's nurse last evening, regarding the request for ENT consultation. She was not aware of the request and did not know why it was requested. This request states the reason is \"missed office visit. \"  I advised the nurse that if the consult was simply requested because she missed her office appointment due to current admission, she can reschedule her office appointment for another day after she is discharged. I advised nurse that if there is another reason for the consultation, then I should be recontacted.

## 2022-03-22 NOTE — PROGRESS NOTES
Hospitalist Progress Note      PCP: Surinder Orellana MD    Date of Admission: 3/20/2022      Subjective: Covid testing pending, no acute event overnight, currently on nasal cannula with acceptable saturation. Medications:  Reviewed    Infusion Medications    sodium chloride      dextrose       Scheduled Medications    ipratropium-albuterol  1 ampule Inhalation Q4H WA    busPIRone  5 mg Oral BID    gabapentin  300 mg Oral BID    trospium  20 mg Oral BID AC    pantoprazole  40 mg Oral QAM AC    sodium chloride flush  5-40 mL IntraVENous 2 times per day    enoxaparin  40 mg SubCUTAneous Daily    azithromycin  500 mg IntraVENous Q24H    cefTRIAXone (ROCEPHIN) IV  1,000 mg IntraVENous Q24H    insulin lispro  0-6 Units SubCUTAneous TID WC    insulin lispro  0-3 Units SubCUTAneous Nightly    budesonide-formoterol  2 puff Inhalation BID    dilTIAZem  30 mg Oral 3 times per day     PRN Meds: albuterol, traMADol, sodium chloride flush, sodium chloride, ondansetron **OR** ondansetron, polyethylene glycol, acetaminophen **OR** acetaminophen, glucose, dextrose bolus (hypoglycemia), glucagon (rDNA), dextrose, albuterol sulfate HFA **AND** ipratropium, melatonin      Intake/Output Summary (Last 24 hours) at 3/22/2022 1607  Last data filed at 3/21/2022 2123  Gross per 24 hour   Intake 10 ml   Output    Net 10 ml       Physical Exam Performed:    BP (!) 100/40   Pulse 86   Temp 98.1 °F (36.7 °C) (Temporal)   Resp 20   Ht 5' 8\" (1.727 m)   Wt 127 lb 6.8 oz (57.8 kg)   SpO2 93%   BMI 19.37 kg/m²     General appearance: No apparent distress, appears stated age and cooperative. HEENT: Pupils equal, round, and reactive to light. Conjunctivae/corneas clear. Neck: Supple, with full range of motion. No jugular venous distention. Trachea midline. Respiratory:  Normal respiratory effort. Clear to auscultation, bilaterally without Rales/Wheezes/Rhonchi.   Cardiovascular: Regular rate and rhythm with normal S1/S2 without murmurs, rubs or gallops. Abdomen: Soft, non-tender, non-distended with normal bowel sounds. Musculoskeletal: No clubbing, cyanosis or edema bilaterally. Full range of motion without deformity. Skin: Skin color, texture, turgor normal.  No rashes or lesions. Neurologic:  Neurovascularly intact without any focal sensory/motor deficits. Cranial nerves: II-XII intact, grossly non-focal.  Psychiatric: Alert and oriented, thought content appropriate, normal insight  Capillary Refill: Brisk,3 seconds, normal   Peripheral Pulses: +2 palpable, equal bilaterally       Labs:   Recent Labs     03/20/22 2145 03/21/22  0716 03/22/22  0853   WBC 19.7* 17.5* 17.0*   HGB 13.9 12.1 11.4*   HCT 44.0 38.4 35.8*    121* 106*     Recent Labs     03/20/22 2145 03/21/22  0626 03/22/22  0853    141 140   K 4.7 5.0 4.3   CL 97* 102 99   CO2 28 26 33*   BUN 16 21* 26*   CREATININE 0.5* 0.7 0.5*   CALCIUM 10.0 9.0 8.8     Recent Labs     03/20/22 2145 03/21/22  0626   AST 22 30   ALT 10 11   BILITOT 0.7 0.5   ALKPHOS 75 59     No results for input(s): INR in the last 72 hours. Recent Labs     03/20/22 2145   TROPONINI <0.01       Urinalysis:      Lab Results   Component Value Date    NITRU Negative 01/29/2018    WBCUA 37 01/29/2018    BACTERIA Rare 05/24/2013    RBCUA None seen 01/29/2018    BLOODU neg 07/23/2018    BLOODU Negative 01/29/2018    SPECGRAV 1.010 07/23/2018    SPECGRAV >1.030 01/29/2018    GLUCOSEU neg 07/23/2018    GLUCOSEU Negative 01/29/2018       Radiology:  CT CHEST WO CONTRAST   Final Result   Multifocal pneumonia significantly increased in the right lower lobe compared   to 03/15/2022. Trace right-sided pleural effusion is seen.   Scattered   ground-glass opacities and tree-in-bud nodularity is seen throughout the   remaining portion of the left lung, slightly increased compared to prior,   also likely postinflammatory-infectious         XR CHEST PORTABLE   Final Result   Developing infiltrate in the right lung base, more prominent since recent   chest CT. Assessment/Plan:    Active Hospital Problems    Diagnosis     Acute on chronic respiratory failure with hypoxia (HCC) [J96.21]      Acute on chronic respiratory failure with hypoxia: Likely secondary to right lower lobe pneumonia, and use BiPAP overnight, pulmonary on board, continue antibiotic azithromycin and Rocephin, follow-up blood culture urine antigen. right lower lobe pneumonia  -abx as noted above    Acute exacerbation of COPD: Precipitated by pneumonia, currently not wheezing, pulmonary following, off steroids, on Symbicort and DuoNeb. Right lower lobe nodule: Patient was scheduled to have biopsy this Thursday, pulmonary is following, history of lung cancer    History of hypertension, continue home meds    Current nicotine use: Counseled for smoking cessation, currently smokes about 3 cigarettes a day. History of diabetes mellitus, continue sliding scale insulin    Sacral wound stage I, present on admission,: Wound care consulted    DVT Prophylaxis: Lovenox  Diet: ADULT DIET; Full Liquid  Code Status: Full Code    PT/OT Eval Status:  For evaluation    Dispo -continue inpatient care    Deshawn Massey MD

## 2022-03-22 NOTE — PROGRESS NOTES
Assessment completed, see doc flowsheets. Pt is A&OX4 Lung sounds are clear; fine crackles on R lower lung. VSS. Tele on. Medication given per [facility]. Patient has no needs at this time.  Call light within in reach, will continue to monitor. '

## 2022-03-22 NOTE — RT PROTOCOL NOTE
RT Inhaler-Nebulizer Bronchodilator Protocol Note    There is a bronchodilator order in the chart from a provider indicating to follow the RT Bronchodilator Protocol and there is an Initiate RT Inhaler-Nebulizer Bronchodilator Protocol order as well (see protocol at bottom of note). CXR Findings:  XR CHEST PORTABLE    Result Date: 3/20/2022  Developing infiltrate in the right lung base, more prominent since recent chest CT. The findings from the last RT Protocol Assessment were as follows:   History Pulmonary Disease: Chronic pulmonary disease  Respiratory Pattern: Mild dyspnea at rest, irregular pattern, or RR 21-25 bpm  Breath Sounds: Slightly diminished and/or crackles  Cough: Strong, productive  Indication for Bronchodilator Therapy: Decreased or absent breath sounds  Bronchodilator Assessment Score: 9    Aerosolized bronchodilator medication orders have been revised according to the RT Inhaler-Nebulizer Bronchodilator Protocol below. Respiratory Therapist to perform RT Therapy Protocol Assessment initially then follow the protocol. Repeat RT Therapy Protocol Assessment PRN for score 0-3 or on second treatment, BID, and PRN for scores above 3. No Indications  adjust the frequency to every 6 hours PRN wheezing or bronchospasm, if no treatments needed after 48 hours then discontinue using Per Protocol order mode. If indication present, adjust the RT bronchodilator orders based on the Bronchodilator Assessment Score as indicated below. Use Inhaler orders unless patient has one or more of the following: on home nebulizer, not able to hold breath for 10 seconds, is not alert and oriented, cannot activate and use MDI correctly, or respiratory rate 25 breaths per minute or more, then use the equivalent nebulizer order(s) with same Frequency and PRN reasons based on the score. If a patient is on this medication at home then do not decrease Frequency below that used at home.     0-3  enter or revise RT bronchodilator order(s) to equivalent RT Bronchodilator order with Frequency of every 4 hours PRN for wheezing or increased work of breathing using Per Protocol order mode. 4-6  enter or revise RT Bronchodilator order(s) to two equivalent RT bronchodilator orders with one order with BID Frequency and one order with Frequency of every 4 hours PRN wheezing or increased work of breathing using Per Protocol order mode. 7-10  enter or revise RT Bronchodilator order(s) to two equivalent RT bronchodilator orders with one order with TID Frequency and one order with Frequency of every 4 hours PRN wheezing or increased work of breathing using Per Protocol order mode. 11-13  enter or revise RT Bronchodilator order(s) to one equivalent RT bronchodilator order with QID Frequency and an Albuterol order with Frequency of every 4 hours PRN wheezing or increased work of breathing using Per Protocol order mode. Greater than 13  enter or revise RT Bronchodilator order(s) to one equivalent RT bronchodilator order with every 4 hours Frequency and an Albuterol order with Frequency of every 2 hours PRN wheezing or increased work of breathing using Per Protocol order mode. RT to enter RT Home Evaluation for COPD & MDI Assessment order using Per Protocol order mode.     Electronically signed by Kemal Gonzalez RCP on 3/22/2022 at 9:38 AM

## 2022-03-22 NOTE — PROGRESS NOTES
03/22/22 0937   RT Protocol   History Pulmonary Disease 2   Respiratory pattern 4   Breath sounds 2   Cough 1   Indications for Bronchodilator Therapy Decreased or absent breath sounds   Bronchodilator Assessment Score 9

## 2022-03-23 LAB
GLUCOSE BLD-MCNC: 124 MG/DL (ref 70–99)
GLUCOSE BLD-MCNC: 166 MG/DL (ref 70–99)
GLUCOSE BLD-MCNC: 86 MG/DL (ref 70–99)
GLUCOSE BLD-MCNC: 96 MG/DL (ref 70–99)
HCT VFR BLD CALC: 33.6 % (ref 36–48)
HEMOGLOBIN: 10.6 G/DL (ref 12–16)
L. PNEUMOPHILA SEROGP 1 UR AG: NORMAL
MCH RBC QN AUTO: 28.9 PG (ref 26–34)
MCHC RBC AUTO-ENTMCNC: 31.5 G/DL (ref 31–36)
MCV RBC AUTO: 91.8 FL (ref 80–100)
PDW BLD-RTO: 14.5 % (ref 12.4–15.4)
PERFORMED ON: ABNORMAL
PERFORMED ON: ABNORMAL
PERFORMED ON: NORMAL
PERFORMED ON: NORMAL
PLATELET # BLD: 110 K/UL (ref 135–450)
PMV BLD AUTO: 10.4 FL (ref 5–10.5)
PROCALCITONIN: 1.98 NG/ML (ref 0–0.15)
RBC # BLD: 3.66 M/UL (ref 4–5.2)
STREP PNEUMONIAE ANTIGEN, URINE: ABNORMAL
WBC # BLD: 12.2 K/UL (ref 4–11)

## 2022-03-23 PROCEDURE — 6360000002 HC RX W HCPCS: Performed by: NURSE PRACTITIONER

## 2022-03-23 PROCEDURE — 6370000000 HC RX 637 (ALT 250 FOR IP): Performed by: NURSE PRACTITIONER

## 2022-03-23 PROCEDURE — 6370000000 HC RX 637 (ALT 250 FOR IP): Performed by: INTERNAL MEDICINE

## 2022-03-23 PROCEDURE — 85027 COMPLETE CBC AUTOMATED: CPT

## 2022-03-23 PROCEDURE — 2700000000 HC OXYGEN THERAPY PER DAY

## 2022-03-23 PROCEDURE — 36415 COLL VENOUS BLD VENIPUNCTURE: CPT

## 2022-03-23 PROCEDURE — 2580000003 HC RX 258: Performed by: NURSE PRACTITIONER

## 2022-03-23 PROCEDURE — 94761 N-INVAS EAR/PLS OXIMETRY MLT: CPT

## 2022-03-23 PROCEDURE — 94640 AIRWAY INHALATION TREATMENT: CPT

## 2022-03-23 PROCEDURE — 2060000000 HC ICU INTERMEDIATE R&B

## 2022-03-23 PROCEDURE — 84145 PROCALCITONIN (PCT): CPT

## 2022-03-23 PROCEDURE — 99233 SBSQ HOSP IP/OBS HIGH 50: CPT | Performed by: INTERNAL MEDICINE

## 2022-03-23 RX ADMIN — IPRATROPIUM BROMIDE AND ALBUTEROL SULFATE 1 AMPULE: 2.5; .5 SOLUTION RESPIRATORY (INHALATION) at 19:59

## 2022-03-23 RX ADMIN — Medication 10 ML: at 20:17

## 2022-03-23 RX ADMIN — INSULIN LISPRO 1 UNITS: 100 INJECTION, SOLUTION INTRAVENOUS; SUBCUTANEOUS at 08:28

## 2022-03-23 RX ADMIN — ACETAMINOPHEN 325MG 650 MG: 325 TABLET ORAL at 10:15

## 2022-03-23 RX ADMIN — TROSPIUM CHLORIDE 20 MG: 20 TABLET, FILM COATED ORAL at 06:20

## 2022-03-23 RX ADMIN — Medication 2 PUFF: at 19:59

## 2022-03-23 RX ADMIN — Medication 1000 MG: at 22:17

## 2022-03-23 RX ADMIN — BUSPIRONE HYDROCHLORIDE 5 MG: 5 TABLET ORAL at 08:28

## 2022-03-23 RX ADMIN — BUSPIRONE HYDROCHLORIDE 5 MG: 5 TABLET ORAL at 20:18

## 2022-03-23 RX ADMIN — PANTOPRAZOLE SODIUM 40 MG: 40 TABLET, DELAYED RELEASE ORAL at 06:20

## 2022-03-23 RX ADMIN — IPRATROPIUM BROMIDE AND ALBUTEROL SULFATE 1 AMPULE: 2.5; .5 SOLUTION RESPIRATORY (INHALATION) at 08:50

## 2022-03-23 RX ADMIN — ENOXAPARIN SODIUM 40 MG: 40 INJECTION SUBCUTANEOUS at 08:28

## 2022-03-23 RX ADMIN — GABAPENTIN 300 MG: 300 CAPSULE ORAL at 08:28

## 2022-03-23 RX ADMIN — DILTIAZEM HYDROCHLORIDE 30 MG: 30 TABLET, FILM COATED ORAL at 17:20

## 2022-03-23 RX ADMIN — TROSPIUM CHLORIDE 20 MG: 20 TABLET, FILM COATED ORAL at 17:20

## 2022-03-23 RX ADMIN — DILTIAZEM HYDROCHLORIDE 30 MG: 30 TABLET, FILM COATED ORAL at 08:28

## 2022-03-23 RX ADMIN — Medication 10 ML: at 08:40

## 2022-03-23 RX ADMIN — Medication 2 PUFF: at 08:50

## 2022-03-23 RX ADMIN — IPRATROPIUM BROMIDE AND ALBUTEROL SULFATE 1 AMPULE: 2.5; .5 SOLUTION RESPIRATORY (INHALATION) at 12:32

## 2022-03-23 RX ADMIN — GABAPENTIN 300 MG: 300 CAPSULE ORAL at 20:17

## 2022-03-23 ASSESSMENT — PAIN SCALES - GENERAL
PAINLEVEL_OUTOF10: 0
PAINLEVEL_OUTOF10: 3
PAINLEVEL_OUTOF10: 0
PAINLEVEL_OUTOF10: 0

## 2022-03-23 NOTE — PROGRESS NOTES
P Pulmonary and Critical Care  Progress note      Reason for Consult: Acute on chronic hypoxemic respiratory failure, pneumonia, pulmonary nodule, very severe COPD/emphysema  Requesting Physician: Dr Samantha Pa    Subjective:   [facility] / [facility] [place]:                The patient is a 78 y.o. female with significant past medical history of:      Diagnosis Date    Bronchitis     COPD     severe    Emphysema lung ([address]     severe    Hemoptysis     Hernia     Hypertension     Lung cancer ([facility] [address] 2008    left lung    Osteoporosis     Pneumonia     Rupture of colon ([facility] [address] 05/24/2013    Skin cancer 2014    ear     Interval history: She looks about the same today. Continues to have higher than normal oxygen requirement currently at 8 L/min. CT imaging yesterday documented new right lower lobe pneumonia.     Past Surgical History:        Procedure Laterality Date    BRONCHOSCOPY  08/05/2016    BRONCHOSCOPY  01/31/2018    CHOLECYSTECTOMY      COLONOSCOPY  8/20/13    KYPHOSIS SURGERY  02/04/2016    LOBECTOMY  0808    MANDIBLE SURGERY      upper jaw and lower jaw    OTHER SURGICAL HISTORY  5/30/13    secondary wound closure    SIGMOIDECTOMY  5/24/13    THORACOTOMY  0808    TONSILLECTOMY      UPPER GASTROINTESTINAL ENDOSCOPY  01/18/2018    UPPER GASTROINTESTINAL ENDOSCOPY N/A 10/31/2018    EGD BIOPSY performed by Stanislav Mendenhall MD at [facility] 1/30/2019    EGD performed by Stanislav Mendenhall MD at [address] [place] [address]     Current Medications:    Current Facility-Administered Medications: ipratropium-albuterol (DUONEB) nebulizer solution 1 ampule, 1 ampule, Inhalation, Q4H WA  albuterol (PROVENTIL) nebulizer solution 2.5 mg, 2.5 mg, Nebulization, Q6H PRN  busPIRone (BUSPAR) tablet 5 mg, 5 mg, Oral, BID  gabapentin (NEURONTIN) capsule 300 mg, 300 mg, Oral, BID  traMADol (ULTRAM) tablet 50 mg, 50 mg, Oral, BID PRN  trospium (SANCTURA) tablet 20 mg, 20 mg, Oral, BID AC  pantoprazole (PROTONIX) tablet 40 mg, 40 mg, Oral, QAM AC  sodium chloride flush 0.9 % injection 5-40 mL, 5-40 mL, IntraVENous, 2 times per day  sodium chloride flush 0.9 % injection 5-40 mL, 5-40 mL, IntraVENous, PRN  0.9 % sodium chloride infusion, 25 mL, IntraVENous, PRN  enoxaparin (LOVENOX) injection 40 mg, 40 mg, SubCUTAneous, Daily  ondansetron (ZOFRAN-ODT) disintegrating tablet 4 mg, 4 mg, Oral, Q8H PRN **OR** ondansetron (ZOFRAN) injection 4 mg, 4 mg, IntraVENous, Q6H PRN  polyethylene glycol (GLYCOLAX) packet 17 g, 17 g, Oral, Daily PRN  acetaminophen (TYLENOL) tablet 650 mg, 650 mg, Oral, Q6H PRN **OR** acetaminophen (TYLENOL) suppository 650 mg, 650 mg, Rectal, Q6H PRN  cefTRIAXone (ROCEPHIN) 1000 mg in sterile water 10 mL IV syringe, 1,000 mg, IntraVENous, Q24H  insulin lispro (1 Unit Dial) 0-6 Units, 0-6 Units, SubCUTAneous, TID WC  insulin lispro (1 Unit Dial) 0-3 Units, 0-3 Units, SubCUTAneous, Nightly  glucose (GLUTOSE) 40 % oral gel 15 g, 15 g, Oral, PRN  dextrose bolus (hypoglycemia) 10%, , IntraVENous, PRN  glucagon (rDNA) injection 1 mg, 1 mg, IntraMUSCular, PRN  dextrose 5 % solution, 100 mL/hr, IntraVENous, PRN  albuterol sulfate  (90 Base) MCG/ACT inhaler 2 puff, 2 puff, Inhalation, Q6H PRN **AND** ipratropium (ATROVENT HFA) 17 MCG/ACT inhaler 2 puff, 2 puff, Inhalation, Q6H PRN  budesonide-formoterol (SYMBICORT) 160-4.5 MCG/ACT inhaler 2 puff, 2 puff, Inhalation, BID  dilTIAZem (CARDIZEM) tablet 30 mg, 30 mg, Oral, 3 times per day  melatonin tablet 3 mg, 3 mg, Oral, Nightly PRN    Allergies   Allergen Reactions    Wellbutrin [Bupropion Hcl] Palpitations       Social History:    TOBACCO:   reports that she has been smoking cigarettes. She has a 0.42 pack-year smoking history. She has never used smokeless tobacco.  ETOH:   reports no history of alcohol use.   Patient currently lives independently  Environmental/chemical exposure: None known    Family History:       Problem Relation Age of Onset    Diabetes Father     Other Father         A-Fib    Cancer Father         prostate    Cancer Mother         ovarian    Other Sister         A-Fib     REVIEW OF SYSTEMS:    CONSTITUTIONAL: See HPI   EYES:  negative for blurred vision, eye discharge, visual disturbance and icterus  HEENT:  negative for hearing loss, tinnitus, ear drainage, sinus pressure, nasal congestion, epistaxis and snoring  RESPIRATORY:  See HPI  CARDIOVASCULAR:  negative for chest pain, palpitations, exertional chest pressure/discomfort, edema, syncope  GASTROINTESTINAL:  negative for nausea, vomiting, diarrhea, constipation, blood in stool and abdominal pain  GENITOURINARY:  negative for frequency, dysuria, urinary incontinence, decreased urine volume, and hematuria  HEMATOLOGIC/LYMPHATIC:  negative for easy bruising, bleeding and lymphadenopathy  ALLERGIC/IMMUNOLOGIC:  negative for recurrent infections, angioedema, anaphylaxis and drug reactions  ENDOCRINE:  negative for weight changes and diabetic symptoms including polyuria, polydipsia and polyphagia  MUSCULOSKELETAL:  negative for  pain, joint swelling, decreased range of motion and muscle weakness  NEUROLOGICAL:  negative for headaches, slurred speech, unilateral weakness  PSYCHIATRIC/BEHAVIORAL: negative for hallucinations, behavioral problems, confusion and agitation.      Objective:   PHYSICAL EXAM:      VITALS:  BP (!) 106/55   Pulse 100   Temp 98 °F (36.7 °C) (Temporal)   Resp 18   Ht 5' 8\" (1.727 m)   Wt 130 lb 1.1 oz (59 kg)   SpO2 91%   BMI 19.78 kg/m²      24HR INTAKE/OUTPUT:      Intake/Output Summary (Last 24 hours) at 3/23/2022 1034  Last data filed at 3/23/2022 1000  Gross per 24 hour   Intake 480 ml   Output 1300 ml   Net -820 ml     CONSTITUTIONAL:  awake, alert, cooperative, no apparent distress, and appears stated age  NECK:  Supple, symmetrical, trachea midline, no adenopathy, thyroid symmetric, not enlarged and no tenderness, skin normal  LUNGS: no increased work of breathing and diminished to auscultation. No accessory muscle use  CARDIOVASCULAR: S1 and S2, no edema and no JVD  ABDOMEN:  normal bowel sounds, non-distended and no masses palpated, and no tenderness to palpation. No hepatospleenomegaly  LYMPHADENOPATHY:  no axillary or supraclavicular adenopathy. No cervical adnenopathy  PSYCHIATRIC: Oriented to person place and time. No obvious depression or anxiety. MUSCULOSKELETAL: No obvious misalignment or effusion of the joints. No clubbing, cyanosis of the digits. SKIN:  normal skin color, texture, turgor and no redness, warmth, or swelling. No palpable nodules    DATA:    Old records have been reviewed    CBC:  Recent Labs     03/21/22  0716 03/22/22  0853 03/23/22  0503   WBC 17.5* 17.0* 12.2*   RBC 4.18 3.95* 3.66*   HGB 12.1 11.4* 10.6*   HCT 38.4 35.8* 33.6*   * 106* 110*   MCV 91.9 90.7 91.8   MCH 29.1 28.9 28.9   MCHC 31.6 31.9 31.5   RDW 14.4 14.1 14.5   BANDSPCT 36* 9*  --       BMP:  Recent Labs     03/20/22  2145 03/21/22  0626 03/22/22  0853    141 140   K 4.7 5.0 4.3   CL 97* 102 99   CO2 28 26 33*   BUN 16 21* 26*   CREATININE 0.5* 0.7 0.5*   CALCIUM 10.0 9.0 8.8   GLUCOSE 201* 170* 137*      ABG:  Recent Labs     03/20/22  2205   PHART 7.378   FZH1MEL 58.2*   PO2ART 147.0*   YPP2KEX 34.2*   I7CXIIPB 99.3   BEART 7.2*     Procalcitonin  Recent Labs     03/21/22  1042 03/23/22  0503   PROCAL 5.81* 1.98*       No results found for: BNP  Lab Results   Component Value Date    CKTOTAL 129 04/21/2010    TROPONINI <0.01 03/20/2022           Radiology Review:  All pertinent images / reports were reviewed as a part of this visit. Assessment:     1. Acute exacerbation COPD  2. Right lower lobe pneumonia  3. Right lower lobe pulmonary nodule  4. COPD, very severe/chronic hypoxemic respiratory failure  5.  History of lung cancer      Plan:     Patient presented with leukocytosis, fever to 102.5 degrees and a procalcitonin greater than 5. She now has repeat CT imaging which clearly shows right lower lobe airspace disease. This is new in comparison to imaging from 3/15/2022. Strep urinary antigen is positive making this most likely pneumococcal pneumonia  Procalcitonin and WBC are downtrending. Stop azithromycin  Continue Rocephin    CT imaging additionally revealed continued evidence of right lower lobe density which may be malignant. The density appears somewhat larger and more solid compared to previous imaging  However, navigational bronchoscopy scheduled for tomorrow will be canceled while she completes treatment and improves from her pneumonia. Patient does have very severe COPD now with acute exacerbation precipitated by her pneumonia  Not wheezing  Does not need steroids at this point  On Symbicort and scheduled duo nebs    She does have chronic hypoxemic respiratory failure requiring 4 L/min nasal cannula oxygen supplement  She is pretty used to oxygen saturations in the high 80s even with supplemental oxygen in place. However, her current hypoxemia is worse than usual.  This is likely secondary to her right lower lobe pneumonia.

## 2022-03-23 NOTE — CARE COORDINATION
Discharge Planning Assessment     discharge planner spoke with patient's spouse to discuss reason for admission, current living situation, and potential needs at the time of discharge    Demographics/Insurance verified Yes/No: Yes    Current type of dwelling: Home . 2 story. Just added a 1st floor bedroom suite with walk-in shower. Patient from ECF/ confirmed with:N/A    Living arrangements: With Spouse    Level of function/Support: Independent with ADLs and IADLs prior to admission. PCP: Arin Varela MD    Last Visit to PCP: 2 weeks ago    DME: Home O2 from [facility] with any community resources/agencies/skilled home care: Have had [facility] [id number] services in the past.    Medication compliance issues: None    Financial issues that could impact healthcare: None    Tentative discharge plan: Pending PT/OT evaluations. Discussed and provided facilities of choice if transition to a skilled nursing facility is required at the time of discharge    Discussed with patient and/or family that on the day of discharge home tentative time of discharge will be between 10 AM and noon. Transportation at the time of discharge: Spouse will provide transportation to home.     Electronically signed by ALICIA Deutsch on 3/23/2022 at 5:29 PM

## 2022-03-23 NOTE — PLAN OF CARE
Problem: Falls - Risk of:  Goal: Will remain free from falls  Description: Will remain free from falls  Outcome: Ongoing  Note: Mat Mealing remained safe and free of falls during this shift, a bed alarm was used to ensure safety.         Problem: Airway Clearance - Ineffective:  Goal: Ability to maintain a clear airway will improve  Description: Ability to maintain a clear airway will improve  Outcome: Ongoing  Note: Mary Ann's airway remained patent during this shift, she remained on supplemental oxygen

## 2022-03-23 NOTE — PROGRESS NOTES
Physician Progress Note      PATIENT:               Chayo Mills  CSN #:                  [id number]  :                       1945  ADMIT DATE:       3/20/2022 9:42 PM  [address] [place] DATE:  RESPONDING  PROVIDER #:        [place] Necessary MD          QUERY TEXT:    Pt admitted with RLL Pneumonia with Acute on Chronic Respiratory Failure w/   Hypoxia. Pt noted to have leukocytosis, febrile, elevated Procalcitonin. If   possible, please document in the progress notes and discharge summary if you   are evaluating and /or treating any of the following: The medical record reflects the following:  Risk Factors: Age, RLL Pneumonia, A/C RF w/Hypoxia, COPD, HTN, DM2  Clinical Indicators: Labs on admission 3/20/22 WBC 19.7  3/21/22 WBC 17.5   Bands 36 Procalcitonin 5.81   Vital Signs in ED 3/20/22 Axillary Temp 102.5    RR 22-35  SpO2 91% on NRB. Treatment: Pulmonary consult, iv NS Bolus, iv Zithromax, iv Rocephin x1,   DuoNeb tx.'s, iv Solumedrol x1, CxR, CT Chest/PE, monitor resp. status & labs  Options provided:  -- Sepsis, present on admission secondary to RLL Pneumonia  -- RLL Pneumonia without Sepsis  -- Other - I will add my own diagnosis  -- Disagree - Not applicable / Not valid  -- Disagree - Clinically unable to determine / Unknown  -- Refer to Clinical Documentation Reviewer    PROVIDER RESPONSE TEXT:    This patient has sepsis which was present on admission secondary to RLL   Pneumonia.     Query created by: Rolo Gramajo on 3/22/2022 11:37 AM      Electronically signed by:  Shraddha Stallworth MD 3/23/2022 2:37 PM

## 2022-03-23 NOTE — PROGRESS NOTES
Pt refused BIPAP. RN aware    . Electronically signed by Indiana Walters RCP on 3/23/2022 at 12:00 AM

## 2022-03-24 LAB
ANION GAP SERPL CALCULATED.3IONS-SCNC: 8 MMOL/L (ref 3–16)
BLOOD CULTURE, ROUTINE: NORMAL
BUN BLDV-MCNC: 12 MG/DL (ref 7–20)
CALCIUM SERPL-MCNC: 9.3 MG/DL (ref 8.3–10.6)
CHLORIDE BLD-SCNC: 101 MMOL/L (ref 99–110)
CO2: 33 MMOL/L (ref 21–32)
CREAT SERPL-MCNC: <0.5 MG/DL (ref 0.6–1.2)
CULTURE, BLOOD 2: NORMAL
GFR AFRICAN AMERICAN: >60
GFR NON-AFRICAN AMERICAN: >60
GLUCOSE BLD-MCNC: 106 MG/DL (ref 70–99)
GLUCOSE BLD-MCNC: 115 MG/DL (ref 70–99)
GLUCOSE BLD-MCNC: 122 MG/DL (ref 70–99)
GLUCOSE BLD-MCNC: 137 MG/DL (ref 70–99)
GLUCOSE BLD-MCNC: 148 MG/DL (ref 70–99)
PERFORMED ON: ABNORMAL
POTASSIUM SERPL-SCNC: 4.5 MMOL/L (ref 3.5–5.1)
SODIUM BLD-SCNC: 142 MMOL/L (ref 136–145)

## 2022-03-24 PROCEDURE — 6370000000 HC RX 637 (ALT 250 FOR IP): Performed by: NURSE PRACTITIONER

## 2022-03-24 PROCEDURE — 2580000003 HC RX 258: Performed by: NURSE PRACTITIONER

## 2022-03-24 PROCEDURE — 94669 MECHANICAL CHEST WALL OSCILL: CPT

## 2022-03-24 PROCEDURE — 2700000000 HC OXYGEN THERAPY PER DAY

## 2022-03-24 PROCEDURE — 6360000002 HC RX W HCPCS: Performed by: NURSE PRACTITIONER

## 2022-03-24 PROCEDURE — 99233 SBSQ HOSP IP/OBS HIGH 50: CPT | Performed by: INTERNAL MEDICINE

## 2022-03-24 PROCEDURE — 94761 N-INVAS EAR/PLS OXIMETRY MLT: CPT

## 2022-03-24 PROCEDURE — 94640 AIRWAY INHALATION TREATMENT: CPT

## 2022-03-24 PROCEDURE — 6370000000 HC RX 637 (ALT 250 FOR IP): Performed by: INTERNAL MEDICINE

## 2022-03-24 PROCEDURE — 2060000000 HC ICU INTERMEDIATE R&B

## 2022-03-24 PROCEDURE — 36415 COLL VENOUS BLD VENIPUNCTURE: CPT

## 2022-03-24 PROCEDURE — 80048 BASIC METABOLIC PNL TOTAL CA: CPT

## 2022-03-24 RX ADMIN — IPRATROPIUM BROMIDE AND ALBUTEROL SULFATE 1 AMPULE: 2.5; .5 SOLUTION RESPIRATORY (INHALATION) at 14:22

## 2022-03-24 RX ADMIN — GABAPENTIN 300 MG: 300 CAPSULE ORAL at 08:08

## 2022-03-24 RX ADMIN — Medication 1000 MG: at 22:46

## 2022-03-24 RX ADMIN — IPRATROPIUM BROMIDE AND ALBUTEROL SULFATE 1 AMPULE: 2.5; .5 SOLUTION RESPIRATORY (INHALATION) at 11:52

## 2022-03-24 RX ADMIN — ENOXAPARIN SODIUM 40 MG: 40 INJECTION SUBCUTANEOUS at 08:08

## 2022-03-24 RX ADMIN — TRAMADOL HYDROCHLORIDE 50 MG: 50 TABLET, FILM COATED ORAL at 11:39

## 2022-03-24 RX ADMIN — BUSPIRONE HYDROCHLORIDE 5 MG: 5 TABLET ORAL at 08:08

## 2022-03-24 RX ADMIN — Medication 10 ML: at 08:08

## 2022-03-24 RX ADMIN — Medication 2 PUFF: at 09:11

## 2022-03-24 RX ADMIN — INSULIN LISPRO 1 UNITS: 100 INJECTION, SOLUTION INTRAVENOUS; SUBCUTANEOUS at 21:26

## 2022-03-24 RX ADMIN — TROSPIUM CHLORIDE 20 MG: 20 TABLET, FILM COATED ORAL at 06:26

## 2022-03-24 RX ADMIN — TROSPIUM CHLORIDE 20 MG: 20 TABLET, FILM COATED ORAL at 16:48

## 2022-03-24 RX ADMIN — PANTOPRAZOLE SODIUM 40 MG: 40 TABLET, DELAYED RELEASE ORAL at 06:26

## 2022-03-24 RX ADMIN — ACETAMINOPHEN 325MG 650 MG: 325 TABLET ORAL at 22:46

## 2022-03-24 RX ADMIN — Medication 2 PUFF: at 20:20

## 2022-03-24 RX ADMIN — IPRATROPIUM BROMIDE AND ALBUTEROL SULFATE 1 AMPULE: 2.5; .5 SOLUTION RESPIRATORY (INHALATION) at 20:12

## 2022-03-24 RX ADMIN — BUSPIRONE HYDROCHLORIDE 5 MG: 5 TABLET ORAL at 21:26

## 2022-03-24 RX ADMIN — GABAPENTIN 300 MG: 300 CAPSULE ORAL at 21:26

## 2022-03-24 RX ADMIN — ACETAMINOPHEN 325MG 650 MG: 325 TABLET ORAL at 14:23

## 2022-03-24 RX ADMIN — DILTIAZEM HYDROCHLORIDE 30 MG: 30 TABLET, FILM COATED ORAL at 08:08

## 2022-03-24 RX ADMIN — DILTIAZEM HYDROCHLORIDE 30 MG: 30 TABLET, FILM COATED ORAL at 16:49

## 2022-03-24 RX ADMIN — IPRATROPIUM BROMIDE AND ALBUTEROL SULFATE 1 AMPULE: 2.5; .5 SOLUTION RESPIRATORY (INHALATION) at 09:06

## 2022-03-24 RX ADMIN — Medication 10 ML: at 21:26

## 2022-03-24 ASSESSMENT — PAIN DESCRIPTION - LOCATION
LOCATION: BUTTOCKS;BACK
LOCATION: BUTTOCKS
LOCATION: BACK

## 2022-03-24 ASSESSMENT — PAIN SCALES - GENERAL
PAINLEVEL_OUTOF10: 0
PAINLEVEL_OUTOF10: 7
PAINLEVEL_OUTOF10: 6
PAINLEVEL_OUTOF10: 0
PAINLEVEL_OUTOF10: 6
PAINLEVEL_OUTOF10: 0
PAINLEVEL_OUTOF10: 6
PAINLEVEL_OUTOF10: 0
PAINLEVEL_OUTOF10: 0

## 2022-03-24 ASSESSMENT — PAIN DESCRIPTION - PAIN TYPE
TYPE: ACUTE PAIN;CHRONIC PAIN
TYPE: CHRONIC PAIN
TYPE: ACUTE PAIN

## 2022-03-24 ASSESSMENT — PAIN DESCRIPTION - FREQUENCY: FREQUENCY: CONTINUOUS

## 2022-03-24 ASSESSMENT — PAIN DESCRIPTION - DESCRIPTORS: DESCRIPTORS: SORE

## 2022-03-24 ASSESSMENT — PAIN DESCRIPTION - ORIENTATION
ORIENTATION: LOWER
ORIENTATION: LOWER

## 2022-03-24 ASSESSMENT — PAIN DESCRIPTION - ONSET: ONSET: ON-GOING

## 2022-03-24 NOTE — PROGRESS NOTES
Hospitalist Progress Note      PCP: José Miguel Okeefe MD    Date of Admission: 3/20/2022      Subjective: Covid testing pending, no acute event overnight, currently on nasal cannula with acceptable saturation. Medications:  Reviewed    Infusion Medications    sodium chloride      dextrose       Scheduled Medications    ipratropium-albuterol  1 ampule Inhalation Q4H WA    busPIRone  5 mg Oral BID    gabapentin  300 mg Oral BID    trospium  20 mg Oral BID AC    pantoprazole  40 mg Oral QAM AC    sodium chloride flush  5-40 mL IntraVENous 2 times per day    enoxaparin  40 mg SubCUTAneous Daily    cefTRIAXone (ROCEPHIN) IV  1,000 mg IntraVENous Q24H    insulin lispro  0-6 Units SubCUTAneous TID WC    insulin lispro  0-3 Units SubCUTAneous Nightly    budesonide-formoterol  2 puff Inhalation BID    dilTIAZem  30 mg Oral 3 times per day     PRN Meds: albuterol, traMADol, sodium chloride flush, sodium chloride, ondansetron **OR** ondansetron, polyethylene glycol, acetaminophen **OR** acetaminophen, glucose, dextrose bolus (hypoglycemia), glucagon (rDNA), dextrose, albuterol sulfate HFA **AND** ipratropium, melatonin      Intake/Output Summary (Last 24 hours) at 3/24/2022 1748  Last data filed at 3/24/2022 1041  Gross per 24 hour   Intake    Output 1150 ml   Net -1150 ml       Physical Exam Performed:    /66   Pulse 97   Temp 98 °F (36.7 °C) (Temporal)   Resp 18   Ht 5' 8\" (1.727 m)   Wt 129 lb 12.8 oz (58.9 kg)   SpO2 92%   BMI 19.74 kg/m²     General appearance: No apparent distress, appears stated age and cooperative. HEENT: Pupils equal, round, and reactive to light. Conjunctivae/corneas clear. Neck: Supple, with full range of motion. No jugular venous distention. Trachea midline. Respiratory:  Normal respiratory effort. Clear to auscultation, bilaterally without Rales/Wheezes/Rhonchi.   Cardiovascular: Regular rate and rhythm with normal S1/S2 without murmurs, rubs or gallops. Abdomen: Soft, non-tender, non-distended with normal bowel sounds. Musculoskeletal: No clubbing, cyanosis or edema bilaterally. Full range of motion without deformity. Skin: Skin color, texture, turgor normal.  No rashes or lesions. Neurologic:  Neurovascularly intact without any focal sensory/motor deficits. Cranial nerves: II-XII intact, grossly non-focal.  Psychiatric: Alert and oriented, thought content appropriate, normal insight  Capillary Refill: Brisk,3 seconds, normal   Peripheral Pulses: +2 palpable, equal bilaterally       Labs:   Recent Labs     03/22/22  0853 03/23/22  0503   WBC 17.0* 12.2*   HGB 11.4* 10.6*   HCT 35.8* 33.6*   * 110*     Recent Labs     03/22/22  0853 03/24/22  0915    142   K 4.3 4.5   CL 99 101   CO2 33* 33*   BUN 26* 12   CREATININE 0.5* <0.5*   CALCIUM 8.8 9.3     No results for input(s): AST, ALT, BILIDIR, BILITOT, ALKPHOS in the last 72 hours. No results for input(s): INR in the last 72 hours. No results for input(s): Ethelene Soulier in the last 72 hours. Urinalysis:      Lab Results   Component Value Date    NITRU Negative 01/29/2018    WBCUA 37 01/29/2018    BACTERIA Rare 05/24/2013    RBCUA None seen 01/29/2018    BLOODU neg 07/23/2018    BLOODU Negative 01/29/2018    SPECGRAV 1.010 07/23/2018    SPECGRAV >1.030 01/29/2018    GLUCOSEU neg 07/23/2018    GLUCOSEU Negative 01/29/2018       Radiology:  CT CHEST WO CONTRAST   Final Result   Multifocal pneumonia significantly increased in the right lower lobe compared   to 03/15/2022. Trace right-sided pleural effusion is seen. Scattered   ground-glass opacities and tree-in-bud nodularity is seen throughout the   remaining portion of the left lung, slightly increased compared to prior,   also likely postinflammatory-infectious         XR CHEST PORTABLE   Final Result   Developing infiltrate in the right lung base, more prominent since recent   chest CT.                  Assessment/Plan:    Active Hospital Problems    Diagnosis     Acute on chronic respiratory failure with hypoxia (HCC) [J96.21]      Acute on chronic respiratory failure with hypoxia: Likely secondary to right lower lobe pneumonia, and use BiPAP overnight, pulmonary on board, continue antibiotic azithromycin and Rocephin, follow-up blood culture urine antigen. right lower lobe pneumonia  -abx as noted above    Acute exacerbation of COPD: Precipitated by pneumonia, currently not wheezing, pulmonary following, off steroids, on Symbicort and DuoNeb. Right lower lobe nodule: Patient was scheduled to have biopsy this Thursday, pulmonary is following, history of lung cancer    History of hypertension, continue home meds    Current nicotine use: Counseled for smoking cessation, currently smokes about 3 cigarettes a day. History of diabetes mellitus, continue sliding scale insulin    Sacral wound stage I, present on admission,: Wound care consulted    DVT Prophylaxis: Lovenox  Diet: ADULT DIET; Regular  Code Status: Full Code    PT/OT Eval Status:  For evaluation    Dispo -continue inpatient care    Ciera Oneill MD

## 2022-03-24 NOTE — PROGRESS NOTES
[address] [place] [address] 2 / 2100 Newport Osmany Stevenson MD Responsible Provider: Katya Toure MD    Anesthesia Type: general ASA Status: 3          Anesthesia Type: general    Paulina Phase I: Paulina Score: 10    Paulina Phase II:      Last vitals: Reviewed and per EMR flowsheets.        Anesthesia Post Evaluation    Patient location during evaluation: PACU  Patient participation: complete - patient participated  Level of consciousness: awake and alert  Pain score: 3  Airway patency: patent  Nausea & Vomiting: no vomiting and no nausea  Complications: no  Cardiovascular status: hemodynamically stable  Respiratory status: acceptable  Hydration status: stable P Pulmonary and Critical Care  Progress note      Reason for Consult: Acute on chronic hypoxemic respiratory failure, pneumonia, pulmonary nodule, very severe COPD/emphysema  Requesting Physician: Dr Smitha Jarquin:   [facility]:                The patient is a 78 y.o. female with significant past medical history of:      Diagnosis Date    Bronchitis     COPD     severe    Emphysema lung ([address]     severe    Hemoptysis     Hernia     Hypertension     Lung cancer ([address] 2008    left lung    Osteoporosis     Pneumonia     Rupture of colon ([address] 05/24/2013    Skin cancer 2014    ear     Interval history: Similar to yesterday. She looks weak. Has a congested sounding cough. Her  was at the bedside this morning.     Past Surgical History:        Procedure Laterality Date    BRONCHOSCOPY  08/05/2016    BRONCHOSCOPY  01/31/2018    CHOLECYSTECTOMY      COLONOSCOPY  8/20/13    KYPHOSIS SURGERY  02/04/2016    LOBECTOMY  0808    MANDIBLE SURGERY      upper jaw and lower jaw    OTHER SURGICAL HISTORY  5/30/13    secondary wound closure    SIGMOIDECTOMY  5/24/13    THORACOTOMY  0808    TONSILLECTOMY      UPPER GASTROINTESTINAL ENDOSCOPY  01/18/2018    UPPER GASTROINTESTINAL ENDOSCOPY N/A 10/31/2018    EGD BIOPSY performed by Nirali Hermosillo MD at [facility] N/A 1/30/2019    EGD performed by Nirali Hermosillo MD at [address] [place] [address]     Current Medications:    Current Facility-Administered Medications: ipratropium-albuterol (DUONEB) nebulizer solution 1 ampule, 1 ampule, Inhalation, Q4H WA  albuterol (PROVENTIL) nebulizer solution 2.5 mg, 2.5 mg, Nebulization, Q6H PRN  busPIRone (BUSPAR) tablet 5 mg, 5 mg, Oral, BID  gabapentin (NEURONTIN) capsule 300 mg, 300 mg, Oral, BID  traMADol (ULTRAM) tablet 50 mg, 50 mg, Oral, BID PRN  trospium (SANCTURA) tablet 20 mg, 20 mg, Oral, BID AC  pantoprazole (PROTONIX) tablet 40 mg, 40 mg, Oral, QAM AC  sodium chloride flush 0.9 % injection 5-40 mL, 5-40 mL, IntraVENous, 2 times per day  sodium chloride flush 0.9 % injection 5-40 mL, 5-40 mL, IntraVENous, PRN  0.9 % sodium chloride infusion, 25 mL, IntraVENous, PRN  enoxaparin (LOVENOX) injection 40 mg, 40 mg, SubCUTAneous, Daily  ondansetron (ZOFRAN-ODT) disintegrating tablet 4 mg, 4 mg, Oral, Q8H PRN **OR** ondansetron (ZOFRAN) injection 4 mg, 4 mg, IntraVENous, Q6H PRN  polyethylene glycol (GLYCOLAX) packet 17 g, 17 g, Oral, Daily PRN  acetaminophen (TYLENOL) tablet 650 mg, 650 mg, Oral, Q6H PRN **OR** acetaminophen (TYLENOL) suppository 650 mg, 650 mg, Rectal, Q6H PRN  cefTRIAXone (ROCEPHIN) 1000 mg in sterile water 10 mL IV syringe, 1,000 mg, IntraVENous, Q24H  insulin lispro (1 Unit Dial) 0-6 Units, 0-6 Units, SubCUTAneous, TID WC  insulin lispro (1 Unit Dial) 0-3 Units, 0-3 Units, SubCUTAneous, Nightly  glucose (GLUTOSE) 40 % oral gel 15 g, 15 g, Oral, PRN  dextrose bolus (hypoglycemia) 10%, , IntraVENous, PRN  glucagon (rDNA) injection 1 mg, 1 mg, IntraMUSCular, PRN  dextrose 5 % solution, 100 mL/hr, IntraVENous, PRN  albuterol sulfate  (90 Base) MCG/ACT inhaler 2 puff, 2 puff, Inhalation, Q6H PRN **AND** ipratropium (ATROVENT HFA) 17 MCG/ACT inhaler 2 puff, 2 puff, Inhalation, Q6H PRN  budesonide-formoterol (SYMBICORT) 160-4.5 MCG/ACT inhaler 2 puff, 2 puff, Inhalation, BID  dilTIAZem (CARDIZEM) tablet 30 mg, 30 mg, Oral, 3 times per day  melatonin tablet 3 mg, 3 mg, Oral, Nightly PRN    Allergies   Allergen Reactions    Wellbutrin [Bupropion Hcl] Palpitations       Social History:    TOBACCO:   reports that she has been smoking cigarettes. She has a 0.42 pack-year smoking history. She has never used smokeless tobacco.  ETOH:   reports no history of alcohol use.   Patient currently lives independently  Environmental/chemical exposure: None known    Family History:       Problem Relation Age of Onset    Diabetes Father    Yonatan Adams Other Father A-Fib    Cancer Father         prostate    Cancer Mother         ovarian    Other Sister         A-Fib     REVIEW OF SYSTEMS:    CONSTITUTIONAL: See HPI   EYES:  negative for blurred vision, eye discharge, visual disturbance and icterus  HEENT:  negative for hearing loss, tinnitus, ear drainage, sinus pressure, nasal congestion, epistaxis and snoring  RESPIRATORY:  See HPI  CARDIOVASCULAR:  negative for chest pain, palpitations, exertional chest pressure/discomfort, edema, syncope  GASTROINTESTINAL:  negative for nausea, vomiting, diarrhea, constipation, blood in stool and abdominal pain  GENITOURINARY:  negative for frequency, dysuria, urinary incontinence, decreased urine volume, and hematuria  HEMATOLOGIC/LYMPHATIC:  negative for easy bruising, bleeding and lymphadenopathy  ALLERGIC/IMMUNOLOGIC:  negative for recurrent infections, angioedema, anaphylaxis and drug reactions  ENDOCRINE:  negative for weight changes and diabetic symptoms including polyuria, polydipsia and polyphagia  MUSCULOSKELETAL:  negative for  pain, joint swelling, decreased range of motion and muscle weakness  NEUROLOGICAL:  negative for headaches, slurred speech, unilateral weakness  PSYCHIATRIC/BEHAVIORAL: negative for hallucinations, behavioral problems, confusion and agitation.      Objective:   PHYSICAL EXAM:      VITALS:  BP (!) 146/64   Pulse 96   Temp 96.7 °F (35.9 °C) (Temporal)   Resp 16   Ht 5' 8\" (1.727 m)   Wt 129 lb 12.8 oz (58.9 kg)   SpO2 94%   BMI 19.74 kg/m²      24HR INTAKE/OUTPUT:      Intake/Output Summary (Last 24 hours) at 3/24/2022 1002  Last data filed at 3/24/2022 0502  Gross per 24 hour   Intake 360 ml   Output 750 ml   Net -390 ml     CONSTITUTIONAL:  awake, alert, cooperative, no apparent distress, and appears stated age  NECK:  Supple, symmetrical, trachea midline, no adenopathy, thyroid symmetric, not enlarged and no tenderness, skin normal  LUNGS:  no increased work of breathing and diminished to auscultation. No accessory muscle use  CARDIOVASCULAR: S1 and S2, no edema and no JVD  ABDOMEN:  normal bowel sounds, non-distended and no masses palpated, and no tenderness to palpation. No hepatospleenomegaly  LYMPHADENOPATHY:  no axillary or supraclavicular adenopathy. No cervical adnenopathy  PSYCHIATRIC: Oriented to person place and time. No obvious depression or anxiety. MUSCULOSKELETAL: No obvious misalignment or effusion of the joints. No clubbing, cyanosis of the digits. SKIN:  normal skin color, texture, turgor and no redness, warmth, or swelling. No palpable nodules    DATA:    Old records have been reviewed    CBC:  Recent Labs     03/22/22  0853 03/23/22  0503   WBC 17.0* 12.2*   RBC 3.95* 3.66*   HGB 11.4* 10.6*   HCT 35.8* 33.6*   * 110*   MCV 90.7 91.8   MCH 28.9 28.9   MCHC 31.9 31.5   RDW 14.1 14.5   BANDSPCT 9*  --       BMP:  Recent Labs     03/22/22  0853      K 4.3   CL 99   CO2 33*   BUN 26*   CREATININE 0.5*   CALCIUM 8.8   GLUCOSE 137*      ABG:  No results for input(s): PHART, QVS1GDJ, PO2ART, EWP9LGR, P5BVNKJL, BEART in the last 72 hours. Procalcitonin  Recent Labs     03/21/22  1042 03/23/22  0503   PROCAL 5.81* 1.98*       No results found for: BNP  Lab Results   Component Value Date    CKTOTAL 129 04/21/2010    TROPONINI <0.01 03/20/2022           Radiology Review:  All pertinent images / reports were reviewed as a part of this visit. Assessment:     1. Acute exacerbation COPD  2. Right lower lobe pneumonia  3. Right lower lobe pulmonary nodule  4. COPD, very severe/chronic hypoxemic respiratory failure  5. History of lung cancer      Plan:     Patient presented with leukocytosis, fever to 102.5 degrees and a procalcitonin greater than 5. She now has repeat CT imaging which clearly shows right lower lobe airspace disease. This is new in comparison to imaging from 3/15/2022.   Strep urinary antigen is positive making this most likely pneumococcal pneumonia  Procalcitonin and WBC are downtrending. Continue Rocephin    CT imaging additionally revealed continued evidence of right lower lobe density which may be malignant. The density appears somewhat larger and more solid compared to previous imaging  Given her worsened hypoxemia and active right lower lobe pneumonia, I have canceled today's navigational bronchoscopy  Once she has recovered from her pneumonia we can revisit the idea of bronchoscopy and biopsy of the mass    Patient does have very severe COPD now with acute exacerbation precipitated by her pneumonia  Not wheezing  Does not need steroids at this point  On Symbicort and scheduled duo nebs    She does have chronic hypoxemic respiratory failure requiring 4 L/min nasal cannula oxygen supplement  She is pretty used to oxygen saturations in the high 80s even with supplemental oxygen in place.   However, her current hypoxemia is worse than usual.   Oxygen requirements, however, are downtrending and she is down to 6 L/min oxygen supplement this morning

## 2022-03-24 NOTE — PROGRESS NOTES
Hospitalist Progress Note      PCP: Chet Zeng MD    Date of Admission: 3/20/2022      Subjective: Covid testing pending, no acute event overnight, currently on nasal cannula with acceptable saturation. Medications:  Reviewed    Infusion Medications    sodium chloride      dextrose       Scheduled Medications    ipratropium-albuterol  1 ampule Inhalation Q4H WA    busPIRone  5 mg Oral BID    gabapentin  300 mg Oral BID    trospium  20 mg Oral BID AC    pantoprazole  40 mg Oral QAM AC    sodium chloride flush  5-40 mL IntraVENous 2 times per day    enoxaparin  40 mg SubCUTAneous Daily    cefTRIAXone (ROCEPHIN) IV  1,000 mg IntraVENous Q24H    insulin lispro  0-6 Units SubCUTAneous TID WC    insulin lispro  0-3 Units SubCUTAneous Nightly    budesonide-formoterol  2 puff Inhalation BID    dilTIAZem  30 mg Oral 3 times per day     PRN Meds: albuterol, traMADol, sodium chloride flush, sodium chloride, ondansetron **OR** ondansetron, polyethylene glycol, acetaminophen **OR** acetaminophen, glucose, dextrose bolus (hypoglycemia), glucagon (rDNA), dextrose, albuterol sulfate HFA **AND** ipratropium, melatonin      Intake/Output Summary (Last 24 hours) at 3/24/2022 1748  Last data filed at 3/24/2022 1041  Gross per 24 hour   Intake    Output 1150 ml   Net -1150 ml       Physical Exam Performed:    /66   Pulse 97   Temp 98 °F (36.7 °C) (Temporal)   Resp 18   Ht 5' 8\" (1.727 m)   Wt 129 lb 12.8 oz (58.9 kg)   SpO2 92%   BMI 19.74 kg/m²     General appearance: No apparent distress, appears stated age and cooperative. HEENT: Pupils equal, round, and reactive to light. Conjunctivae/corneas clear. Neck: Supple, with full range of motion. No jugular venous distention. Trachea midline. Respiratory:  Normal respiratory effort. Clear to auscultation, bilaterally without Rales/Wheezes/Rhonchi.   Cardiovascular: Regular rate and rhythm with normal S1/S2 without murmurs, rubs or gallops. Abdomen: Soft, non-tender, non-distended with normal bowel sounds. Musculoskeletal: No clubbing, cyanosis or edema bilaterally. Full range of motion without deformity. Skin: Skin color, texture, turgor normal.  No rashes or lesions. Neurologic:  Neurovascularly intact without any focal sensory/motor deficits. Cranial nerves: II-XII intact, grossly non-focal.  Psychiatric: Alert and oriented, thought content appropriate, normal insight  Capillary Refill: Brisk,3 seconds, normal   Peripheral Pulses: +2 palpable, equal bilaterally       Labs:   Recent Labs     03/22/22  0853 03/23/22  0503   WBC 17.0* 12.2*   HGB 11.4* 10.6*   HCT 35.8* 33.6*   * 110*     Recent Labs     03/22/22  0853 03/24/22  0915    142   K 4.3 4.5   CL 99 101   CO2 33* 33*   BUN 26* 12   CREATININE 0.5* <0.5*   CALCIUM 8.8 9.3     No results for input(s): AST, ALT, BILIDIR, BILITOT, ALKPHOS in the last 72 hours. No results for input(s): INR in the last 72 hours. No results for input(s): Burnice Bertie in the last 72 hours. Urinalysis:      Lab Results   Component Value Date    NITRU Negative 01/29/2018    WBCUA 37 01/29/2018    BACTERIA Rare 05/24/2013    RBCUA None seen 01/29/2018    BLOODU neg 07/23/2018    BLOODU Negative 01/29/2018    SPECGRAV 1.010 07/23/2018    SPECGRAV >1.030 01/29/2018    GLUCOSEU neg 07/23/2018    GLUCOSEU Negative 01/29/2018       Radiology:  CT CHEST WO CONTRAST   Final Result   Multifocal pneumonia significantly increased in the right lower lobe compared   to 03/15/2022. Trace right-sided pleural effusion is seen. Scattered   ground-glass opacities and tree-in-bud nodularity is seen throughout the   remaining portion of the left lung, slightly increased compared to prior,   also likely postinflammatory-infectious         XR CHEST PORTABLE   Final Result   Developing infiltrate in the right lung base, more prominent since recent   chest CT.                  Assessment/Plan:    Active Hospital Problems    Diagnosis     Acute on chronic respiratory failure with hypoxia ([facility]) [J96.21]      Acute on chronic respiratory failure with hypoxia: Likely secondary to right lower lobe pneumonia, and use BiPAP overnight, pulmonary on board, continue antibiotic azithromycin and Rocephin, follow-up blood culture urine antigen. right lower lobe pneumonia  -abx as noted above    Acute exacerbation of COPD: Precipitated by pneumonia, currently not wheezing, pulmonary following, off steroids, on Symbicort and DuoNeb. Right lower lobe nodule: Patient was scheduled to have biopsy this Thursday, pulmonary is following, history of lung cancer    History of hypertension, continue home meds    Current nicotine use: Counseled for smoking cessation, currently smokes about 3 cigarettes a day. History of diabetes mellitus, continue sliding scale insulin    Sacral wound stage I, present on admission,: Wound care consulted    DVT Prophylaxis: Lovenox  Diet: ADULT DIET; Regular  Code Status: Full Code    PT/OT Eval Status:  For evaluation    Dispo -continue inpatient care    Deshawn Massey MD

## 2022-03-24 NOTE — PROGRESS NOTES
3/23/22 PM:  Patient with need to increase HFNC O2 to 10L, refuses BiPap. Scheduled to have outpatient biopsy of RLL lung nodule on 3/24/22. Plans are to have bronch on 3/25/22. Patient hypotensive overnight, held PO BP meds. Dc plans are to return to home with spouse with [place].

## 2022-03-24 NOTE — PROGRESS NOTES
Pt is compliant with plan of care. Pt started today on 6 L high flow NC now on 5 L, MD wants patient back at home baseline. Bronchoscopy and biopsy held till pt is more stable with pneumonia and hypoxia. Encouraged pt to get out of bed and sit in the chair but she refused. Put mepilex on pt coccyx and rt butt cheek to prevent skin breakdown. I brought a foam wedge and seat cushion into the room for the patient but she refused to use them at the moment. PT/OT evaluations needed. Pt has  present and all standard safety in place.

## 2022-03-24 NOTE — PLAN OF CARE
Problem: Falls - Risk of:  Goal: Will remain free from falls  Description: Will remain free from falls  Outcome: Ongoing  Goal: Absence of physical injury  Description: Absence of physical injury  Outcome: Ongoing     Problem: Airway Clearance - Ineffective:  Goal: Clear lung sounds  Description: Clear lung sounds  Outcome: Ongoing  Goal: Ability to maintain a clear airway will improve  Description: Ability to maintain a clear airway will improve  Outcome: Ongoing     Problem: Skin Integrity:  Goal: Will show no infection signs and symptoms  Description: Will show no infection signs and symptoms  Outcome: Ongoing  Goal: Absence of new skin breakdown  Description: Absence of new skin breakdown  Outcome: Ongoing     Problem: Pain:  Goal: Pain level will decrease  Description: Pain level will decrease  Outcome: Ongoing  Goal: Control of acute pain  Description: Control of acute pain  Outcome: Ongoing  Goal: Control of chronic pain  Description: Control of chronic pain  Outcome: Ongoing

## 2022-03-25 ENCOUNTER — TELEPHONE (OUTPATIENT)
Dept: PRIMARY CARE CLINIC | Age: 77
End: 2022-03-25

## 2022-03-25 LAB
CULTURE, RESPIRATORY: NORMAL
GLUCOSE BLD-MCNC: 102 MG/DL (ref 70–99)
GLUCOSE BLD-MCNC: 123 MG/DL (ref 70–99)
GLUCOSE BLD-MCNC: 171 MG/DL (ref 70–99)
GLUCOSE BLD-MCNC: 94 MG/DL (ref 70–99)
GRAM STAIN RESULT: NORMAL
HCT VFR BLD CALC: 34.9 % (ref 36–48)
HEMOGLOBIN: 11 G/DL (ref 12–16)
MCH RBC QN AUTO: 28.6 PG (ref 26–34)
MCHC RBC AUTO-ENTMCNC: 31.5 G/DL (ref 31–36)
MCV RBC AUTO: 90.8 FL (ref 80–100)
PDW BLD-RTO: 14.3 % (ref 12.4–15.4)
PERFORMED ON: ABNORMAL
PERFORMED ON: NORMAL
PLATELET # BLD: 144 K/UL (ref 135–450)
PMV BLD AUTO: 9.6 FL (ref 5–10.5)
RBC # BLD: 3.85 M/UL (ref 4–5.2)
WBC # BLD: 6.3 K/UL (ref 4–11)

## 2022-03-25 PROCEDURE — 6370000000 HC RX 637 (ALT 250 FOR IP): Performed by: INTERNAL MEDICINE

## 2022-03-25 PROCEDURE — 2700000000 HC OXYGEN THERAPY PER DAY

## 2022-03-25 PROCEDURE — 99233 SBSQ HOSP IP/OBS HIGH 50: CPT | Performed by: INTERNAL MEDICINE

## 2022-03-25 PROCEDURE — 6360000002 HC RX W HCPCS: Performed by: NURSE PRACTITIONER

## 2022-03-25 PROCEDURE — 2060000000 HC ICU INTERMEDIATE R&B

## 2022-03-25 PROCEDURE — 94640 AIRWAY INHALATION TREATMENT: CPT

## 2022-03-25 PROCEDURE — 94761 N-INVAS EAR/PLS OXIMETRY MLT: CPT

## 2022-03-25 PROCEDURE — 85027 COMPLETE CBC AUTOMATED: CPT

## 2022-03-25 PROCEDURE — 36415 COLL VENOUS BLD VENIPUNCTURE: CPT

## 2022-03-25 PROCEDURE — 2580000003 HC RX 258: Performed by: NURSE PRACTITIONER

## 2022-03-25 PROCEDURE — 6370000000 HC RX 637 (ALT 250 FOR IP): Performed by: NURSE PRACTITIONER

## 2022-03-25 PROCEDURE — 94669 MECHANICAL CHEST WALL OSCILL: CPT

## 2022-03-25 RX ADMIN — Medication 2 PUFF: at 09:51

## 2022-03-25 RX ADMIN — INSULIN LISPRO 1 UNITS: 100 INJECTION, SOLUTION INTRAVENOUS; SUBCUTANEOUS at 20:20

## 2022-03-25 RX ADMIN — Medication 2 PUFF: at 20:35

## 2022-03-25 RX ADMIN — DILTIAZEM HYDROCHLORIDE 30 MG: 30 TABLET, FILM COATED ORAL at 08:39

## 2022-03-25 RX ADMIN — Medication 10 ML: at 08:47

## 2022-03-25 RX ADMIN — ACETAMINOPHEN 325MG 650 MG: 325 TABLET ORAL at 23:57

## 2022-03-25 RX ADMIN — DILTIAZEM HYDROCHLORIDE 30 MG: 30 TABLET, FILM COATED ORAL at 16:24

## 2022-03-25 RX ADMIN — ENOXAPARIN SODIUM 40 MG: 40 INJECTION SUBCUTANEOUS at 08:39

## 2022-03-25 RX ADMIN — GABAPENTIN 300 MG: 300 CAPSULE ORAL at 20:15

## 2022-03-25 RX ADMIN — TROSPIUM CHLORIDE 20 MG: 20 TABLET, FILM COATED ORAL at 06:23

## 2022-03-25 RX ADMIN — DILTIAZEM HYDROCHLORIDE 30 MG: 30 TABLET, FILM COATED ORAL at 23:46

## 2022-03-25 RX ADMIN — IPRATROPIUM BROMIDE AND ALBUTEROL SULFATE 1 AMPULE: 2.5; .5 SOLUTION RESPIRATORY (INHALATION) at 20:34

## 2022-03-25 RX ADMIN — TRAMADOL HYDROCHLORIDE 50 MG: 50 TABLET, FILM COATED ORAL at 23:57

## 2022-03-25 RX ADMIN — IPRATROPIUM BROMIDE AND ALBUTEROL SULFATE 1 AMPULE: 2.5; .5 SOLUTION RESPIRATORY (INHALATION) at 16:20

## 2022-03-25 RX ADMIN — Medication 10 ML: at 20:15

## 2022-03-25 RX ADMIN — DILTIAZEM HYDROCHLORIDE 30 MG: 30 TABLET, FILM COATED ORAL at 00:36

## 2022-03-25 RX ADMIN — PANTOPRAZOLE SODIUM 40 MG: 40 TABLET, DELAYED RELEASE ORAL at 06:22

## 2022-03-25 RX ADMIN — IPRATROPIUM BROMIDE AND ALBUTEROL SULFATE 1 AMPULE: 2.5; .5 SOLUTION RESPIRATORY (INHALATION) at 09:34

## 2022-03-25 RX ADMIN — IPRATROPIUM BROMIDE AND ALBUTEROL SULFATE 1 AMPULE: 2.5; .5 SOLUTION RESPIRATORY (INHALATION) at 13:12

## 2022-03-25 RX ADMIN — BUSPIRONE HYDROCHLORIDE 5 MG: 5 TABLET ORAL at 08:39

## 2022-03-25 RX ADMIN — BUSPIRONE HYDROCHLORIDE 5 MG: 5 TABLET ORAL at 20:15

## 2022-03-25 RX ADMIN — GABAPENTIN 300 MG: 300 CAPSULE ORAL at 08:39

## 2022-03-25 RX ADMIN — Medication 1000 MG: at 23:46

## 2022-03-25 RX ADMIN — TROSPIUM CHLORIDE 20 MG: 20 TABLET, FILM COATED ORAL at 16:24

## 2022-03-25 ASSESSMENT — PAIN SCALES - GENERAL
PAINLEVEL_OUTOF10: 5
PAINLEVEL_OUTOF10: 0

## 2022-03-25 NOTE — TELEPHONE ENCOUNTER
Pts care is doing about as expected  I would defer to the inpatient specialists who are taking care of her    Tiffanie Jimenez MD

## 2022-03-25 NOTE — PROGRESS NOTES
Handoff report received and patient assessment completed. Desated to and sustained 70's% after up to bathroom, unable to recover, required increase O2 @ 10lpm but has since been weaned back down to home dose 4L. Lung sounds with inspiratory wheezes bilaterally. Strong moist cough present, expectorating thick brown sputum. VSS. Denies any pain or discomfort. POC discussed and all questions addressed. Will cont to monitor. denies

## 2022-03-25 NOTE — PROGRESS NOTES
Hospitalist Progress Note      PCP: Alphonso Herrera MD    Date of Admission: 3/20/2022      Subjective: Covid testing negative, no acute event overnight, currently on nasal cannula with acceptable saturation. She is back to her baseline in regard to oxygen but she is still weak and not great from the pneumonia. Medications:  Reviewed    Infusion Medications    sodium chloride      dextrose       Scheduled Medications    ipratropium-albuterol  1 ampule Inhalation Q4H WA    busPIRone  5 mg Oral BID    gabapentin  300 mg Oral BID    trospium  20 mg Oral BID AC    pantoprazole  40 mg Oral QAM AC    sodium chloride flush  5-40 mL IntraVENous 2 times per day    enoxaparin  40 mg SubCUTAneous Daily    cefTRIAXone (ROCEPHIN) IV  1,000 mg IntraVENous Q24H    insulin lispro  0-6 Units SubCUTAneous TID WC    insulin lispro  0-3 Units SubCUTAneous Nightly    budesonide-formoterol  2 puff Inhalation BID    dilTIAZem  30 mg Oral 3 times per day     PRN Meds: albuterol, traMADol, sodium chloride flush, sodium chloride, ondansetron **OR** ondansetron, polyethylene glycol, acetaminophen **OR** acetaminophen, glucose, dextrose bolus (hypoglycemia), glucagon (rDNA), dextrose, albuterol sulfate HFA **AND** ipratropium, melatonin      Intake/Output Summary (Last 24 hours) at 3/25/2022 1559  Last data filed at 3/25/2022 1213  Gross per 24 hour   Intake 240 ml   Output 800 ml   Net -560 ml       Physical Exam Performed:    BP (!) 135/59   Pulse 98   Temp 98 °F (36.7 °C) (Temporal)   Resp 18   Ht 5' 8\" (1.727 m)   Wt 122 lb 5.7 oz (55.5 kg)   SpO2 93%   BMI 18.60 kg/m²     General appearance: No apparent distress, appears stated age and cooperative. HEENT: Pupils equal, round, and reactive to light. Conjunctivae/corneas clear. Neck: Supple, with full range of motion. No jugular venous distention. Trachea midline. Respiratory:  Normal respiratory effort. She has some rhonchi.   Cardiovascular: Regular rate and rhythm with normal S1/S2 without murmurs, rubs or gallops. Abdomen: Soft, non-tender, non-distended with normal bowel sounds. Musculoskeletal: No clubbing, cyanosis or edema bilaterally. Full range of motion without deformity. Skin: Skin color, texture, turgor normal.  No rashes or lesions. Neurologic:  Neurovascularly intact without any focal sensory/motor deficits. Cranial nerves: II-XII intact, grossly non-focal.  Psychiatric: Alert and oriented, thought content appropriate, normal insight  Capillary Refill: Brisk,3 seconds, normal   Peripheral Pulses: +2 palpable, equal bilaterally       Labs:   Recent Labs     03/23/22  0503 03/25/22  0555   WBC 12.2* 6.3   HGB 10.6* 11.0*   HCT 33.6* 34.9*   * 144     Recent Labs     03/24/22  0915      K 4.5      CO2 33*   BUN 12   CREATININE <0.5*   CALCIUM 9.3     No results for input(s): AST, ALT, BILIDIR, BILITOT, ALKPHOS in the last 72 hours. No results for input(s): INR in the last 72 hours. No results for input(s): Elidia Sheryl in the last 72 hours. Urinalysis:      Lab Results   Component Value Date    NITRU Negative 01/29/2018    WBCUA 37 01/29/2018    BACTERIA Rare 05/24/2013    RBCUA None seen 01/29/2018    BLOODU neg 07/23/2018    BLOODU Negative 01/29/2018    SPECGRAV 1.010 07/23/2018    SPECGRAV >1.030 01/29/2018    GLUCOSEU neg 07/23/2018    GLUCOSEU Negative 01/29/2018       Radiology:  CT CHEST WO CONTRAST   Final Result   Multifocal pneumonia significantly increased in the right lower lobe compared   to 03/15/2022. Trace right-sided pleural effusion is seen. Scattered   ground-glass opacities and tree-in-bud nodularity is seen throughout the   remaining portion of the left lung, slightly increased compared to prior,   also likely postinflammatory-infectious         XR CHEST PORTABLE   Final Result   Developing infiltrate in the right lung base, more prominent since recent   chest CT. Assessment/Plan:    Active Hospital Problems    Diagnosis     Acute on chronic respiratory failure with hypoxia ([facility]) [J96.21]      Acute on chronic respiratory failure with hypoxia: Likely secondary to right lower lobe pneumonia, and use BiPAP overnight, pulmonary on board, continue antibiotic azithromycin and Rocephin. Her urine strep was positive, so this is likely strep pneumonia.    right lower lobe pneumonia  -abx as noted above    Acute exacerbation of COPD: Precipitated by pneumonia, currently not wheezing, pulmonary following, off steroids, on Symbicort and DuoNeb. Right lower lobe nodule: Patient was scheduled to have biopsy this Thursday, pulmonary is following, history of lung cancer    History of hypertension, continue home meds    Current nicotine use: Counseled for smoking cessation, currently smokes about 3 cigarettes a day. History of diabetes mellitus, continue sliding scale insulin    Sacral wound stage I, present on admission,: Wound care consulted    DVT Prophylaxis: Lovenox  Diet: ADULT DIET; Regular  Code Status: Full Code    PT/OT Eval Status: For evaluation    Dispo -continue inpatient care possible d/c over the weekend.      Lehta Cui MD

## 2022-03-25 NOTE — PROGRESS NOTES
03/24/22 2013   Treatment   Treatment Type Vest   $Bronchial Hygiene $Oscillatory therapy   pt tolerated chest vest well.

## 2022-03-25 NOTE — PROGRESS NOTES
P Pulmonary and Critical Care  Progress note      Reason for Consult: Acute on chronic hypoxemic respiratory failure, pneumonia, pulmonary nodule, very severe COPD/emphysema  Requesting Physician: Dr White Lat:   [facility]:                The patient is a 78 y.o. female with significant past medical history of:      Diagnosis Date    Bronchitis     COPD     severe    Emphysema lung ([address]     severe    Hemoptysis     Hernia     Hypertension     Lung [facility] ([facility] 2008    left lung    Osteoporosis     Pneumonia     Rupture of colon ([facility] 05/24/2013    Skin cancer 2014    ear     Interval history: Has decreasing oxygen requirement. She looks really weak so. She has not yet been out of bed even.   Her  was at the bedside this morning    Past Surgical History:        Procedure Laterality Date    BRONCHOSCOPY  08/05/2016    BRONCHOSCOPY  01/31/2018    CHOLECYSTECTOMY      COLONOSCOPY  8/20/13    KYPHOSIS SURGERY  02/04/2016    LOBECTOMY  0808    MANDIBLE SURGERY      upper jaw and lower jaw    OTHER SURGICAL HISTORY  5/30/13    secondary wound closure    SIGMOIDECTOMY  5/24/13    THORACOTOMY  0808    TONSILLECTOMY      UPPER GASTROINTESTINAL ENDOSCOPY  01/18/2018    UPPER GASTROINTESTINAL ENDOSCOPY N/A 10/31/2018    EGD BIOPSY performed by Chele Calixto MD at [place] [address] 1/30/2019    EGD performed by Chele Calixto MD at [address] [place] [address]     Current Medications:    Current Facility-Administered Medications: ipratropium-albuterol (DUONEB) nebulizer solution 1 ampule, 1 ampule, Inhalation, Q4H WA  albuterol (PROVENTIL) nebulizer solution 2.5 mg, 2.5 mg, Nebulization, Q6H PRN  busPIRone (BUSPAR) tablet 5 mg, 5 mg, Oral, BID  gabapentin (NEURONTIN) capsule 300 mg, 300 mg, Oral, BID  traMADol (ULTRAM) tablet 50 mg, 50 mg, Oral, BID PRN  trospium (SANCTURA) tablet 20 mg, 20 mg, Oral, BID AC  pantoprazole (PROTONIX) tablet 40 mg, 40 mg, Oral, QAM AC  sodium chloride flush 0.9 % injection 5-40 mL, 5-40 mL, IntraVENous, 2 times per day  sodium chloride flush 0.9 % injection 5-40 mL, 5-40 mL, IntraVENous, PRN  0.9 % sodium chloride infusion, 25 mL, IntraVENous, PRN  enoxaparin (LOVENOX) injection 40 mg, 40 mg, SubCUTAneous, Daily  ondansetron (ZOFRAN-ODT) disintegrating tablet 4 mg, 4 mg, Oral, Q8H PRN **OR** ondansetron (ZOFRAN) injection 4 mg, 4 mg, IntraVENous, Q6H PRN  polyethylene glycol (GLYCOLAX) packet 17 g, 17 g, Oral, Daily PRN  acetaminophen (TYLENOL) tablet 650 mg, 650 mg, Oral, Q6H PRN **OR** acetaminophen (TYLENOL) suppository 650 mg, 650 mg, Rectal, Q6H PRN  cefTRIAXone (ROCEPHIN) 1000 mg in sterile water 10 mL IV syringe, 1,000 mg, IntraVENous, Q24H  insulin lispro (1 Unit Dial) 0-6 Units, 0-6 Units, SubCUTAneous, TID WC  insulin lispro (1 Unit Dial) 0-3 Units, 0-3 Units, SubCUTAneous, Nightly  glucose (GLUTOSE) 40 % oral gel 15 g, 15 g, Oral, PRN  dextrose bolus (hypoglycemia) 10%, , IntraVENous, PRN  glucagon (rDNA) injection 1 mg, 1 mg, IntraMUSCular, PRN  dextrose 5 % solution, 100 mL/hr, IntraVENous, PRN  albuterol sulfate  (90 Base) MCG/ACT inhaler 2 puff, 2 puff, Inhalation, Q6H PRN **AND** ipratropium (ATROVENT HFA) 17 MCG/ACT inhaler 2 puff, 2 puff, Inhalation, Q6H PRN  budesonide-formoterol (SYMBICORT) 160-4.5 MCG/ACT inhaler 2 puff, 2 puff, Inhalation, BID  dilTIAZem (CARDIZEM) tablet 30 mg, 30 mg, Oral, 3 times per day  melatonin tablet 3 mg, 3 mg, Oral, Nightly PRN    Allergies   Allergen Reactions    Wellbutrin [Bupropion Hcl] Palpitations       Social History:    TOBACCO:   reports that she has been smoking cigarettes. She has a 0.42 pack-year smoking history. She has never used smokeless tobacco.  ETOH:   reports no history of alcohol use.   Patient currently lives independently  Environmental/chemical exposure: None known    Family History:       Problem Relation Age of Onset    Diabetes Father     Other Father         A-Fib    Cancer Father         prostate    Cancer Mother         ovarian    Other Sister         A-Fib     REVIEW OF SYSTEMS:    CONSTITUTIONAL: See HPI   EYES:  negative for blurred vision, eye discharge, visual disturbance and icterus  HEENT:  negative for hearing loss, tinnitus, ear drainage, sinus pressure, nasal congestion, epistaxis and snoring  RESPIRATORY:  See HPI  CARDIOVASCULAR:  negative for chest pain, palpitations, exertional chest pressure/discomfort, edema, syncope  GASTROINTESTINAL:  negative for nausea, vomiting, diarrhea, constipation, blood in stool and abdominal pain  GENITOURINARY:  negative for frequency, dysuria, urinary incontinence, decreased urine volume, and hematuria  HEMATOLOGIC/LYMPHATIC:  negative for easy bruising, bleeding and lymphadenopathy  ALLERGIC/IMMUNOLOGIC:  negative for recurrent infections, angioedema, anaphylaxis and drug reactions  ENDOCRINE:  negative for weight changes and diabetic symptoms including polyuria, polydipsia and polyphagia  MUSCULOSKELETAL:  negative for  pain, joint swelling, decreased range of motion and muscle weakness  NEUROLOGICAL:  negative for headaches, slurred speech, unilateral weakness  PSYCHIATRIC/BEHAVIORAL: negative for hallucinations, behavioral problems, confusion and agitation.      Objective:   PHYSICAL EXAM:      VITALS:  /76   Pulse 90   Temp 97.8 °F (36.6 °C) (Temporal)   Resp 18   Ht 5' 8\" (1.727 m)   Wt 122 lb 5.7 oz (55.5 kg)   SpO2 96%   BMI 18.60 kg/m²      24HR INTAKE/OUTPUT:      Intake/Output Summary (Last 24 hours) at 3/25/2022 1157  Last data filed at 3/25/2022 2796  Gross per 24 hour   Intake 240 ml   Output 600 ml   Net -360 ml     CONSTITUTIONAL:  awake, alert, cooperative, no apparent distress, and appears stated age  NECK:  Supple, symmetrical, trachea midline, no adenopathy, thyroid symmetric, not enlarged and no tenderness, skin normal  LUNGS:  no increased work of breathing and diminished to auscultation. No accessory muscle use  CARDIOVASCULAR: S1 and S2, no edema and no JVD  ABDOMEN:  normal bowel sounds, non-distended and no masses palpated, and no tenderness to palpation. No hepatospleenomegaly  LYMPHADENOPATHY:  no axillary or supraclavicular adenopathy. No cervical adnenopathy  PSYCHIATRIC: Oriented to person place and time. No obvious depression or anxiety. MUSCULOSKELETAL: No obvious misalignment or effusion of the joints. No clubbing, cyanosis of the digits. SKIN:  normal skin color, texture, turgor and no redness, warmth, or swelling. No palpable nodules    DATA:    Old records have been reviewed    CBC:  Recent Labs     03/23/22  0503 03/25/22  0555   WBC 12.2* 6.3   RBC 3.66* 3.85*   HGB 10.6* 11.0*   HCT 33.6* 34.9*   * 144   MCV 91.8 90.8   MCH 28.9 28.6   MCHC 31.5 31.5   RDW 14.5 14.3      BMP:  Recent Labs     03/24/22  0915      K 4.5      CO2 33*   BUN 12   CREATININE <0.5*   CALCIUM 9.3   GLUCOSE 137*      ABG:  No results for input(s): PHART, WKJ3DDK, PO2ART, YGI3SYJ, R4JDUSQN, BEART in the last 72 hours. Procalcitonin  Recent Labs     03/23/22  0503   PROCAL 1.98*       No results found for: BNP  Lab Results   Component Value Date    CKTOTAL 129 04/21/2010    TROPONINI <0.01 03/20/2022           Radiology Review:  All pertinent images / reports were reviewed as a part of this visit. Assessment:     1. Acute exacerbation COPD  2. Right lower lobe pneumonia  3. Right lower lobe pulmonary nodule  4. COPD, very severe/chronic hypoxemic respiratory failure  5. History of lung cancer      Plan:     Patient presented with leukocytosis, fever to 102.5 degrees and a procalcitonin greater than 5. She now has repeat CT imaging which clearly shows right lower lobe airspace disease. This is new in comparison to imaging from 3/15/2022.   Strep urinary antigen is positive making this most likely pneumococcal pneumonia  Procalcitonin and WBC are downtrending. Continue Rocephin    CT imaging additionally revealed continued evidence of right lower lobe density which may be malignant. The density appears somewhat larger and more solid compared to previous imaging  Needs to recover some strength and get over her pneumonia prior to rescheduling her bronchoscopy. Patient does have very severe COPD now with acute exacerbation precipitated by her pneumonia  Not wheezing  Does not need steroids at this point  On Symbicort and scheduled duo nebs    She does have chronic hypoxemic respiratory failure requiring 4 L/min nasal cannula oxygen supplement  She is pretty used to oxygen saturations in the high 80s even with supplemental oxygen in place.   However, her current hypoxemia is worse than usual.   Oxygen requirements, however, are downtrending and she is down to 6 L/min oxygen supplement this morning    Appears very weak  PT/OT  May need some rehab time  Discussed with patient and family

## 2022-03-25 NOTE — RT PROTOCOL NOTE
RT Inhaler-Nebulizer Bronchodilator Protocol Note    There is a bronchodilator order in the chart from a provider indicating to follow the RT Bronchodilator Protocol and there is an Initiate RT Inhaler-Nebulizer Bronchodilator Protocol order as well (see protocol at bottom of note). CXR Findings:  No results found. The findings from the last RT Protocol Assessment were as follows:   History Pulmonary Disease: Chronic pulmonary disease  Respiratory Pattern: Dyspnea on exertion or RR 21-25 bpm  Breath Sounds: Slightly diminished and/or crackles  Cough: Weak, non-productive  Indication for Bronchodilator Therapy: Decreased or absent breath sounds  Bronchodilator Assessment Score: 9    Aerosolized bronchodilator medication orders have been revised according to the RT Inhaler-Nebulizer Bronchodilator Protocol below. Respiratory Therapist to perform RT Therapy Protocol Assessment initially then follow the protocol. Repeat RT Therapy Protocol Assessment PRN for score 0-3 or on second treatment, BID, and PRN for scores above 3. No Indications  adjust the frequency to every 6 hours PRN wheezing or bronchospasm, if no treatments needed after 48 hours then discontinue using Per Protocol order mode. If indication present, adjust the RT bronchodilator orders based on the Bronchodilator Assessment Score as indicated below. Use Inhaler orders unless patient has one or more of the following: on home nebulizer, not able to hold breath for 10 seconds, is not alert and oriented, cannot activate and use MDI correctly, or respiratory rate 25 breaths per minute or more, then use the equivalent nebulizer order(s) with same Frequency and PRN reasons based on the score. If a patient is on this medication at home then do not decrease Frequency below that used at home.     0-3  enter or revise RT bronchodilator order(s) to equivalent RT Bronchodilator order with Frequency of every 4 hours PRN for wheezing or increased work of breathing using Per Protocol order mode. 4-6  enter or revise RT Bronchodilator order(s) to two equivalent RT bronchodilator orders with one order with BID Frequency and one order with Frequency of every 4 hours PRN wheezing or increased work of breathing using Per Protocol order mode. 7-10  enter or revise RT Bronchodilator order(s) to two equivalent RT bronchodilator orders with one order with TID Frequency and one order with Frequency of every 4 hours PRN wheezing or increased work of breathing using Per Protocol order mode. 11-13  enter or revise RT Bronchodilator order(s) to one equivalent RT bronchodilator order with QID Frequency and an Albuterol order with Frequency of every 4 hours PRN wheezing or increased work of breathing using Per Protocol order mode. Greater than 13  enter or revise RT Bronchodilator order(s) to one equivalent RT bronchodilator order with every 4 hours Frequency and an Albuterol order with Frequency of every 2 hours PRN wheezing or increased work of breathing using Per Protocol order mode. RT to enter RT Home Evaluation for COPD & MDI Assessment order using Per Protocol order mode.     Electronically signed by Margarito Gilford, RCP on 3/25/2022 at 5:49 PM

## 2022-03-25 NOTE — TELEPHONE ENCOUNTER
----- Message from Elissa Mohs sent at 3/24/2022  1:59 PM EDT -----  Subject: Message to Provider    QUESTIONS  Information for Provider? Susie Posadas called to advise you that Surinder Whittaker was   admitted to the [facility] on March 20,2022 with pneumonia. He wants   your opinion on her care. The endoscopy was cancelled at this time. ---------------------------------------------------------------------------  --------------  Katie SANFORD  What is the best way for the office to contact you? OK to leave message on   voicemail  Preferred Call Back Phone Number? [phone]  ---------------------------------------------------------------------------  --------------  SCRIPT ANSWERS  Relationship to Patient? Other  Representative Name? Ingris Giles   Is the Representative on the appropriate HIPAA document in Epic?  Yes

## 2022-03-26 VITALS
OXYGEN SATURATION: 92 % | RESPIRATION RATE: 23 BRPM | TEMPERATURE: 98.3 F | HEIGHT: 68 IN | WEIGHT: 121.25 LBS | SYSTOLIC BLOOD PRESSURE: 147 MMHG | HEART RATE: 104 BPM | BODY MASS INDEX: 18.38 KG/M2 | DIASTOLIC BLOOD PRESSURE: 89 MMHG

## 2022-03-26 LAB
GLUCOSE BLD-MCNC: 103 MG/DL (ref 70–99)
GLUCOSE BLD-MCNC: 127 MG/DL (ref 70–99)
GLUCOSE BLD-MCNC: 139 MG/DL (ref 70–99)
PERFORMED ON: ABNORMAL

## 2022-03-26 PROCEDURE — 6370000000 HC RX 637 (ALT 250 FOR IP): Performed by: NURSE PRACTITIONER

## 2022-03-26 PROCEDURE — 6360000002 HC RX W HCPCS: Performed by: NURSE PRACTITIONER

## 2022-03-26 PROCEDURE — 2700000000 HC OXYGEN THERAPY PER DAY

## 2022-03-26 PROCEDURE — 94669 MECHANICAL CHEST WALL OSCILL: CPT

## 2022-03-26 PROCEDURE — 97161 PT EVAL LOW COMPLEX 20 MIN: CPT

## 2022-03-26 PROCEDURE — 94761 N-INVAS EAR/PLS OXIMETRY MLT: CPT

## 2022-03-26 PROCEDURE — 94640 AIRWAY INHALATION TREATMENT: CPT

## 2022-03-26 PROCEDURE — 99233 SBSQ HOSP IP/OBS HIGH 50: CPT | Performed by: INTERNAL MEDICINE

## 2022-03-26 PROCEDURE — 2580000003 HC RX 258: Performed by: NURSE PRACTITIONER

## 2022-03-26 PROCEDURE — 97116 GAIT TRAINING THERAPY: CPT

## 2022-03-26 PROCEDURE — 6370000000 HC RX 637 (ALT 250 FOR IP): Performed by: INTERNAL MEDICINE

## 2022-03-26 PROCEDURE — 97530 THERAPEUTIC ACTIVITIES: CPT

## 2022-03-26 RX ADMIN — Medication 2 PUFF: at 08:48

## 2022-03-26 RX ADMIN — TROSPIUM CHLORIDE 20 MG: 20 TABLET, FILM COATED ORAL at 17:44

## 2022-03-26 RX ADMIN — DILTIAZEM HYDROCHLORIDE 30 MG: 30 TABLET, FILM COATED ORAL at 09:30

## 2022-03-26 RX ADMIN — DILTIAZEM HYDROCHLORIDE 30 MG: 30 TABLET, FILM COATED ORAL at 17:40

## 2022-03-26 RX ADMIN — BUSPIRONE HYDROCHLORIDE 5 MG: 5 TABLET ORAL at 09:30

## 2022-03-26 RX ADMIN — TROSPIUM CHLORIDE 20 MG: 20 TABLET, FILM COATED ORAL at 06:37

## 2022-03-26 RX ADMIN — PANTOPRAZOLE SODIUM 40 MG: 40 TABLET, DELAYED RELEASE ORAL at 06:37

## 2022-03-26 RX ADMIN — IPRATROPIUM BROMIDE AND ALBUTEROL SULFATE 1 AMPULE: 2.5; .5 SOLUTION RESPIRATORY (INHALATION) at 08:46

## 2022-03-26 RX ADMIN — ENOXAPARIN SODIUM 40 MG: 40 INJECTION SUBCUTANEOUS at 09:30

## 2022-03-26 RX ADMIN — GABAPENTIN 300 MG: 300 CAPSULE ORAL at 09:30

## 2022-03-26 RX ADMIN — IPRATROPIUM BROMIDE AND ALBUTEROL SULFATE 1 AMPULE: 2.5; .5 SOLUTION RESPIRATORY (INHALATION) at 15:36

## 2022-03-26 RX ADMIN — Medication 10 ML: at 09:33

## 2022-03-26 RX ADMIN — IPRATROPIUM BROMIDE AND ALBUTEROL SULFATE 1 AMPULE: 2.5; .5 SOLUTION RESPIRATORY (INHALATION) at 12:15

## 2022-03-26 ASSESSMENT — PAIN SCALES - GENERAL
PAINLEVEL_OUTOF10: 0

## 2022-03-26 NOTE — PROGRESS NOTES
Pt and  insist on being discharge today , made pt aware that home care set up is not completed yet, pt verbalized understanding , and still insist on discharging. This RN unable to reach an oncall MD, Yoly Sharma got pt a walker to go home with. Discharge instruction reviewed with pt and [facility]. phone number provided for pt. Pt/ request  CM  fellow up, This RN left a voice message for CM, will continue to monitor.

## 2022-03-26 NOTE — PROGRESS NOTES
Physical Therapy    Facility/Department: [address]  Initial Assessment    NAME: Chrystal Hawthorne  : [birth year]  MRN: [id number]    Date of Service: 3/26/2022    Discharge Recommendations:  Chrystal Hawthorne scored a 18/24 on the AM-PAC short mobility form. Current research shows that an AM-PAC score of 18 or greater is typically associated with a discharge to the patient's home setting. Based on the patient's AM-PAC score and their current functional mobility deficits, it is recommended that the patient have 2-3 sessions per week of Physical Therapy at d/c to increase the patient's independence. At this time, this patient demonstrates the endurance and safety to discharge home with HHCPT and a follow up treatment frequency of 2-3x/wk. Please see assessment section for further patient specific details. If patient discharges prior to next session this note will serve as a discharge summary. Please see below for the latest assessment towards goals. HOME HEALTH CARE: LEVEL 3 SAFETY     - Initial home health evaluation to occur within 24-48 hours, in patient home   - Therapy evaluations in home within 24-48 hours of discharge; including DME and home safety   - Frontload therapy 5 days, then 3x a week   - Therapy to evaluate if patient has [address] needs for personal care   -  evaluation within 24-48 hours, includes evaluation of resources and insurance to determine [place], [place], LTC, and Medicaid options         PT Equipment Recommendations  Equipment Needed: Yes  Mobility Devices: Julia Perches: Rolling    Assessment   Body structures, Functions, Activity limitations: Decreased functional mobility ; Decreased balance;Decreased posture;Decreased strength;Decreased endurance  Assessment: pt currently presents below baseline requiring use of RW for safe ambulation at this time and will benefit from continued skilled therapy services to facilitate return to PLOF and reduce fall risk  Treatment Diagnosis: impaired functional mobility  Prognosis: Good  Decision Making: Low Complexity  PT Education: PT Role;Plan of Care;Energy Conservation  Patient Education: pt verbalized understanding  REQUIRES PT FOLLOW UP: Yes  Activity Tolerance  Activity Tolerance: Patient Tolerated treatment well;Patient limited by fatigue  Activity Tolerance: ambulation distance limited by fatigue and SOB       Patient Diagnosis(es): The primary encounter diagnosis was Pneumonia of right lower lobe due to infectious organism. Diagnoses of COPD exacerbation ([address] and Acute on chronic respiratory failure with hypoxia ([address] were also pertinent to this visit. has a past medical history of Bronchitis, COPD, Emphysema lung ([address] Hemoptysis, Hernia, Hypertension, Lung cancer ([address] Osteoporosis, Pneumonia, Rupture of colon ([address] and Skin cancer. has a past surgical history that includes thoracotomy (4483); lobectomy (0015); Cholecystectomy; Tonsillectomy; Mandible surgery; sigmoidectomy (5/24/13); other surgical history (5/30/13); Colonoscopy (8/20/13); bronchoscopy (08/05/2016); Kyphosis surgery (02/04/2016); Upper gastrointestinal endoscopy (01/18/2018); bronchoscopy (01/31/2018); Upper gastrointestinal endoscopy (N/A, 10/31/2018); and Upper gastrointestinal endoscopy (N/A, 1/30/2019). Restrictions  Restrictions/Precautions  Restrictions/Precautions: Fall Risk (high fall risk)  Position Activity Restriction  Other position/activity restrictions: Monik Klein is a 78 y.o. female who presents in acute respiratory stress.  called EMS from home for severe shortness of breath. Patient was hypoxic to the 50s in route. After receiving 2 treatments and being placed on high flow oxygen on a nonrebreather, patient's O2 saturation is now 88%. Patient denies having cough, chest pain, or fever. .  Vision/Hearing  Vision: Impaired  Vision Exceptions: Wears glasses for reading  Hearing: Within functional limits Subjective  General  Chart Reviewed: Yes  Family / Caregiver Present: Yes (spouse)  Diagnosis: respiratory failure with hypoxia  Follows Commands: Within Functional Limits  Subjective  Subjective: pt supine at start of session, agreeable to therapy this afternoon, reports she wants to go home  Pain Screening  Patient Currently in Pain: Denies  Vital Signs  Patient Currently in Pain: Denies       Orientation  Orientation  Overall Orientation Status: Within Functional Limits  Social/Functional History  Social/Functional History  Lives With: Spouse  Type of Home: House  Home Layout: Two level,Able to Live on Main level with bedroom/bathroom  Home Access: Stairs to enter without rails  Entrance Stairs - Number of Steps: 1  Bathroom Shower/Tub: Walk-in shower  Bathroom Toilet: Handicap height  Bathroom Equipment: Shower chair  Home Equipment: Oxygen  ADL Assistance: Independent  Homemaking Assistance: Independent  Ambulation Assistance: Independent  Transfer Assistance: Independent  Active : No  Patient's  Info: spouse drives  Additional Comments: 4L of O2 at home. no recent falls.     Objective    AROM RLE (degrees)  RLE AROM: WFL  AROM LLE (degrees)  LLE AROM : WFL  Strength RLE  Strength RLE: WFL  Strength LLE  Strength LLE: WFL        Bed mobility  Supine to Sit: Contact guard assistance  Sit to Supine: Contact guard assistance  Scooting: Contact guard assistance  Transfers  Sit to Stand: Contact guard assistance  Stand to sit: Contact guard assistance  Ambulation  Ambulation?: Yes  More Ambulation?: Yes  Ambulation 1  Surface: level tile  Device: No Device;Rolling Walker  Other Apparatus: O2 (4L)  Assistance: Contact guard assistance  Quality of Gait: narrow WARREN, mild flexed trunk, decreased step length and gina but improved posture and balance with use of RW  Distance: 3 steps without AD, 48' with RW  Ambulation 2  Surface - 2: level tile  Device 2: Rollator  Other Apparatus 2: O2 (4L)  Quality of Gait 2: flexed trunk, pushing too far out in front,  Distance: 10' in room, trialed locking brakes and sitting on seat  Stairs/Curb  Stairs?: No     Balance  Posture: Fair (rounded shoulders)  Sitting - Static: Good  Sitting - Dynamic: Good  Standing - Static: Fair  Standing - Dynamic: Fair  Comments: EOB balance with SBA        Plan   Plan  Times per week: 3-5  Times per day: Daily  Current Treatment Recommendations: Strengthening,Neuromuscular Re-education,ROM,Positioning,Gait Training,Transfer Training,Functional Mobility Training,Balance Training,Endurance Training  Safety Devices  Type of devices:  All fall risk precautions in place,Bed alarm in place,Call light within reach,Nurse notified,Left in bed      AM-PAC Score  AM-PAC Inpatient Mobility Raw Score : 18 (03/26/22 1435)  AM-PAC Inpatient T-Scale Score : 43.63 (03/26/22 1435)  Mobility Inpatient CMS 0-100% Score: 46.58 (03/26/22 1435)  Mobility Inpatient CMS G-Code Modifier : CK (03/26/22 1435)          Goals  Short term goals  Time Frame for Short term goals: discharge  Short term goal 1: pt will complete bed mobility MOD I  Short term goal 2: pt will complete sit to/from stand MOD i  Short term goal 3: pt will amb 150' with RW MOD I  Long term goals  Time Frame for Long term goals : LTG=STG       Therapy Time   Individual Concurrent Group Co-treatment   Time In 1317         Time Out 1430         Minutes 73         Timed Code Treatment Minutes: [address] [facility], [address] [facility]

## 2022-03-26 NOTE — PROGRESS NOTES
P Pulmonary and Critical Care  Progress note      Reason for Consult: Acute on chronic hypoxemic respiratory failure, pneumonia, pulmonary nodule, very severe COPD/emphysema  Requesting Physician: Dr Penny Notice    Subjective:   [facility]:                The patient is a 78 y.o. female with significant past medical history of:      Diagnosis Date    Bronchitis     COPD     severe    Emphysema lung ([address]     severe    Hemoptysis     Hernia     Hypertension     Lung cancer ([facility] 2008    left lung    Osteoporosis     Pneumonia     Rupture of colon ([facility] [address] 05/24/2013    Skin cancer 2014    ear     Interval history: She looks and feels a little better today. Did not get out of bed yesterday, however. PT/OT evaluation is also still pending.     Past Surgical History:        Procedure Laterality Date    BRONCHOSCOPY  08/05/2016    BRONCHOSCOPY  01/31/2018    CHOLECYSTECTOMY      COLONOSCOPY  8/20/13    KYPHOSIS SURGERY  02/04/2016    LOBECTOMY  0808    MANDIBLE SURGERY      upper jaw and lower jaw    OTHER SURGICAL HISTORY  5/30/13    secondary wound closure    SIGMOIDECTOMY  5/24/13    THORACOTOMY  0808    TONSILLECTOMY      UPPER GASTROINTESTINAL ENDOSCOPY  01/18/2018    UPPER GASTROINTESTINAL ENDOSCOPY N/A 10/31/2018    EGD BIOPSY performed by Osiel Schroeder MD at [facility] N/A 1/30/2019    EGD performed by Osiel Schroeder MD at [address] [place] [address]     Current Medications:    Current Facility-Administered Medications: ipratropium-albuterol (DUONEB) nebulizer solution 1 ampule, 1 ampule, Inhalation, Q4H WA  albuterol (PROVENTIL) nebulizer solution 2.5 mg, 2.5 mg, Nebulization, Q6H PRN  busPIRone (BUSPAR) tablet 5 mg, 5 mg, Oral, BID  gabapentin (NEURONTIN) capsule 300 mg, 300 mg, Oral, BID  traMADol (ULTRAM) tablet 50 mg, 50 mg, Oral, BID PRN  trospium (SANCTURA) tablet 20 mg, 20 mg, Oral, BID AC  pantoprazole (PROTONIX) tablet 40 mg, 40 mg, Oral, QAM AC  sodium chloride flush 0.9 % injection 5-40 mL, 5-40 mL, IntraVENous, 2 times per day  sodium chloride flush 0.9 % injection 5-40 mL, 5-40 mL, IntraVENous, PRN  0.9 % sodium chloride infusion, 25 mL, IntraVENous, PRN  enoxaparin (LOVENOX) injection 40 mg, 40 mg, SubCUTAneous, Daily  ondansetron (ZOFRAN-ODT) disintegrating tablet 4 mg, 4 mg, Oral, Q8H PRN **OR** ondansetron (ZOFRAN) injection 4 mg, 4 mg, IntraVENous, Q6H PRN  polyethylene glycol (GLYCOLAX) packet 17 g, 17 g, Oral, Daily PRN  acetaminophen (TYLENOL) tablet 650 mg, 650 mg, Oral, Q6H PRN **OR** acetaminophen (TYLENOL) suppository 650 mg, 650 mg, Rectal, Q6H PRN  cefTRIAXone (ROCEPHIN) 1000 mg in sterile water 10 mL IV syringe, 1,000 mg, IntraVENous, Q24H  insulin lispro (1 Unit Dial) 0-6 Units, 0-6 Units, SubCUTAneous, TID WC  insulin lispro (1 Unit Dial) 0-3 Units, 0-3 Units, SubCUTAneous, Nightly  glucose (GLUTOSE) 40 % oral gel 15 g, 15 g, Oral, PRN  dextrose bolus (hypoglycemia) 10%, , IntraVENous, PRN  glucagon (rDNA) injection 1 mg, 1 mg, IntraMUSCular, PRN  dextrose 5 % solution, 100 mL/hr, IntraVENous, PRN  albuterol sulfate  (90 Base) MCG/ACT inhaler 2 puff, 2 puff, Inhalation, Q6H PRN **AND** ipratropium (ATROVENT HFA) 17 MCG/ACT inhaler 2 puff, 2 puff, Inhalation, Q6H PRN  budesonide-formoterol (SYMBICORT) 160-4.5 MCG/ACT inhaler 2 puff, 2 puff, Inhalation, BID  dilTIAZem (CARDIZEM) tablet 30 mg, 30 mg, Oral, 3 times per day  melatonin tablet 3 mg, 3 mg, Oral, Nightly PRN    Allergies   Allergen Reactions    Wellbutrin [Bupropion Hcl] Palpitations       Social History:    TOBACCO:   reports that she has been smoking cigarettes. She has a 0.42 pack-year smoking history. She has never used smokeless tobacco.  ETOH:   reports no history of alcohol use.   Patient currently lives independently  Environmental/chemical exposure: None known    Family History:       Problem Relation Age of Onset    Diabetes Father    Cristina Strauss Other Father         A-Fib    Cancer Father         prostate    Cancer Mother         ovarian    Other Sister         A-Fib     REVIEW OF SYSTEMS:    CONSTITUTIONAL: See HPI   EYES:  negative for blurred vision, eye discharge, visual disturbance and icterus  HEENT:  negative for hearing loss, tinnitus, ear drainage, sinus pressure, nasal congestion, epistaxis and snoring  RESPIRATORY:  See HPI  CARDIOVASCULAR:  negative for chest pain, palpitations, exertional chest pressure/discomfort, edema, syncope  GASTROINTESTINAL:  negative for nausea, vomiting, diarrhea, constipation, blood in stool and abdominal pain  GENITOURINARY:  negative for frequency, dysuria, urinary incontinence, decreased urine volume, and hematuria  HEMATOLOGIC/LYMPHATIC:  negative for easy bruising, bleeding and lymphadenopathy  ALLERGIC/IMMUNOLOGIC:  negative for recurrent infections, angioedema, anaphylaxis and drug reactions  ENDOCRINE:  negative for weight changes and diabetic symptoms including polyuria, polydipsia and polyphagia  MUSCULOSKELETAL:  negative for  pain, joint swelling, decreased range of motion and muscle weakness  NEUROLOGICAL:  negative for headaches, slurred speech, unilateral weakness  PSYCHIATRIC/BEHAVIORAL: negative for hallucinations, behavioral problems, confusion and agitation.      Objective:   PHYSICAL EXAM:      VITALS:  /62   Pulse 81   Temp 96.9 °F (36.1 °C) (Temporal)   Resp 16   Ht 5' 8\" (1.727 m)   Wt 121 lb 4.1 oz (55 kg)   SpO2 94%   BMI 18.44 kg/m²      24HR INTAKE/OUTPUT:      Intake/Output Summary (Last 24 hours) at 3/26/2022 1016  Last data filed at 3/26/2022 0636  Gross per 24 hour   Intake    Output 800 ml   Net -800 ml     CONSTITUTIONAL:  awake, alert, cooperative, no apparent distress, and appears stated age  NECK:  Supple, symmetrical, trachea midline, no adenopathy, thyroid symmetric, not enlarged and no tenderness, skin normal  LUNGS:  no increased work of breathing and diminished to auscultation. No accessory muscle use  CARDIOVASCULAR: S1 and S2, no edema and no JVD  ABDOMEN:  normal bowel sounds, non-distended and no masses palpated, and no tenderness to palpation. No hepatospleenomegaly  LYMPHADENOPATHY:  no axillary or supraclavicular adenopathy. No cervical adnenopathy  PSYCHIATRIC: Oriented to person place and time. No obvious depression or anxiety. MUSCULOSKELETAL: No obvious misalignment or effusion of the joints. No clubbing, cyanosis of the digits. SKIN:  normal skin color, texture, turgor and no redness, warmth, or swelling. No palpable nodules    DATA:    Old records have been reviewed    CBC:  Recent Labs     03/25/22  0555   WBC 6.3   RBC 3.85*   HGB 11.0*   HCT 34.9*      MCV 90.8   MCH 28.6   MCHC 31.5   RDW 14.3      BMP:  Recent Labs     03/24/22  0915      K 4.5      CO2 33*   BUN 12   CREATININE <0.5*   CALCIUM 9.3   GLUCOSE 137*      ABG:  No results for input(s): PHART, BJT0JZM, PO2ART, ENX8AEW, S1SJJHRF, BEART in the last 72 hours. Procalcitonin  No results for input(s): PROCAL in the last 72 hours. No results found for: BNP  Lab Results   Component Value Date    CKTOTAL 129 04/21/2010    TROPONINI <0.01 03/20/2022           Radiology Review:  All pertinent images / reports were reviewed as a part of this visit. Assessment:     1. Acute exacerbation COPD  2. Right lower lobe pneumonia  3. Right lower lobe pulmonary nodule  4. COPD, very severe/chronic hypoxemic respiratory failure  5. History of lung cancer      Plan:     Patient presented with leukocytosis, fever to 102.5 degrees and a procalcitonin greater than 5. She now has repeat CT imaging which clearly shows right lower lobe airspace disease. This is new in comparison to imaging from 3/15/2022. Strep urinary antigen is positive making this most likely pneumococcal pneumonia  She would like to go home.   From a pneumonia point of view that would be fine  I am concerned that she is still pretty weak  Asking for PT/OT to evaluate. Home PT versus extensive rehab? CT imaging additionally revealed continued evidence of right lower lobe density which may be malignant. The density appears somewhat larger and more solid compared to previous imaging  Needs to recover some strength and get over her pneumonia prior to rescheduling her bronchoscopy. Patient does have very severe COPD now with acute exacerbation precipitated by her pneumonia  Not wheezing  Does not need steroids at this point  On Symbicort and scheduled duo nebs    She does have chronic hypoxemic respiratory failure requiring 4 L/min nasal cannula oxygen supplement  She is pretty used to oxygen saturations in the high 80s even with supplemental oxygen in place.   However, her current hypoxemia is worse than usual.   Oxygen requirements, however, are downtrending and she is down to 6 L/min oxygen supplement this morning

## 2022-03-26 NOTE — FLOWSHEET NOTE
03/26/22 1140   Treatment Team Notification   Reason for Communication Evaluate   Team Member Name PT/OT   Treatment Team Role Other (Comment)   Method of Communication Secure Message   Response Waiting for response   Notification Time [phone]   Can go home if passed Therapy today  Sent after attempts made to contact by phone since 0900 after learning this is the best way to contact them on weekends.

## 2022-03-26 NOTE — CARE COORDINATION
Home care referral faxed to [address] ([phone] fax)    Attempted to call patient on room phone, but they did not answer. Spoke to spouse who stated that patient has had home care in the past and he would like her to have the service again. Referral made to [facility].

## 2022-03-26 NOTE — PROGRESS NOTES
Pt with d/c and home care order. This RN complete BARBARA,  CM made aware, Per CM  note  referral has been made to [facility].. Pt home Ophelia Morelosor has not been delivered at this time,   Place a call to CM and left a message. Will continue to monitor.

## 2022-03-28 ENCOUNTER — CARE COORDINATION (OUTPATIENT)
Dept: CASE MANAGEMENT | Age: 77
End: 2022-03-28

## 2022-03-28 DIAGNOSIS — I10 PRIMARY HYPERTENSION: Primary | ICD-10-CM

## 2022-03-28 NOTE — PROGRESS NOTES
Called patient to follow up on home care referral from weekend. Patient denies any needs for home care. Discharge planner notified.

## 2022-03-29 ENCOUNTER — NURSE ONLY (OUTPATIENT)
Dept: RHEUMATOLOGY | Age: 77
End: 2022-03-29
Payer: COMMERCIAL

## 2022-03-29 ENCOUNTER — OFFICE VISIT (OUTPATIENT)
Dept: ENT CLINIC | Age: 77
End: 2022-03-29
Payer: COMMERCIAL

## 2022-03-29 VITALS
TEMPERATURE: 97.7 F | OXYGEN SATURATION: 94 % | HEART RATE: 92 BPM | DIASTOLIC BLOOD PRESSURE: 64 MMHG | SYSTOLIC BLOOD PRESSURE: 107 MMHG

## 2022-03-29 DIAGNOSIS — H61.23 BILATERAL IMPACTED CERUMEN: ICD-10-CM

## 2022-03-29 DIAGNOSIS — H60.332 ACUTE SWIMMER'S EAR OF LEFT SIDE: Primary | ICD-10-CM

## 2022-03-29 DIAGNOSIS — M81.0 AGE-RELATED OSTEOPOROSIS WITHOUT CURRENT PATHOLOGICAL FRACTURE: Primary | ICD-10-CM

## 2022-03-29 DIAGNOSIS — H90.0 CONDUCTIVE HEARING LOSS, BILATERAL: ICD-10-CM

## 2022-03-29 PROCEDURE — 96372 THER/PROPH/DIAG INJ SC/IM: CPT | Performed by: INTERNAL MEDICINE

## 2022-03-29 PROCEDURE — 69210 REMOVE IMPACTED EAR WAX UNI: CPT | Performed by: OTOLARYNGOLOGY

## 2022-03-29 RX ORDER — CIPROFLOXACIN AND DEXAMETHASONE 3; 1 MG/ML; MG/ML
4 SUSPENSION/ DROPS AURICULAR (OTIC) 2 TIMES DAILY
Qty: 7.5 ML | Refills: 0 | Status: SHIPPED | OUTPATIENT
Start: 2022-03-29 | End: 2022-04-08

## 2022-03-29 NOTE — PATIENT INSTRUCTIONS
WATER PRECAUTIONS  · Do not allow water to get into your right or left ear, as this may cause, or worsen, an ear infection. · Before bathing, showering or washing your hair, a plug of cotton coated with petroleum jelly should be placed in the opening of your ear canal.  After bathing the cotton should be removed and the outer part of your ear dried with a soft towel. Alternatively, you may use a silicone putty ear plug purchased from your pharmacy. · If water does enter your ear, drain the water out into a tissue or cotton ball. Then, instill into your ear four drops of the eardrops you were prescribed. Call the office if you develop develops pain or drainage.

## 2022-03-29 NOTE — PROGRESS NOTES
Chief Complaint   Patient presents with    Cerumen Impaction       HISTORY:    Esthela Huntley stated that the hearing is decreased in both ears, which are plugged up with ear wax. EXAMINATION:    Both external ear canals were occluded by cerumen impaction, obscuring visualization of the TMs. PROCEDURE - REMOVAL OF BILATERAL CERUMEN IMPACTION:   The cerumen impaction was removed from both of the EACs, under otomicroscopy visualization, with instrumentation, using a Billeau wire loop and Nunez ear suction   After successful cerumen removal, the left EAC was abnormal with erythema, edema, and exudate, with points of ulcerated skin and exposed bone and a polypoid mass in the inferior EAC. The right EAC appeared to be normal and clear without mass, exudate, or edema. The tympanic membranes appeared to be normal, including normal pneumatic mobility bilaterally. Esthela Huntley reported improved hearing, back to usual level, after cerumen removal.            IMPRESSION / [employer] / Aidan Folk / PROCEDURES:       Esthela Huntley was seen today for cerumen impaction. Diagnoses and all orders for this visit:    Acute swimmer's ear of left side  -     ciprofloxacin-dexamethasone (CIPRODEX) 0.3-0.1 % otic suspension; Place 4 drops into the left ear 2 times daily for 10 days    Bilateral impacted cerumen    Conductive hearing loss, bilateral        RECOMMENDATIONS / PLAN:   1. See Patient Instructions on file for this visit. 2. Return in about 1 month (around 4/29/2022) for recheck left external otitis and polyp of external canal, and sooner if condition worsens.

## 2022-03-29 NOTE — PROGRESS NOTES
Patient was here for Prolia Injection. Given in left arm SC. Palomo Craig Patient tolerated well. Patient is due for next prolia injection 9/29/2022. She will call to schedule and appointment then. Buy and Bill medication.    Lot # [id number]  Exp: 05/31/2024

## 2022-04-04 ENCOUNTER — CARE COORDINATION (OUTPATIENT)
Dept: CASE MANAGEMENT | Age: 77
End: 2022-04-04

## 2022-04-04 NOTE — CARE COORDINATION
[facility] Transitions Follow Up Call    2022    Patient: Jasbir Shields  Patient : 1945   MRN: [id number]  Reason for Admission: Acute on chronic hypoxemic respiratory failure, pneumonia  Discharge Date: 3/26/22 RARS: Readmission Risk Score: 10.3 ( )         Spoke with: Jasbir Shields  Care Transitions Follow Up Call    Pt states she thinks she is doing fine. Using rescue inhaler about 2 times/day and nebulizer daily. Denies [address] and the need for. CTN will still call [facility] to see if they have referral. Pt has not had hospital f/u with any providers and refused to scheduled. Did see ENT for swimmers ear, states doing better and using ear drops. Has f/u later this month. Denies SOB, feels she is at baseline. Using O2 . Needs to be reviewed by the provider   Additional needs identified to be addressed with provider: Yes  home health care-pt did not get picked up by [address] because [facility] was to . They state they did not get orders. [facility] should be calling for home care orders. Pt refused to schedule hospital f/u appt. Method of communication with provider : chart routing      Care Transition Nurse (CTN) contacted the patient by telephone to follow up after admission on 3/20/22. Verified name and  with patient as identifiers. Addressed changes since last contact: home health [facility] did not  pt as was planned  medications-any changes? questions?  f/u appt  Discussed follow-up appointments. If no appointment was previously scheduled, appointment scheduling offered: Yes. Is follow up appointment scheduled within 7 days of discharge? No and declined. CTN reviewed discharge instructions, medical action plan and red flags with patient and discussed any barriers to care and/or understanding of plan of care after discharge.  Discussed appropriate site of care based on symptoms and resources available to patient including: PCP  Specialist  When to call 911. The patient agrees to contact the PCP office for questions related to their healthcare. Patients top risk factors for readmission: medical condition-Acute on chronic hypoxemic respiratory failure, pneumonia  Interventions to address risk factors: Obtained and reviewed discharge summary and/or continuity of care documents and home care contacted, [facility] for f/u. Non-[facility] follow up appointment(s): na    CTN provided contact information for future needs. Plan for follow-up call in 1-2 days based on severity of symptoms and risk factors. Plan for next call: self management-ADLs  follow up appointment-PCP yet? Home care referral [facility] Transitions Subsequent and Final Call    Schedule Follow Up Appointment with PCP: Declined  Subsequent and Final Calls  Do you have any ongoing symptoms?: No  Have your medications changed?: No  Do you have any questions related to your medications?: No  Do you currently have any active services?: No  Are you currently active with any services?: Home Health, Other  Do you have any needs or concerns that I can assist you with?: No  Identified Barriers: Lack of Education  Care Transitions Interventions   Home Care Waiver: Declined        DME Assistance: Completed   Other Interventions:            Follow Up  Future Appointments   Date Time Provider Laverne Baez   4/28/2022  3:00 PM Kayleigh Cuevas MD FF ENT [facility]   5/18/2022  3:30 PM Citlalli Coleman MD PULM & CC [facility]   6/8/2022 11:15 AM Davina Lynch MD FF RHEUM [facility]   6/17/2022  9:15 AM Rocio Gloria MD [facility]       Malorie Patino, RN

## 2022-04-04 NOTE — CARE COORDINATION
Call to [facility], they do not have orders for home care and will contact Be Carlton at [facility] with [facility] FF to f/u. Will route notes to PCP for awareness and no hospital f/u yet. Kathleen Case RN, BSN  Care Coordinator  Cell [phone]  Email [email or web address]

## 2022-04-04 NOTE — CARE COORDINATION
Call to [facility] [place] they did not  pt at discharge d/t [facility] did, will call. Malorie Patino RN, BSN  Care Coordinator  Cell [phone]  Email [email or web address]

## 2022-04-05 ENCOUNTER — CARE COORDINATION (OUTPATIENT)
Dept: CASE MANAGEMENT | Age: 77
End: 2022-04-05

## 2022-04-05 NOTE — CARE COORDINATION
Per note in Leslie Manuel RN, f/u with pt on yesterday, 04/05/22. Per note, no additional needs/medical needs were noted for CTN to contact pt again on today. .. Pt has denied HC several times and this is noted. Pt refused to schedule f/u and [place] routed message to PCP to advise. Will schedule for f/u on next week as pt appears stable.   Audrey Camacho LPN, [facility]: [phone]

## 2022-04-11 ENCOUNTER — CARE COORDINATION (OUTPATIENT)
Dept: CASE MANAGEMENT | Age: 77
End: 2022-04-11

## 2022-04-11 NOTE — CARE COORDINATION
[facility] Transitions Follow Up Call    2022    Patient: Joni Fuller  Patient : 1945   MRN: [id number]  Reason for Admission: Acute on chronic hypoxemic respiratory failure, pneumonia  Discharge Date: 3/26/22 RARS: Readmission Risk Score: 10.3 ( )         Spoke with: Anh Mccallum [facility] Transitions Subsequent and Final Call    Subsequent and Final Calls  Do you have any ongoing symptoms?: No  Have your medications changed?: No  Do you have any questions related to your medications?: No  Do you currently have any active services?: No  Are you currently active with any services?: Home Health, Other  Do you have any needs or concerns that I can assist you with?: No  Identified Barriers: None  Care Transitions Interventions   Home Care Waiver: Declined        DME Assistance: Completed   Other Interventions: Follow Up: Patient reports that she is doing well, denies SOB, cough, fever. She has followed up with Dr. Fernie Clark, is going to schedule a bronchoscopy with Dr. Jun Novak and is seeing a new oncologist.  Her oncologist is relocating and someone in that same office will take over his patients. Patient denies any questions or concerns at this time. CTN will continue with outreach follow up calls.     Future Appointments   Date Time Provider Laverne Sasha   2022  3:00 PM Madison Lambert MD FF ENT [facility]   2022  3:30 PM Chiquita Romero MD PULM & CC [facility]   2022 11:15 AM Gunjan Wu MD FF RHEUM [facility]   2022  9:15 AM Annabella Yuan MD [facility] Richy Perez RN

## 2022-04-19 ENCOUNTER — CARE COORDINATION (OUTPATIENT)
Dept: CASE MANAGEMENT | Age: 77
End: 2022-04-19

## 2022-04-19 NOTE — CARE COORDINATION
[facility] Transitions Follow Up Call    2022    Patient: Roosevelt Wilson  Patient : 1945   MRN: [id number]  Reason for Admission: SOB  Discharge Date: 3/26/22 RARS: Readmission Risk Score: 10.3 ( )         Spoke with: [facility] Transitions Follow Up Call    Needs to be reviewed by the provider   Additional needs identified to be addressed with provider: No  DME-O2             Method of communication with provider : none      Care Transition Nurse (CTN) contacted the patient by telephone to follow up after admission on 22. Verified name and  with patient as identifiers. Addressed changes since last contact: none  Discussed follow-up appointments. If no appointment was previously scheduled, appointment scheduling offered: Yes. Is follow up appointment scheduled within 7 days of discharge? Yes. Advance Care Planning:   Does patient have an Advance Directive: Reviewed and current  CTN reviewed discharge instructions, medical action plan and red flags with patient and discussed any barriers to care and/or understanding of plan of care after discharge. Discussed appropriate site of care based on symptoms and resources available to patient including: PCP  Specialist  Urgent care clinics  When to call [address] The patient agrees to contact the PCP office for questions related to their healthcare. Patients top risk factors for readmission: ineffective coping  Interventions to address risk factors: Obtained and reviewed discharge summary and/or continuity of care documents      Non-[facility] follow up appointment(s):     CTN provided contact information for future needs. Plan for follow-up call in 5-7 days based on severity of symptoms and risk factors. Plan for next call: symptom management-SOB, exercise as tolerated      This [address] spoke with pt and pt stated that she is doing well. Patient denied any worsening symptoms.  Denied fever, chills, N/V and any difficulty breathing at this time. Denied chest pain and SOB. Denied difficulty with urination, BMs or appetite. Pt denied any needs and concerns at this time. No new changes or medications to report or address. Advised pt to immediately report any worsening symptoms to the PCP. Patient verbalized understanding and agreed. Audrey Camacho LPN, [facility]  PH: [phone]      Care Transitions Subsequent and Final Call    Schedule Follow Up Appointment with PCP: Completed  Subsequent and Final Calls  Do you have any ongoing symptoms?: No  Have your medications changed?: No  Do you have any questions related to your medications?: No  Do you currently have any active services?: No  Are you currently active with any services?: Home Health, Other  Do you have any needs or concerns that I can assist you with?: No  Identified Barriers: None  Care Transitions Interventions   Home Care Waiver: Declined        DME Assistance: Completed   Other Interventions:            Follow Up  Future Appointments   Date Time Provider Laverne Baez   4/28/2022  3:00 PM Colleen Holm MD FF ENT [facility]   5/18/2022  3:30 PM Robb Ramos MD PULM & CC [facility]   6/8/2022 11:15 AM Aureliano Tineo MD FF RHEUM [facility]   6/17/2022  9:15 AM Isis Leonard MD TC PC [place] [facility]       Audrey Camacho LPN

## 2022-04-26 ENCOUNTER — CARE COORDINATION (OUTPATIENT)
Dept: CASE MANAGEMENT | Age: 77
End: 2022-04-26

## 2022-04-26 NOTE — CARE COORDINATION
[facility] Transitions Follow Up Call    2022    Patient: Abimael Mooney  Patient : 1945   MRN: [id number]  Reason for Admission: SOB  Discharge Date: 3/26/22 RARS: Readmission Risk Score: 10.3 ( )         Spoke with: [address] Transitions Follow Up Call    Needs to be reviewed by the provider   Additional needs identified to be addressed with provider: No  refused-HC             Method of communication with provider : none      Care Transition Nurse (CTN) contacted the patient by telephone to follow up after admission on 22. Verified name and  with patient as identifiers. Addressed changes since last contact: none  Discussed follow-up appointments. If no appointment was previously scheduled, appointment scheduling offered: Yes. Is follow up appointment scheduled within 7 days of discharge? Yes. Advance Care Planning:   Does patient have an Advance Directive: Reviewed and current  CTN reviewed discharge instructions, medical action plan and red flags with patient and discussed any barriers to care and/or understanding of plan of care after discharge. Discussed appropriate site of care based on symptoms and resources available to patient including: PCP  Specialist  Urgent care clinics  [facility]  When to call [address] The patient agrees to contact the PCP office for questions related to their healthcare. Patients top risk factors for readmission: ineffective coping  Interventions to address risk factors: Obtained and reviewed discharge summary and/or continuity of care documents      Non-[facility] follow up appointment(s):     CTN provided contact information for future needs. No further follow-up call indicated based on severity of symptoms and risk factors. Plan for next call: N/A       This [facility] spoke with pt and pt stated that she is doing well. Patient denied any worsening symptoms.  Denied fever, chills, N/V and any difficulty breathing at this time.  Denied chest pain and SOB. Denied difficulty with urination, BMs or appetite. Pt is stable and denied any needs and concerns at this time. Advised pt to immediately report any worsening symptoms to the PCP. Patient verbalized understanding and agreed. John Gonzalez LPN, [facility]  PH: [phone]            Care Transitions Subsequent and Final Call    Schedule Follow Up Appointment with PCP: Completed  Subsequent and Final Calls  Do you have any ongoing symptoms?: No  Have your medications changed?: No  Do you have any questions related to your medications?: No  Do you currently have any active services?: No  Are you currently active with any services?: Home Health, Other  Do you have any needs or concerns that I can assist you with?: No  Identified Barriers: None  Care Transitions Interventions   Home Care Waiver: Declined        DME Assistance: Completed   Other Interventions:            Follow Up  Future Appointments   Date Time Provider Laverne Baez   4/28/2022  3:00 PM Vasquez Billingsley MD FF ENT [facility]   5/18/2022  3:30 PM Merle Dobson MD PULM & CC [facility]   6/8/2022 11:15 AM Claude Decant, MD FF RHEUM [facility]   6/17/2022  9:15 AM Chayo Vela MD TC PC [facility] - PAWAN Gonzalez LPN

## 2022-05-05 NOTE — DISCHARGE SUMMARY
Hospital Medicine Discharge Summary    Patient: Fifi Quiros     Gender: female  : 1945   Age: 78 y.o. MRN: [id number]    Admitting Physician: Elvin Madera MD  Discharge Physician: Tish Alfaro MD     Code Status: Prior     Admit Date: 3/20/2022   Discharge Date: 3/26/2022      Disposition:  Home with home care. Discharge Diagnoses: Active Hospital Problems    Diagnosis Date Noted    Acute on chronic respiratory failure with hypoxia [facility]) [J96.21] 2014       Follow-up appointments:  one week    Outpatient to do list: follow up    Condition at Discharge:  [address]:   Gaetano Beasley is a 78 y.o. female history of COPD on chronic oxygen of 4 L, hypertension, and tobacco use. Patient presents the emergency room for increasing shortness of breath and hypoxia. Patient states over the last 2 days she has had increasing shortness of breath she wears 4 L nasal cannula at baseline. She denies any change in her cough which occurs on a daily basis. She denies any fevers. She had her grandkids visiting today so was trying to hold off on coming to ER. Per EMS she was 56% on room air at home. She was 86% on nonrebreather in the emergency room she was placed immediately on BiPAP. Patient does states she was wearing her oxygen today. She does unfortunately still continue to smoke but has decreased her use she is down to 2 to 3 cigarettes a day. She was febrile in the emergency room at 102.5. She feels her breathing has improved since starting bipap    Acute on chronic respiratory failure with hypoxia: Likely secondary to right lower lobe pneumonia, and use BiPAP overnight, pulmonary on board, continue antibiotic azithromycin and Rocephin. Her urine strep was positive, so this is likely strep pneumonia.   Completed abx       right lower lobe pneumonia  -abx as noted above     Acute exacerbation of COPD: Precipitated by pneumonia, currently not wheezing, pulmonary following, off steroids, on Symbicort and DuoNeb.     Right lower lobe nodule: Patient was scheduled to have biopsy this Thursday, pulmonary is following, history of lung cancer     History of hypertension, continue home meds     Current nicotine use: Counseled for smoking cessation, currently smokes about 3 cigarettes a day.     History of diabetes mellitus, continue sliding scale insulin     Sacral wound stage I, present on admission,: Wound care consulted       Discharge Medications:   Discharge Medication List as of 3/26/2022  6:41 PM        Discharge Medication List as of 3/26/2022  6:41 PM        Discharge Medication List as of 3/26/2022  6:41 PM      CONTINUE these medications which have NOT CHANGED    Details   busPIRone (BUSPAR) 5 MG tablet One tablet twice a day, Disp-60 tablet, R-2Normal      pantoprazole (PROTONIX) 40 MG tablet Take 1 tablet by mouth daily, Disp-30 tablet, R-5Normal      traMADol (ULTRAM) 50 MG tablet Take 1 tablet by mouth 2 times daily for 90 days. , Disp-60 tablet, R-2Print      montelukast (SINGULAIR) 10 MG tablet Take 1 tablet by mouth nightly, Disp-30 tablet, R-11Normal      fluticasone-salmeterol (ADVAIR) 250-50 MCG/DOSE AEPB INHALE ONE PUFF BY MOUTH TWICE A DAY, Disp-1 each, R-11Normal      gabapentin (NEURONTIN) 300 MG capsule TAKE ONE CAPSULE BY MOUTH TWICE A DAY, Disp-60 capsule, R-2Normal      dilTIAZem (CARDIZEM) 30 MG tablet TAKE ONE TABLET BY MOUTH THREE TIMES A DAY, Disp-90 tablet, R-3Normal      TOVIAZ 4 MG TB24 ER tablet TAKE ONE TABLET BY MOUTH DAILY, Disp-30 tablet, R-10Normal      denosumab (PROLIA) 60 MG/ML SOSY SC injection Inject 1 mL into the skin once for 1 dose Every 6 months, Disp-1 each, R-1Normal      azithromycin (ZITHROMAX) 250 MG tablet TAKE ONE TABLET BY MOUTH THREE TIMES A WEEK, Disp-12 tablet, R-10Normal      albuterol sulfate HFA (VENTOLIN HFA) 108 (90 Base) MCG/ACT inhaler Inhale 2 puffs into the lungs 4 times daily as needed for Wheezing, Disp-1 Inhaler,R-11Normal      albuterol (PROVENTIL) (2.5 MG/3ML) 0.083% nebulizer solution Take 3 mLs by nebulization every 6 hours as needed for Wheezing DX:COPD J44.9, Disp-360 mL,R-11Normal      guaiFENesin (MUCINEX) 600 MG extended release tablet Take 1,200 mg by mouth dailyHistorical Med      albuterol-ipratropium (COMBIVENT RESPIMAT)  MCG/ACT AERS inhaler Inhale 1 puff into the lungs every 6 hours, Disp-1 Inhaler, R-11Normal      OXYGEN Inhale 3 L/min into the lungs continuous Use nightly as needed, Disp-1 Bottle, R-5Print           Discharge Medication List as of 3/26/2022  6:41 PM          Discharge ROS:  A complete review of systems was asked and negative except for some general deconditioning. Discharge Exam:    BP (!) 147/89   Pulse 104   Temp 98.3 °F (36.8 °C) (Temporal)   Resp 23   Ht 5' 8\" (1.727 m)   Wt 121 lb 4.1 oz (55 kg)   SpO2 92%   BMI 18.44 kg/m²   General appearance:  NAD  HEENT:   Normal cephalic, atraumatic, moist mucous membranes, no oropharyngeal erythema or exudate  Neck: Supple, trachea midline, no anterior cervical or SC LAD  Heart[de-identified] Normal s1/s2, RRR, no murmurs, gallops, or rubs. No  leg edema  Lungs: . Clear to auscultation, bilaterally without Rales/Wheezes/Rhonchi. Abdomen: Soft, non-tender, non-distended, bowel sounds present, no masses  Musculoskeletal:  No clubbing, no cyanosis, no edema  Skin: No lesion or masses  Neurologic:  Neurovascularly intact without any focal sensory/motor deficits. Cranial nerves: II-XII intact, grossly non-focal.  Psychiatric:  A & O x3  Neuro: Grossly intact, moves all four extremities     Labs: Results for Surinder Talley (MRN [id number]) as of 5/5/2022 08:07   Ref.  Range 3/25/2022 05:55   WBC Latest Ref Range: 4.0 - 11.0 K/uL 6.3   RBC Latest Ref Range: 4.00 - 5.20 M/uL 3.85 (L)   Hemoglobin Quant Latest Ref Range: 12.0 - 16.0 g/dL 11.0 (L)   Hematocrit Latest Ref Range: 36.0 - 48.0 % 34.9 (L)   MCV Latest Ref Range: 80.0 - 100.0 fL 90.8 MCH Latest Ref Range: 26.0 - 34.0 pg 28.6   MCHC Latest Ref Range: 31.0 - 36.0 g/dL 31.5   MPV Latest Ref Range: 5.0 - 10.5 fL 9.6   RDW Latest Ref Range: 12.4 - 15.4 % 14.3   Platelet Count Latest Ref Range: 135 - 450 K/uL 144         Radiology:              The patient was seen and examined on day of discharge and this discharge summary is in conjunction with any daily progress note from day of discharge. Time Spent on discharge is 30 minutes  in the examination, evaluation, counseling and review of medications and discharge plan. Note that greater than 30 minutes was spent in preparing discharge papers, discussing discharge with patient, medication review, etc.       Signed:    Manuel Willoughby MD   5/5/2022      Thank you Cristine Parnell MD for the opportunity to be involved in this patient's care.  If you have any questions or concerns please feel free to contact me at [phone]

## 2022-05-25 ENCOUNTER — OFFICE VISIT (OUTPATIENT)
Dept: PULMONOLOGY | Age: 77
End: 2022-05-25
Payer: COMMERCIAL

## 2022-05-25 ENCOUNTER — HOSPITAL ENCOUNTER (OUTPATIENT)
Age: 77
Discharge: HOME | End: 2022-05-25
Payer: COMMERCIAL

## 2022-05-25 VITALS
OXYGEN SATURATION: 97 % | HEART RATE: 79 BPM | SYSTOLIC BLOOD PRESSURE: 128 MMHG | WEIGHT: 126 LBS | BODY MASS INDEX: 19.16 KG/M2 | DIASTOLIC BLOOD PRESSURE: 79 MMHG

## 2022-05-25 DIAGNOSIS — J44.9 COPD, SEVERE (HCC): ICD-10-CM

## 2022-05-25 DIAGNOSIS — C34.12 MALIGNANT NEOPLASM OF UPPER LOBE OF LEFT LUNG (HCC): Primary | ICD-10-CM

## 2022-05-25 DIAGNOSIS — J96.11 CHRONIC RESPIRATORY FAILURE WITH HYPOXIA (HCC): ICD-10-CM

## 2022-05-25 DIAGNOSIS — J41.1 MUCOPURULENT CHRONIC BRONCHITIS (HCC): ICD-10-CM

## 2022-05-25 DIAGNOSIS — R91.8 PULMONARY NODULES: ICD-10-CM

## 2022-05-25 DIAGNOSIS — J43.2 CENTRILOBULAR EMPHYSEMA (HCC): ICD-10-CM

## 2022-05-25 DIAGNOSIS — C34.12 MALIGNANT NEOPLASM OF UPPER LOBE OF LEFT LUNG (HCC): ICD-10-CM

## 2022-05-25 DIAGNOSIS — Z90.2 S/P LOBECTOMY OF LUNG: ICD-10-CM

## 2022-05-25 LAB
BUN BLDV-MCNC: 17 MG/DL (ref 7–20)
CREAT SERPL-MCNC: 0.6 MG/DL (ref 0.6–1.2)
GFR AFRICAN AMERICAN: >60
GFR NON-AFRICAN AMERICAN: >60

## 2022-05-25 PROCEDURE — 84520 ASSAY OF UREA NITROGEN: CPT

## 2022-05-25 PROCEDURE — 99214 OFFICE O/P EST MOD 30 MIN: CPT | Performed by: INTERNAL MEDICINE

## 2022-05-25 PROCEDURE — 36415 COLL VENOUS BLD VENIPUNCTURE: CPT

## 2022-05-25 PROCEDURE — 82565 ASSAY OF CREATININE: CPT

## 2022-05-25 PROCEDURE — 1123F ACP DISCUSS/DSCN MKR DOCD: CPT | Performed by: INTERNAL MEDICINE

## 2022-05-25 RX ORDER — ALBUTEROL SULFATE 2.5 MG/3ML
2.5 SOLUTION RESPIRATORY (INHALATION) EVERY 6 HOURS PRN
Qty: 360 ML | Refills: 11 | Status: SHIPPED | OUTPATIENT
Start: 2022-05-25 | End: 2022-06-17 | Stop reason: ALTCHOICE

## 2022-05-25 NOTE — PROGRESS NOTES
[facility]  Follow Up Note  Saravanan Lloyd MD         Roosevelt Wilson   YOB: 1945    Date of Visit:  5/25/2022    Assessment/Plan:  1. Pulmonary nodules/history of lung cancer  Patient had left upper lobectomy 2008 for lung cancer. She had radiosurgery in the right lung in 2019. Lesion at that time was not biopsied due to severity of her underlying disease and associated risk of biopsy. Current lesion is in a similar location as to that which was radiated. Initially this was thought to represent scar but the area seems to be enlarging and was PET avid on recent scanning. We did discuss possible navigational bronchoscopy and biopsy considering this to be somewhat safer than IR biopsy given her severe emphysema. However, she ended up in the hospital with right lower lobe pneumonia prior to the biopsy. Biopsy has not been performed at this point. Repeat CT imaging and we will present her at tumor board next week. 2. Shortness of breath  She has been home from the hospital almost 2 months now. Feels like her breathing has improved and she is back to her previous baseline. 3. COPD, severe ([address]  I reviewed pulmonary function testing  Most recent PFT was 2018  FEV1 is less than 1 L, 34% predicted  This reveals very severe COPD    Medication management:  Advair  Combivent  DuoNeb      4. Centrilobular emphysema ([address]  I reviewed CT imaging today and this does reveal very severe emphysema which appears stable    5. Chronic respiratory failure with hypoxia (HCC)  Stable  Benefits from supplemental oxygen and portable oxygen concentrator  Now needing up to 4 L/min continuous flow oxygen    6.  Immunization  She has had her flu shot  COVID UTD      Follow-up in 3 month      Chief Complaint   Patient presents with    Shortness of Breath     6 month O2 sat RA at rest 85% O2 sat 4 liters at rest 97%       HPI  The patient presents with a chief complaint of moderate to severe shortness of breath related to very severe COPD/emphysema of many years duration. She also has a history of lung cancer. She had a lobectomy on the left and empiric radiation on the right. We have been following an enlarging pulmonary nodule. CT imaging showed enlargement of the nodule. She returns to the office today for follow-up of recent PET scan. Dr. Maria Del Carmen Patino had contacted me asking about obtaining a biopsy. Review of Systems  No complaint of chest pain, nausea or vomiting    History  I have reviewed past medical, surgical, social and family history. This is documented elsewhere in the medical record. Physical Exam:  Well developed, well nourished  Alert and oriented  Sclera is clear  No cervical adenopathy  No JVD. Chest examination is congested and wheeze. Cardiac examination reveals regular rate and rhythm without murmur, gallop or rub. The abdomen is soft, nontender and nondistended. There is no clubbing, cyanosis or edema of the extremities. There is no obvious skin rash. No focal neuro deficicts  Normal mood and affect      Allergies   Allergen Reactions    Wellbutrin [Bupropion Hcl] Palpitations     Prior to Visit Medications    Medication Sig Taking?  Authorizing Provider   montelukast (SINGULAIR) 10 MG tablet Take 1 tablet by mouth nightly Yes West Jason MD   fluticasone-salmeterol (ADVAIR) 250-50 MCG/DOSE AEPB INHALE ONE PUFF BY MOUTH TWICE A DAY Yes West Jason MD   gabapentin (NEURONTIN) 300 MG capsule TAKE ONE CAPSULE BY MOUTH TWICE A DAY Yes Brian Shipman MD   dilTIAZem (CARDIZEM) 30 MG tablet TAKE ONE TABLET BY MOUTH THREE TIMES A DAY Yes Brian Shipman MD   albuterol sulfate HFA (VENTOLIN HFA) 108 (90 Base) MCG/ACT inhaler Inhale 2 puffs into the lungs 4 times daily as needed for Wheezing Yes West Jason MD   albuterol-ipratropium (COMBIVENT RESPIMAT)  MCG/ACT AERS inhaler Inhale 1 puff into the lungs every 6 hours  Patient taking differently: Inhale 1 puff into the lungs every 6 hours as needed  Yes Annabella Yuan MD   OXYGEN Inhale 3 L/min into the lungs continuous Use nightly as needed  Patient taking differently: Inhale 4 L/min into the lungs continuous  Yes Annabella Yuan MD   busPIRone (BUSPAR) 5 MG tablet One tablet twice a day  Annabella Yuan MD   pantoprazole (PROTONIX) 40 MG tablet Take 1 tablet by mouth daily  Annabella Yuan MD   traMADol (ULTRAM) 50 MG tablet Take 1 tablet by mouth 2 times daily for 90 days. Patient taking differently: Take 50 mg by mouth 2 times daily as needed. Gunjan Wu MD   TOVIAZ 4 MG TB24 ER tablet TAKE ONE TABLET BY MOUTH DAILY  Annabella Yuan MD   denosumab (PROLIA) 60 MG/ML SOSY SC injection Inject 1 mL into the skin once for 1 dose Every 6 months  Gunjan Wu MD   azithromycin (ZITHROMAX) 250 MG tablet TAKE ONE TABLET BY MOUTH THREE TIMES A WEEK  Patient not taking: Reported on 3/29/2022  Chiquita Romero MD   albuterol (PROVENTIL) (2.5 MG/3ML) 0.083% nebulizer solution Take 3 mLs by nebulization every 6 hours as needed for Wheezing DX:COPD J44.9  Chiquita Romero MD   guaiFENesin (MUCINEX) 600 MG extended release tablet Take 1,200 mg by mouth daily  Historical Provider, MD       Vitals:    22 1503   SpO2: 97%     There is no height or weight on file to calculate BMI.      Wt Readings from Last 3 Encounters:   22 121 lb 4.1 oz (55 kg)   22 120 lb 3.2 oz (54.5 kg)   22 121 lb 6 oz (55.1 kg)     BP Readings from Last 3 Encounters:   22 107/64   22 (!) 147/89   22 (!) 122/58        Social History     Tobacco Use   Smoking Status Light Tobacco Smoker    Packs/day: 0.01    Years: 42.00    Pack years: 0.42    Types: Cigarettes    Last attempt to quit: 2010    Years since quittin.2   Smokeless Tobacco Never Used   Tobacco Comment    1 cigarette month, maybe will have one if she is upset

## 2022-06-06 DIAGNOSIS — G89.29 CHRONIC MIDLINE LOW BACK PAIN WITHOUT SCIATICA: ICD-10-CM

## 2022-06-06 DIAGNOSIS — G62.9 NEUROPATHY: ICD-10-CM

## 2022-06-06 DIAGNOSIS — M54.50 CHRONIC MIDLINE LOW BACK PAIN WITHOUT SCIATICA: ICD-10-CM

## 2022-06-07 RX ORDER — GABAPENTIN 300 MG/1
CAPSULE ORAL
Qty: 60 CAPSULE | Refills: 2 | Status: SHIPPED | OUTPATIENT
Start: 2022-06-07 | End: 2022-06-17 | Stop reason: SDUPTHER

## 2022-06-08 ENCOUNTER — HOSPITAL ENCOUNTER (OUTPATIENT)
Dept: CT IMAGING | Age: 77
Discharge: HOME | End: 2022-06-08
Payer: COMMERCIAL

## 2022-06-08 DIAGNOSIS — C34.12 MALIGNANT NEOPLASM OF UPPER LOBE OF LEFT LUNG (HCC): ICD-10-CM

## 2022-06-08 DIAGNOSIS — R91.8 PULMONARY NODULES: ICD-10-CM

## 2022-06-08 DIAGNOSIS — J41.1 MUCOPURULENT CHRONIC BRONCHITIS (HCC): ICD-10-CM

## 2022-06-08 PROCEDURE — 6360000004 HC RX CONTRAST MEDICATION: Performed by: INTERNAL MEDICINE

## 2022-06-08 PROCEDURE — 71260 CT THORAX DX C+: CPT

## 2022-06-08 RX ADMIN — IOPAMIDOL 50 ML: 755 INJECTION, SOLUTION INTRAVENOUS at 14:42

## 2022-06-10 DIAGNOSIS — R91.8 LUNG MASS: Primary | ICD-10-CM

## 2022-06-16 ENCOUNTER — OFFICE VISIT (OUTPATIENT)
Dept: RHEUMATOLOGY | Age: 77
End: 2022-06-16
Payer: COMMERCIAL

## 2022-06-16 VITALS
DIASTOLIC BLOOD PRESSURE: 72 MMHG | WEIGHT: 129 LBS | HEIGHT: 68 IN | HEART RATE: 80 BPM | BODY MASS INDEX: 19.55 KG/M2 | SYSTOLIC BLOOD PRESSURE: 122 MMHG

## 2022-06-16 DIAGNOSIS — M81.0 AGE-RELATED OSTEOPOROSIS WITHOUT CURRENT PATHOLOGICAL FRACTURE: ICD-10-CM

## 2022-06-16 PROCEDURE — 1123F ACP DISCUSS/DSCN MKR DOCD: CPT | Performed by: INTERNAL MEDICINE

## 2022-06-16 PROCEDURE — 99213 OFFICE O/P EST LOW 20 MIN: CPT | Performed by: INTERNAL MEDICINE

## 2022-06-16 RX ORDER — TRAMADOL HYDROCHLORIDE 50 MG/1
50 TABLET ORAL 2 TIMES DAILY PRN
Qty: 60 TABLET | Refills: 2 | Status: SHIPPED | OUTPATIENT
Start: 2022-06-16 | End: 2022-09-22 | Stop reason: SDUPTHER

## 2022-06-16 NOTE — PROGRESS NOTES
Subjective:       Ivet Dickerson is a 78 y.o. female returns for follow-up of osteoporosis. She received her last Prolia weeks ago. She is here today to for refill on her tramadol. She has a recurrence of her lung cancer. Review of Systems:        Objective:       /72   Pulse 80   Ht 5' 8\" (1.727 m)   Wt 129 lb (58.5 kg)   BMI 19.61 kg/m²   Alert female no acute. Assessment:      Osteoporosis    Plan:    Patient's OARRS report was reviewed. Prescription for tramadol given.   I will see patient back in 3 months          Elise Rahman MD, MD, 6/16/2022 4:25 PM

## 2022-06-17 ENCOUNTER — OFFICE VISIT (OUTPATIENT)
Dept: PRIMARY CARE CLINIC | Age: 77
End: 2022-06-17
Payer: COMMERCIAL

## 2022-06-17 VITALS
WEIGHT: 128.4 LBS | BODY MASS INDEX: 19.46 KG/M2 | HEART RATE: 87 BPM | DIASTOLIC BLOOD PRESSURE: 64 MMHG | OXYGEN SATURATION: 97 % | SYSTOLIC BLOOD PRESSURE: 112 MMHG | HEIGHT: 68 IN

## 2022-06-17 DIAGNOSIS — F41.9 ANXIETY: ICD-10-CM

## 2022-06-17 DIAGNOSIS — M54.50 CHRONIC MIDLINE LOW BACK PAIN WITHOUT SCIATICA: ICD-10-CM

## 2022-06-17 DIAGNOSIS — C34.12 MALIGNANT NEOPLASM OF UPPER LOBE OF LEFT LUNG (HCC): Primary | ICD-10-CM

## 2022-06-17 DIAGNOSIS — G89.29 CHRONIC MIDLINE LOW BACK PAIN WITHOUT SCIATICA: ICD-10-CM

## 2022-06-17 DIAGNOSIS — G62.9 NEUROPATHY: ICD-10-CM

## 2022-06-17 DIAGNOSIS — J44.9 COPD, SEVERE (HCC): ICD-10-CM

## 2022-06-17 DIAGNOSIS — I10 PRIMARY HYPERTENSION: ICD-10-CM

## 2022-06-17 DIAGNOSIS — K21.01 GASTROESOPHAGEAL REFLUX DISEASE WITH ESOPHAGITIS AND HEMORRHAGE: ICD-10-CM

## 2022-06-17 DIAGNOSIS — J96.11 CHRONIC RESPIRATORY FAILURE WITH HYPOXIA (HCC): ICD-10-CM

## 2022-06-17 PROCEDURE — 1123F ACP DISCUSS/DSCN MKR DOCD: CPT | Performed by: INTERNAL MEDICINE

## 2022-06-17 PROCEDURE — 99214 OFFICE O/P EST MOD 30 MIN: CPT | Performed by: INTERNAL MEDICINE

## 2022-06-17 RX ORDER — PANTOPRAZOLE SODIUM 40 MG/1
40 TABLET, DELAYED RELEASE ORAL DAILY
Qty: 30 TABLET | Refills: 5 | Status: SHIPPED | OUTPATIENT
Start: 2022-06-17 | End: 2022-09-20 | Stop reason: SDUPTHER

## 2022-06-17 RX ORDER — BUSPIRONE HYDROCHLORIDE 5 MG/1
TABLET ORAL
Qty: 60 TABLET | Refills: 5 | Status: SHIPPED | OUTPATIENT
Start: 2022-06-17 | End: 2022-09-20 | Stop reason: SDUPTHER

## 2022-06-17 RX ORDER — GABAPENTIN 300 MG/1
CAPSULE ORAL
Qty: 60 CAPSULE | Refills: 5 | Status: SHIPPED | OUTPATIENT
Start: 2022-06-17 | End: 2022-09-20 | Stop reason: SDUPTHER

## 2022-06-17 ASSESSMENT — PATIENT HEALTH QUESTIONNAIRE - PHQ9
2. FEELING DOWN, DEPRESSED OR HOPELESS: 0
SUM OF ALL RESPONSES TO PHQ9 QUESTIONS 1 & 2: 0
SUM OF ALL RESPONSES TO PHQ QUESTIONS 1-9: 0
1. LITTLE INTEREST OR PLEASURE IN DOING THINGS: 0

## 2022-06-17 NOTE — PROGRESS NOTES
6/17/2022   Ivet Dickerson  1945    The patients PMH, surgical history, family history, medications, allergies were all reviewed and updated as appropriate today. Current Outpatient Medications on File Prior to Visit   Medication Sig Dispense Refill    traMADol (ULTRAM) 50 MG tablet Take 1 tablet by mouth 2 times daily as needed for Pain for up to 90 days. 60 tablet 2    gabapentin (NEURONTIN) 300 MG capsule TAKE ONE CAPSULE BY MOUTH TWICE A DAY 60 capsule 2    busPIRone (BUSPAR) 5 MG tablet One tablet twice a day 60 tablet 2    pantoprazole (PROTONIX) 40 MG tablet Take 1 tablet by mouth daily 30 tablet 5    montelukast (SINGULAIR) 10 MG tablet Take 1 tablet by mouth nightly 30 tablet 11    fluticasone-salmeterol (ADVAIR) 250-50 MCG/DOSE AEPB INHALE ONE PUFF BY MOUTH TWICE A DAY 1 each 11    dilTIAZem (CARDIZEM) 30 MG tablet TAKE ONE TABLET BY MOUTH THREE TIMES A DAY 90 tablet 3    TOVIAZ 4 MG TB24 ER tablet TAKE ONE TABLET BY MOUTH DAILY 30 tablet 10    guaiFENesin (MUCINEX) 600 MG extended release tablet Take 1,200 mg by mouth daily      OXYGEN Inhale 3 L/min into the lungs continuous Use nightly as needed (Patient taking differently: Inhale 4 L/min into the lungs continuous ) 1 Bottle 5    denosumab (PROLIA) 60 MG/ML SOSY SC injection Inject 1 mL into the skin once for 1 dose Every 6 months 1 each 1     No current facility-administered medications on file prior to visit. Chief Complaint   Patient presents with    COPD       HPI:  Continues to try to manage with severe COPD, remains on continuous 4l/min nc supplemental oxygen    \"Her CA is back\"-sees Dr. Jovana Nayak instead of Dr. Maria Del Carmen Patino at [facility]    Review of Systems    OBJECTIVE:  /64 (Site: Left Upper Arm, Position: Sitting, Cuff Size: Medium Adult)   Pulse 87   Ht 5' 8\" (1.727 m)   Wt 128 lb 6.4 oz (58.2 kg)   SpO2 97% Comment: 4L o2  BMI 19.52 kg/m²    Physical Exam  Vitals and nursing note reviewed.  Exam conducted with a chaperone present (here with spouse). Constitutional:       General: She is not in acute distress. Appearance: Normal appearance. She is well-developed. She is ill-appearing. HENT:      Head: Normocephalic and atraumatic. Right Ear: Tympanic membrane, ear canal and external ear normal.      Left Ear: Tympanic membrane, ear canal and external ear normal.      Nose: Nose normal. No rhinorrhea. Mouth/Throat:      Pharynx: No oropharyngeal exudate or posterior oropharyngeal erythema. Eyes:      General:         Right eye: No discharge. Left eye: No discharge. Extraocular Movements: Extraocular movements intact. Conjunctiva/sclera: Conjunctivae normal.      Pupils: Pupils are equal, round, and reactive to light. Neck:      Thyroid: No thyromegaly. Vascular: No carotid bruit or JVD. Cardiovascular:      Rate and Rhythm: Normal rate and regular rhythm. Pulses: Normal pulses. Heart sounds: Normal heart sounds. No murmur heard. Pulmonary:      Effort: Pulmonary effort is normal. No respiratory distress. Breath sounds: Normal breath sounds. No wheezing, rhonchi or rales. Abdominal:      General: Bowel sounds are normal. There is no distension. Palpations: Abdomen is soft. Tenderness: There is no abdominal tenderness. There is no rebound. Musculoskeletal:         General: No swelling. Cervical back: Normal range of motion. No muscular tenderness. Right lower leg: No edema. Left lower leg: No edema. Comments: FROM x 4   Lymphadenopathy:      Cervical: No cervical adenopathy. Skin:     General: Skin is warm and dry. Capillary Refill: Capillary refill takes 2 to 3 seconds. Coloration: Skin is not pale. Findings: No erythema or rash. Neurological:      Mental Status: She is alert and oriented to person, place, and time. Cranial Nerves: No cranial nerve deficit. Sensory: No sensory deficit.       Motor: No abnormal muscle tone. Deep Tendon Reflexes: Reflexes normal.   Psychiatric:         Mood and Affect: Mood normal.         Behavior: Behavior normal.         Thought Content:  Thought content normal.         Judgment: Judgment normal.         Data Review:   CBC:   Lab Results   Component Value Date    WBC 6.3 03/25/2022    WBC 12.2 03/23/2022    WBC 17.0 03/22/2022    HGB 11.0 03/25/2022    HGB 10.6 03/23/2022    HGB 11.4 03/22/2022    HCT 34.9 03/25/2022    HCT 33.6 03/23/2022    HCT 35.8 03/22/2022    MCV 90.8 03/25/2022    MCV 91.8 03/23/2022    MCV 90.7 03/22/2022     03/25/2022     03/23/2022     03/22/2022     Chemistry:   Lab Results   Component Value Date     03/24/2022     03/22/2022     03/21/2022    K 4.5 03/24/2022    K 4.3 03/22/2022    K 5.0 03/21/2022    K 4.7 03/20/2022    K 4.5 09/10/2021    K 3.8 05/28/2020     03/24/2022    CL 99 03/22/2022     03/21/2022    CO2 33 03/24/2022    CO2 33 03/22/2022    CO2 26 03/21/2022    PHOS 2.6 01/31/2018    PHOS 3.8 07/08/2016    BUN 17 05/25/2022    BUN 12 03/24/2022    BUN 26 03/22/2022    CREATININE 0.6 05/25/2022    CREATININE <0.5 03/24/2022    CREATININE 0.5 03/22/2022     Hepatic Function:   Lab Results   Component Value Date    AST 30 03/21/2022    AST 22 03/20/2022    AST 23 09/10/2021    ALT 11 03/21/2022    ALT 10 03/20/2022    ALT 6 09/10/2021    BILIDIR <0.2 01/29/2018    BILIDIR <0.2 03/07/2017    BILIDIR 0.3 12/30/2014    BILITOT 0.5 03/21/2022    BILITOT 0.7 03/20/2022    BILITOT 0.5 09/10/2021    ALKPHOS 59 03/21/2022    ALKPHOS 75 03/20/2022    ALKPHOS 71 09/10/2021     Lab Results   Component Value Date    LIPASE 18 05/24/2013     Lipids:   Lab Results   Component Value Date    CHOL 214 03/05/2018    HDL 63 09/10/2021    TRIG 129 03/05/2018       ASSESSMENT/PLAN  1.) COPD/Lung CA- sees Pulmonary Adam- maintained on chronic nasal oxygen now on 4l/min nc  No refills Advair, Singulair 10, needed today    2.) Sees Rheum Ramana for ProLia injections q 6 months for osteoporosis    3.) HTN-BP  well controlled on current regimen  /64 (Site: Left Upper Arm, Position: Sitting, Cuff Size: Medium Adult)   Pulse 87   Ht 5' 8\" (1.727 m)   Wt 128 lb 6.4 oz (58.2 kg)   SpO2 97% Comment: 4L o2  BMI 19.52 kg/m²     4.) OAB- no refills needed on Toviaz today     5.) Protonix refill printed as per pt request today  Fasting labs ordered again today and in 6 months    F/u in 3 months if labs stable  Wants refills today Protonix and gabapentin, BuSpar    MD Mich Lockett MD    Electronically signed by Mich Deluna MD on 6/17/2022 at 9:31 AM

## 2022-06-17 NOTE — PROGRESS NOTES
Pt here for 3 month follow up for COPD    caregaps completed. Would like to have refills but dont know when to get them.

## 2022-06-21 DIAGNOSIS — I10 ESSENTIAL HYPERTENSION: ICD-10-CM

## 2022-06-21 LAB
A/G RATIO: 1.8 (ref 1.1–2.2)
ALBUMIN SERPL-MCNC: 4.6 G/DL (ref 3.4–5)
ALP BLD-CCNC: 59 U/L (ref 40–129)
ALT SERPL-CCNC: 11 U/L (ref 10–40)
ANION GAP SERPL CALCULATED.3IONS-SCNC: 13 MMOL/L (ref 3–16)
AST SERPL-CCNC: 19 U/L (ref 15–37)
BASOPHILS ABSOLUTE: 0 K/UL (ref 0–0.2)
BASOPHILS RELATIVE PERCENT: 0.3 %
BILIRUB SERPL-MCNC: 0.4 MG/DL (ref 0–1)
BUN BLDV-MCNC: 22 MG/DL (ref 7–20)
CALCIUM SERPL-MCNC: 9.8 MG/DL (ref 8.3–10.6)
CHLORIDE BLD-SCNC: 100 MMOL/L (ref 99–110)
CHOLESTEROL, FASTING: 184 MG/DL (ref 0–199)
CO2: 33 MMOL/L (ref 21–32)
CREAT SERPL-MCNC: 0.6 MG/DL (ref 0.6–1.2)
EOSINOPHILS ABSOLUTE: 0.2 K/UL (ref 0–0.6)
EOSINOPHILS RELATIVE PERCENT: 2.6 %
GFR AFRICAN AMERICAN: >60
GFR NON-AFRICAN AMERICAN: >60
GLUCOSE FASTING: 98 MG/DL (ref 70–99)
HCT VFR BLD CALC: 37.4 % (ref 36–48)
HDLC SERPL-MCNC: 65 MG/DL (ref 40–60)
HEMOGLOBIN: 12.1 G/DL (ref 12–16)
LDL CHOLESTEROL CALCULATED: 101 MG/DL
LYMPHOCYTES ABSOLUTE: 1.5 K/UL (ref 1–5.1)
LYMPHOCYTES RELATIVE PERCENT: 21.8 %
MCH RBC QN AUTO: 29.3 PG (ref 26–34)
MCHC RBC AUTO-ENTMCNC: 32.3 G/DL (ref 31–36)
MCV RBC AUTO: 90.8 FL (ref 80–100)
MONOCYTES ABSOLUTE: 1.1 K/UL (ref 0–1.3)
MONOCYTES RELATIVE PERCENT: 16.7 %
NEUTROPHILS ABSOLUTE: 3.9 K/UL (ref 1.7–7.7)
NEUTROPHILS RELATIVE PERCENT: 58.6 %
PDW BLD-RTO: 14.2 % (ref 12.4–15.4)
PLATELET # BLD: 145 K/UL (ref 135–450)
PMV BLD AUTO: 9.9 FL (ref 5–10.5)
POTASSIUM SERPL-SCNC: 4.4 MMOL/L (ref 3.5–5.1)
RBC # BLD: 4.12 M/UL (ref 4–5.2)
SODIUM BLD-SCNC: 146 MMOL/L (ref 136–145)
TOTAL PROTEIN: 7.1 G/DL (ref 6.4–8.2)
TRIGLYCERIDE, FASTING: 88 MG/DL (ref 0–150)
VLDLC SERPL CALC-MCNC: 18 MG/DL
WBC # BLD: 6.7 K/UL (ref 4–11)

## 2022-07-13 ENCOUNTER — TELEPHONE (OUTPATIENT)
Dept: ADMINISTRATIVE | Age: 77
End: 2022-07-13

## 2022-07-13 NOTE — TELEPHONE ENCOUNTER
PA COVER MY MEDS  Medication:traMADol HCl 50MG tablets  Key:[id number] - PA Case ID: [id number] - Rx #: [id number]  Status:Approved today  PA Case: [id number], Status: Approved, Coverage Starts on: 7/13/2022 12:00:00 AM, Coverage Ends on: 1/9/2023 12:00:00 AM.

## 2022-07-14 DIAGNOSIS — J44.9 COPD, SEVERE (HCC): Primary | ICD-10-CM

## 2022-07-15 RX ORDER — AZITHROMYCIN 250 MG/1
TABLET, FILM COATED ORAL
Qty: 12 TABLET | Refills: 3 | Status: SHIPPED | OUTPATIENT
Start: 2022-07-15

## 2022-08-24 ENCOUNTER — HOSPITAL ENCOUNTER (OUTPATIENT)
Dept: CT IMAGING | Age: 77
Discharge: HOME | End: 2022-08-24
Payer: COMMERCIAL

## 2022-08-24 DIAGNOSIS — C34.31 SMALL CELL LUNG CANCER, RIGHT LOWER LOBE (HCC): ICD-10-CM

## 2022-08-24 PROCEDURE — 71250 CT THORAX DX C-: CPT

## 2022-09-06 DIAGNOSIS — M81.0 AGE-RELATED OSTEOPOROSIS WITHOUT CURRENT PATHOLOGICAL FRACTURE: ICD-10-CM

## 2022-09-13 DIAGNOSIS — K21.01 GASTROESOPHAGEAL REFLUX DISEASE WITH ESOPHAGITIS AND HEMORRHAGE: ICD-10-CM

## 2022-09-14 RX ORDER — PANTOPRAZOLE SODIUM 40 MG/1
TABLET, DELAYED RELEASE ORAL
Qty: 30 TABLET | Refills: 5 | OUTPATIENT
Start: 2022-09-14

## 2022-09-20 ENCOUNTER — OFFICE VISIT (OUTPATIENT)
Dept: PRIMARY CARE CLINIC | Age: 77
End: 2022-09-20
Payer: COMMERCIAL

## 2022-09-20 VITALS
WEIGHT: 141 LBS | OXYGEN SATURATION: 100 % | TEMPERATURE: 97.7 F | DIASTOLIC BLOOD PRESSURE: 68 MMHG | HEART RATE: 93 BPM | SYSTOLIC BLOOD PRESSURE: 110 MMHG | BODY MASS INDEX: 21.44 KG/M2

## 2022-09-20 DIAGNOSIS — G62.9 NEUROPATHY: ICD-10-CM

## 2022-09-20 DIAGNOSIS — F41.9 ANXIETY: ICD-10-CM

## 2022-09-20 DIAGNOSIS — K21.01 GASTROESOPHAGEAL REFLUX DISEASE WITH ESOPHAGITIS AND HEMORRHAGE: ICD-10-CM

## 2022-09-20 DIAGNOSIS — M54.50 CHRONIC MIDLINE LOW BACK PAIN WITHOUT SCIATICA: ICD-10-CM

## 2022-09-20 DIAGNOSIS — J41.1 MUCOPURULENT CHRONIC BRONCHITIS (HCC): ICD-10-CM

## 2022-09-20 DIAGNOSIS — N32.81 OAB (OVERACTIVE BLADDER): ICD-10-CM

## 2022-09-20 DIAGNOSIS — G89.29 CHRONIC MIDLINE LOW BACK PAIN WITHOUT SCIATICA: ICD-10-CM

## 2022-09-20 PROCEDURE — 1123F ACP DISCUSS/DSCN MKR DOCD: CPT | Performed by: INTERNAL MEDICINE

## 2022-09-20 PROCEDURE — 99214 OFFICE O/P EST MOD 30 MIN: CPT | Performed by: INTERNAL MEDICINE

## 2022-09-20 RX ORDER — BUSPIRONE HYDROCHLORIDE 5 MG/1
TABLET ORAL
Qty: 60 TABLET | Refills: 5 | Status: SHIPPED | OUTPATIENT
Start: 2022-09-20

## 2022-09-20 RX ORDER — GABAPENTIN 300 MG/1
CAPSULE ORAL
Qty: 60 CAPSULE | Refills: 5 | Status: SHIPPED | OUTPATIENT
Start: 2022-09-20 | End: 2022-12-21

## 2022-09-20 RX ORDER — PANTOPRAZOLE SODIUM 40 MG/1
40 TABLET, DELAYED RELEASE ORAL DAILY
Qty: 30 TABLET | Refills: 5 | Status: SHIPPED | OUTPATIENT
Start: 2022-09-20

## 2022-09-20 RX ORDER — MONTELUKAST SODIUM 10 MG/1
10 TABLET ORAL NIGHTLY
Qty: 30 TABLET | Refills: 5 | Status: SHIPPED | OUTPATIENT
Start: 2022-09-20

## 2022-09-20 RX ORDER — FESOTERODINE FUMARATE 4 MG/1
TABLET, EXTENDED RELEASE ORAL
Qty: 30 TABLET | Refills: 5 | Status: SHIPPED | OUTPATIENT
Start: 2022-09-20

## 2022-09-20 RX ORDER — FLUTICASONE PROPIONATE AND SALMETEROL 250; 50 UG/1; UG/1
1 POWDER RESPIRATORY (INHALATION) EVERY 12 HOURS
Qty: 60 EACH | Refills: 5 | Status: SHIPPED | OUTPATIENT
Start: 2022-09-20

## 2022-09-20 ASSESSMENT — PATIENT HEALTH QUESTIONNAIRE - PHQ9
SUM OF ALL RESPONSES TO PHQ QUESTIONS 1-9: 0
SUM OF ALL RESPONSES TO PHQ QUESTIONS 1-9: 0
2. FEELING DOWN, DEPRESSED OR HOPELESS: 0
1. LITTLE INTEREST OR PLEASURE IN DOING THINGS: 0
SUM OF ALL RESPONSES TO PHQ9 QUESTIONS 1 & 2: 0
SUM OF ALL RESPONSES TO PHQ QUESTIONS 1-9: 0
SUM OF ALL RESPONSES TO PHQ QUESTIONS 1-9: 0

## 2022-09-20 NOTE — PROGRESS NOTES
9/20/2022   Manish Emmanuel  1945    The patients PMH, surgical history, family history, medications, allergies were all reviewed and updated as appropriate today. Current Outpatient Medications on File Prior to Visit   Medication Sig Dispense Refill    azithromycin (ZITHROMAX) 250 MG tablet TAKE ONE TABLET BY MOUTH THREE TIMES A WEEK 12 tablet 3    pantoprazole (PROTONIX) 40 MG tablet Take 1 tablet by mouth daily 30 tablet 5    busPIRone (BUSPAR) 5 MG tablet One tablet twice a day 60 tablet 5    dilTIAZem (CARDIZEM) 30 MG tablet TAKE ONE TABLET BY MOUTH THREE TIMES A DAY 90 tablet 5    montelukast (SINGULAIR) 10 MG tablet Take 1 tablet by mouth nightly 30 tablet 11    fluticasone-salmeterol (ADVAIR) 250-50 MCG/DOSE AEPB INHALE ONE PUFF BY MOUTH TWICE A DAY 1 each 11    TOVIAZ 4 MG TB24 ER tablet TAKE ONE TABLET BY MOUTH DAILY 30 tablet 10    guaiFENesin (MUCINEX) 600 MG extended release tablet Take 1,200 mg by mouth daily      OXYGEN Inhale 3 L/min into the lungs continuous Use nightly as needed (Patient taking differently: Inhale 4 L/min into the lungs continuous) 1 Bottle 5    denosumab (PROLIA) 60 MG/ML SOSY SC injection Inject 1 mL into the skin once for 1 dose Every 6 months 1 each 1    gabapentin (NEURONTIN) 300 MG capsule One cap twice a day 60 capsule 5     No current facility-administered medications on file prior to visit. Chief Complaint   Patient presents with    Discuss Medications    COPD     No complaints       HPI:  here to \"discuss medications\"-NEEDS PRINTED REFILLS ON EVERYTHING! Remains on chronic supplemental oxygen , now at 4l/min nc continuous per Pulmonary Adam  But pulse ox 99%, decreased to 3 l/min and pulse ox 98%    Review of Systems    OBJECTIVE:    Physical Exam  Vitals and nursing note reviewed. Constitutional:       General: She is not in acute distress. Appearance: Normal appearance. She is well-developed. She is ill-appearing.    HENT:      Head: Normocephalic and atraumatic. Right Ear: Tympanic membrane, ear canal and external ear normal.      Left Ear: Tympanic membrane, ear canal and external ear normal.      Nose: Nose normal. No rhinorrhea. Mouth/Throat:      Pharynx: No oropharyngeal exudate or posterior oropharyngeal erythema. Eyes:      General:         Right eye: No discharge. Left eye: No discharge. Extraocular Movements: Extraocular movements intact. Conjunctiva/sclera: Conjunctivae normal.      Pupils: Pupils are equal, round, and reactive to light. Neck:      Thyroid: No thyromegaly. Vascular: No carotid bruit or JVD. Cardiovascular:      Rate and Rhythm: Normal rate and regular rhythm. Pulses: Normal pulses. Heart sounds: Normal heart sounds. No murmur heard. Pulmonary:      Effort: Pulmonary effort is normal. No respiratory distress. Breath sounds: Rhonchi present. No wheezing or rales. Abdominal:      General: Bowel sounds are normal. There is no distension. Palpations: Abdomen is soft. Tenderness: There is no abdominal tenderness. There is no rebound. Musculoskeletal:         General: No swelling. Cervical back: Normal range of motion. No muscular tenderness. Right lower leg: No edema. Left lower leg: No edema. Comments: FROM x 4   Lymphadenopathy:      Cervical: No cervical adenopathy. Skin:     General: Skin is warm and dry. Capillary Refill: Capillary refill takes 2 to 3 seconds. Coloration: Skin is not pale. Findings: No erythema or rash. Neurological:      Mental Status: She is alert and oriented to person, place, and time. Cranial Nerves: No cranial nerve deficit. Sensory: No sensory deficit. Motor: No abnormal muscle tone. Deep Tendon Reflexes: Reflexes normal.   Psychiatric:         Mood and Affect: Mood normal.         Behavior: Behavior normal.         Thought Content:  Thought content normal. Judgment: Judgment normal.     /68   Pulse 93   Temp 97.7 °F (36.5 °C) (Infrared)   Wt 141 lb (64 kg)   SpO2 100%   BMI 21.44 kg/m²   Wt Readings from Last 3 Encounters:   09/20/22 141 lb (64 kg)   06/17/22 128 lb 6.4 oz (58.2 kg)   06/16/22 129 lb (58.5 kg)         Data Review:   CBC:   Lab Results   Component Value Date/Time    WBC 6.7 06/21/2022 10:02 AM    WBC 6.3 03/25/2022 05:55 AM    WBC 12.2 03/23/2022 05:03 AM    HGB 12.1 06/21/2022 10:02 AM    HGB 11.0 03/25/2022 05:55 AM    HGB 10.6 03/23/2022 05:03 AM    HCT 37.4 06/21/2022 10:02 AM    HCT 34.9 03/25/2022 05:55 AM    HCT 33.6 03/23/2022 05:03 AM    MCV 90.8 06/21/2022 10:02 AM    MCV 90.8 03/25/2022 05:55 AM    MCV 91.8 03/23/2022 05:03 AM     06/21/2022 10:02 AM     03/25/2022 05:55 AM     03/23/2022 05:03 AM     Chemistry:   Lab Results   Component Value Date/Time     06/21/2022 10:02 AM     03/24/2022 09:15 AM     03/22/2022 08:53 AM    K 4.4 06/21/2022 10:02 AM    K 4.5 03/24/2022 09:15 AM    K 4.3 03/22/2022 08:53 AM    K 5.0 03/21/2022 06:26 AM    K 4.7 03/20/2022 09:45 PM    K 3.8 05/28/2020 07:33 AM     06/21/2022 10:02 AM     03/24/2022 09:15 AM    CL 99 03/22/2022 08:53 AM    CO2 33 06/21/2022 10:02 AM    CO2 33 03/24/2022 09:15 AM    CO2 33 03/22/2022 08:53 AM    PHOS 2.6 01/31/2018 10:09 AM    PHOS 3.8 07/08/2016 08:39 AM    BUN 22 06/21/2022 10:02 AM    BUN 17 05/25/2022 04:06 PM    BUN 12 03/24/2022 09:15 AM    CREATININE 0.6 06/21/2022 10:02 AM    CREATININE 0.6 05/25/2022 04:06 PM    CREATININE <0.5 03/24/2022 09:15 AM     Hepatic Function:   Lab Results   Component Value Date/Time    AST 19 06/21/2022 10:02 AM    AST 30 03/21/2022 06:26 AM    AST 22 03/20/2022 09:45 PM    ALT 11 06/21/2022 10:02 AM    ALT 11 03/21/2022 06:26 AM    ALT 10 03/20/2022 09:45 PM    BILIDIR <0.2 01/29/2018 11:53 AM    BILIDIR <0.2 03/07/2017 11:45 AM    BILIDIR 0.3 12/30/2014 04:10 PM    BILITOT 0.4 06/21/2022 10:02 AM    BILITOT 0.5 03/21/2022 06:26 AM    BILITOT 0.7 03/20/2022 09:45 PM    ALKPHOS 59 06/21/2022 10:02 AM    ALKPHOS 59 03/21/2022 06:26 AM    ALKPHOS 75 03/20/2022 09:45 PM     Lab Results   Component Value Date/Time    LIPASE 18 05/24/2013 12:10 PM     Lipids:   Lab Results   Component Value Date/Time    CHOL 214 03/05/2018 11:07 AM    HDL 65 06/21/2022 10:02 AM    TRIG 129 03/05/2018 11:07 AM       ASSESSMENT/PLAN  1. OAB (overactive bladder)  Refill printed  - fesoterodine (TOVIAZ) 4 MG TB24 ER tablet; One daily  Dispense: 30 tablet; Refill: 5    2. Mucopurulent chronic bronchitis ([facility] [address]  Reminded pt to use Adviar BID, not prn  OK to decrease oxygen to 3l/min nc  - montelukast (SINGULAIR) 10 MG tablet; Take 1 tablet by mouth nightly  Dispense: 30 tablet; Refill: 5  - CBC with Auto Differential; Future  - Comprehensive Metabolic Panel, Fasting; Future  - Lipid, Fasting; Future    3. Gastroesophageal reflux disease with esophagitis and hemorrhage  Refill printed  - pantoprazole (PROTONIX) 40 MG tablet; Take 1 tablet by mouth daily  Dispense: 30 tablet; Refill: 5    4. Neuropathy  No refill needed but wants all Rx's PRINTED today  - gabapentin (NEURONTIN) 300 MG capsule; One cap twice a day  Dispense: 60 capsule; Refill: 5    5. Chronic midline low back pain without sciatica  As above  - gabapentin (NEURONTIN) 300 MG capsule; One cap twice a day  Dispense: 60 capsule; Refill: 5    6. Anxiety  Refill printed  - busPIRone (BUSPAR) 5 MG tablet; One tablet twice a day  Dispense: 60 tablet;  Refill: 5    F/u in 3 months with labs prior  UTD with all vaccinations today    Natacha Wilson MD        Electronically signed by Natacha Wilson MD on 9/20/2022 at 9:16 AM

## 2022-09-22 ENCOUNTER — OFFICE VISIT (OUTPATIENT)
Dept: RHEUMATOLOGY | Age: 77
End: 2022-09-22
Payer: COMMERCIAL

## 2022-09-22 ENCOUNTER — TELEPHONE (OUTPATIENT)
Dept: RHEUMATOLOGY | Age: 77
End: 2022-09-22

## 2022-09-22 VITALS
HEART RATE: 76 BPM | WEIGHT: 142 LBS | SYSTOLIC BLOOD PRESSURE: 124 MMHG | DIASTOLIC BLOOD PRESSURE: 80 MMHG | BODY MASS INDEX: 21.52 KG/M2 | HEIGHT: 68 IN

## 2022-09-22 DIAGNOSIS — M81.0 AGE-RELATED OSTEOPOROSIS WITHOUT CURRENT PATHOLOGICAL FRACTURE: ICD-10-CM

## 2022-09-22 DIAGNOSIS — M81.0 AGE-RELATED OSTEOPOROSIS WITHOUT CURRENT PATHOLOGICAL FRACTURE: Primary | ICD-10-CM

## 2022-09-22 LAB
CALCIUM SERPL-MCNC: 9.6 MG/DL (ref 8.3–10.6)
CREAT SERPL-MCNC: 0.7 MG/DL (ref 0.6–1.2)
GFR AFRICAN AMERICAN: >60
GFR NON-AFRICAN AMERICAN: >60

## 2022-09-22 PROCEDURE — 1123F ACP DISCUSS/DSCN MKR DOCD: CPT | Performed by: INTERNAL MEDICINE

## 2022-09-22 PROCEDURE — 99213 OFFICE O/P EST LOW 20 MIN: CPT | Performed by: INTERNAL MEDICINE

## 2022-09-22 RX ORDER — TRAMADOL HYDROCHLORIDE 50 MG/1
50 TABLET ORAL 2 TIMES DAILY PRN
Qty: 60 TABLET | Refills: 2 | Status: SHIPPED | OUTPATIENT
Start: 2022-09-22 | End: 2022-12-21

## 2022-09-22 NOTE — TELEPHONE ENCOUNTER
The [facility], spoke with Susannah Velázquez, she took all the information and we will have a reply in up to 5 days. PA REF # [id number]      Submitted PA for PROLIA  Via CMM Key: [id number] STATUS: APPROVED .     Letter scanned into chart

## 2022-09-22 NOTE — PROGRESS NOTES
Subjective:       Calixto Valentine is a 78 y.o. female   The patient returns for follow-up of osteoporosis. She is due for her next Prolia injection. She recently had a slight fall with no fractures. Review of Systems:    Denies injection site reactions denies clinical fracture    Objective:     /80   Pulse 76   Ht 5' 8\" (1.727 m)   Wt 142 lb (64.4 kg)   BMI 21.59 kg/m²   Alert female in no acute distress no vertebral body tenderness. Assessment:      Osteoporosis    Plan:      Blood work will be obtained to make sure there is no contraindication to giving the next Prolia injection. I will see her back in 6 months time.         Stephany Ceballos MD, MD, 9/22/2022 4:20 PM

## 2022-09-22 NOTE — TELEPHONE ENCOUNTER
Adarsh Jaramillo, Dr Wilton Dias wanted me to forward this message to you to see if there is anything we can do or [facility] to help with cost of last prolia. Pt's  said was told that it was \"out of network\".

## 2022-10-10 DIAGNOSIS — M81.0 AGE-RELATED OSTEOPOROSIS WITHOUT CURRENT PATHOLOGICAL FRACTURE: ICD-10-CM

## 2022-10-10 NOTE — TELEPHONE ENCOUNTER
Patient calling due for Prolia injection-last given 3/29/22  Needs sent to [facility]  Patient supplied      Medication Pending

## 2022-10-20 ENCOUNTER — TELEPHONE (OUTPATIENT)
Dept: RHEUMATOLOGY | Age: 77
End: 2022-10-20

## 2022-10-20 DIAGNOSIS — M81.0 AGE-RELATED OSTEOPOROSIS WITHOUT CURRENT PATHOLOGICAL FRACTURE: ICD-10-CM

## 2022-10-20 NOTE — TELEPHONE ENCOUNTER
Patient was scheduled for Prolia injection 10/20/22. Medication is supposed to be shipped to the office from a Pharmacy. Called pt to discuss. She has not heard anything from the pharmacy regarding the shipment of the prolia. We discussed that she can qualify for a co pay assist card with [employer]. Did confernece call with patient and pharmacy. They advised that the prolia is flagged under major medical. Patient's  called and get her enrolled into prolia support.  Pharmacy advised that their benefits team has to do the verification

## 2022-10-25 NOTE — TELEPHONE ENCOUNTER
Called the patients daughter Alcira to schedule her mother for a 2wk follow up with  the week of December 25,2017. Pended

## 2022-10-25 NOTE — TELEPHONE ENCOUNTER
RECEIVED A CALL FROM Kim, she stated that this medication is APPROVED. We just need to bill this UNDER THE PTS MEDICAL PLAN.     Approved from 10/4/22-10/4/23 [address] [place] [address]

## 2022-10-27 ENCOUNTER — TELEPHONE (OUTPATIENT)
Dept: RHEUMATOLOGY | Age: 77
End: 2022-10-27

## 2022-10-27 NOTE — TELEPHONE ENCOUNTER
Layla calling on [employer] for Prolia  Approved from 10/4/22 to 10/2/23    Auth# [id number]  Prolia injection needs to be sent to [address] [place] [address] pharmacy    Was transferred to [facility]. Spoke with Gosia. Reports that the message went to the wrong benefits team on the 20th. They sent it to the pharmacy team instead of the major medical team. She is going to send this as Urgent to the correct team. They should have resolution with in 24 hours.

## 2022-10-27 NOTE — TELEPHONE ENCOUNTER
Called spoke with patient gave her message on what is going on with the Prolia  Patient is aware we should hear something back with in 24h

## 2022-10-27 NOTE — TELEPHONE ENCOUNTER
Submitted PA for GUILLERMINA  Via CMM  Key: [address] STATUS: NOT SENT. They want to know \"Has a repeat bone mineral density (BMD) test demonstrated a stable or increase in BMD?\"    There is no recent DEXA SCAN. If this requires a response please respond to the pool ( P [facility] [address] [facility] [place]). Thank you please advise patient.

## 2022-10-31 NOTE — TELEPHONE ENCOUNTER
Per . last one done 2/26/21, she is not due until 2/26/23 for her next dexa.  Please proceed with 21 date

## 2022-11-01 ENCOUNTER — OFFICE VISIT (OUTPATIENT)
Dept: PULMONOLOGY | Age: 77
End: 2022-11-01
Payer: COMMERCIAL

## 2022-11-01 ENCOUNTER — NURSE ONLY (OUTPATIENT)
Dept: RHEUMATOLOGY | Age: 77
End: 2022-11-01
Payer: COMMERCIAL

## 2022-11-01 VITALS — HEART RATE: 84 BPM | OXYGEN SATURATION: 93 %

## 2022-11-01 DIAGNOSIS — R91.8 PULMONARY NODULES: Primary | ICD-10-CM

## 2022-11-01 DIAGNOSIS — M81.0 AGE-RELATED OSTEOPOROSIS WITHOUT CURRENT PATHOLOGICAL FRACTURE: Primary | ICD-10-CM

## 2022-11-01 DIAGNOSIS — J44.9 COPD, SEVERE (HCC): ICD-10-CM

## 2022-11-01 DIAGNOSIS — J43.2 CENTRILOBULAR EMPHYSEMA (HCC): ICD-10-CM

## 2022-11-01 DIAGNOSIS — J96.11 CHRONIC RESPIRATORY FAILURE WITH HYPOXIA (HCC): ICD-10-CM

## 2022-11-01 DIAGNOSIS — C34.12 MALIGNANT NEOPLASM OF UPPER LOBE OF LEFT LUNG (HCC): ICD-10-CM

## 2022-11-01 PROCEDURE — 1123F ACP DISCUSS/DSCN MKR DOCD: CPT | Performed by: INTERNAL MEDICINE

## 2022-11-01 PROCEDURE — 96372 THER/PROPH/DIAG INJ SC/IM: CPT | Performed by: INTERNAL MEDICINE

## 2022-11-01 PROCEDURE — 99214 OFFICE O/P EST MOD 30 MIN: CPT | Performed by: INTERNAL MEDICINE

## 2022-11-01 RX ORDER — PREDNISONE 10 MG/1
TABLET ORAL
Qty: 30 TABLET | Refills: 3 | Status: SHIPPED | OUTPATIENT
Start: 2022-11-01 | End: 2022-11-11

## 2022-11-01 RX ORDER — LEVOFLOXACIN 750 MG/1
750 TABLET ORAL DAILY
Qty: 7 TABLET | Refills: 3 | Status: SHIPPED | OUTPATIENT
Start: 2022-11-01 | End: 2022-11-11

## 2022-11-01 NOTE — PROGRESS NOTES
Pt received prolia 60mg/ml sc left upper outer arm, [employer], lot [id number], exp 10/24. Pt tolerated well. Pt supplied prolia.

## 2022-11-01 NOTE — PROGRESS NOTES
[facility]  Follow Up Note  MD Stan Fox Andrade   YOB: 1945    Date of Visit:  11/1/2022    Assessment/Plan:  1. Pulmonary nodules/history of lung cancer  Patient had left upper lobectomy 2008 for lung cancer. She had radiosurgery in the right lung in 2019. Lesion at that time was not biopsied due to severity of her underlying disease and associated risk of biopsy. Current lesion is in a similar location as to that which was radiated. Initially this was thought to represent scar but the area seems to be enlarging and was PET avid on recent scanning. We did discuss possible navigational bronchoscopy and biopsy considering this to be somewhat safer than IR biopsy given her severe emphysema. However, she ended up in the hospital with right lower lobe pneumonia prior to the biopsy. Biopsy has not been performed at this point. Her most recent CT imaging was reviewed during this visit and my impression is that the right sided density is not obviously larger. Some of the surrounding ASD has improved    Recommend repeat imaging. She stated that Dr Randi Fair was ordering for December. 2. Shortness of breath  She has been home from the hospital almost 2 months now. Feels like her breathing has improved and she is back to her previous baseline. 3. COPD, severe ([address]  I reviewed pulmonary function testing  Most recent PFT was 2018  FEV1 is less than 1 L, 34% predicted  This reveals very severe COPD    Medication management:  Advair  Combivent  DuoNeb      4. Centrilobular emphysema ([address]  I reviewed CT imaging today and this does reveal very severe emphysema which appears stable    5. Chronic respiratory failure with hypoxia (HCC)  Stable  Benefits from supplemental oxygen and portable oxygen concentrator  Now needing up to 4 L/min continuous flow oxygen    6.  Immunization  She has had her flu shot  COVID UTD      Follow-up in 6 month      Chief Complaint   Patient presents with    Shortness of Breath     3 month        HPI  The patient presents with a chief complaint of moderate to severe shortness of breath related to very severe COPD/emphysema of many years duration. She also has a history of lung cancer. She had a lobectomy on the left and empiric radiation on the right. We have been following an enlarging pulmonary nodule. CT imaging showed enlargement of the nodule. She returns to the office today for follow-up of recent PET scan. Dr. Lulu Marie had contacted me asking about obtaining a biopsy. Review of Systems  No complaint of chest pain, nausea or vomiting    History  I have reviewed past medical, surgical, social and family history. This is documented elsewhere in the medical record. Physical Exam:  Well developed, well nourished  Alert and oriented  Sclera is clear  No cervical adenopathy  No JVD. Chest examination is congested and wheeze. Cardiac examination reveals regular rate and rhythm without murmur, gallop or rub. The abdomen is soft, nontender and nondistended. There is no clubbing, cyanosis or edema of the extremities. There is no obvious skin rash. No focal neuro deficicts  Normal mood and affect      Allergies   Allergen Reactions    Wellbutrin [Bupropion Hcl] Palpitations     Prior to Visit Medications    Medication Sig Taking? Authorizing Provider   denosumab (PROLIA) 60 MG/ML SOSY SC injection Inject 1 mL into the skin once for 1 dose Every 6 months  Ameena Metz MD   traMADol (ULTRAM) 50 MG tablet Take 1 tablet by mouth 2 times daily as needed for Pain for up to 90 days. Lisseth Martinez MD   fluticasone-salmeterol (ADVAIR DISKUS) 250-50 MCG/ACT AEPB diskus inhaler Inhale 1 puff into the lungs in the morning and 1 puff in the evening.   Mone Ortiz MD   fesoterodine (TOVIAZ) 4 MG TB24 ER tablet One daily  Mone Ortiz MD   montelukast (SINGULAIR) 10 MG tablet Take 1 tablet by mouth nightly  Aristides Speaks Denisse Dhaliwal MD   pantoprazole (PROTONIX) 40 MG tablet Take 1 tablet by mouth daily  Luis E Bernstein MD   gabapentin (NEURONTIN) 300 MG capsule One cap twice a day  Luis E Bernstein MD   busPIRone (BUSPAR) 5 MG tablet One tablet twice a day  Luis E Bernstein MD   dilTIAZem (CARDIZEM) 30 MG tablet TAKE ONE TABLET BY MOUTH THREE TIMES A DAY  Luis E Bernstein MD   azithromycin (ZITHROMAX) 250 MG tablet TAKE ONE TABLET BY MOUTH THREE TIMES A WEEK  Jony Barbosa MD   guaiFENesin (MUCINEX) 600 MG extended release tablet Take 1,200 mg by mouth daily  Historical Provider, MD   OXYGEN Inhale 3 L/min into the lungs continuous Use nightly as needed  Patient taking differently: Inhale 3 L/min into the lungs continuous  Luis E Bernstein MD       Vitals:    22 0841   Pulse: 84   SpO2: 93%     There is no height or weight on file to calculate BMI.      Wt Readings from Last 3 Encounters:   22 142 lb (64.4 kg)   22 141 lb (64 kg)   22 128 lb 6.4 oz (58.2 kg)     BP Readings from Last 3 Encounters:   22 124/80   22 110/68   22 112/64        Social History     Tobacco Use   Smoking Status Light Smoker    Packs/day: 0.01    Years: 42.00    Pack years: 0.42    Types: Cigarettes    Last attempt to quit: 2010    Years since quittin.6   Smokeless Tobacco Never   Tobacco Comments    1 cigarette month, maybe will have one if she is upset

## 2022-11-02 NOTE — TELEPHONE ENCOUNTER
Submitted PA for GUILLERMINA  Via CMM Key: [address] STATUS: \"Final Outcome has been reached on this event. Previous decision cannot be edited. \"    Tom Crowder is calling [phone] to see if she can switch the current MEDICAL APPROVAL to pharmacy APPROVAL.

## 2022-11-03 NOTE — TELEPHONE ENCOUNTER
Called the insurance, spoke with Ruba Spencer, she said it was denied under medical benefits due to there already being an APPROVAL from 10/4/22-10/3/23 under the MEDICAL BENEFIT. She is going to fax me that approval and I will scan into the chart. I received another call from Elfida Galeazzi, stating that this is not a pharmacy benefit under the pts plan.   The only option to get this put under the pharmacy is that we give it under the facility (which they would need name of facility, [facility]) and could have med delivered to the office for the pt    Called [facility], spoke with DANIKA, she stated that the prolia was shipped from [place] [address] and was delivered to the doctors office on 11/1/22

## 2022-11-03 NOTE — TELEPHONE ENCOUNTER
Called the insurance, spoke with Jeanine Guevara, she said it was denied under medical benefits due to there already being an APPROVAL from 10/4/22-10/3/23 under the MEDICAL BENEFIT. She is going to fax me that approval and I will scan into the chart. I received another call from Martina Gan, stating that this is not a pharmacy benefit under the pts plan.   The only option to get this put under the pharmacy is that we give it under the facility (which they would need name of facility, [facility]) and could have med delivered to the office for the pt

## 2022-11-17 DIAGNOSIS — N32.81 OAB (OVERACTIVE BLADDER): ICD-10-CM

## 2022-11-18 RX ORDER — FESOTERODINE FUMARATE 4 MG/1
TABLET, EXTENDED RELEASE ORAL
Qty: 30 TABLET | Refills: 5 | Status: SHIPPED | OUTPATIENT
Start: 2022-11-18

## 2022-12-07 ENCOUNTER — HOSPITAL ENCOUNTER (OUTPATIENT)
Dept: CT IMAGING | Age: 77
Discharge: HOME | End: 2022-12-07
Payer: COMMERCIAL

## 2022-12-07 DIAGNOSIS — C34.31 PRIMARY SMALL CELL CARCINOMA OF LOWER LOBE OF RIGHT LUNG (HCC): ICD-10-CM

## 2022-12-07 PROCEDURE — 71250 CT THORAX DX C-: CPT

## 2022-12-09 DIAGNOSIS — J41.1 MUCOPURULENT CHRONIC BRONCHITIS (HCC): ICD-10-CM

## 2022-12-09 LAB
A/G RATIO: 1.8 (ref 1.1–2.2)
ALBUMIN SERPL-MCNC: 4.4 G/DL (ref 3.4–5)
ALP BLD-CCNC: 67 U/L (ref 40–129)
ALT SERPL-CCNC: 12 U/L (ref 10–40)
ANION GAP SERPL CALCULATED.3IONS-SCNC: 14 MMOL/L (ref 3–16)
AST SERPL-CCNC: 20 U/L (ref 15–37)
BASOPHILS ABSOLUTE: 0 K/UL (ref 0–0.2)
BASOPHILS RELATIVE PERCENT: 0.6 %
BILIRUB SERPL-MCNC: 0.4 MG/DL (ref 0–1)
BUN BLDV-MCNC: 13 MG/DL (ref 7–20)
CALCIUM SERPL-MCNC: 10.1 MG/DL (ref 8.3–10.6)
CHLORIDE BLD-SCNC: 98 MMOL/L (ref 99–110)
CHOLESTEROL, FASTING: 211 MG/DL (ref 0–199)
CO2: 29 MMOL/L (ref 21–32)
CREAT SERPL-MCNC: 0.7 MG/DL (ref 0.6–1.2)
EOSINOPHILS ABSOLUTE: 0.1 K/UL (ref 0–0.6)
EOSINOPHILS RELATIVE PERCENT: 2.3 %
GFR SERPL CREATININE-BSD FRML MDRD: >60 ML/MIN/{1.73_M2}
GLUCOSE FASTING: 94 MG/DL (ref 70–99)
HCT VFR BLD CALC: 38.2 % (ref 36–48)
HDLC SERPL-MCNC: 72 MG/DL (ref 40–60)
HEMOGLOBIN: 12.1 G/DL (ref 12–16)
LDL CHOLESTEROL CALCULATED: 121 MG/DL
LYMPHOCYTES ABSOLUTE: 1.2 K/UL (ref 1–5.1)
LYMPHOCYTES RELATIVE PERCENT: 18.8 %
MCH RBC QN AUTO: 28.3 PG (ref 26–34)
MCHC RBC AUTO-ENTMCNC: 31.6 G/DL (ref 31–36)
MCV RBC AUTO: 89.5 FL (ref 80–100)
MONOCYTES ABSOLUTE: 0.8 K/UL (ref 0–1.3)
MONOCYTES RELATIVE PERCENT: 13.5 %
NEUTROPHILS ABSOLUTE: 4 K/UL (ref 1.7–7.7)
NEUTROPHILS RELATIVE PERCENT: 64.8 %
PDW BLD-RTO: 14.1 % (ref 12.4–15.4)
PLATELET # BLD: 160 K/UL (ref 135–450)
PMV BLD AUTO: 10.3 FL (ref 5–10.5)
POTASSIUM SERPL-SCNC: 4.5 MMOL/L (ref 3.5–5.1)
RBC # BLD: 4.27 M/UL (ref 4–5.2)
SODIUM BLD-SCNC: 141 MMOL/L (ref 136–145)
TOTAL PROTEIN: 6.9 G/DL (ref 6.4–8.2)
TRIGLYCERIDE, FASTING: 92 MG/DL (ref 0–150)
VLDLC SERPL CALC-MCNC: 18 MG/DL
WBC # BLD: 6.2 K/UL (ref 4–11)

## 2022-12-12 NOTE — RESULT ENCOUNTER NOTE
Labs are stable without significant change from last test.    OK to follow up as scheduled to review results in detail.     Shawna Vega MD

## 2022-12-16 ENCOUNTER — OFFICE VISIT (OUTPATIENT)
Dept: PRIMARY CARE CLINIC | Age: 77
End: 2022-12-16
Payer: COMMERCIAL

## 2022-12-16 VITALS
HEART RATE: 67 BPM | BODY MASS INDEX: 24.18 KG/M2 | SYSTOLIC BLOOD PRESSURE: 128 MMHG | DIASTOLIC BLOOD PRESSURE: 68 MMHG | TEMPERATURE: 97.8 F | WEIGHT: 159 LBS | OXYGEN SATURATION: 97 %

## 2022-12-16 DIAGNOSIS — C34.12 MALIGNANT NEOPLASM OF UPPER LOBE OF LEFT LUNG (HCC): Primary | ICD-10-CM

## 2022-12-16 DIAGNOSIS — I10 PRIMARY HYPERTENSION: ICD-10-CM

## 2022-12-16 DIAGNOSIS — J44.9 COPD, SEVERE (HCC): ICD-10-CM

## 2022-12-16 DIAGNOSIS — J96.11 CHRONIC RESPIRATORY FAILURE WITH HYPOXIA (HCC): ICD-10-CM

## 2022-12-16 PROCEDURE — 99213 OFFICE O/P EST LOW 20 MIN: CPT | Performed by: INTERNAL MEDICINE

## 2022-12-16 PROCEDURE — 1123F ACP DISCUSS/DSCN MKR DOCD: CPT | Performed by: INTERNAL MEDICINE

## 2022-12-16 PROCEDURE — 3074F SYST BP LT 130 MM HG: CPT | Performed by: INTERNAL MEDICINE

## 2022-12-16 PROCEDURE — 3078F DIAST BP <80 MM HG: CPT | Performed by: INTERNAL MEDICINE

## 2022-12-16 ASSESSMENT — PATIENT HEALTH QUESTIONNAIRE - PHQ9
SUM OF ALL RESPONSES TO PHQ QUESTIONS 1-9: 0
1. LITTLE INTEREST OR PLEASURE IN DOING THINGS: 0
SUM OF ALL RESPONSES TO PHQ9 QUESTIONS 1 & 2: 0
2. FEELING DOWN, DEPRESSED OR HOPELESS: 0
SUM OF ALL RESPONSES TO PHQ QUESTIONS 1-9: 0

## 2022-12-16 NOTE — PROGRESS NOTES
12/16/2022   Marcoashley Spearing  1945    The patients PMH, surgical history, family history, medications, allergies were all reviewed and updated as appropriate today. Current Outpatient Medications on File Prior to Visit   Medication Sig Dispense Refill    fesoterodine (TOVIAZ) 4 MG TB24 ER tablet TAKE ONE TABLET BY MOUTH DAILY 30 tablet 5    traMADol (ULTRAM) 50 MG tablet Take 1 tablet by mouth 2 times daily as needed for Pain for up to 90 days. 60 tablet 2    fluticasone-salmeterol (ADVAIR DISKUS) 250-50 MCG/ACT AEPB diskus inhaler Inhale 1 puff into the lungs in the morning and 1 puff in the evening. 60 each 5    montelukast (SINGULAIR) 10 MG tablet Take 1 tablet by mouth nightly 30 tablet 5    pantoprazole (PROTONIX) 40 MG tablet Take 1 tablet by mouth daily 30 tablet 5    gabapentin (NEURONTIN) 300 MG capsule One cap twice a day 60 capsule 5    busPIRone (BUSPAR) 5 MG tablet One tablet twice a day 60 tablet 5    dilTIAZem (CARDIZEM) 30 MG tablet TAKE ONE TABLET BY MOUTH THREE TIMES A DAY 90 tablet 5    azithromycin (ZITHROMAX) 250 MG tablet TAKE ONE TABLET BY MOUTH THREE TIMES A WEEK 12 tablet 3    guaiFENesin (MUCINEX) 600 MG extended release tablet Take 1,200 mg by mouth daily      OXYGEN Inhale 3 L/min into the lungs continuous Use nightly as needed (Patient taking differently: Inhale 3 L/min into the lungs continuous) 1 Bottle 5    denosumab (PROLIA) 60 MG/ML SOSY SC injection Inject 1 mL into the skin once for 1 dose Every 6 months 1 each 1     No current facility-administered medications on file prior to visit.         Chief Complaint   Patient presents with    3 Month Follow-Up    COPD    Hypertension     No Complaints        HPI:  continues to struggle with her COPD but has mostly good days  Continues to need continuous nasal oxygen 3l/min nc  Sees [facility]   very involved in her care  No refills needed today  Sees Adam every 3 months    Review of Systems    OBJECTIVE:  BP 128/68   Pulse 67   Temp 97.8 °F (36.6 °C) (Infrared)   Wt 159 lb (72.1 kg)   SpO2 97%   BMI 24.18 kg/m²    Wt Readings from Last 3 Encounters:   12/16/22 159 lb (72.1 kg)   09/22/22 142 lb (64.4 kg)   09/20/22 141 lb (64 kg)       Physical Exam  Vitals and nursing note reviewed. Constitutional:       General: She is not in acute distress. Appearance: Normal appearance. She is well-developed. HENT:      Head: Normocephalic and atraumatic. Right Ear: Tympanic membrane, ear canal and external ear normal.      Left Ear: Tympanic membrane, ear canal and external ear normal.      Nose: Nose normal. No rhinorrhea. Mouth/Throat:      Pharynx: No oropharyngeal exudate or posterior oropharyngeal erythema. Eyes:      General:         Right eye: No discharge. Left eye: No discharge. Extraocular Movements: Extraocular movements intact. Conjunctiva/sclera: Conjunctivae normal.      Pupils: Pupils are equal, round, and reactive to light. Neck:      Thyroid: No thyromegaly. Vascular: No carotid bruit or JVD. Cardiovascular:      Rate and Rhythm: Normal rate and regular rhythm. Pulses: Normal pulses. Heart sounds: Normal heart sounds. No murmur heard. Pulmonary:      Effort: Pulmonary effort is normal. No respiratory distress. Breath sounds: Normal breath sounds. No wheezing, rhonchi or rales. Abdominal:      General: Bowel sounds are normal. There is no distension. Palpations: Abdomen is soft. Tenderness: There is no abdominal tenderness. There is no rebound. Musculoskeletal:         General: No swelling. Cervical back: Normal range of motion. No muscular tenderness. Right lower leg: No edema. Left lower leg: No edema. Comments: FROM x 4   Lymphadenopathy:      Cervical: No cervical adenopathy. Skin:     General: Skin is warm and dry. Capillary Refill: Capillary refill takes 2 to 3 seconds.       Coloration: Skin is not pale.      Findings: No erythema or rash. Neurological:      Mental Status: She is alert and oriented to person, place, and time. Cranial Nerves: No cranial nerve deficit. Sensory: No sensory deficit. Motor: No abnormal muscle tone. Deep Tendon Reflexes: Reflexes normal.   Psychiatric:         Mood and Affect: Mood normal.         Behavior: Behavior normal.         Thought Content:  Thought content normal.         Judgment: Judgment normal.       Data Review:   CBC:   Lab Results   Component Value Date/Time    WBC 6.2 12/09/2022 03:44 PM    WBC 6.7 06/21/2022 10:02 AM    WBC 6.3 03/25/2022 05:55 AM    HGB 12.1 12/09/2022 03:44 PM    HGB 12.1 06/21/2022 10:02 AM    HGB 11.0 03/25/2022 05:55 AM    HCT 38.2 12/09/2022 03:44 PM    HCT 37.4 06/21/2022 10:02 AM    HCT 34.9 03/25/2022 05:55 AM    MCV 89.5 12/09/2022 03:44 PM    MCV 90.8 06/21/2022 10:02 AM    MCV 90.8 03/25/2022 05:55 AM     12/09/2022 03:44 PM     06/21/2022 10:02 AM     03/25/2022 05:55 AM     Chemistry:   Lab Results   Component Value Date/Time     12/09/2022 03:44 PM     06/21/2022 10:02 AM     03/24/2022 09:15 AM    K 4.5 12/09/2022 03:44 PM    K 4.4 06/21/2022 10:02 AM    K 4.5 03/24/2022 09:15 AM    K 5.0 03/21/2022 06:26 AM    K 4.7 03/20/2022 09:45 PM    K 3.8 05/28/2020 07:33 AM    CL 98 12/09/2022 03:44 PM     06/21/2022 10:02 AM     03/24/2022 09:15 AM    CO2 29 12/09/2022 03:44 PM    CO2 33 06/21/2022 10:02 AM    CO2 33 03/24/2022 09:15 AM    PHOS 2.6 01/31/2018 10:09 AM    PHOS 3.8 07/08/2016 08:39 AM    BUN 13 12/09/2022 03:44 PM    BUN 22 06/21/2022 10:02 AM    BUN 17 05/25/2022 04:06 PM    CREATININE 0.7 12/09/2022 03:44 PM    CREATININE 0.7 09/22/2022 04:31 PM    CREATININE 0.6 06/21/2022 10:02 AM     Hepatic Function:   Lab Results   Component Value Date/Time    AST 20 12/09/2022 03:44 PM    AST 19 06/21/2022 10:02 AM    AST 30 03/21/2022 06:26 AM    ALT 12 12/09/2022 03:44 PM    ALT 11 06/21/2022 10:02 AM    ALT 11 03/21/2022 06:26 AM    BILIDIR <0.2 01/29/2018 11:53 AM    BILIDIR <0.2 03/07/2017 11:45 AM    BILIDIR 0.3 12/30/2014 04:10 PM    BILITOT 0.4 12/09/2022 03:44 PM    BILITOT 0.4 06/21/2022 10:02 AM    BILITOT 0.5 03/21/2022 06:26 AM    ALKPHOS 67 12/09/2022 03:44 PM    ALKPHOS 59 06/21/2022 10:02 AM    ALKPHOS 59 03/21/2022 06:26 AM     Lab Results   Component Value Date/Time    LIPASE 18 05/24/2013 12:10 PM     Lipids:   Lab Results   Component Value Date/Time    CHOL 214 03/05/2018 11:07 AM    HDL 72 12/09/2022 03:44 PM    TRIG 129 03/05/2018 11:07 AM       ASSESSMENT/PLAN    1. Malignant neoplasm of upper lobe of left lung (HCC)  Resultant severe pulmonary cripple 14 years ago but stable on supplemental nasal oxygen    2. Primary hypertension  Just had labs done 12/9, stable    3. COPD, severe ([address]  As per Pulmonary [facility] every 3 months    4.  Chronic respiratory failure with hypoxia (HCC)  Continues using/needing 3 l/min nc continuous oxygen     F/u with labs prior again in 6 months    Pascual Ramsey MD    Electronically signed by Pascual Ramsey MD on 12/16/2022 at 9:11 AM

## 2022-12-18 DIAGNOSIS — J44.9 COPD, SEVERE (HCC): ICD-10-CM

## 2022-12-19 RX ORDER — AZITHROMYCIN 250 MG/1
TABLET, FILM COATED ORAL
Qty: 12 TABLET | Refills: 3 | Status: SHIPPED | OUTPATIENT
Start: 2022-12-19

## 2023-02-01 NOTE — TELEPHONE ENCOUNTER
Pt has appointment scheduled for February 8 at 4:30. Prescription will be filled then. Pt states she has a few pills left.

## 2023-02-01 NOTE — TELEPHONE ENCOUNTER
Called pt. Offered appointment tomorrow at 10:45. She is checking with her  to see if she can make it. Will call back.

## 2023-02-08 ENCOUNTER — OFFICE VISIT (OUTPATIENT)
Dept: RHEUMATOLOGY | Age: 78
End: 2023-02-08
Payer: COMMERCIAL

## 2023-02-08 VITALS
SYSTOLIC BLOOD PRESSURE: 134 MMHG | DIASTOLIC BLOOD PRESSURE: 82 MMHG | HEART RATE: 102 BPM | HEIGHT: 68 IN | WEIGHT: 167 LBS | OXYGEN SATURATION: 94 % | BODY MASS INDEX: 25.31 KG/M2

## 2023-02-08 DIAGNOSIS — M81.0 AGE-RELATED OSTEOPOROSIS WITHOUT CURRENT PATHOLOGICAL FRACTURE: ICD-10-CM

## 2023-02-08 PROCEDURE — 99213 OFFICE O/P EST LOW 20 MIN: CPT | Performed by: INTERNAL MEDICINE

## 2023-02-08 PROCEDURE — 3074F SYST BP LT 130 MM HG: CPT | Performed by: INTERNAL MEDICINE

## 2023-02-08 PROCEDURE — 3078F DIAST BP <80 MM HG: CPT | Performed by: INTERNAL MEDICINE

## 2023-02-08 PROCEDURE — 1123F ACP DISCUSS/DSCN MKR DOCD: CPT | Performed by: INTERNAL MEDICINE

## 2023-02-08 RX ORDER — TRAMADOL HYDROCHLORIDE 50 MG/1
TABLET ORAL
Qty: 60 TABLET | OUTPATIENT
Start: 2023-02-08

## 2023-02-08 RX ORDER — TRAMADOL HYDROCHLORIDE 50 MG/1
50 TABLET ORAL 2 TIMES DAILY PRN
Qty: 60 TABLET | Refills: 2 | Status: SHIPPED | OUTPATIENT
Start: 2023-02-08 | End: 2023-05-09

## 2023-02-08 NOTE — PROGRESS NOTES
Subjective:       Jasbir Boudreaux is a 78 y.o. female the patient returns for follow-up of osteoporosis. She will be due for Prolia in May. She continues to use tramadol two daily for pain      Review of Systems:    Denies clinical fracture since the last visit    Objective:       /82   Pulse (!) 102   Ht 5' 8\" (1.727 m)   Wt 167 lb (75.8 kg)   SpO2 94%   BMI 25.39 kg/m²   Alert female no acute distress no vertebral body tenderness to direct compression  Assessment:    Osteoporosis      Plan:        The patient will need to get blood work in April so that we can administer Prolia in May. She will be given an appointment to come back in April to have blood work drawn.   The patient's OARRS report was reviewed      Melissa Llanes MD, MD, 2/8/2023 4:34 PM

## 2023-02-09 ENCOUNTER — TELEPHONE (OUTPATIENT)
Dept: RHEUMATOLOGY | Age: 78
End: 2023-02-09

## 2023-02-09 NOTE — TELEPHONE ENCOUNTER
Submitted PA for TRAMADOL  Via CMM  Key: [id number] STATUS: APPROVED FROM 2/13/23-8/12/23.     APPROVAL letter scanned into the chart

## 2023-03-14 DIAGNOSIS — J41.1 MUCOPURULENT CHRONIC BRONCHITIS (HCC): ICD-10-CM

## 2023-03-15 RX ORDER — MONTELUKAST SODIUM 10 MG/1
TABLET ORAL
Qty: 30 TABLET | Refills: 5 | Status: SHIPPED | OUTPATIENT
Start: 2023-03-15

## 2023-04-04 ENCOUNTER — TELEPHONE (OUTPATIENT)
Dept: PRIMARY CARE CLINIC | Age: 78
End: 2023-04-04

## 2023-04-04 DIAGNOSIS — K21.01 GASTROESOPHAGEAL REFLUX DISEASE WITH ESOPHAGITIS AND HEMORRHAGE: ICD-10-CM

## 2023-04-04 RX ORDER — PANTOPRAZOLE SODIUM 40 MG/1
TABLET, DELAYED RELEASE ORAL
Qty: 30 TABLET | Refills: 5 | Status: SHIPPED | OUTPATIENT
Start: 2023-04-04

## 2023-04-04 NOTE — TELEPHONE ENCOUNTER
Pharmacy called need new refills on these medications for pt   pantoprazole (PROTONIX) 40 MG tablet    fluticasone-salmeterol (ADVAIR DISKUS) 250-50 MCG/ACT

## 2023-04-04 NOTE — TELEPHONE ENCOUNTER
Recent Visits  Date Type Provider Dept   12/16/22 Office Visit Tonja Cogan, MD [facility] [facility] [facility]   09/20/22 Office Visit Tonja Cogan, MD [facility]   06/17/22 Office Visit Tonja Cogan, MD [facility]   03/11/22 Office Visit Tonja Cogan, MD [facility] [address] recent visits within past 540 days with a meds authorizing provider and meeting all other requirements  Future Appointments  Date Type Provider Dept   06/09/23 Appointment Tonja Cogan, MD [facility] [facility],[address] [facility]   Showing future appointments within next 150 days with a meds authorizing provider and meeting all other requirements

## 2023-04-27 ENCOUNTER — OFFICE VISIT (OUTPATIENT)
Dept: RHEUMATOLOGY | Age: 78
End: 2023-04-27
Payer: COMMERCIAL

## 2023-04-27 VITALS
HEART RATE: 88 BPM | DIASTOLIC BLOOD PRESSURE: 80 MMHG | WEIGHT: 182 LBS | SYSTOLIC BLOOD PRESSURE: 118 MMHG | BODY MASS INDEX: 27.58 KG/M2 | HEIGHT: 68 IN

## 2023-04-27 DIAGNOSIS — I10 PRIMARY HYPERTENSION: ICD-10-CM

## 2023-04-27 DIAGNOSIS — J44.9 COPD, SEVERE (HCC): ICD-10-CM

## 2023-04-27 DIAGNOSIS — M81.0 AGE-RELATED OSTEOPOROSIS WITHOUT CURRENT PATHOLOGICAL FRACTURE: Primary | ICD-10-CM

## 2023-04-27 DIAGNOSIS — M81.0 AGE-RELATED OSTEOPOROSIS WITHOUT CURRENT PATHOLOGICAL FRACTURE: ICD-10-CM

## 2023-04-27 LAB
ALBUMIN SERPL-MCNC: 4.7 G/DL (ref 3.4–5)
ALBUMIN/GLOB SERPL: 1.9 {RATIO} (ref 1.1–2.2)
ALP SERPL-CCNC: 77 U/L (ref 40–129)
ALT SERPL-CCNC: 13 U/L (ref 10–40)
ANION GAP SERPL CALCULATED.3IONS-SCNC: 11 MMOL/L (ref 3–16)
AST SERPL-CCNC: 20 U/L (ref 15–37)
BASOPHILS # BLD: 0 K/UL (ref 0–0.2)
BASOPHILS NFR BLD: 0.4 %
BILIRUB SERPL-MCNC: 0.4 MG/DL (ref 0–1)
BUN SERPL-MCNC: 16 MG/DL (ref 7–20)
CALCIUM SERPL-MCNC: 10.3 MG/DL (ref 8.3–10.6)
CALCIUM SERPL-MCNC: 10.4 MG/DL (ref 8.3–10.6)
CHLORIDE SERPL-SCNC: 100 MMOL/L (ref 99–110)
CHOLEST SERPL-MCNC: 209 MG/DL (ref 0–199)
CO2 SERPL-SCNC: 31 MMOL/L (ref 21–32)
CREAT SERPL-MCNC: 0.7 MG/DL (ref 0.6–1.2)
CREAT SERPL-MCNC: 0.7 MG/DL (ref 0.6–1.2)
DEPRECATED RDW RBC AUTO: 14.5 % (ref 12.4–15.4)
EOSINOPHIL # BLD: 0.2 K/UL (ref 0–0.6)
EOSINOPHIL NFR BLD: 3 %
GFR SERPLBLD CREATININE-BSD FMLA CKD-EPI: >60 ML/MIN/{1.73_M2}
GFR SERPLBLD CREATININE-BSD FMLA CKD-EPI: >60 ML/MIN/{1.73_M2}
GLUCOSE P FAST SERPL-MCNC: 104 MG/DL (ref 70–99)
HCT VFR BLD AUTO: 37.1 % (ref 36–48)
HDLC SERPL-MCNC: 77 MG/DL (ref 40–60)
HGB BLD-MCNC: 11.9 G/DL (ref 12–16)
LDL CHOLESTEROL CALCULATED: 106 MG/DL
LYMPHOCYTES # BLD: 1.1 K/UL (ref 1–5.1)
LYMPHOCYTES NFR BLD: 15.9 %
MCH RBC QN AUTO: 28.1 PG (ref 26–34)
MCHC RBC AUTO-ENTMCNC: 32 G/DL (ref 31–36)
MCV RBC AUTO: 88 FL (ref 80–100)
MONOCYTES # BLD: 1.2 K/UL (ref 0–1.3)
MONOCYTES NFR BLD: 16.8 %
NEUTROPHILS # BLD: 4.5 K/UL (ref 1.7–7.7)
NEUTROPHILS NFR BLD: 63.9 %
PLATELET # BLD AUTO: 168 K/UL (ref 135–450)
PMV BLD AUTO: 10.2 FL (ref 5–10.5)
POTASSIUM SERPL-SCNC: 4.5 MMOL/L (ref 3.5–5.1)
PROT SERPL-MCNC: 7.2 G/DL (ref 6.4–8.2)
RBC # BLD AUTO: 4.22 M/UL (ref 4–5.2)
SODIUM SERPL-SCNC: 142 MMOL/L (ref 136–145)
TRIGL SERPL-MCNC: 131 MG/DL (ref 0–150)
VLDLC SERPL CALC-MCNC: 26 MG/DL
WBC # BLD AUTO: 7 K/UL (ref 4–11)

## 2023-04-27 PROCEDURE — 3074F SYST BP LT 130 MM HG: CPT | Performed by: INTERNAL MEDICINE

## 2023-04-27 PROCEDURE — 99213 OFFICE O/P EST LOW 20 MIN: CPT | Performed by: INTERNAL MEDICINE

## 2023-04-27 PROCEDURE — 1123F ACP DISCUSS/DSCN MKR DOCD: CPT | Performed by: INTERNAL MEDICINE

## 2023-04-27 PROCEDURE — 3079F DIAST BP 80-89 MM HG: CPT | Performed by: INTERNAL MEDICINE

## 2023-04-27 RX ORDER — TRAMADOL HYDROCHLORIDE 50 MG/1
50 TABLET ORAL 2 TIMES DAILY PRN
Qty: 60 TABLET | Refills: 2 | Status: SHIPPED | OUTPATIENT
Start: 2023-05-07 | End: 2023-08-05

## 2023-04-27 NOTE — PROGRESS NOTES
Subjective:       Celia Balbuena is a 70 y.o. female patient returns for follow-up of osteoporosis. She is due for a Prolia injection next week. She still using tramadol for pain. There have been no new clinical fractures. Current Outpatient Medications   Medication Instructions    azithromycin (ZITHROMAX) 250 MG tablet TAKE ONE TABLET BY MOUTH THREE TIMES A WEEK    busPIRone (BUSPAR) 5 MG tablet One tablet twice a day    denosumab (PROLIA) 60 mg, SubCUTAneous, ONCE, Every 6 months    dilTIAZem (CARDIZEM) 30 MG tablet TAKE ONE TABLET BY MOUTH THREE TIMES A DAY    fesoterodine (TOVIAZ) 4 MG TB24 ER tablet TAKE ONE TABLET BY MOUTH DAILY    fluticasone-salmeterol (ADVAIR DISKUS) 250-50 MCG/ACT AEPB diskus inhaler 1 puff, Inhalation, EVERY 12 HOURS    gabapentin (NEURONTIN) 300 MG capsule One cap twice a day    guaiFENesin (MUCINEX) 1,200 mg, Oral, DAILY    montelukast (SINGULAIR) 10 MG tablet TAKE ONE TABLET BY MOUTH ONCE NIGHTLY    OXYGEN 3 L/min, Inhalation, CONTINUOUS, Use nightly as needed     pantoprazole (PROTONIX) 40 MG tablet TAKE ONE TABLET BY MOUTH DAILY    traMADol (ULTRAM) 50 mg, Oral, 2 TIMES DAILY PRN        Review of Systems:    Denies injection site reaction. Continue to use portable oxygen    Objective:     /80   Pulse 88   Ht 5' 8\" (1.727 m)   Wt 182 lb (82.6 kg)   BMI 27.67 kg/m²   Alert female in no acute distress. No vertebral body tenderness to direct compression    Assessment:      Osteoporosis    Plan:      Blood work will be obtained today. Monitor for renal insufficiency hypocalcemia. She will receive Prolia sometime next week. The patient will make an appointment for 3 months at [facility] for follow-up. The patient's OARRS report was reviewed.         Hadley Oppenheim, MD, MD, 4/27/2023 12:52 PM

## 2023-04-28 ENCOUNTER — TELEPHONE (OUTPATIENT)
Dept: RHEUMATOLOGY | Age: 78
End: 2023-04-28

## 2023-05-01 ENCOUNTER — OFFICE VISIT (OUTPATIENT)
Dept: PULMONOLOGY | Age: 78
End: 2023-05-01
Payer: COMMERCIAL

## 2023-05-01 VITALS — HEART RATE: 81 BPM | BODY MASS INDEX: 28.28 KG/M2 | OXYGEN SATURATION: 99 % | WEIGHT: 186 LBS

## 2023-05-01 DIAGNOSIS — Z85.118 HISTORY OF LUNG CANCER: ICD-10-CM

## 2023-05-01 DIAGNOSIS — J43.2 CENTRILOBULAR EMPHYSEMA (HCC): ICD-10-CM

## 2023-05-01 DIAGNOSIS — J96.11 CHRONIC RESPIRATORY FAILURE WITH HYPOXIA (HCC): ICD-10-CM

## 2023-05-01 DIAGNOSIS — J44.9 COPD, SEVERE (HCC): ICD-10-CM

## 2023-05-01 DIAGNOSIS — R91.8 PULMONARY NODULES: Primary | ICD-10-CM

## 2023-05-01 PROBLEM — J44.1 COPD WITH RESPIRATORY DISTRESS, ACUTE (HCC): Status: RESOLVED | Noted: 2019-05-13 | Resolved: 2023-05-01

## 2023-05-01 PROCEDURE — 99214 OFFICE O/P EST MOD 30 MIN: CPT | Performed by: INTERNAL MEDICINE

## 2023-05-01 PROCEDURE — 1123F ACP DISCUSS/DSCN MKR DOCD: CPT | Performed by: INTERNAL MEDICINE

## 2023-05-01 RX ORDER — FLUTICASONE PROPIONATE AND SALMETEROL 250; 50 UG/1; UG/1
1 POWDER RESPIRATORY (INHALATION) EVERY 12 HOURS
Qty: 1 EACH | Refills: 11 | Status: CANCELLED | OUTPATIENT
Start: 2023-05-01

## 2023-05-01 RX ORDER — ALBUTEROL SULFATE 90 UG/1
2 AEROSOL, METERED RESPIRATORY (INHALATION) EVERY 6 HOURS PRN
Qty: 18 G | Refills: 11 | Status: SHIPPED | OUTPATIENT
Start: 2023-05-01 | End: 2024-04-30

## 2023-05-01 RX ORDER — ALBUTEROL SULFATE 2.5 MG/3ML
2.5 SOLUTION RESPIRATORY (INHALATION) EVERY 6 HOURS PRN
Qty: 360 ML | Refills: 11 | Status: SHIPPED | OUTPATIENT
Start: 2023-05-01

## 2023-05-01 RX ORDER — AZITHROMYCIN 250 MG/1
TABLET, FILM COATED ORAL
Qty: 12 TABLET | Refills: 11 | Status: SHIPPED | OUTPATIENT
Start: 2023-05-01 | End: 2023-05-11

## 2023-05-01 NOTE — PROGRESS NOTES
[facility]  Follow Up Note  MD Gerardo Cowart   YOB: 1945    Date of Visit:  5/1/2023    Assessment/Plan:  1. Pulmonary nodules/history of lung cancer  Patient had left upper lobectomy 2008 for lung cancer. She had radiosurgery in the right lung in 2019. Lesion at that time was not biopsied due to severity of her underlying disease and associated risk of biopsy. Current lesion is in a similar location as to that which was radiated. Initially this was thought to represent scar but the area seems to be enlarging and was PET avid     Repeat imaging 11/22 again showed some enlargement ==> Fibrosis vs malignancy. Repeat CT chest now. 2. Shortness of breath  She has been home from the hospital almost 2 months now. Feels like her breathing has improved and she is back to her previous baseline. 3. COPD, severe ([address]  I reviewed pulmonary function testing  Most recent PFT was 2018  FEV1 is less than 1 L, 34% predicted  This reveals very severe COPD    Wheezing on exam diffusely    Medication management:  Advair ==> Trelegy 200  Combivent  DuoNeb      4. Centrilobular emphysema ([address]  I reviewed CT imaging today and this does reveal very severe emphysema which appears stable    5. Chronic respiratory failure with hypoxia (HCC)  Stable  Benefits from supplemental oxygen and portable oxygen concentrator  Now needing up to 4 L/min continuous flow oxygen    6. Immunization  She has had her flu shot  COVID UTD      Follow-up in 6 month      Chief Complaint   Patient presents with    Shortness of Breath     6 month        HPI  The patient presents with a chief complaint of moderate to severe shortness of breath related to very severe COPD/emphysema of many years duration. She also has a history of lung cancer. She had a lobectomy on the left and empiric radiation on the right. We have been following an enlarging pulmonary nodule.

## 2023-05-11 DIAGNOSIS — G62.9 NEUROPATHY: ICD-10-CM

## 2023-05-11 DIAGNOSIS — M54.50 CHRONIC MIDLINE LOW BACK PAIN WITHOUT SCIATICA: ICD-10-CM

## 2023-05-11 DIAGNOSIS — G89.29 CHRONIC MIDLINE LOW BACK PAIN WITHOUT SCIATICA: ICD-10-CM

## 2023-05-11 NOTE — TELEPHONE ENCOUNTER
Recent Visits  Date Type Provider Dept   12/16/22 Office Visit Matthew Elliott MD [facility] [facility],[address] [facility]   09/20/22 Office Visit Matthew Elliott MD [facility]   06/17/22 Office Visit Matthew Elliott MD [facility]   03/11/22 Office Visit Matthew Elliott MD [place] [id number] [address] recent visits within past 540 days with a meds authorizing provider and meeting all other requirements  Future Appointments  Date Type Provider Dept   06/13/23 Appointment Matthew Elliott MD [facility] [address] [facility],[address]   Showing future appointments within next 150 days with a meds authorizing provider and meeting all other requirements

## 2023-05-12 RX ORDER — GABAPENTIN 300 MG/1
CAPSULE ORAL
Qty: 60 CAPSULE | Refills: 5 | Status: SHIPPED | OUTPATIENT
Start: 2023-05-12 | End: 2023-09-12

## 2023-05-19 ENCOUNTER — NURSE ONLY (OUTPATIENT)
Dept: RHEUMATOLOGY | Age: 78
End: 2023-05-19

## 2023-05-19 DIAGNOSIS — M81.0 AGE-RELATED OSTEOPOROSIS WITHOUT CURRENT PATHOLOGICAL FRACTURE: Primary | ICD-10-CM

## 2023-05-19 NOTE — PROGRESS NOTES
Patient came in for 6 month Prolia. Patient reports she had no issues with her last injection. Griselda Stone on staff during injection. Verbal consent given to treat today. [facility]  [facility] [phone]  Lot: [id number]  Exp:  8/31/25  1 syringe = 60 mg given SQ Left arm. Pharmacy provided medication.

## 2023-05-26 ENCOUNTER — OFFICE VISIT (OUTPATIENT)
Dept: ENT CLINIC | Age: 78
End: 2023-05-26

## 2023-05-26 VITALS
SYSTOLIC BLOOD PRESSURE: 136 MMHG | HEIGHT: 68 IN | WEIGHT: 187 LBS | OXYGEN SATURATION: 93 % | DIASTOLIC BLOOD PRESSURE: 86 MMHG | TEMPERATURE: 97.7 F | BODY MASS INDEX: 28.34 KG/M2 | HEART RATE: 101 BPM

## 2023-05-26 DIAGNOSIS — H90.0 CONDUCTIVE HEARING LOSS, BILATERAL: ICD-10-CM

## 2023-05-26 DIAGNOSIS — H61.23 BILATERAL IMPACTED CERUMEN: ICD-10-CM

## 2023-05-26 DIAGNOSIS — H60.333 ACUTE SWIMMER'S EAR OF BOTH SIDES: Primary | ICD-10-CM

## 2023-05-26 RX ORDER — CIPROFLOXACIN AND DEXAMETHASONE 3; 1 MG/ML; MG/ML
4 SUSPENSION/ DROPS AURICULAR (OTIC) 2 TIMES DAILY
Qty: 7.5 ML | Refills: 0 | Status: SHIPPED | OUTPATIENT
Start: 2023-05-26 | End: 2023-06-05

## 2023-05-26 NOTE — PROGRESS NOTES
Chief Complaint   Patient presents with    Otalgia    Cerumen Impaction    Hearing Loss       HISTORY:    Jen Rogers stated that the hearing is decreased in both ears, which are plugged up with ear wax. She stated she has pain in the ears, not severe. EXAMINATION:    Both external ear canals were occluded by cerumen impaction, obscuring visualization of the TMs. PROCEDURE - REMOVAL OF BILATERAL CERUMEN IMPACTION:   The cerumen impaction was removed from both of the EACs, under otomicroscopy visualization, with instrumentation, using a Billeau wire loop. After successful cerumen removal, there was eroded skin with exposed bone in the inferior EACs bilaterally with erythema, edema, and exudate c/w severe external otitis bilaterally. The were erythematous and edematous bilaterally with without mass, exudate, or edema. The tympanic membranes appeared to be normal, including normal pneumatic mobility bilaterally. Cipro ophthalmic solution was instilled down to the TMs and CB meatal plugs places. Jen Rogers reported improved hearing, back to usual level, after cerumen removal.        HEARING ASSESSMENT:  Finger rub:  Able to hear finger rub bilaterally. Tuning fork tests, [address] tuning fork:  Sandy was midline and Rinne air > bone bilaterally. IMPRESSION / Rushie Seek / Laughlin Limes / PROCEDURES:       Jen Rogers was seen today for otalgia, cerumen impaction and hearing loss.     Diagnoses and all orders for this visit:    Acute swimmer's ear of both sides  -     ciprofloxacin-dexamethasone (CIPRODEX) 0.3-0.1 % otic suspension; Place 4 drops into both ears 2 times daily for 10 days    Bilateral impacted cerumen  -     ciprofloxacin-dexamethasone (CIPRODEX) 0.3-0.1 % otic suspension; Place 4 drops into both ears 2 times daily for 10 days    Conductive hearing loss, bilateral  -     ciprofloxacin-dexamethasone (CIPRODEX) 0.3-0.1 % otic suspension; Place 4 drops into both ears 2 times daily for 10

## 2023-06-05 ENCOUNTER — HOSPITAL ENCOUNTER (OUTPATIENT)
Dept: CT IMAGING | Age: 78
Discharge: HOME | End: 2023-06-05
Payer: COMMERCIAL

## 2023-06-05 DIAGNOSIS — R91.8 PULMONARY NODULES: ICD-10-CM

## 2023-06-05 PROCEDURE — 71250 CT THORAX DX C-: CPT

## 2023-06-15 ENCOUNTER — OFFICE VISIT (OUTPATIENT)
Dept: ENT CLINIC | Age: 78
End: 2023-06-15
Payer: COMMERCIAL

## 2023-06-15 VITALS
OXYGEN SATURATION: 97 % | BODY MASS INDEX: 28.34 KG/M2 | DIASTOLIC BLOOD PRESSURE: 69 MMHG | WEIGHT: 187 LBS | HEART RATE: 78 BPM | TEMPERATURE: 97.5 F | SYSTOLIC BLOOD PRESSURE: 150 MMHG | HEIGHT: 68 IN

## 2023-06-15 DIAGNOSIS — H60.333 ACUTE SWIMMER'S EAR OF BOTH SIDES: Primary | ICD-10-CM

## 2023-06-15 PROCEDURE — 99213 OFFICE O/P EST LOW 20 MIN: CPT | Performed by: OTOLARYNGOLOGY

## 2023-06-15 PROCEDURE — 1123F ACP DISCUSS/DSCN MKR DOCD: CPT | Performed by: OTOLARYNGOLOGY

## 2023-06-15 PROCEDURE — 3078F DIAST BP <80 MM HG: CPT | Performed by: OTOLARYNGOLOGY

## 2023-06-15 PROCEDURE — 3074F SYST BP LT 130 MM HG: CPT | Performed by: OTOLARYNGOLOGY

## 2023-06-29 ENCOUNTER — OFFICE VISIT (OUTPATIENT)
Dept: PRIMARY CARE CLINIC | Age: 78
End: 2023-06-29
Payer: COMMERCIAL

## 2023-06-29 VITALS
WEIGHT: 190.2 LBS | OXYGEN SATURATION: 98 % | SYSTOLIC BLOOD PRESSURE: 120 MMHG | BODY MASS INDEX: 28.92 KG/M2 | DIASTOLIC BLOOD PRESSURE: 82 MMHG | HEART RATE: 80 BPM

## 2023-06-29 DIAGNOSIS — Z85.118 HISTORY OF LUNG CANCER: ICD-10-CM

## 2023-06-29 DIAGNOSIS — J96.11 CHRONIC RESPIRATORY FAILURE WITH HYPOXIA (HCC): ICD-10-CM

## 2023-06-29 DIAGNOSIS — I10 PRIMARY HYPERTENSION: Primary | ICD-10-CM

## 2023-06-29 DIAGNOSIS — F41.9 ANXIETY: ICD-10-CM

## 2023-06-29 PROCEDURE — 3079F DIAST BP 80-89 MM HG: CPT | Performed by: INTERNAL MEDICINE

## 2023-06-29 PROCEDURE — 3074F SYST BP LT 130 MM HG: CPT | Performed by: INTERNAL MEDICINE

## 2023-06-29 PROCEDURE — 99387 INIT PM E/M NEW PAT 65+ YRS: CPT | Performed by: INTERNAL MEDICINE

## 2023-06-29 RX ORDER — BUSPIRONE HYDROCHLORIDE 5 MG/1
TABLET ORAL
Qty: 60 TABLET | Refills: 5 | Status: SHIPPED | OUTPATIENT
Start: 2023-06-29

## 2023-06-29 SDOH — ECONOMIC STABILITY: FOOD INSECURITY: WITHIN THE PAST 12 MONTHS, THE FOOD YOU BOUGHT JUST DIDN'T LAST AND YOU DIDN'T HAVE MONEY TO GET MORE.: NEVER TRUE

## 2023-06-29 SDOH — ECONOMIC STABILITY: HOUSING INSECURITY
IN THE LAST 12 MONTHS, WAS THERE A TIME WHEN YOU DID NOT HAVE A STEADY PLACE TO SLEEP OR SLEPT IN A SHELTER (INCLUDING NOW)?: NO

## 2023-06-29 SDOH — ECONOMIC STABILITY: FOOD INSECURITY: WITHIN THE PAST 12 MONTHS, YOU WORRIED THAT YOUR FOOD WOULD RUN OUT BEFORE YOU GOT MONEY TO BUY MORE.: NEVER TRUE

## 2023-06-29 SDOH — ECONOMIC STABILITY: INCOME INSECURITY: HOW HARD IS IT FOR YOU TO PAY FOR THE VERY BASICS LIKE FOOD, HOUSING, MEDICAL CARE, AND HEATING?: NOT HARD AT ALL

## 2023-06-29 ASSESSMENT — PATIENT HEALTH QUESTIONNAIRE - PHQ9
2. FEELING DOWN, DEPRESSED OR HOPELESS: 0
1. LITTLE INTEREST OR PLEASURE IN DOING THINGS: 0
SUM OF ALL RESPONSES TO PHQ QUESTIONS 1-9: 0
SUM OF ALL RESPONSES TO PHQ9 QUESTIONS 1 & 2: 0
SUM OF ALL RESPONSES TO PHQ QUESTIONS 1-9: 0

## 2023-07-05 RX ORDER — LEVOFLOXACIN 750 MG/1
TABLET ORAL
Qty: 7 TABLET | Refills: 3 | OUTPATIENT
Start: 2023-07-05

## 2023-07-07 RX ORDER — LEVOFLOXACIN 750 MG/1
TABLET ORAL
Qty: 7 TABLET | Refills: 3 | OUTPATIENT
Start: 2023-07-07

## 2023-07-18 ENCOUNTER — TELEPHONE (OUTPATIENT)
Dept: PULMONOLOGY | Age: 78
End: 2023-07-18

## 2023-07-18 NOTE — TELEPHONE ENCOUNTER
Pt  called and said that doctor usually gives pt levofloxacin and steroid to help when her phlegm turns green. He said that the pharmacy told them that we denied it.     Ph [phone]

## 2023-09-04 NOTE — DISCHARGE INSTR - COC
Continuity of Care Form    Patient Name: Vicente Rodriguez   :  [id number]  MRN:  [id number]    Admit date:  3/20/2022  Discharge date:  ***    Code Status Order: Full Code   Advance Directives:      Admitting Physician:  Bard Danna MD  PCP: David Tafoya MD    Discharging Nurse: [facility] Unit/Room#: W4J-5656/0070-90  Discharging Unit Phone Number: ***    Emergency Contact:   Extended Emergency Contact Information  Primary Emergency Contact: Eb Young  Address: [address] Phone: [phone]  Mobile Phone: [phone]  Relation: Spouse    Past Surgical History:  Past Surgical History:   Procedure Laterality Date    BRONCHOSCOPY  2016    BRONCHOSCOPY  2018    CHOLECYSTECTOMY      COLONOSCOPY  13    KYPHOSIS SURGERY  2016    LOBECTOMY  0808    MANDIBLE SURGERY      upper jaw and lower jaw    OTHER SURGICAL HISTORY  13    secondary wound closure    SIGMOIDECTOMY  13    THORACOTOMY  0808    TONSILLECTOMY      UPPER GASTROINTESTINAL ENDOSCOPY  2018    UPPER GASTROINTESTINAL ENDOSCOPY N/A 10/31/2018    EGD BIOPSY performed by Abbe Suarez MD at [address] [facility] N/A 2019    EGD performed by Abbe Suarez MD at [address] [facility] ENDOSCOPY       Immunization History:   Immunization History   Administered Date(s) Administered    COVID-19, Pfizer Purple top, DILUTE for use, 12+ yrs, 30mcg/0.3mL dose 2021, 2021, 2021    Influenza Vaccine, unspecified formulation 10/31/2017    Influenza Virus Vaccine 2009, 08/15/2014, 2015, 10/01/2018    Influenza, Quadv, adjuvanted, 65 yrs +, IM, PF (Fluad) 2020, 09/10/2021    Pneumococcal Conjugate 13-valent (Nerplag90) 2014    Pneumococcal Conjugate 7-valent (Prevnar7) 2009    Pneumococcal Polysaccharide (Mpxmaptqu15) 2014, 2015    Tdap (Boostrix, Physical Therapy    Visit Type: initial evaluation  Treatment diagnosis: Decreased functional mobility  SUBJECTIVE  RN in agreement to work with patient for therapy session.  Pt. states she is feeling pretty good.  Pt. agreeable to therapy this date.   Prior treatment in the past year for same condition: no therapies  Patient has not been hospitalized, in a skilled nursing facility, or seen by home health in the last 30 days.  Patient / Family Goal: return home and return to previous functional status    Pain   RN informed on pain level.    Location: denies pain    At onset of session (out of 10): 0     OBJECTIVE     Cognitive Status   Level of Consciousness   - alert  Orientation    - Oriented to: person, place, time and situation  Functional Communication   - Overall Status: within functional limits  Following Direction   - follows all commands and directions consistently    Patient Activity Tolerance: 2 to 1 activity to rest    Skin:   redness noted on left second digit, with digit wrapped      Range of Motion (ROM)   (degrees unless noted; active unless noted; norms in ( ); negative=lacking to 0, positive=beyond 0)  WFL: LLE, RLE    Strength  (out of 5 unless noted, standard test position unless noted)   Comments / Details: Decreased LE strength as noted with  transfers and ambulation.       Sitting Balance  (ADALGISA = base of support)  Static      - Trial 1 details: modified independent and with double LE support  Dynamic      - Trial 1 details: modified independent and with double LE support    Standing Balance  (ADALGISA = base of support)  Firm Surface: Double Leg      - Static, Eyes Open       - Trial 1 details: with double UE support and modified independent     - Dynamic, Eyes Open       - Trial 1 details: stand by assist and with double UE support  Bilateral UE support on 4 wheeled walker for standing balance.        Bed Mobility  Pt. reports sleeping in a recliner chair at home and not using bed.    Transfers  Assistive devices: gait belt, 4-wheeled walker  - Sit to stand: stand by assist  - Stand to sit: stand by assist  Cues for hand placements.     Ambulation / Gait  - Assistive device: gait belt and four-wheeled walker  - Distance (feet unless otherwise indicated): 175  - Assist Level: stand by assist  - Surface: even  - Description: decreased georgi/pace  Cues for pacing.      Interventions     Training provided: gait training, safety training, transfer training, activity tolerance and use of assistive device    Skilled input: Verbal instruction/cues  Verbal Consent: Writer verbally educated and received verbal consent for hand placement, positioning of patient, and techniques to be performed today from patient          Education:   - Present and ready to learn: patient  Education provided during session:  - Results of above outlined education: Verbalizes understanding and Needs reinforcement    ASSESSMENT   Patient will benefit from skilled therapy to address listed impairments and functional limitations.    Discharge needs based on today's assessment:   - Current level of function: slightly below baseline level of function   - Therapy needs at discharge: does not require ongoing therapy   - Activities of daily living (ADLs) requiring support at discharge: bed mobility, transfers, ambulation and stairs   - Impairments that require further therapy intervention: strength and activity tolerance    AM-PAC  - Generalized Prior Level of Function: IND/MOD I ([facility] 22-24)       Key: MOD A=moderate assistance, IND/MOD I=independent/modified independent  - Generalized Current Level of Function     - Current Mobility Score: 18       AM-PAC Scoring Key= >21 Modified Independent; 20-21 Supervision; 18-19 Minimal assist; 13-18 Moderate assist; 9-12 Max assist; <9 Total assist       • Predicted patient presentation: Low (stable) - Patient comorbidities and complexities, as defined above, will have little effect on  Adacel) 09/09/2020    Zoster Recombinant (Shingrix) 09/09/2020, 03/24/2021       Active Problems:  Patient Active Problem List   Diagnosis Code    [facility] C34.90    Hypertension I10    Diverticulosis K57.90    Acute on chronic respiratory failure with hypoxia (HCC) J96.21    OAB (overactive bladder) N32.81    Pulmonary nodules R91.8    Ventral hernia K43.9    COPD, severe (HCC) J44.9    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    Upper GI bleeding K92.2    Chronic respiratory failure with hypoxia (HCC) J96.11    Mucus plugging of bronchi T17.500A    COPD with respiratory distress, acute ([facility]) J44.1    Centrilobular emphysema ([facility]) J43.2       Isolation/Infection:   Isolation            No Isolation          Patient Infection Status       Infection Onset Added Last Indicated Last Indicated By Review Planned Expiration Resolved Resolved By    None active    Resolved    COVID-19 (Rule Out) 03/20/22 03/20/22 03/20/22 COVID-19 (Ordered)   03/21/22 Rule-Out Test Resulted    COVID-19 (Rule Out) 05/27/20 05/27/20 05/27/20 COVID-19 (Ordered)   05/28/20 Rule-Out Test Resulted            Nurse Assessment:  Last Vital Signs: /65   Pulse 81   Temp 97.9 °F (36.6 °C) (Temporal)   Resp 20   Ht 5' 8\" (1.727 m)   Wt 121 lb 4.1 oz (55 kg)   SpO2 (!) 89%   BMI 18.44 kg/m²     Last documented pain score (0-10 scale): Pain Level: 0  Last Weight:   Wt Readings from Last 1 Encounters:   03/26/22 121 lb 4.1 oz (55 kg)     Mental Status:  oriented and alert    IV Access:  - None    Nursing Mobility/ADLs:  Walking   Assisted  Transfer  Assisted  Bathing  Assisted  Dressing  Assisted  Toileting  Assisted  Feeding  Independent  Med Admin  Assisted  Med Delivery   whole    Wound Care Documentation and Therapy:  Wound 03/21/22 Buttocks Left Stage 1, non-blanchable erythema (Active)   Wound Image   03/21/22 1307   Wound Etiology Pressure Stage  1 03/26/22 0800   Dressing Status Clean;Dry; Intact 03/26/22 0800   Dressing/Treatment Foam 03/26/22 0800   Dressing Change Due 03/25/22 03/26/22 0800   Wound Length (cm) 1.5 cm 03/21/22 1307   Wound Width (cm) 2 cm 03/21/22 1307   Wound Surface Area (cm^2) 3 cm^2 03/21/22 1307   Wound Assessment Non-blanchable erythema 03/26/22 0800   Drainage Amount None 03/26/22 0800   Odor None 03/26/22 0800   Elena-wound Assessment Intact 03/26/22 0800   Number of days: 5       Wound 03/21/22 Coccyx Right Stage 2 Pressure Injury with crusted area (Active)   Wound Etiology Pressure Stage  2 03/26/22 0800   Dressing Status Clean;Dry; Intact 03/26/22 0800   Dressing/Treatment Foam 03/26/22 0800   Dressing Change Due 03/25/22 03/26/22 0800   Wound Length (cm) 1 cm 03/21/22 1307   Wound Width (cm) 2 cm 03/21/22 1307   Wound Surface Area (cm^2) 2 cm^2 03/21/22 1307   Wound Assessment Dry 03/26/22 0800   Drainage Amount None 03/26/22 0800   Odor None 03/26/22 0800   Elena-wound Assessment Blanchable erythema 03/26/22 0800   Number of days: 5        Elimination:  Continence: Bowel: {YES   Bladder: {YES   Urinary Catheter: None   Colostomy/Ileostomy/Ileal Conduit: No       Date of Last BM: 3/24/2022    Intake/Output Summary (Last 24 hours) at 3/26/2022 1535  Last data filed at 3/26/2022 0636  Gross per 24 hour   Intake --   Output 600 ml   Net -600 ml     I/O last 3 completed shifts: In: 240 [P.O.:240]  Out: 1400 [Urine:1400]    Safety Concerns: At Risk for Falls    Impairments/Disabilities:      None    Nutrition Therapy:  Current Nutrition Therapy:   - Oral Diet:  General    Routes of Feeding: Oral  Liquids: Thin Liquids  Daily Fluid Restriction: no  Last Modified Barium Swallow with Video (Video Swallowing Test): not done    Treatments at the Time of Hospital Discharge:   Respiratory Treatments:     Oxygen Therapy:  is on oxygen at 4 L/min per nasal cannula.   Ventilator:    - No ventilator support    Rehab Therapies: sn, pt,ot  Weight Bearing Status/Restrictions: No weight bearing restrictions  Other Medical Equipment progress for prescribed plan of care.    Pain at End of Session: RN informed on pain level  Pain: 0/10, location: denies pain    PLAN (while hospitalized)  Suggestions for next session as indicated: Repeated transfers from various surfaces, ambulation with 4 WW, bring 4 WW to room, car transfer  PT Frequency: 3-5 x per week      PT/OT Mobility Equipment for Discharge: Pt. owns 4 WW.  Interventions: bed mobility, gait training, patient/family training, functional transfer training, strengthening and stairs retraining  Agreement to plan and goals: patient agrees with goals and treatment plan        GOALS  Review Date: 9/11/2023  Long Term Goals: (to be met by time of discharge from hospital)  Sit to stand: Patient will complete sit to stand transfer with 4-wheeled walker, modified independent.   Status: progressing/ongoing  Stand to sit: Patient will complete stand to sit transfer with 4-wheeled walker, modified independent.   Status: progressing/ongoing  Ambulation (even): Patient will ambulate on even surface for 150 feet with 4-wheeled walker, modified independent.   Status: progressing/ongoing    Patient will demonstrate and/or verbalize understanding of car transfer completion.  Not met.      Documented in the chart in the following areas: Assessment/Plan.    Admitting diagnosis: Chest pain, unspecified type (R07.9)    Co-morbidities and problem list:   Patient Active Problem List:   Pulmonary hypertension (CMS/HCC)   Hypersomnia with sleep apnea, AHI 12   Chronic venous insufficiency   [facility]) BiV MRI Pacemaker, 9/1/2020   Chronic anticoagulation   Chronic pain of both knees   Hyperlipidemia   Personal history of calcium pyrophosphate deposition disease (CPPD)   Acquired hypothyroidism   Nonrheumatic pulmonary valve insufficiency   Chronic right-sided CHF (congestive heart failure) (CMD)   transient CHB (complete heart block) (post AV node ablation 9/2022)   Lymphedema   Obesity, morbid, BMI  (for information only, NOT a DME order):  crutches  Other Treatments: HOME HEALTH CARE: LEVEL 3 SAFETY       -Initial home health evaluation to occur within 24-48 hours, in patient home   -Home health agency to establish plan of care for patient over 60 day period   -Medication Reconciliation   -PT/OT/Speech evaluations in home within 24-48 hours of discharge; including  -DME and home safety   -Frontload therapy 5 days, then 3x a week   -OT to evaluate if patient has [address] needs for personal care   - evaluation within 24-48 hours, includes evaluation of resources   and insurance to determine [place], [place], LTC, and Medicaid options   -PCP Visit scheduled within three to seven days of discharge   -Telehealth-Homecare Vitals(If patient is agreeable and meets guidelines)  -May Have Physical Therapy Start Of Care       Patient's personal belongings (please select all that are sent with patient):  None    RN SIGNATURE:  Electronically signed by Mary Bhatti RN on 3/26/22 at 3:41 PM EDT    CASE MANAGEMENT/SOCIAL WORK SECTION    Inpatient Status Date:03/20/2022    Readmission Risk Assessment Score:  Readmission Risk              Risk of Unplanned Readmission:  16           Discharging to Facility/ Agency   Name: Rupinder Rich will call for Appointment  Phone: [phone]  Fax: [phone]     Dialysis Facility (if applicable)   Name:  Address:  Dialysis Schedule:  Phone:  Fax:    / signature: Electronically signed by Dalia Segal RN on 3/26/22 at 3:46 PM EDT    PHYSICIAN SECTION    Prognosis: Fair    Condition at Discharge: Stable    Rehab Potential (if transferring to Rehab): Good    Recommended Labs or Other Treatments After Discharge: follow up with pulm    Physician Certification: I certify the above information and transfer of Janie June  is necessary for the continuing treatment of the diagnosis listed and that she requires Home Care for less 30 days. 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      Patient at End of Session:   Location: in chair  Safety measures: call light within reach, lines intact and alarm system in place/re-engaged  Handoff to: nurse      Therapy procedure time and total treatment time can be found documented on the Time Entry flowsheet   Update Admission H&P: No change in H&P    PHYSICIAN SIGNATURE:  Electronically signed by Shelia Solano MD on 3/26/22 at 3:51 PM EDT

## 2023-10-02 DIAGNOSIS — J44.9 COPD, SEVERE (HCC): ICD-10-CM

## 2023-10-02 DIAGNOSIS — J41.1 MUCOPURULENT CHRONIC BRONCHITIS (HCC): ICD-10-CM

## 2023-10-02 RX ORDER — MONTELUKAST SODIUM 10 MG/1
TABLET ORAL
Qty: 30 TABLET | Refills: 5 | Status: SHIPPED | OUTPATIENT
Start: 2023-10-02

## 2023-10-02 RX ORDER — AZITHROMYCIN 250 MG/1
TABLET, FILM COATED ORAL
Qty: 6 TABLET | Refills: 5 | Status: SHIPPED | OUTPATIENT
Start: 2023-10-02

## 2023-10-26 DIAGNOSIS — K21.01 GASTROESOPHAGEAL REFLUX DISEASE WITH ESOPHAGITIS AND HEMORRHAGE: ICD-10-CM

## 2023-10-30 RX ORDER — PANTOPRAZOLE SODIUM 40 MG/1
TABLET, DELAYED RELEASE ORAL
Qty: 30 TABLET | Refills: 5 | Status: SHIPPED | OUTPATIENT
Start: 2023-10-30

## 2023-10-31 ENCOUNTER — HOSPITAL ENCOUNTER (OUTPATIENT)
Dept: CT IMAGING | Age: 78
Discharge: HOME | End: 2023-10-31
Attending: RADIOLOGY
Payer: COMMERCIAL

## 2023-10-31 DIAGNOSIS — C34.31 CANCER OF LOWER LOBE OF RIGHT LUNG (HCC): ICD-10-CM

## 2023-10-31 PROCEDURE — 71250 CT THORAX DX C-: CPT

## 2023-11-01 ENCOUNTER — OFFICE VISIT (OUTPATIENT)
Dept: PULMONOLOGY | Age: 78
End: 2023-11-01
Payer: COMMERCIAL

## 2023-11-01 VITALS — BODY MASS INDEX: 29.04 KG/M2 | OXYGEN SATURATION: 94 % | HEART RATE: 116 BPM | WEIGHT: 191 LBS

## 2023-11-01 DIAGNOSIS — R91.1 PULMONARY NODULE: ICD-10-CM

## 2023-11-01 DIAGNOSIS — J43.2 CENTRILOBULAR EMPHYSEMA (HCC): ICD-10-CM

## 2023-11-01 DIAGNOSIS — J44.9 COPD, SEVERE (HCC): Primary | ICD-10-CM

## 2023-11-01 DIAGNOSIS — Z90.2 S/P LOBECTOMY OF LUNG: ICD-10-CM

## 2023-11-01 DIAGNOSIS — Z85.118 HISTORY OF LUNG CANCER: ICD-10-CM

## 2023-11-01 DIAGNOSIS — J96.11 CHRONIC RESPIRATORY FAILURE WITH HYPOXIA (HCC): ICD-10-CM

## 2023-11-01 PROCEDURE — 1123F ACP DISCUSS/DSCN MKR DOCD: CPT | Performed by: INTERNAL MEDICINE

## 2023-11-01 PROCEDURE — 99214 OFFICE O/P EST MOD 30 MIN: CPT | Performed by: INTERNAL MEDICINE

## 2023-11-01 RX ORDER — LEVOFLOXACIN 750 MG/1
750 TABLET ORAL DAILY
Qty: 7 TABLET | Refills: 3 | Status: SHIPPED | OUTPATIENT
Start: 2023-11-01 | End: 2023-11-29

## 2023-11-01 RX ORDER — PREDNISONE 10 MG/1
TABLET ORAL
Qty: 30 TABLET | Refills: 3 | Status: SHIPPED | OUTPATIENT
Start: 2023-11-01 | End: 2023-11-11

## 2023-11-03 ENCOUNTER — TELEPHONE (OUTPATIENT)
Dept: PULMONOLOGY | Age: 78
End: 2023-11-03

## 2023-11-03 NOTE — TELEPHONE ENCOUNTER
Dr. Santa Tipton called and would like to speak with Silas Bhandari regarding patients CT scan and wanted to see if CT can be moved up. Informed Dr. Santa Tipton that dr. Michele Terrell will be back in on Monday and he that is fine and will touch pass on Monday.      Cell Number : [phone]

## 2023-11-20 RX ORDER — LEVOFLOXACIN 750 MG/1
750 TABLET, FILM COATED ORAL DAILY
Qty: 10 TABLET | OUTPATIENT
Start: 2023-11-20

## 2024-01-16 DIAGNOSIS — N32.81 OAB (OVERACTIVE BLADDER): ICD-10-CM

## 2024-01-16 RX ORDER — FESOTERODINE FUMARATE 4 MG/1
TABLET, EXTENDED RELEASE ORAL
Qty: 30 TABLET | Refills: 5 | Status: SHIPPED | OUTPATIENT
Start: 2024-01-16

## 2024-01-16 NOTE — TELEPHONE ENCOUNTER
LastVisit 6/29/2023     NextVisit Visit date not found     Pharmacy:    [facility] [id number] - [place], [place] - [address] P [phone] - F [phone]  [address] [place] [address]  Phone: [phone] Fax: [phone]    [facility] - [place], [place] - [address] [place] [address] - P [phone] - F [phone]  [facility] [address]  Phone: [phone] Fax: [phone]    [facility] - [place], [place] - [address] [facility] - P [phone] - F [phone]  [address] [place] [address]  Phone: [phone] Fax: [phone]     pharmacy confirmed in EPIC

## 2024-01-29 ENCOUNTER — HOSPITAL ENCOUNTER (OUTPATIENT)
Dept: CT IMAGING | Age: 79
Discharge: HOME | End: 2024-01-29
Attending: RADIOLOGY
Payer: COMMERCIAL

## 2024-01-29 DIAGNOSIS — C34.31 SQUAMOUS CELL CARCINOMA OF BRONCHUS IN RIGHT LOWER LOBE (HCC): ICD-10-CM

## 2024-01-29 PROCEDURE — 71250 CT THORAX DX C-: CPT

## 2024-01-30 DIAGNOSIS — G62.9 NEUROPATHY: ICD-10-CM

## 2024-01-30 DIAGNOSIS — G89.29 CHRONIC MIDLINE LOW BACK PAIN WITHOUT SCIATICA: ICD-10-CM

## 2024-01-30 DIAGNOSIS — M54.50 CHRONIC MIDLINE LOW BACK PAIN WITHOUT SCIATICA: ICD-10-CM

## 2024-01-30 RX ORDER — GABAPENTIN 300 MG/1
CAPSULE ORAL
Qty: 60 CAPSULE | Refills: 2 | Status: SHIPPED | OUTPATIENT
Start: 2024-01-30 | End: 2024-03-30

## 2024-02-09 DIAGNOSIS — I10 PRIMARY HYPERTENSION: ICD-10-CM

## 2024-02-09 LAB
BASOPHILS # BLD: 0 K/UL (ref 0–0.2)
BASOPHILS NFR BLD: 0.3 %
DEPRECATED RDW RBC AUTO: 15.4 % (ref 12.4–15.4)
EOSINOPHIL # BLD: 0.2 K/UL (ref 0–0.6)
EOSINOPHIL NFR BLD: 3.1 %
HCT VFR BLD AUTO: 36.2 % (ref 36–48)
HGB BLD-MCNC: 11.6 G/DL (ref 12–16)
LYMPHOCYTES # BLD: 1.2 K/UL (ref 1–5.1)
LYMPHOCYTES NFR BLD: 16.9 %
MCH RBC QN AUTO: 28.3 PG (ref 26–34)
MCHC RBC AUTO-ENTMCNC: 32.1 G/DL (ref 31–36)
MCV RBC AUTO: 88.3 FL (ref 80–100)
MONOCYTES # BLD: 1.1 K/UL (ref 0–1.3)
MONOCYTES NFR BLD: 15.1 %
NEUTROPHILS # BLD: 4.6 K/UL (ref 1.7–7.7)
NEUTROPHILS NFR BLD: 64.6 %
PLATELET # BLD AUTO: 190 K/UL (ref 135–450)
PMV BLD AUTO: 10.6 FL (ref 5–10.5)
RBC # BLD AUTO: 4.1 M/UL (ref 4–5.2)
WBC # BLD AUTO: 7.1 K/UL (ref 4–11)

## 2024-02-10 LAB
ALBUMIN SERPL-MCNC: 4.2 G/DL (ref 3.4–5)
ALBUMIN/GLOB SERPL: 1.7 {RATIO} (ref 1.1–2.2)
ALP SERPL-CCNC: 74 U/L (ref 40–129)
ALT SERPL-CCNC: 9 U/L (ref 10–40)
ANION GAP SERPL CALCULATED.3IONS-SCNC: 11 MMOL/L (ref 3–16)
AST SERPL-CCNC: 16 U/L (ref 15–37)
BILIRUB SERPL-MCNC: 0.3 MG/DL (ref 0–1)
BUN SERPL-MCNC: 16 MG/DL (ref 7–20)
CALCIUM SERPL-MCNC: 9.6 MG/DL (ref 8.3–10.6)
CHLORIDE SERPL-SCNC: 100 MMOL/L (ref 99–110)
CHOLEST SERPL-MCNC: 179 MG/DL (ref 0–199)
CO2 SERPL-SCNC: 33 MMOL/L (ref 21–32)
CREAT SERPL-MCNC: 0.8 MG/DL (ref 0.6–1.2)
GFR SERPLBLD CREATININE-BSD FMLA CKD-EPI: >60 ML/MIN/{1.73_M2}
GLUCOSE P FAST SERPL-MCNC: 124 MG/DL (ref 70–99)
HDLC SERPL-MCNC: 55 MG/DL (ref 40–60)
LDL CHOLESTEROL CALCULATED: 88 MG/DL
POTASSIUM SERPL-SCNC: 4.6 MMOL/L (ref 3.5–5.1)
PROT SERPL-MCNC: 6.7 G/DL (ref 6.4–8.2)
TRIGL SERPL-MCNC: 182 MG/DL (ref 0–150)
VLDLC SERPL CALC-MCNC: 36 MG/DL

## 2024-02-12 NOTE — RESULT ENCOUNTER NOTE
Labs are stable without significant change from last test.    OK to follow up as scheduled to review results in detail.    Sachin Tay MD

## 2024-02-15 ENCOUNTER — OFFICE VISIT (OUTPATIENT)
Dept: ENT CLINIC | Age: 79
End: 2024-02-15

## 2024-02-15 VITALS
DIASTOLIC BLOOD PRESSURE: 63 MMHG | TEMPERATURE: 97.9 F | HEART RATE: 89 BPM | SYSTOLIC BLOOD PRESSURE: 137 MMHG | BODY MASS INDEX: 29.44 KG/M2 | WEIGHT: 193.6 LBS | OXYGEN SATURATION: 96 %

## 2024-02-15 DIAGNOSIS — H60.43 CHOLESTEATOMA OF EXTERNAL AUDITORY CANAL, BILATERAL: Primary | ICD-10-CM

## 2024-02-15 DIAGNOSIS — H61.23 IMPACTED CERUMEN OF BOTH EARS: ICD-10-CM

## 2024-02-15 DIAGNOSIS — H60.333 ACUTE SWIMMER'S EAR OF BOTH SIDES: ICD-10-CM

## 2024-02-15 DIAGNOSIS — H90.0 CONDUCTIVE HEARING LOSS OF BOTH EARS: ICD-10-CM

## 2024-02-15 RX ORDER — CIPROFLOXACIN AND DEXAMETHASONE 3; 1 MG/ML; MG/ML
4 SUSPENSION/ DROPS AURICULAR (OTIC) 2 TIMES DAILY
Qty: 7.5 ML | Refills: 0 | Status: SHIPPED | OUTPATIENT
Start: 2024-02-15 | End: 2024-02-25

## 2024-02-15 NOTE — PATIENT INSTRUCTIONS
WATER PRECAUTIONS  Do not allow water to get into your right or left ear, as this may cause, or worsen, an ear infection.  Before bathing, showering or washing your hair, a plug of cotton coated with petroleum jelly should be placed in the opening of your ear canal.  After bathing the cotton should be removed and the outer part of your ear dried with a soft towel.  Alternatively, you may use a silicone putty ear plug purchased from your pharmacy.    If water does enter your ear, drain the water out into a tissue or cotton ball.  Then, instill into your ear four drops of the eardrops you were prescribed. Call the office if you develop pain or drainage.

## 2024-02-15 NOTE — PROGRESS NOTES
CHIEF COMPLAINT:  Chief Complaint   Patient presents with    Ear Problem     Recheck ear canal cholesteatoma.    Cerumen Impaction    Hearing Loss       HISTORY:    Mary Ann stated that the hearing is decreased in both ears, which are plugged up with ear wax.      EXAMINATION:    Both external ear canals were occluded by cerumen impaction and canal cholesteatoma, obscuring visualization of the TMs.        PROCEDURE - REMOVAL OF BILATERAL CANAL CHOLESTEATOMA AND CERUMEN IMPACTION:   The canal cholesteatoma and cerumen impaction was removed from both of the EACs, under otomicroscopy visualization, with instrumentation, using a Billeau wire loop, Nunez ear suction, and alligator forceps.  After successful cerumen removal, the EACs were erythematous and edematous with multiple areas of ulcerated skin and exposed and partially eroded canal bone in the outer half of the EAC.  The tympanic membranes appeared to be normal, including normal pneumatic mobility bilaterally.  There was no evidence of acute middle ear disease.  Mary Ann reported improved hearing, back to usual level, after cerumen removal.          IMPRESSION / DIAGNOSES / ORDERS / PROCEDURES:       Mary Ann was seen today for ear problem, cerumen impaction and hearing loss.    Diagnoses and all orders for this visit:    Cholesteatoma of external auditory canal, bilateral    Impacted cerumen of both ears    Conductive hearing loss of both ears    Acute swimmer's ear of both sides  -     ciprofloxacin-dexAMETHasone (CIPRODEX) 0.3-0.1 % otic suspension; Place 4 drops into both ears 2 times daily for 10 days        RECOMMENDATIONS / PLAN:   See Patient Instructions on file for this visit.  Return in about 11 days (around 2/26/2024) for recheck/follow-up, and sooner if condition worsens.

## 2024-02-26 ENCOUNTER — HOSPITAL ENCOUNTER (OUTPATIENT)
Dept: PET IMAGING | Age: 79
Discharge: HOME | End: 2024-02-26
Payer: COMMERCIAL

## 2024-02-26 DIAGNOSIS — C34.90 MALIGNANT NEOPLASM OF LUNG, UNSPECIFIED LATERALITY, UNSPECIFIED PART OF LUNG (HCC): ICD-10-CM

## 2024-02-26 PROCEDURE — 78815 PET IMAGE W/CT SKULL-THIGH: CPT

## 2024-02-26 PROCEDURE — 3430000000 HC RX DIAGNOSTIC RADIOPHARMACEUTICAL: Performed by: RADIOLOGY

## 2024-02-26 PROCEDURE — A9609 HC RX DIAGNOSTIC RADIOPHARMACEUTICAL: HCPCS | Performed by: RADIOLOGY

## 2024-02-26 RX ORDER — FLUDEOXYGLUCOSE F 18 200 MCI/ML
15.88 INJECTION, SOLUTION INTRAVENOUS
Status: COMPLETED | OUTPATIENT
Start: 2024-02-26 | End: 2024-02-26

## 2024-02-26 RX ADMIN — FLUDEOXYGLUCOSE F 18 15.88 MILLICURIE: 200 INJECTION, SOLUTION INTRAVENOUS at 13:11

## 2024-02-27 ENCOUNTER — OFFICE VISIT (OUTPATIENT)
Dept: ENT CLINIC | Age: 79
End: 2024-02-27
Payer: COMMERCIAL

## 2024-02-27 VITALS
BODY MASS INDEX: 28.79 KG/M2 | DIASTOLIC BLOOD PRESSURE: 84 MMHG | SYSTOLIC BLOOD PRESSURE: 134 MMHG | HEIGHT: 68 IN | HEART RATE: 120 BPM | WEIGHT: 190 LBS | OXYGEN SATURATION: 94 % | TEMPERATURE: 97.5 F

## 2024-02-27 DIAGNOSIS — H60.333 ACUTE SWIMMER'S EAR OF BOTH SIDES: ICD-10-CM

## 2024-02-27 DIAGNOSIS — H60.43 CHOLESTEATOMA OF EXTERNAL AUDITORY CANAL, BILATERAL: Primary | ICD-10-CM

## 2024-02-27 PROCEDURE — 3075F SYST BP GE 130 - 139MM HG: CPT | Performed by: OTOLARYNGOLOGY

## 2024-02-27 PROCEDURE — 3079F DIAST BP 80-89 MM HG: CPT | Performed by: OTOLARYNGOLOGY

## 2024-02-27 PROCEDURE — 99213 OFFICE O/P EST LOW 20 MIN: CPT | Performed by: OTOLARYNGOLOGY

## 2024-02-27 PROCEDURE — 1123F ACP DISCUSS/DSCN MKR DOCD: CPT | Performed by: OTOLARYNGOLOGY

## 2024-02-27 NOTE — PATIENT INSTRUCTIONS
NO Q-TIPS OR OTHER INSTRUMENTS/OBJECTS IN THE EARS   You should never clean your ears with a Q-tip, cotton tipped applicator, Kimberly pin, paper clip, safety pin, pen cap, or any other instrument.  This will tend to push wax in deeper and pack the ear canal with wax.  There is a high risk and danger of this practice, especially rupture of ear drum, dislocation or other damage to ossicles, and permanent, irreversible, and irreparable hearing loss.  It may cause inflammation and irritation of the ear canal and cause itching or pain.  I recommend only use of one the several ear wax removal kits available \"over the counter\" if you feel a need to try to remove ear wax.  For example, Murine, Bausch and Lomb, NeilMed, or Debrox ear wax removal kits may be used for ear wax removal, as needed.  No other methods should be self used for cleaning your ears.         Use Debrox 4 drops in both ears three times a day for 4 days before returning for ear cleaning.  This is an over the counter medication.

## 2024-02-27 NOTE — PROGRESS NOTES
HISTORY \"My ears are perfect now.\"  She denied any ear pain or drainage.  She stated that her hearing is back to her usual ability.        EXAMINATION  Ears: Mild cerumen accumulation and squamous debris removed from the left EAC.  Both EACs otherwise were normal with no areas of ulcerated skin and exposed bone, appeared to be completely healed from last visit..  TMs were intact and otherwise, normal          IMPRESSION / DIAGNOSES / ORDERS   Diagnoses and all orders for this visit:    Cholesteatoma of external auditory canal, bilateral  Comments:  Appears to be controlled after previous debridement.    Acute swimmer's ear of both sides  Comments:  Appears to be minimal to resolved after treatment.         RECOMMENDATIONS / PLAN   See Patient Instructions on file for this visit.  Patient was advised to use Debrox for 4 days prior to each return visit for ear cleaning to soften the wax, in order to facilitate the cleaning process, and to decrease or avoid excessive discomfort during cleaning of the ear.  She stated her understanding.  Return in about 4 months (around 6/27/2024) for recheck/follow-up, possible ear cleaning, and sooner if condition worsens.

## 2024-03-22 ENCOUNTER — TELEPHONE (OUTPATIENT)
Dept: PULMONOLOGY | Age: 79
End: 2024-03-22

## 2024-03-22 NOTE — TELEPHONE ENCOUNTER
Dr Ewing called and wanted to discuss this pt with you.  I informed him you were on vacation and he said it could wait until you get back.    His cell # is [phone]

## 2024-04-12 ENCOUNTER — TELEPHONE (OUTPATIENT)
Dept: PULMONOLOGY | Age: 79
End: 2024-04-12

## 2024-04-12 NOTE — TELEPHONE ENCOUNTER
called and wanted to know if patients CT scan in May can be pushed out a little bit     Dr Ewing Cell: [phone]

## 2024-04-15 ENCOUNTER — OFFICE VISIT (OUTPATIENT)
Dept: PRIMARY CARE CLINIC | Age: 79
End: 2024-04-15
Payer: COMMERCIAL

## 2024-04-15 VITALS
SYSTOLIC BLOOD PRESSURE: 128 MMHG | BODY MASS INDEX: 29.72 KG/M2 | WEIGHT: 195.4 LBS | HEART RATE: 90 BPM | RESPIRATION RATE: 18 BRPM | DIASTOLIC BLOOD PRESSURE: 80 MMHG

## 2024-04-15 DIAGNOSIS — N32.81 OAB (OVERACTIVE BLADDER): ICD-10-CM

## 2024-04-15 DIAGNOSIS — G62.9 NEUROPATHY: ICD-10-CM

## 2024-04-15 DIAGNOSIS — G89.29 CHRONIC MIDLINE LOW BACK PAIN WITHOUT SCIATICA: ICD-10-CM

## 2024-04-15 DIAGNOSIS — K21.01 GASTROESOPHAGEAL REFLUX DISEASE WITH ESOPHAGITIS AND HEMORRHAGE: ICD-10-CM

## 2024-04-15 DIAGNOSIS — F41.9 ANXIETY: ICD-10-CM

## 2024-04-15 DIAGNOSIS — M54.50 CHRONIC MIDLINE LOW BACK PAIN WITHOUT SCIATICA: ICD-10-CM

## 2024-04-15 DIAGNOSIS — J41.1 MUCOPURULENT CHRONIC BRONCHITIS (HCC): ICD-10-CM

## 2024-04-15 DIAGNOSIS — I10 PRIMARY HYPERTENSION: Primary | ICD-10-CM

## 2024-04-15 DIAGNOSIS — J44.9 COPD, SEVERE (HCC): ICD-10-CM

## 2024-04-15 PROCEDURE — 99214 OFFICE O/P EST MOD 30 MIN: CPT | Performed by: INTERNAL MEDICINE

## 2024-04-15 PROCEDURE — 3074F SYST BP LT 130 MM HG: CPT | Performed by: INTERNAL MEDICINE

## 2024-04-15 PROCEDURE — 1123F ACP DISCUSS/DSCN MKR DOCD: CPT | Performed by: INTERNAL MEDICINE

## 2024-04-15 PROCEDURE — 3079F DIAST BP 80-89 MM HG: CPT | Performed by: INTERNAL MEDICINE

## 2024-04-15 RX ORDER — GABAPENTIN 300 MG/1
300 CAPSULE ORAL 2 TIMES DAILY
Qty: 60 CAPSULE | Refills: 5 | Status: SHIPPED | OUTPATIENT
Start: 2024-04-15 | End: 2024-10-12

## 2024-04-15 RX ORDER — BUSPIRONE HYDROCHLORIDE 5 MG/1
TABLET ORAL
Qty: 60 TABLET | Refills: 5 | Status: SHIPPED | OUTPATIENT
Start: 2024-04-15

## 2024-04-15 RX ORDER — MONTELUKAST SODIUM 10 MG/1
10 TABLET ORAL NIGHTLY
Qty: 30 TABLET | Refills: 5 | Status: SHIPPED | OUTPATIENT
Start: 2024-04-15

## 2024-04-15 RX ORDER — FESOTERODINE FUMARATE 4 MG/1
TABLET, EXTENDED RELEASE ORAL
Qty: 30 TABLET | Refills: 5 | Status: SHIPPED | OUTPATIENT
Start: 2024-04-15

## 2024-04-15 RX ORDER — PANTOPRAZOLE SODIUM 40 MG/1
40 TABLET, DELAYED RELEASE ORAL DAILY
Qty: 30 TABLET | Refills: 5 | Status: SHIPPED | OUTPATIENT
Start: 2024-04-15

## 2024-04-15 ASSESSMENT — PATIENT HEALTH QUESTIONNAIRE - PHQ9
SUM OF ALL RESPONSES TO PHQ9 QUESTIONS 1 & 2: 0
2. FEELING DOWN, DEPRESSED OR HOPELESS: NOT AT ALL
SUM OF ALL RESPONSES TO PHQ QUESTIONS 1-9: 0
SUM OF ALL RESPONSES TO PHQ QUESTIONS 1-9: 0
1. LITTLE INTEREST OR PLEASURE IN DOING THINGS: NOT AT ALL
SUM OF ALL RESPONSES TO PHQ QUESTIONS 1-9: 0
SUM OF ALL RESPONSES TO PHQ QUESTIONS 1-9: 0

## 2024-04-15 NOTE — PROGRESS NOTES
4/15/2024   Mary Ann Alan  1945    The patients PMH, surgical history, family history, medications, allergies were all reviewed and updated as appropriate today.     Current Outpatient Medications on File Prior to Visit   Medication Sig Dispense Refill    gabapentin (NEURONTIN) 300 MG capsule TAKE ONE CAPSULE BY MOUTH TWICE A DAY 60 capsule 2    fesoterodine (TOVIAZ) 4 MG TB24 ER tablet TAKE 1 TABLET BY MOUTH DAILY 30 tablet 5    pantoprazole (PROTONIX) 40 MG tablet TAKE ONE TABLET BY MOUTH DAILY 30 tablet 5    azithromycin (ZITHROMAX) 250 MG tablet TAKE ONE TABLET BY MOUTH THREE TIMES A WEEK 6 tablet 5    montelukast (SINGULAIR) 10 MG tablet TAKE ONE TABLET BY MOUTH ONCE NIGHTLY 30 tablet 5    busPIRone (BUSPAR) 5 MG tablet One tablet twice a day 60 tablet 5    dilTIAZem (CARDIZEM) 30 MG tablet TAKE ONE TABLET BY MOUTH THREE TIMES A DAY 90 tablet 5    albuterol-ipratropium (COMBIVENT RESPIMAT)  MCG/ACT AERS inhaler Inhale 1 puff into the lungs in the morning and 1 puff at noon and 1 puff in the evening and 1 puff before bedtime. 1 each 11    albuterol sulfate HFA (PROVENTIL;VENTOLIN;PROAIR) 108 (90 Base) MCG/ACT inhaler Inhale 2 puffs into the lungs every 6 hours as needed for Wheezing 18 g 11    albuterol (PROVENTIL) (2.5 MG/3ML) 0.083% nebulizer solution Take 3 mLs by nebulization every 6 hours as needed for Wheezing 360 mL 11    fluticasone-umeclidin-vilant (TRELEGY ELLIPTA) 200-62.5-25 MCG/ACT AEPB inhaler Inhale 1 puff into the lungs daily 1 each 5    guaiFENesin (MUCINEX) 600 MG extended release tablet Take 2 tablets by mouth daily      OXYGEN Inhale 3 L/min into the lungs continuous Use nightly as needed (Patient taking differently: Inhale 3 L/min into the lungs continuous) 1 Bottle 5    denosumab (PROLIA) 60 MG/ML SOSY SC injection Inject 1 mL into the skin once for 1 dose Every 6 months 1 each 1     No current facility-administered medications on file prior to visit.        Chief Complaint  Render Path Notes In Note?: No

## 2024-04-16 ENCOUNTER — TELEPHONE (OUTPATIENT)
Dept: PRIMARY CARE CLINIC | Age: 79
End: 2024-04-16

## 2024-04-16 DIAGNOSIS — M81.0 OSTEOPOROSIS WITHOUT CURRENT PATHOLOGICAL FRACTURE, UNSPECIFIED OSTEOPOROSIS TYPE: Primary | ICD-10-CM

## 2024-04-16 NOTE — TELEPHONE ENCOUNTER
Dr Boles called and requested to speak with Dr Tay regarding of the patient.  Contact him back at [phone].

## 2024-04-16 NOTE — TELEPHONE ENCOUNTER
Called Dr Boles and was ready for transfer but call was disconnected. I tried calling him back but no answer line was busy. Will try again at a later time

## 2024-04-23 ENCOUNTER — TELEPHONE (OUTPATIENT)
Dept: PULMONOLOGY | Age: 79
End: 2024-04-23

## 2024-05-08 RX ORDER — FLUTICASONE FUROATE, UMECLIDINIUM BROMIDE AND VILANTEROL TRIFENATATE 200; 62.5; 25 UG/1; UG/1; UG/1
1 POWDER RESPIRATORY (INHALATION) DAILY
Qty: 60 EACH | Refills: 0 | Status: SHIPPED | OUTPATIENT
Start: 2024-05-08

## 2024-05-24 ENCOUNTER — OFFICE VISIT (OUTPATIENT)
Dept: PULMONOLOGY | Age: 79
End: 2024-05-24

## 2024-05-24 VITALS
WEIGHT: 194 LBS | BODY MASS INDEX: 30.45 KG/M2 | HEART RATE: 62 BPM | HEIGHT: 67 IN | SYSTOLIC BLOOD PRESSURE: 112 MMHG | OXYGEN SATURATION: 97 % | DIASTOLIC BLOOD PRESSURE: 64 MMHG

## 2024-05-24 DIAGNOSIS — J96.11 CHRONIC RESPIRATORY FAILURE WITH HYPOXIA (HCC): ICD-10-CM

## 2024-05-24 DIAGNOSIS — J43.2 CENTRILOBULAR EMPHYSEMA (HCC): ICD-10-CM

## 2024-05-24 DIAGNOSIS — R91.8 PULMONARY NODULES: ICD-10-CM

## 2024-05-24 DIAGNOSIS — J44.9 COPD, SEVERE (HCC): ICD-10-CM

## 2024-05-24 DIAGNOSIS — Z85.118 HISTORY OF LUNG CANCER: Primary | ICD-10-CM

## 2024-05-24 RX ORDER — LEVOFLOXACIN 750 MG/1
750 TABLET, FILM COATED ORAL DAILY
Qty: 10 TABLET | Refills: 3 | Status: SHIPPED | OUTPATIENT
Start: 2024-05-24 | End: 2024-07-03

## 2024-05-24 RX ORDER — ALBUTEROL SULFATE 2.5 MG/3ML
2.5 SOLUTION RESPIRATORY (INHALATION) EVERY 6 HOURS PRN
Qty: 360 ML | Refills: 11 | Status: SHIPPED | OUTPATIENT
Start: 2024-05-24

## 2024-05-24 RX ORDER — PREDNISONE 10 MG/1
TABLET ORAL
Qty: 30 TABLET | Refills: 3 | Status: SHIPPED | OUTPATIENT
Start: 2024-05-24 | End: 2024-06-03

## 2024-05-24 RX ORDER — AZITHROMYCIN 250 MG/1
TABLET, FILM COATED ORAL
Qty: 6 TABLET | Refills: 5 | Status: SHIPPED | OUTPATIENT
Start: 2024-05-24

## 2024-05-24 NOTE — PROGRESS NOTES
[facility]  Follow Up Note  Jhonatan Medina MD         Mary Ann Alan   YOB: 1945    Date of Visit:  5/24/2024    Assessment/Plan:  1. Pulmonary nodules/history of lung cancer  PET scan showed increased size and activity of the RLL lesion.  She had XRT to this area by Dr Ewing  She will have f/u CT imaging at the appropriate time    2. COPD, severe (HCC)  I reviewed pulmonary function testing  Most recent PFT was 2018  FEV1 is less than 1 L, 34% predicted  This reveals very severe COPD    Wheezing on exam diffusely  Still smokes some    Medication management:  Advair ==> Trelegy 200  Combivent  DuoNeb  Azithromycin MWF    3. Centrilobular emphysema (HCC)  I reviewed CT imaging today and this does reveal very severe emphysema which appears stable    4. Chronic respiratory failure with hypoxia (HCC)  Stable  Benefits from supplemental oxygen and portable oxygen concentrator  Now needing up to 4 L/min continuous flow oxygen    5. Immunization  She has had her flu shot  COVID UTD      Follow-up in 6 month      Chief Complaint   Patient presents with    COPD     Cough for a while. Coughing up mucus at times, sometimes greenish but sometimes clear. On 4 LPM POC.  On 4 L at home as well. Using nebs currently.       HPI  The patient presents with a chief complaint of moderate to severe shortness of breath related to very severe COPD/emphysema of many years duration.  She also has a history of lung cancer. She had a lobectomy on the left and empiric radiation on the right. She had repeat imaging yesterday.    Review of Systems  No complaint of chest pain, nausea or vomiting    History  I have reviewed past medical, surgical, social and family history. This is documented elsewhere in the medical record.     Physical Exam:  Well developed, well nourished  Alert and oriented  Sclera is clear  No cervical adenopathy  No JVD.  Chest examination is congested and

## 2024-06-03 RX ORDER — FLUTICASONE FUROATE, UMECLIDINIUM BROMIDE AND VILANTEROL TRIFENATATE 200; 62.5; 25 UG/1; UG/1; UG/1
POWDER RESPIRATORY (INHALATION)
Qty: 1 EACH | Refills: 2 | Status: SHIPPED | OUTPATIENT
Start: 2024-06-03

## 2024-06-04 ENCOUNTER — HOSPITAL ENCOUNTER (OUTPATIENT)
Dept: GENERAL RADIOLOGY | Age: 79
Discharge: HOME | End: 2024-06-04
Payer: COMMERCIAL

## 2024-06-04 DIAGNOSIS — M81.0 SENILE OSTEOPOROSIS: ICD-10-CM

## 2024-06-04 PROCEDURE — 77080 DXA BONE DENSITY AXIAL: CPT

## 2024-06-26 ENCOUNTER — OFFICE VISIT (OUTPATIENT)
Dept: ENT CLINIC | Age: 79
End: 2024-06-26
Payer: COMMERCIAL

## 2024-06-26 VITALS
HEART RATE: 90 BPM | HEIGHT: 67 IN | TEMPERATURE: 97.8 F | BODY MASS INDEX: 30.76 KG/M2 | DIASTOLIC BLOOD PRESSURE: 90 MMHG | OXYGEN SATURATION: 96 % | WEIGHT: 196 LBS | SYSTOLIC BLOOD PRESSURE: 145 MMHG

## 2024-06-26 DIAGNOSIS — H60.332 ACUTE SWIMMER'S EAR OF LEFT SIDE: Primary | ICD-10-CM

## 2024-06-26 DIAGNOSIS — H60.43 CHOLESTEATOMA OF EXTERNAL AUDITORY CANAL, BILATERAL: ICD-10-CM

## 2024-06-26 DIAGNOSIS — H61.23 IMPACTED CERUMEN OF BOTH EARS: ICD-10-CM

## 2024-06-26 DIAGNOSIS — H90.0 CONDUCTIVE HEARING LOSS OF BOTH EARS: ICD-10-CM

## 2024-06-26 PROCEDURE — 99213 OFFICE O/P EST LOW 20 MIN: CPT | Performed by: OTOLARYNGOLOGY

## 2024-06-26 PROCEDURE — 3077F SYST BP >= 140 MM HG: CPT | Performed by: OTOLARYNGOLOGY

## 2024-06-26 PROCEDURE — 69210 REMOVE IMPACTED EAR WAX UNI: CPT | Performed by: OTOLARYNGOLOGY

## 2024-06-26 PROCEDURE — 1123F ACP DISCUSS/DSCN MKR DOCD: CPT | Performed by: OTOLARYNGOLOGY

## 2024-06-26 PROCEDURE — 3080F DIAST BP >= 90 MM HG: CPT | Performed by: OTOLARYNGOLOGY

## 2024-06-26 RX ORDER — CIPROFLOXACIN AND DEXAMETHASONE 3; 1 MG/ML; MG/ML
4 SUSPENSION/ DROPS AURICULAR (OTIC) 2 TIMES DAILY
Qty: 7.5 ML | Refills: 0 | Status: SHIPPED | OUTPATIENT
Start: 2024-06-26 | End: 2024-07-06

## 2024-06-26 NOTE — PROGRESS NOTES
CHIEF COMPLAINT:  Chief Complaint   Patient presents with    Ear Problem       HISTORY:    Mary Ann stated that the hearing is decreased in both ears, which are plugged up with ear wax.      EXAMINATION:    Both external ear canals were occluded by cerumen impaction, obscuring visualization of the TMs.        PROCEDURE - REMOVAL OF BILATERAL CERUMEN IMPACTION (CPT 18716):   The cerumen impaction was removed from both of the EACs, under otomicroscopy visualization, with instrumentation, using a Billeau wire loop, Nunez ear suction, and alligator forceps.  After successful cerumen removal, the right EAC appeared to be normal and clear without mass, exudate, or edema.  The left EAC had areas of exposed bone in the left EAC with granulation tissue which bled easily when touched.   The tympanic membranes appeared to be normal, including normal pneumatic mobility bilaterally.  There was no evidence of acute middle ear disease.  Mary Ann reported improved hearing, back to usual level, after cerumen removal.          IMPRESSION / DIAGNOSES / ORDERS / PROCEDURES:       Mary Ann was seen today for ear problem.    Diagnoses and all orders for this visit:    Acute swimmer's ear of left side  -     ciprofloxacin-dexAMETHasone (CIPRODEX) 0.3-0.1 % otic suspension; Place 4 drops into the left ear 2 times daily for 10 days    Cholesteatoma of external auditory canal, bilateral    Impacted cerumen of both ears    Conductive hearing loss of both ears        RECOMMENDATIONS / PLAN:   Return in about 3 weeks (around 7/17/2024) for recheck/follow-up, possible biopsy, and sooner if condition worsens.      Continue Regimen: 0.05 Retin A Cream compound with vitamin c apply to face at bedtime Detail Level: Zone Continue Regimen: compounded tretinoin

## 2024-07-10 ENCOUNTER — HOSPITAL ENCOUNTER (OUTPATIENT)
Dept: CT IMAGING | Age: 79
Discharge: HOME | End: 2024-07-10
Attending: RADIOLOGY
Payer: COMMERCIAL

## 2024-07-10 DIAGNOSIS — C34.31 SQUAMOUS CELL CARCINOMA OF BRONCHUS IN RIGHT LOWER LOBE (HCC): ICD-10-CM

## 2024-07-10 PROCEDURE — 71250 CT THORAX DX C-: CPT

## 2024-07-17 ENCOUNTER — OFFICE VISIT (OUTPATIENT)
Dept: ENT CLINIC | Age: 79
End: 2024-07-17
Payer: COMMERCIAL

## 2024-07-17 VITALS
OXYGEN SATURATION: 96 % | SYSTOLIC BLOOD PRESSURE: 132 MMHG | WEIGHT: 189 LBS | DIASTOLIC BLOOD PRESSURE: 73 MMHG | HEART RATE: 112 BPM | HEIGHT: 67 IN | TEMPERATURE: 98.1 F | BODY MASS INDEX: 29.66 KG/M2

## 2024-07-17 DIAGNOSIS — H93.8X2 MASS OF LEFT EAR CANAL: Primary | ICD-10-CM

## 2024-07-17 PROCEDURE — 69105 BIOPSY OF EXTERNAL EAR CANAL: CPT | Performed by: OTOLARYNGOLOGY

## 2024-07-17 NOTE — PROGRESS NOTES
CHIEF COMPLAINT  Chief Complaint   Patient presents with    Otitis Externa       HISTORY OF PRESENT ILLNESS      Mary Ann Alan is a 79 y.o. female here for recheck and follow up of left external otitis.  \"My ears are better.\"         EXAMINATION    Vitals:    07/17/24 1253   BP: 132/73   Site: Left Upper Arm   Position: Sitting   Cuff Size: Large Adult   Pulse: (!) 112   Temp: 98.1 °F (36.7 °C)   TempSrc: Infrared   SpO2: 96%   Weight: 85.7 kg (189 lb)   Height: 1.702 m (5' 7.01\")     WDWN, NAD  (+)  Ears:  Left EAC with granulation tissue in the inferior EAC near the meatus.  Right TM and EAC were normal.  The mastoids and pinnae were normal.      I performed this physical exam personally.      PROCEDURE   Biopsy of lesion of left ear canal, with cup biopsy forceps.  Hemostasis obtained with silver nitrate cauterization.  H202 irrigation and Cipro ophthalmic solution was instilled and cotton ball meatal plug neplaced in the left EAC/external meatus.            IMPRESSION / DIAGNOSES / ORDERS:   Mary Ann was seen today for otitis externa.    Diagnoses and all orders for this visit:    Mass of left ear canal  -     SURGICAL PATHOLOGY    Other orders  -     Surgical Pathology         RECOMMENDATIONS / PLAN:   Tylenol or ibuprofen or naprosyn (OTC) as needed for pain.  Patient agreed to call for the path results in 7 days.  Return in about 4 months (around 11/17/2024) for recheck/follow-up, and sooner if condition worsens.           Keon Bustillo MD    [facility]  Department of Otolaryngology/Head and Neck Surgery  [facility]  [address], [address]  [place] [address]  [phone]

## 2024-07-31 ENCOUNTER — OFFICE VISIT (OUTPATIENT)
Dept: PRIMARY CARE CLINIC | Age: 79
End: 2024-07-31
Payer: COMMERCIAL

## 2024-07-31 VITALS
BODY MASS INDEX: 28.97 KG/M2 | RESPIRATION RATE: 18 BRPM | SYSTOLIC BLOOD PRESSURE: 108 MMHG | DIASTOLIC BLOOD PRESSURE: 64 MMHG | WEIGHT: 185 LBS | HEART RATE: 74 BPM

## 2024-07-31 DIAGNOSIS — Q34.1 CYST OF MEDIASTINUM: Primary | ICD-10-CM

## 2024-07-31 PROCEDURE — 99213 OFFICE O/P EST LOW 20 MIN: CPT | Performed by: INTERNAL MEDICINE

## 2024-07-31 PROCEDURE — 3078F DIAST BP <80 MM HG: CPT | Performed by: INTERNAL MEDICINE

## 2024-07-31 PROCEDURE — 1123F ACP DISCUSS/DSCN MKR DOCD: CPT | Performed by: INTERNAL MEDICINE

## 2024-07-31 PROCEDURE — 3074F SYST BP LT 130 MM HG: CPT | Performed by: INTERNAL MEDICINE

## 2024-07-31 SDOH — ECONOMIC STABILITY: FOOD INSECURITY: WITHIN THE PAST 12 MONTHS, YOU WORRIED THAT YOUR FOOD WOULD RUN OUT BEFORE YOU GOT MONEY TO BUY MORE.: NEVER TRUE

## 2024-07-31 SDOH — ECONOMIC STABILITY: FOOD INSECURITY: WITHIN THE PAST 12 MONTHS, THE FOOD YOU BOUGHT JUST DIDN'T LAST AND YOU DIDN'T HAVE MONEY TO GET MORE.: NEVER TRUE

## 2024-07-31 SDOH — ECONOMIC STABILITY: INCOME INSECURITY: HOW HARD IS IT FOR YOU TO PAY FOR THE VERY BASICS LIKE FOOD, HOUSING, MEDICAL CARE, AND HEATING?: NOT HARD AT ALL

## 2024-07-31 NOTE — PROGRESS NOTES
7/31/2024   Mary Annmilind Blevinsour  1945    The patients PMH, surgical history, family history, medications, allergies were all reviewed and updated as appropriate today.     Current Outpatient Medications on File Prior to Visit   Medication Sig Dispense Refill    TRELEGY ELLIPTA 200-62.5-25 MCG/ACT AEPB inhaler INHALE 1 PUFF BY MOUTH DAILY 1 each 2    albuterol (PROVENTIL) (2.5 MG/3ML) 0.083% nebulizer solution Take 3 mLs by nebulization every 6 hours as needed for Wheezing 360 mL 11    montelukast (SINGULAIR) 10 MG tablet Take 1 tablet by mouth nightly 30 tablet 5    gabapentin (NEURONTIN) 300 MG capsule Take 1 capsule by mouth 2 times daily for 180 days. 60 capsule 5    dilTIAZem (CARDIZEM) 30 MG tablet TAKE ONE TABLET BY MOUTH THREE TIMES A DAY 90 tablet 5    busPIRone (BUSPAR) 5 MG tablet One tablet twice a day 60 tablet 5    pantoprazole (PROTONIX) 40 MG tablet Take 1 tablet by mouth daily 30 tablet 5    fesoterodine (TOVIAZ) 4 MG TB24 ER tablet TAKE 1 TABLET BY MOUTH DAILY 30 tablet 5    albuterol-ipratropium (COMBIVENT RESPIMAT)  MCG/ACT AERS inhaler Inhale 1 puff into the lungs in the morning and 1 puff at noon and 1 puff in the evening and 1 puff before bedtime. 1 each 11    guaiFENesin (MUCINEX) 600 MG extended release tablet Take 2 tablets by mouth daily      OXYGEN Inhale 3 L/min into the lungs continuous Use nightly as needed (Patient taking differently: Inhale 3 L/min into the lungs continuous) 1 Bottle 5    azithromycin (ZITHROMAX) 250 MG tablet TAKE ONE TABLET BY MOUTH THREE TIMES A WEEK (Patient not taking: Reported on 7/31/2024) 6 tablet 5    albuterol sulfate HFA (PROVENTIL;VENTOLIN;PROAIR) 108 (90 Base) MCG/ACT inhaler Inhale 2 puffs into the lungs every 6 hours as needed for Wheezing 18 g 11    denosumab (PROLIA) 60 MG/ML SOSY SC injection Inject 1 mL into the skin once for 1 dose Every 6 months 1 each 1     Current Facility-Administered Medications on File Prior to Visit   Medication Dose

## 2024-08-06 ENCOUNTER — OFFICE VISIT (OUTPATIENT)
Dept: SURGERY | Age: 79
End: 2024-08-06
Payer: COMMERCIAL

## 2024-08-06 VITALS
HEIGHT: 67 IN | WEIGHT: 185 LBS | SYSTOLIC BLOOD PRESSURE: 136 MMHG | BODY MASS INDEX: 29.03 KG/M2 | DIASTOLIC BLOOD PRESSURE: 80 MMHG

## 2024-08-06 DIAGNOSIS — L72.3 SEBACEOUS CYST: Primary | ICD-10-CM

## 2024-08-06 PROCEDURE — 3075F SYST BP GE 130 - 139MM HG: CPT | Performed by: SURGERY

## 2024-08-06 PROCEDURE — 3079F DIAST BP 80-89 MM HG: CPT | Performed by: SURGERY

## 2024-08-06 PROCEDURE — 1123F ACP DISCUSS/DSCN MKR DOCD: CPT | Performed by: SURGERY

## 2024-08-06 PROCEDURE — 99203 OFFICE O/P NEW LOW 30 MIN: CPT | Performed by: SURGERY

## 2024-08-06 ASSESSMENT — ENCOUNTER SYMPTOMS
BACK PAIN: 0
EYE DISCHARGE: 0
APNEA: 0
ABDOMINAL DISTENTION: 0
COLOR CHANGE: 0
SHORTNESS OF BREATH: 1
ABDOMINAL PAIN: 0
CHEST TIGHTNESS: 0
EYE ITCHING: 0

## 2024-08-06 NOTE — PATIENT INSTRUCTIONS
1. We discussed the risks of surgery including bleeding, infection, as well as the cost and pain related to the procedure. Benefits of excision include resolution of symptoms and definitive tissue diagnosis. Alternatives include close observation. The patient understands, agrees, and wishes to proceed with excision  2. We also discussed anesthesia options including general anesthesia, local anesthetic only, and local anesthetic with sedation. General anesthesia provides the most patient relaxation but at the cost of higher risk as it includes endotracheal intubation. Local anesthetic poses the least risk to the patient but is the most uncomfortable. Additional benefit of local anesthetic only is that the patient could drive home from the procedure. The patient has chosen local anesthetic only  3. Will schedule for excision of sebaceous cyst on right chest with local anesthetic only as outpatient procedure to be performed in office

## 2024-08-06 NOTE — PROGRESS NOTES
[facility]  SUBJECTIVE:    Chief Complaint: right chest sebaceous cyst    Mary Ann Alan   1945   79 y.o. female presents with a sebaceous cyst on her right chest under her breast for the last 2 weeks, intermittently tender. No other similar lesions or complaints. Medical conditions include COPD on home oxygen    Review of Systems   Constitutional:  Negative for activity change and appetite change.   HENT:  Negative for congestion and dental problem.    Eyes:  Negative for discharge and itching.   Respiratory:  Positive for shortness of breath. Negative for apnea and chest tightness.    Cardiovascular:  Negative for chest pain and leg swelling.   Gastrointestinal:  Negative for abdominal distention and abdominal pain.   Endocrine: Negative for cold intolerance and heat intolerance.   Genitourinary:  Negative for difficulty urinating and dyspareunia.   Musculoskeletal:  Negative for arthralgias and back pain.   Skin:  Negative for color change and pallor.   Allergic/Immunologic: Negative for environmental allergies and food allergies.   Neurological:  Negative for dizziness and facial asymmetry.   Hematological:  Negative for adenopathy. Does not bruise/bleed easily.   Psychiatric/Behavioral:  Negative for agitation and behavioral problems.        Past Medical History:   Diagnosis Date    Bronchitis     COPD     severe    Emphysema lung (HCC)     severe    Hemoptysis     Hernia     Hypertension     Lung cancer (HCC) 2008    left lung    Osteoporosis     Pneumonia     Rupture of colon (HCC) 05/24/2013    Skin cancer 2014    ear     Past Surgical History:   Procedure Laterality Date    BRONCHOSCOPY  08/05/2016    BRONCHOSCOPY  01/31/2018    CHOLECYSTECTOMY      COLONOSCOPY  8/20/13    KYPHOSIS SURGERY  02/04/2016    LOBECTOMY  0808    MANDIBLE SURGERY      upper jaw and lower jaw    OTHER SURGICAL HISTORY  5/30/13    secondary wound closure    SIGMOIDECTOMY

## 2024-08-20 ENCOUNTER — PROCEDURE VISIT (OUTPATIENT)
Dept: SURGERY | Age: 79
End: 2024-08-20
Payer: COMMERCIAL

## 2024-08-20 VITALS — DIASTOLIC BLOOD PRESSURE: 80 MMHG | SYSTOLIC BLOOD PRESSURE: 120 MMHG | BODY MASS INDEX: 28.98 KG/M2 | WEIGHT: 185 LBS

## 2024-08-20 DIAGNOSIS — L72.3 SEBACEOUS CYST: Primary | ICD-10-CM

## 2024-08-20 PROCEDURE — 3074F SYST BP LT 130 MM HG: CPT | Performed by: SURGERY

## 2024-08-20 PROCEDURE — 1123F ACP DISCUSS/DSCN MKR DOCD: CPT | Performed by: SURGERY

## 2024-08-20 PROCEDURE — 99213 OFFICE O/P EST LOW 20 MIN: CPT | Performed by: SURGERY

## 2024-08-20 PROCEDURE — 3079F DIAST BP 80-89 MM HG: CPT | Performed by: SURGERY

## 2024-08-20 RX ORDER — AMOXICILLIN AND CLAVULANATE POTASSIUM 875; 125 MG/1; MG/1
1 TABLET, FILM COATED ORAL 2 TIMES DAILY
Qty: 14 TABLET | Refills: 0 | Status: SHIPPED | OUTPATIENT
Start: 2024-08-20 | End: 2024-08-27

## 2024-08-20 NOTE — PROGRESS NOTES
[facility]  SUBJECTIVE:    Chief Complaint: right chest sebaceous cyst    Mary Ann Alan   1945   79 y.o. female presents today for excision of a right chest sebaceous cyst under her right breast. However since last visit it has become red, tender, grown significantly in size, without drainage. COPD on home oxygen    Past Medical History:   Diagnosis Date    Bronchitis     COPD     severe    Emphysema lung (HCC)     severe    Hemoptysis     Hernia     Hypertension     Lung cancer (HCC)     left lung    Osteoporosis     Pneumonia     Rupture of colon (HCC) 2013    Skin cancer 2014    ear     Past Surgical History:   Procedure Laterality Date    BRONCHOSCOPY  2016    BRONCHOSCOPY  2018    CHOLECYSTECTOMY      COLONOSCOPY  13    KYPHOSIS SURGERY  2016    LOBECTOMY  0808    MANDIBLE SURGERY      upper jaw and lower jaw    OTHER SURGICAL HISTORY  13    secondary wound closure    SIGMOIDECTOMY  13    THORACOTOMY  0808    TONSILLECTOMY      UPPER GASTROINTESTINAL ENDOSCOPY  2018    UPPER GASTROINTESTINAL ENDOSCOPY N/A 10/31/2018    EGD BIOPSY performed by Stephen Jimenez MD at [facility] ENDOSCOPY    UPPER GASTROINTESTINAL ENDOSCOPY N/A 2019    EGD performed by Stephen Jimenez MD at [facility] ENDOSCOPY     Social History     Socioeconomic History    Marital status:      Spouse name: Jefferson    Number of children: 2    Years of education: Not on file    Highest education level: Not on file   Occupational History    Not on file   Tobacco Use    Smoking status: Light Smoker     Current packs/day: 0.00     Average packs/day: (0.4 ttl pk-yrs)     Types: Cigarettes     Start date: 1968     Last attempt to quit: 2010     Years since quittin.4    Smokeless tobacco: Never    Tobacco comments:     1 cigarette month, maybe will have one if she is upset   Vaping Use    Vaping status: Never Used   Substance

## 2024-08-20 NOTE — PATIENT INSTRUCTIONS
Discussed increased risk associated with excision with active infection. Alternatively discussed treatment of infection and delayed excision, she agrees with the latter  Start PO antibiotics  Warm compresses, dry dressing if wound opens  Delayed excision in 2-3 weeks, present sooner for incision and drainage if cyst worsens

## 2024-08-23 ENCOUNTER — OFFICE VISIT (OUTPATIENT)
Dept: SURGERY | Age: 79
End: 2024-08-23

## 2024-08-23 VITALS — BODY MASS INDEX: 28.98 KG/M2 | DIASTOLIC BLOOD PRESSURE: 80 MMHG | WEIGHT: 185 LBS | SYSTOLIC BLOOD PRESSURE: 120 MMHG

## 2024-08-23 DIAGNOSIS — L72.3 SEBACEOUS CYST: Primary | ICD-10-CM

## 2024-08-23 RX ORDER — LIDOCAINE HYDROCHLORIDE AND EPINEPHRINE 10; 10 MG/ML; UG/ML
10 INJECTION, SOLUTION INFILTRATION; PERINEURAL ONCE
Status: COMPLETED | OUTPATIENT
Start: 2024-08-23 | End: 2024-08-23

## 2024-08-23 RX ADMIN — LIDOCAINE HYDROCHLORIDE AND EPINEPHRINE 10 ML: 10; 10 INJECTION, SOLUTION INFILTRATION; PERINEURAL at 12:24

## 2024-08-23 NOTE — PATIENT INSTRUCTIONS
Local wound care with dry dressing as needed daily starting tomorrow  May shower normally starting tomorrow  Primary pain control with tylenol and ibuprofen with food  Continue current antibiotic regimen. Does not need additional or adjustment in antibiotics   Return to office in 1 week for a wound check

## 2024-08-23 NOTE — PROGRESS NOTES
[facility]  SUBJECTIVE:    Chief Complaint: infected sebaceous cyst    Mary Ann Alan   1945   79 y.o. female presents with a right chest sebaceous cyst that is infected. With continued pain presents for incision and drainage. Not much improvement with antibiotics alone. No other symptoms    Past Medical History:   Diagnosis Date    Bronchitis     COPD     severe    Emphysema lung (HCC)     severe    Hemoptysis     Hernia     Hypertension     Lung cancer (HCC)     left lung    Osteoporosis     Pneumonia     Rupture of colon (HCC) 2013    Skin cancer 2014    ear     Past Surgical History:   Procedure Laterality Date    BRONCHOSCOPY  2016    BRONCHOSCOPY  2018    CHOLECYSTECTOMY      COLONOSCOPY  13    KYPHOSIS SURGERY  2016    LOBECTOMY  0808    MANDIBLE SURGERY      upper jaw and lower jaw    OTHER SURGICAL HISTORY  13    secondary wound closure    SIGMOIDECTOMY  13    THORACOTOMY  0808    TONSILLECTOMY      UPPER GASTROINTESTINAL ENDOSCOPY  2018    UPPER GASTROINTESTINAL ENDOSCOPY N/A 10/31/2018    EGD BIOPSY performed by [facility] MD at [facility] ENDOSCOPY    UPPER GASTROINTESTINAL ENDOSCOPY N/A 2019    EGD performed by Stephen Jimenez MD at [facility] ENDOSCOPY     Social History     Socioeconomic History    Marital status:      Spouse name: Jefferson    Number of children: 2    Years of education: Not on file    Highest education level: Not on file   Occupational History    Not on file   Tobacco Use    Smoking status: Light Smoker     Current packs/day: 0.00     Average packs/day: (0.4 ttl pk-yrs)     Types: Cigarettes     Start date: 1968     Last attempt to quit: 2010     Years since quittin.4    Smokeless tobacco: Never    Tobacco comments:     1 cigarette month, maybe will have one if she is upset   Vaping Use    Vaping status: Never Used   Substance and Sexual Activity

## 2024-08-28 ENCOUNTER — OFFICE VISIT (OUTPATIENT)
Dept: SURGERY | Age: 79
End: 2024-08-28

## 2024-08-28 VITALS — BODY MASS INDEX: 28.98 KG/M2 | DIASTOLIC BLOOD PRESSURE: 80 MMHG | SYSTOLIC BLOOD PRESSURE: 120 MMHG | WEIGHT: 185 LBS

## 2024-08-28 DIAGNOSIS — Z09 SURGERY FOLLOW-UP: Primary | ICD-10-CM

## 2024-08-28 PROCEDURE — 99024 POSTOP FOLLOW-UP VISIT: CPT | Performed by: SURGERY

## 2024-08-28 NOTE — PATIENT INSTRUCTIONS
Continue local wound care  Does not need additional antibiotics  Return to office in several weeks as discussed for definitive cyst removal

## 2024-08-28 NOTE — PROGRESS NOTES
[facility]  SUBJECTIVE:    Mary Ann Alan   1945   79 y.o. female presents for routine postoperative followup after Incision and drainage of infected sebaceous cyst 24. Incisional pain minimal. Wound improving. Overall patient feels lethargic, on home oxygen at baseline    Past Medical History:   Diagnosis Date    Bronchitis     COPD     severe    Emphysema lung (HCC)     severe    Hemoptysis     Hernia     Hypertension     Lung cancer (HCC)     left lung    Osteoporosis     Pneumonia     Rupture of colon (HCC) 2013    Skin cancer 2014    ear     Past Surgical History:   Procedure Laterality Date    BRONCHOSCOPY  2016    BRONCHOSCOPY  2018    CHOLECYSTECTOMY      COLONOSCOPY  13    KYPHOSIS SURGERY  2016    LOBECTOMY  0808    MANDIBLE SURGERY      upper jaw and lower jaw    OTHER SURGICAL HISTORY  13    secondary wound closure    SIGMOIDECTOMY  13    THORACOTOMY  0808    TONSILLECTOMY      UPPER GASTROINTESTINAL ENDOSCOPY  2018    UPPER GASTROINTESTINAL ENDOSCOPY N/A 10/31/2018    EGD BIOPSY performed by Stephen Jimenez MD at [facility] ENDOSCOPY    UPPER GASTROINTESTINAL ENDOSCOPY N/A 2019    EGD performed by Stephen Jimenez MD at [facility] ENDOSCOPY     Social History     Socioeconomic History    Marital status:      Spouse name: Jefferson    Number of children: 2    Years of education: Not on file    Highest education level: Not on file   Occupational History    Not on file   Tobacco Use    Smoking status: Light Smoker     Current packs/day: 0.00     Average packs/day: (0.4 ttl pk-yrs)     Types: Cigarettes     Start date: 1968     Last attempt to quit: 2010     Years since quittin.5    Smokeless tobacco: Never    Tobacco comments:     1 cigarette month, maybe will have one if she is upset   Vaping Use    Vaping status: Never Used   Substance and Sexual Activity    Alcohol use: No      5    pantoprazole (PROTONIX) 40 MG tablet Take 1 tablet by mouth daily 30 tablet 5    fesoterodine (TOVIAZ) 4 MG TB24 ER tablet TAKE 1 TABLET BY MOUTH DAILY 30 tablet 5    albuterol-ipratropium (COMBIVENT RESPIMAT)  MCG/ACT AERS inhaler Inhale 1 puff into the lungs in the morning and 1 puff at noon and 1 puff in the evening and 1 puff before bedtime. 1 each 11    guaiFENesin (MUCINEX) 600 MG extended release tablet Take 2 tablets by mouth daily      OXYGEN Inhale 3 L/min into the lungs continuous Use nightly as needed (Patient taking differently: Inhale 3 L/min into the lungs continuous) 1 Bottle 5    albuterol sulfate HFA (PROVENTIL;VENTOLIN;PROAIR) 108 (90 Base) MCG/ACT inhaler Inhale 2 puffs into the lungs every 6 hours as needed for Wheezing 18 g 11    denosumab (PROLIA) 60 MG/ML SOSY SC injection Inject 1 mL into the skin once for 1 dose Every 6 months 1 each 1     Current Facility-Administered Medications   Medication Dose Route Frequency Provider Last Rate Last Admin    denosumab (PROLIA) SC injection 60 mg  60 mg SubCUTAneous Once Sachin Tay MD          Allergies   Allergen Reactions    Wellbutrin [Bupropion Hcl] Palpitations        Review of Systems:  Review of systems performed and negative with the exception of the above findings    OBJECTIVE:  /80   Wt 83.9 kg (185 lb)   BMI 28.98 kg/m²      Physical Exam:  General appearance: alert, appears stated age, cooperative, and no distress  Skin/wound: right chest wound open, erythema improving, no drainage, sebaceous cyst remains as expected, does not need incision and drainage    No visits with results within 6 Week(s) from this visit.   Latest known visit with results is:   Orders Only on 05/01/2024   Component Date Value Ref Range Status    Creatinine 05/01/2024 0.7  0.6 - 1.2 mg/dL Final    Est, Glom Filt Rate 05/01/2024 89  >60 Final    Comment: Pediatric calculator

## 2024-09-03 RX ORDER — FLUTICASONE FUROATE, UMECLIDINIUM BROMIDE AND VILANTEROL TRIFENATATE 200; 62.5; 25 UG/1; UG/1; UG/1
POWDER RESPIRATORY (INHALATION)
Qty: 1 EACH | Refills: 0 | Status: SHIPPED | OUTPATIENT
Start: 2024-09-03

## 2024-09-03 NOTE — TELEPHONE ENCOUNTER
Last appointment:  5/24/2024    Next appointment:  12/2/2024    Please refill as Dr. Medina is out.

## 2024-09-04 ENCOUNTER — TELEPHONE (OUTPATIENT)
Dept: PRIMARY CARE CLINIC | Age: 79
End: 2024-09-04

## 2024-09-04 ENCOUNTER — HOSPITAL ENCOUNTER (INPATIENT)
Age: 79
LOS: 4 days | Discharge: HOME | DRG: 190 | End: 2024-09-08
Attending: STUDENT IN AN ORGANIZED HEALTH CARE EDUCATION/TRAINING PROGRAM | Admitting: STUDENT IN AN ORGANIZED HEALTH CARE EDUCATION/TRAINING PROGRAM
Payer: COMMERCIAL

## 2024-09-04 ENCOUNTER — APPOINTMENT (OUTPATIENT)
Dept: GENERAL RADIOLOGY | Age: 79
DRG: 190 | End: 2024-09-04
Payer: COMMERCIAL

## 2024-09-04 DIAGNOSIS — R79.89 ELEVATED TROPONIN: ICD-10-CM

## 2024-09-04 DIAGNOSIS — J44.1 COPD WITH ACUTE EXACERBATION (HCC): ICD-10-CM

## 2024-09-04 DIAGNOSIS — R65.10 SIRS (SYSTEMIC INFLAMMATORY RESPONSE SYNDROME) (HCC): ICD-10-CM

## 2024-09-04 DIAGNOSIS — U07.1 COVID-19: Primary | ICD-10-CM

## 2024-09-04 LAB
ALBUMIN SERPL-MCNC: 3.8 G/DL (ref 3.4–5)
ALBUMIN/GLOB SERPL: 1.2 {RATIO} (ref 1.1–2.2)
ALP SERPL-CCNC: 64 U/L (ref 40–129)
ALT SERPL-CCNC: 8 U/L (ref 10–40)
ANION GAP SERPL CALCULATED.3IONS-SCNC: 14 MMOL/L (ref 3–16)
APTT BLD: 27 SEC (ref 22.1–36.4)
AST SERPL-CCNC: 30 U/L (ref 15–37)
BASE EXCESS BLDV CALC-SCNC: 4.7 MMOL/L (ref -3–3)
BASOPHILS # BLD: 0 K/UL (ref 0–0.2)
BASOPHILS NFR BLD: 0.4 %
BILIRUB SERPL-MCNC: 0.3 MG/DL (ref 0–1)
BUN SERPL-MCNC: 14 MG/DL (ref 7–20)
CALCIUM SERPL-MCNC: 9.1 MG/DL (ref 8.3–10.6)
CHLORIDE SERPL-SCNC: 103 MMOL/L (ref 99–110)
CO2 BLDV-SCNC: 71 MMOL/L
CO2 SERPL-SCNC: 24 MMOL/L (ref 21–32)
COHGB MFR BLDV: 1.8 % (ref 0–1.5)
CREAT SERPL-MCNC: 0.6 MG/DL (ref 0.6–1.2)
DEPRECATED RDW RBC AUTO: 16.1 % (ref 12.4–15.4)
DO-HGB MFR BLDV: 5 %
EOSINOPHIL # BLD: 0.1 K/UL (ref 0–0.6)
EOSINOPHIL NFR BLD: 2.7 %
FLUAV RNA RESP QL NAA+PROBE: NOT DETECTED
FLUBV RNA RESP QL NAA+PROBE: NOT DETECTED
GFR SERPLBLD CREATININE-BSD FMLA CKD-EPI: >90 ML/MIN/{1.73_M2}
GLUCOSE SERPL-MCNC: 128 MG/DL (ref 70–99)
HCO3 BLDV-SCNC: 30.3 MMOL/L (ref 23–29)
HCT VFR BLD AUTO: 39.7 % (ref 36–48)
HGB BLD-MCNC: 12.5 G/DL (ref 12–16)
INR PPP: 1.07 (ref 0.85–1.15)
LACTATE BLDV-SCNC: 1 MMOL/L (ref 0.4–1.9)
LACTATE BLDV-SCNC: 1 MMOL/L (ref 0.4–1.9)
LYMPHOCYTES # BLD: 0.7 K/UL (ref 1–5.1)
LYMPHOCYTES NFR BLD: 14.5 %
MAGNESIUM SERPL-MCNC: 2.1 MG/DL (ref 1.8–2.4)
MCH RBC QN AUTO: 27.6 PG (ref 26–34)
MCHC RBC AUTO-ENTMCNC: 31.6 G/DL (ref 31–36)
MCV RBC AUTO: 87.4 FL (ref 80–100)
METHGB MFR BLDV: 0.1 %
MONOCYTES # BLD: 1.4 K/UL (ref 0–1.3)
MONOCYTES NFR BLD: 30.8 %
NEUTROPHILS # BLD: 2.3 K/UL (ref 1.7–7.7)
NEUTROPHILS NFR BLD: 51.6 %
NT-PROBNP SERPL-MCNC: 293 PG/ML (ref 0–449)
O2 CT VFR BLDV CALC: 17 VOL %
O2 THERAPY: ABNORMAL
PCO2 BLDV: 48.1 MMHG (ref 40–50)
PH BLDV: 7.41 [PH] (ref 7.35–7.45)
PLATELET # BLD AUTO: 159 K/UL (ref 135–450)
PMV BLD AUTO: 10.5 FL (ref 5–10.5)
PO2 BLDV: 72.5 MMHG (ref 25–40)
POTASSIUM SERPL-SCNC: 4.9 MMOL/L (ref 3.5–5.1)
PROCALCITONIN SERPL IA-MCNC: 0.07 NG/ML (ref 0–0.15)
PROT SERPL-MCNC: 7.1 G/DL (ref 6.4–8.2)
PROTHROMBIN TIME: 14.1 SEC (ref 11.9–14.9)
RBC # BLD AUTO: 4.54 M/UL (ref 4–5.2)
REASON FOR REJECTION: NORMAL
REJECTED TEST: NORMAL
SAO2 % BLDV: 95 %
SARS-COV-2 RNA RESP QL NAA+PROBE: DETECTED
SODIUM SERPL-SCNC: 141 MMOL/L (ref 136–145)
TROPONIN, HIGH SENSITIVITY: 16 NG/L (ref 0–14)
TROPONIN, HIGH SENSITIVITY: 17 NG/L (ref 0–14)
WBC # BLD AUTO: 4.5 K/UL (ref 4–11)

## 2024-09-04 PROCEDURE — 85730 THROMBOPLASTIN TIME PARTIAL: CPT

## 2024-09-04 PROCEDURE — 71045 X-RAY EXAM CHEST 1 VIEW: CPT

## 2024-09-04 PROCEDURE — 84484 ASSAY OF TROPONIN QUANT: CPT

## 2024-09-04 PROCEDURE — 87040 BLOOD CULTURE FOR BACTERIA: CPT

## 2024-09-04 PROCEDURE — 2580000003 HC RX 258: Performed by: PHYSICIAN ASSISTANT

## 2024-09-04 PROCEDURE — 6360000002 HC RX W HCPCS: Performed by: PHYSICIAN ASSISTANT

## 2024-09-04 PROCEDURE — 6370000000 HC RX 637 (ALT 250 FOR IP): Performed by: PHYSICIAN ASSISTANT

## 2024-09-04 PROCEDURE — 83880 ASSAY OF NATRIURETIC PEPTIDE: CPT

## 2024-09-04 PROCEDURE — 83605 ASSAY OF LACTIC ACID: CPT

## 2024-09-04 PROCEDURE — 94640 AIRWAY INHALATION TREATMENT: CPT

## 2024-09-04 PROCEDURE — 82803 BLOOD GASES ANY COMBINATION: CPT

## 2024-09-04 PROCEDURE — 85025 COMPLETE CBC W/AUTO DIFF WBC: CPT

## 2024-09-04 PROCEDURE — 84145 PROCALCITONIN (PCT): CPT

## 2024-09-04 PROCEDURE — 99285 EMERGENCY DEPT VISIT HI MDM: CPT

## 2024-09-04 PROCEDURE — 6360000002 HC RX W HCPCS: Performed by: STUDENT IN AN ORGANIZED HEALTH CARE EDUCATION/TRAINING PROGRAM

## 2024-09-04 PROCEDURE — 2700000000 HC OXYGEN THERAPY PER DAY

## 2024-09-04 PROCEDURE — 1200000000 HC SEMI PRIVATE

## 2024-09-04 PROCEDURE — 87636 SARSCOV2 & INF A&B AMP PRB: CPT

## 2024-09-04 PROCEDURE — 93005 ELECTROCARDIOGRAM TRACING: CPT | Performed by: STUDENT IN AN ORGANIZED HEALTH CARE EDUCATION/TRAINING PROGRAM

## 2024-09-04 PROCEDURE — 80053 COMPREHEN METABOLIC PANEL: CPT

## 2024-09-04 PROCEDURE — 2580000003 HC RX 258: Performed by: STUDENT IN AN ORGANIZED HEALTH CARE EDUCATION/TRAINING PROGRAM

## 2024-09-04 PROCEDURE — 6370000000 HC RX 637 (ALT 250 FOR IP): Performed by: STUDENT IN AN ORGANIZED HEALTH CARE EDUCATION/TRAINING PROGRAM

## 2024-09-04 PROCEDURE — 83735 ASSAY OF MAGNESIUM: CPT

## 2024-09-04 PROCEDURE — 96374 THER/PROPH/DIAG INJ IV PUSH: CPT

## 2024-09-04 PROCEDURE — 94761 N-INVAS EAR/PLS OXIMETRY MLT: CPT

## 2024-09-04 PROCEDURE — 85610 PROTHROMBIN TIME: CPT

## 2024-09-04 RX ORDER — IPRATROPIUM BROMIDE AND ALBUTEROL SULFATE 2.5; .5 MG/3ML; MG/3ML
1 SOLUTION RESPIRATORY (INHALATION) ONCE
Status: COMPLETED | OUTPATIENT
Start: 2024-09-04 | End: 2024-09-04

## 2024-09-04 RX ORDER — ACETAMINOPHEN 650 MG/1
650 SUPPOSITORY RECTAL EVERY 6 HOURS PRN
Status: DISCONTINUED | OUTPATIENT
Start: 2024-09-04 | End: 2024-09-08 | Stop reason: HOSPADM

## 2024-09-04 RX ORDER — SODIUM CHLORIDE 0.9 % (FLUSH) 0.9 %
5-40 SYRINGE (ML) INJECTION EVERY 12 HOURS SCHEDULED
Status: DISCONTINUED | OUTPATIENT
Start: 2024-09-04 | End: 2024-09-08 | Stop reason: HOSPADM

## 2024-09-04 RX ORDER — ENOXAPARIN SODIUM 100 MG/ML
40 INJECTION SUBCUTANEOUS DAILY
Status: DISCONTINUED | OUTPATIENT
Start: 2024-09-05 | End: 2024-09-08 | Stop reason: HOSPADM

## 2024-09-04 RX ORDER — IPRATROPIUM BROMIDE AND ALBUTEROL SULFATE 2.5; .5 MG/3ML; MG/3ML
1 SOLUTION RESPIRATORY (INHALATION)
Status: DISCONTINUED | OUTPATIENT
Start: 2024-09-05 | End: 2024-09-05

## 2024-09-04 RX ORDER — SODIUM CHLORIDE 0.9 % (FLUSH) 0.9 %
5-40 SYRINGE (ML) INJECTION PRN
Status: DISCONTINUED | OUTPATIENT
Start: 2024-09-04 | End: 2024-09-08 | Stop reason: HOSPADM

## 2024-09-04 RX ORDER — SODIUM CHLORIDE 9 MG/ML
INJECTION, SOLUTION INTRAVENOUS PRN
Status: DISCONTINUED | OUTPATIENT
Start: 2024-09-04 | End: 2024-09-08 | Stop reason: HOSPADM

## 2024-09-04 RX ORDER — ACETAMINOPHEN 325 MG/1
650 TABLET ORAL EVERY 6 HOURS PRN
Status: DISCONTINUED | OUTPATIENT
Start: 2024-09-04 | End: 2024-09-08 | Stop reason: HOSPADM

## 2024-09-04 RX ORDER — ONDANSETRON 4 MG/1
4 TABLET, ORALLY DISINTEGRATING ORAL EVERY 8 HOURS PRN
Status: DISCONTINUED | OUTPATIENT
Start: 2024-09-04 | End: 2024-09-08 | Stop reason: HOSPADM

## 2024-09-04 RX ORDER — 0.9 % SODIUM CHLORIDE 0.9 %
1000 INTRAVENOUS SOLUTION INTRAVENOUS ONCE
Status: COMPLETED | OUTPATIENT
Start: 2024-09-04 | End: 2024-09-04

## 2024-09-04 RX ORDER — POLYETHYLENE GLYCOL 3350 17 G/17G
17 POWDER, FOR SOLUTION ORAL DAILY PRN
Status: DISCONTINUED | OUTPATIENT
Start: 2024-09-04 | End: 2024-09-08 | Stop reason: HOSPADM

## 2024-09-04 RX ORDER — ONDANSETRON 2 MG/ML
4 INJECTION INTRAMUSCULAR; INTRAVENOUS EVERY 6 HOURS PRN
Status: DISCONTINUED | OUTPATIENT
Start: 2024-09-04 | End: 2024-09-08 | Stop reason: HOSPADM

## 2024-09-04 RX ORDER — GUAIFENESIN 600 MG/1
1200 TABLET, EXTENDED RELEASE ORAL 2 TIMES DAILY
Status: DISCONTINUED | OUTPATIENT
Start: 2024-09-04 | End: 2024-09-08 | Stop reason: HOSPADM

## 2024-09-04 RX ORDER — ACETAMINOPHEN 500 MG
500 TABLET ORAL ONCE
Status: COMPLETED | OUTPATIENT
Start: 2024-09-04 | End: 2024-09-04

## 2024-09-04 RX ORDER — DEXAMETHASONE SODIUM PHOSPHATE 10 MG/ML
6 INJECTION, SOLUTION INTRAMUSCULAR; INTRAVENOUS ONCE
Status: COMPLETED | OUTPATIENT
Start: 2024-09-04 | End: 2024-09-04

## 2024-09-04 RX ORDER — SODIUM CHLORIDE 9 MG/ML
INJECTION, SOLUTION INTRAVENOUS CONTINUOUS
Status: ACTIVE | OUTPATIENT
Start: 2024-09-04 | End: 2024-09-06

## 2024-09-04 RX ADMIN — GUAIFENESIN 1200 MG: 600 TABLET, EXTENDED RELEASE ORAL at 23:52

## 2024-09-04 RX ADMIN — SODIUM CHLORIDE 1000 ML: 9 INJECTION, SOLUTION INTRAVENOUS at 21:35

## 2024-09-04 RX ADMIN — ACETAMINOPHEN 500 MG: 500 TABLET ORAL at 21:34

## 2024-09-04 RX ADMIN — SODIUM CHLORIDE: 9 INJECTION, SOLUTION INTRAVENOUS at 23:50

## 2024-09-04 RX ADMIN — AZITHROMYCIN DIHYDRATE 500 MG: 500 INJECTION, POWDER, LYOPHILIZED, FOR SOLUTION INTRAVENOUS at 23:51

## 2024-09-04 RX ADMIN — IPRATROPIUM BROMIDE AND ALBUTEROL SULFATE 1 DOSE: .5; 3 SOLUTION RESPIRATORY (INHALATION) at 19:35

## 2024-09-04 RX ADMIN — DEXAMETHASONE SODIUM PHOSPHATE 6 MG: 10 INJECTION, SOLUTION INTRAMUSCULAR; INTRAVENOUS at 19:50

## 2024-09-04 ASSESSMENT — PAIN SCALES - GENERAL: PAINLEVEL_OUTOF10: 0

## 2024-09-04 ASSESSMENT — PAIN - FUNCTIONAL ASSESSMENT
PAIN_FUNCTIONAL_ASSESSMENT: 0-10
PAIN_FUNCTIONAL_ASSESSMENT: ACTIVITIES ARE NOT PREVENTED

## 2024-09-04 NOTE — TELEPHONE ENCOUNTER
Patient was at the lab center to complete labs but the orders are not expected till 10/15/2024.  Patient requested for the labs to be released right now so she may complete them before the apt on 9/25/2024.  Please contact pt once ordered.

## 2024-09-05 LAB
BACTERIA URNS QL MICRO: ABNORMAL /HPF
BILIRUB UR QL STRIP.AUTO: NEGATIVE
CLARITY UR: CLEAR
COLOR UR: YELLOW
EKG ATRIAL RATE: 107 BPM
EKG DIAGNOSIS: NORMAL
EKG P AXIS: 77 DEGREES
EKG P-R INTERVAL: 128 MS
EKG Q-T INTERVAL: 328 MS
EKG QRS DURATION: 70 MS
EKG QTC CALCULATION (BAZETT): 437 MS
EKG R AXIS: 6 DEGREES
EKG T AXIS: 51 DEGREES
EKG VENTRICULAR RATE: 107 BPM
EPI CELLS #/AREA URNS AUTO: 8 /HPF (ref 0–5)
GLUCOSE UR STRIP.AUTO-MCNC: 100 MG/DL
HGB UR QL STRIP.AUTO: NEGATIVE
HYALINE CASTS #/AREA URNS AUTO: 4 /LPF (ref 0–8)
KETONES UR STRIP.AUTO-MCNC: 40 MG/DL
LEUKOCYTE ESTERASE UR QL STRIP.AUTO: NEGATIVE
NITRITE UR QL STRIP.AUTO: NEGATIVE
PH UR STRIP.AUTO: 6 [PH] (ref 5–8)
PROT UR STRIP.AUTO-MCNC: 30 MG/DL
RBC CLUMPS #/AREA URNS AUTO: 3 /HPF (ref 0–4)
SP GR UR STRIP.AUTO: 1.02 (ref 1–1.03)
UA COMPLETE W REFLEX CULTURE PNL UR: ABNORMAL
UA DIPSTICK W REFLEX MICRO PNL UR: YES
URN SPEC COLLECT METH UR: ABNORMAL
UROBILINOGEN UR STRIP-ACNC: 1 E.U./DL
WBC #/AREA URNS AUTO: 1 /HPF (ref 0–5)

## 2024-09-05 PROCEDURE — 6360000002 HC RX W HCPCS: Performed by: STUDENT IN AN ORGANIZED HEALTH CARE EDUCATION/TRAINING PROGRAM

## 2024-09-05 PROCEDURE — 6370000000 HC RX 637 (ALT 250 FOR IP): Performed by: STUDENT IN AN ORGANIZED HEALTH CARE EDUCATION/TRAINING PROGRAM

## 2024-09-05 PROCEDURE — 81001 URINALYSIS AUTO W/SCOPE: CPT

## 2024-09-05 PROCEDURE — 94761 N-INVAS EAR/PLS OXIMETRY MLT: CPT

## 2024-09-05 PROCEDURE — 2700000000 HC OXYGEN THERAPY PER DAY

## 2024-09-05 PROCEDURE — 2580000003 HC RX 258: Performed by: STUDENT IN AN ORGANIZED HEALTH CARE EDUCATION/TRAINING PROGRAM

## 2024-09-05 PROCEDURE — 93010 ELECTROCARDIOGRAM REPORT: CPT | Performed by: INTERNAL MEDICINE

## 2024-09-05 PROCEDURE — 1200000000 HC SEMI PRIVATE

## 2024-09-05 PROCEDURE — 94640 AIRWAY INHALATION TREATMENT: CPT

## 2024-09-05 RX ORDER — TROSPIUM CHLORIDE 20 MG/1
20 TABLET, FILM COATED ORAL DAILY
Status: DISCONTINUED | OUTPATIENT
Start: 2024-09-05 | End: 2024-09-08 | Stop reason: HOSPADM

## 2024-09-05 RX ORDER — IPRATROPIUM BROMIDE AND ALBUTEROL SULFATE 2.5; .5 MG/3ML; MG/3ML
1 SOLUTION RESPIRATORY (INHALATION) EVERY 4 HOURS PRN
Status: DISCONTINUED | OUTPATIENT
Start: 2024-09-05 | End: 2024-09-06

## 2024-09-05 RX ORDER — BUSPIRONE HYDROCHLORIDE 5 MG/1
5 TABLET ORAL 2 TIMES DAILY
Status: DISCONTINUED | OUTPATIENT
Start: 2024-09-05 | End: 2024-09-08 | Stop reason: HOSPADM

## 2024-09-05 RX ORDER — IPRATROPIUM BROMIDE AND ALBUTEROL SULFATE 2.5; .5 MG/3ML; MG/3ML
1 SOLUTION RESPIRATORY (INHALATION)
Status: DISCONTINUED | OUTPATIENT
Start: 2024-09-05 | End: 2024-09-07

## 2024-09-05 RX ORDER — DILTIAZEM HYDROCHLORIDE 30 MG/1
30 TABLET, FILM COATED ORAL EVERY 8 HOURS SCHEDULED
Status: DISCONTINUED | OUTPATIENT
Start: 2024-09-05 | End: 2024-09-08 | Stop reason: HOSPADM

## 2024-09-05 RX ORDER — MONTELUKAST SODIUM 10 MG/1
10 TABLET ORAL NIGHTLY
Status: DISCONTINUED | OUTPATIENT
Start: 2024-09-05 | End: 2024-09-08 | Stop reason: HOSPADM

## 2024-09-05 RX ORDER — GABAPENTIN 300 MG/1
300 CAPSULE ORAL 2 TIMES DAILY
Status: DISCONTINUED | OUTPATIENT
Start: 2024-09-05 | End: 2024-09-08 | Stop reason: HOSPADM

## 2024-09-05 RX ORDER — BUDESONIDE AND FORMOTEROL FUMARATE DIHYDRATE 160; 4.5 UG/1; UG/1
2 AEROSOL RESPIRATORY (INHALATION)
Status: DISCONTINUED | OUTPATIENT
Start: 2024-09-05 | End: 2024-09-08 | Stop reason: HOSPADM

## 2024-09-05 RX ORDER — PANTOPRAZOLE SODIUM 40 MG/1
40 TABLET, DELAYED RELEASE ORAL
Status: DISCONTINUED | OUTPATIENT
Start: 2024-09-05 | End: 2024-09-08 | Stop reason: HOSPADM

## 2024-09-05 RX ADMIN — Medication 2 PUFF: at 09:01

## 2024-09-05 RX ADMIN — DILTIAZEM HYDROCHLORIDE 30 MG: 30 TABLET, FILM COATED ORAL at 20:56

## 2024-09-05 RX ADMIN — GABAPENTIN 300 MG: 300 CAPSULE ORAL at 20:56

## 2024-09-05 RX ADMIN — IPRATROPIUM BROMIDE AND ALBUTEROL SULFATE 1 DOSE: .5; 3 SOLUTION RESPIRATORY (INHALATION) at 08:58

## 2024-09-05 RX ADMIN — ENOXAPARIN SODIUM 40 MG: 100 INJECTION SUBCUTANEOUS at 07:55

## 2024-09-05 RX ADMIN — IPRATROPIUM BROMIDE AND ALBUTEROL SULFATE 1 DOSE: .5; 3 SOLUTION RESPIRATORY (INHALATION) at 16:24

## 2024-09-05 RX ADMIN — Medication 2 PUFF: at 21:53

## 2024-09-05 RX ADMIN — PANTOPRAZOLE SODIUM 40 MG: 40 TABLET, DELAYED RELEASE ORAL at 06:46

## 2024-09-05 RX ADMIN — GUAIFENESIN 1200 MG: 600 TABLET, EXTENDED RELEASE ORAL at 20:56

## 2024-09-05 RX ADMIN — DILTIAZEM HYDROCHLORIDE 30 MG: 30 TABLET, FILM COATED ORAL at 14:38

## 2024-09-05 RX ADMIN — WATER 60 MG: 1 INJECTION INTRAMUSCULAR; INTRAVENOUS; SUBCUTANEOUS at 20:56

## 2024-09-05 RX ADMIN — BUSPIRONE HYDROCHLORIDE 5 MG: 5 TABLET ORAL at 07:55

## 2024-09-05 RX ADMIN — BUSPIRONE HYDROCHLORIDE 5 MG: 5 TABLET ORAL at 20:56

## 2024-09-05 RX ADMIN — WATER 60 MG: 1 INJECTION INTRAMUSCULAR; INTRAVENOUS; SUBCUTANEOUS at 12:31

## 2024-09-05 RX ADMIN — AZITHROMYCIN DIHYDRATE 500 MG: 500 INJECTION, POWDER, LYOPHILIZED, FOR SOLUTION INTRAVENOUS at 21:06

## 2024-09-05 RX ADMIN — WATER 60 MG: 1 INJECTION INTRAMUSCULAR; INTRAVENOUS; SUBCUTANEOUS at 04:21

## 2024-09-05 RX ADMIN — GUAIFENESIN 1200 MG: 600 TABLET, EXTENDED RELEASE ORAL at 07:55

## 2024-09-05 RX ADMIN — DILTIAZEM HYDROCHLORIDE 30 MG: 30 TABLET, FILM COATED ORAL at 06:46

## 2024-09-05 RX ADMIN — IPRATROPIUM BROMIDE AND ALBUTEROL SULFATE 1 DOSE: .5; 3 SOLUTION RESPIRATORY (INHALATION) at 21:51

## 2024-09-05 RX ADMIN — IPRATROPIUM BROMIDE AND ALBUTEROL SULFATE 1 DOSE: .5; 3 SOLUTION RESPIRATORY (INHALATION) at 13:13

## 2024-09-05 RX ADMIN — GABAPENTIN 300 MG: 300 CAPSULE ORAL at 07:55

## 2024-09-05 RX ADMIN — SODIUM CHLORIDE: 9 INJECTION, SOLUTION INTRAVENOUS at 11:31

## 2024-09-05 RX ADMIN — MONTELUKAST SODIUM 10 MG: 10 TABLET, FILM COATED ORAL at 06:46

## 2024-09-05 RX ADMIN — TIOTROPIUM BROMIDE INHALATION SPRAY 2 PUFF: 3.12 SPRAY, METERED RESPIRATORY (INHALATION) at 09:01

## 2024-09-05 RX ADMIN — TROSPIUM CHLORIDE 20 MG: 20 TABLET, FILM COATED ORAL at 07:55

## 2024-09-06 LAB
CRP SERPL-MCNC: 6.4 MG/L (ref 0–5.1)
D-DIMER QUANTITATIVE: 0.44 UG/ML FEU (ref 0–0.6)

## 2024-09-06 PROCEDURE — 6360000002 HC RX W HCPCS: Performed by: STUDENT IN AN ORGANIZED HEALTH CARE EDUCATION/TRAINING PROGRAM

## 2024-09-06 PROCEDURE — 36415 COLL VENOUS BLD VENIPUNCTURE: CPT

## 2024-09-06 PROCEDURE — 1200000000 HC SEMI PRIVATE

## 2024-09-06 PROCEDURE — 6360000002 HC RX W HCPCS: Performed by: INTERNAL MEDICINE

## 2024-09-06 PROCEDURE — 94761 N-INVAS EAR/PLS OXIMETRY MLT: CPT

## 2024-09-06 PROCEDURE — 94640 AIRWAY INHALATION TREATMENT: CPT

## 2024-09-06 PROCEDURE — 2580000003 HC RX 258: Performed by: STUDENT IN AN ORGANIZED HEALTH CARE EDUCATION/TRAINING PROGRAM

## 2024-09-06 PROCEDURE — 99222 1ST HOSP IP/OBS MODERATE 55: CPT | Performed by: INTERNAL MEDICINE

## 2024-09-06 PROCEDURE — 86140 C-REACTIVE PROTEIN: CPT

## 2024-09-06 PROCEDURE — 6370000000 HC RX 637 (ALT 250 FOR IP): Performed by: STUDENT IN AN ORGANIZED HEALTH CARE EDUCATION/TRAINING PROGRAM

## 2024-09-06 PROCEDURE — 2580000003 HC RX 258: Performed by: INTERNAL MEDICINE

## 2024-09-06 PROCEDURE — 2700000000 HC OXYGEN THERAPY PER DAY

## 2024-09-06 PROCEDURE — 85379 FIBRIN DEGRADATION QUANT: CPT

## 2024-09-06 RX ORDER — IPRATROPIUM BROMIDE AND ALBUTEROL SULFATE 2.5; .5 MG/3ML; MG/3ML
1 SOLUTION RESPIRATORY (INHALATION)
Status: DISCONTINUED | OUTPATIENT
Start: 2024-09-07 | End: 2024-09-08 | Stop reason: HOSPADM

## 2024-09-06 RX ADMIN — WATER 60 MG: 1 INJECTION INTRAMUSCULAR; INTRAVENOUS; SUBCUTANEOUS at 10:45

## 2024-09-06 RX ADMIN — Medication 2 PUFF: at 08:09

## 2024-09-06 RX ADMIN — DILTIAZEM HYDROCHLORIDE 30 MG: 30 TABLET, FILM COATED ORAL at 05:16

## 2024-09-06 RX ADMIN — PANTOPRAZOLE SODIUM 40 MG: 40 TABLET, DELAYED RELEASE ORAL at 05:16

## 2024-09-06 RX ADMIN — IPRATROPIUM BROMIDE AND ALBUTEROL SULFATE 1 DOSE: .5; 3 SOLUTION RESPIRATORY (INHALATION) at 22:20

## 2024-09-06 RX ADMIN — MONTELUKAST SODIUM 10 MG: 10 TABLET, FILM COATED ORAL at 21:30

## 2024-09-06 RX ADMIN — Medication 2 PUFF: at 22:20

## 2024-09-06 RX ADMIN — IPRATROPIUM BROMIDE AND ALBUTEROL SULFATE 1 DOSE: .5; 3 SOLUTION RESPIRATORY (INHALATION) at 15:43

## 2024-09-06 RX ADMIN — ENOXAPARIN SODIUM 40 MG: 100 INJECTION SUBCUTANEOUS at 10:44

## 2024-09-06 RX ADMIN — WATER 40 MG: 1 INJECTION INTRAMUSCULAR; INTRAVENOUS; SUBCUTANEOUS at 21:30

## 2024-09-06 RX ADMIN — SODIUM CHLORIDE, PRESERVATIVE FREE 10 ML: 5 INJECTION INTRAVENOUS at 21:31

## 2024-09-06 RX ADMIN — IPRATROPIUM BROMIDE AND ALBUTEROL SULFATE 1 DOSE: .5; 3 SOLUTION RESPIRATORY (INHALATION) at 08:09

## 2024-09-06 RX ADMIN — BUSPIRONE HYDROCHLORIDE 5 MG: 5 TABLET ORAL at 21:30

## 2024-09-06 RX ADMIN — GABAPENTIN 300 MG: 300 CAPSULE ORAL at 10:44

## 2024-09-06 RX ADMIN — SODIUM CHLORIDE, PRESERVATIVE FREE 10 ML: 5 INJECTION INTRAVENOUS at 10:51

## 2024-09-06 RX ADMIN — AZITHROMYCIN DIHYDRATE 500 MG: 500 INJECTION, POWDER, LYOPHILIZED, FOR SOLUTION INTRAVENOUS at 21:30

## 2024-09-06 RX ADMIN — TIOTROPIUM BROMIDE INHALATION SPRAY 2 PUFF: 3.12 SPRAY, METERED RESPIRATORY (INHALATION) at 08:09

## 2024-09-06 RX ADMIN — WATER 60 MG: 1 INJECTION INTRAMUSCULAR; INTRAVENOUS; SUBCUTANEOUS at 05:16

## 2024-09-06 RX ADMIN — TROSPIUM CHLORIDE 20 MG: 20 TABLET, FILM COATED ORAL at 10:44

## 2024-09-06 RX ADMIN — GABAPENTIN 300 MG: 300 CAPSULE ORAL at 21:30

## 2024-09-06 RX ADMIN — GUAIFENESIN 1200 MG: 600 TABLET, EXTENDED RELEASE ORAL at 21:30

## 2024-09-06 RX ADMIN — BUSPIRONE HYDROCHLORIDE 5 MG: 5 TABLET ORAL at 10:44

## 2024-09-06 RX ADMIN — GUAIFENESIN 1200 MG: 600 TABLET, EXTENDED RELEASE ORAL at 10:44

## 2024-09-06 RX ADMIN — DILTIAZEM HYDROCHLORIDE 30 MG: 30 TABLET, FILM COATED ORAL at 21:31

## 2024-09-06 RX ADMIN — DILTIAZEM HYDROCHLORIDE 30 MG: 30 TABLET, FILM COATED ORAL at 17:01

## 2024-09-07 PROCEDURE — 94640 AIRWAY INHALATION TREATMENT: CPT

## 2024-09-07 PROCEDURE — 97165 OT EVAL LOW COMPLEX 30 MIN: CPT

## 2024-09-07 PROCEDURE — 94761 N-INVAS EAR/PLS OXIMETRY MLT: CPT

## 2024-09-07 PROCEDURE — 97161 PT EVAL LOW COMPLEX 20 MIN: CPT

## 2024-09-07 PROCEDURE — 6370000000 HC RX 637 (ALT 250 FOR IP): Performed by: STUDENT IN AN ORGANIZED HEALTH CARE EDUCATION/TRAINING PROGRAM

## 2024-09-07 PROCEDURE — 6360000002 HC RX W HCPCS: Performed by: STUDENT IN AN ORGANIZED HEALTH CARE EDUCATION/TRAINING PROGRAM

## 2024-09-07 PROCEDURE — 1200000000 HC SEMI PRIVATE

## 2024-09-07 PROCEDURE — 94680 O2 UPTK RST&XERS DIR SIMPLE: CPT

## 2024-09-07 PROCEDURE — 2700000000 HC OXYGEN THERAPY PER DAY

## 2024-09-07 PROCEDURE — 2580000003 HC RX 258: Performed by: STUDENT IN AN ORGANIZED HEALTH CARE EDUCATION/TRAINING PROGRAM

## 2024-09-07 RX ORDER — PREDNISONE 20 MG/1
TABLET ORAL
Qty: 23 TABLET | Refills: 0 | Status: SHIPPED | OUTPATIENT
Start: 2024-09-07 | End: 2024-09-19

## 2024-09-07 RX ORDER — AZITHROMYCIN 500 MG/1
250 TABLET, FILM COATED ORAL DAILY
Qty: 1 TABLET | Refills: 0 | Status: SHIPPED | OUTPATIENT
Start: 2024-09-07 | End: 2024-09-08 | Stop reason: HOSPADM

## 2024-09-07 RX ORDER — IPRATROPIUM BROMIDE AND ALBUTEROL SULFATE 2.5; .5 MG/3ML; MG/3ML
1 SOLUTION RESPIRATORY (INHALATION) EVERY 4 HOURS PRN
Status: DISCONTINUED | OUTPATIENT
Start: 2024-09-07 | End: 2024-09-08 | Stop reason: HOSPADM

## 2024-09-07 RX ADMIN — ENOXAPARIN SODIUM 40 MG: 100 INJECTION SUBCUTANEOUS at 11:09

## 2024-09-07 RX ADMIN — MONTELUKAST SODIUM 10 MG: 10 TABLET, FILM COATED ORAL at 21:28

## 2024-09-07 RX ADMIN — PANTOPRAZOLE SODIUM 40 MG: 40 TABLET, DELAYED RELEASE ORAL at 05:29

## 2024-09-07 RX ADMIN — DILTIAZEM HYDROCHLORIDE 30 MG: 30 TABLET, FILM COATED ORAL at 17:32

## 2024-09-07 RX ADMIN — IPRATROPIUM BROMIDE AND ALBUTEROL SULFATE 1 DOSE: .5; 3 SOLUTION RESPIRATORY (INHALATION) at 12:19

## 2024-09-07 RX ADMIN — BUSPIRONE HYDROCHLORIDE 5 MG: 5 TABLET ORAL at 11:05

## 2024-09-07 RX ADMIN — GABAPENTIN 300 MG: 300 CAPSULE ORAL at 21:28

## 2024-09-07 RX ADMIN — Medication 2 PUFF: at 10:00

## 2024-09-07 RX ADMIN — IPRATROPIUM BROMIDE AND ALBUTEROL SULFATE 1 DOSE: .5; 3 SOLUTION RESPIRATORY (INHALATION) at 10:00

## 2024-09-07 RX ADMIN — GABAPENTIN 300 MG: 300 CAPSULE ORAL at 11:05

## 2024-09-07 RX ADMIN — TIOTROPIUM BROMIDE INHALATION SPRAY 2 PUFF: 3.12 SPRAY, METERED RESPIRATORY (INHALATION) at 10:06

## 2024-09-07 RX ADMIN — SODIUM CHLORIDE, PRESERVATIVE FREE 10 ML: 5 INJECTION INTRAVENOUS at 11:08

## 2024-09-07 RX ADMIN — ACETAMINOPHEN 650 MG: 325 TABLET ORAL at 22:22

## 2024-09-07 RX ADMIN — IPRATROPIUM BROMIDE AND ALBUTEROL SULFATE 1 DOSE: .5; 3 SOLUTION RESPIRATORY (INHALATION) at 21:25

## 2024-09-07 RX ADMIN — AZITHROMYCIN DIHYDRATE 500 MG: 500 INJECTION, POWDER, LYOPHILIZED, FOR SOLUTION INTRAVENOUS at 21:29

## 2024-09-07 RX ADMIN — DILTIAZEM HYDROCHLORIDE 30 MG: 30 TABLET, FILM COATED ORAL at 05:29

## 2024-09-07 RX ADMIN — SODIUM CHLORIDE, PRESERVATIVE FREE 10 ML: 5 INJECTION INTRAVENOUS at 21:28

## 2024-09-07 RX ADMIN — TROSPIUM CHLORIDE 20 MG: 20 TABLET, FILM COATED ORAL at 11:05

## 2024-09-07 RX ADMIN — DILTIAZEM HYDROCHLORIDE 30 MG: 30 TABLET, FILM COATED ORAL at 21:28

## 2024-09-07 RX ADMIN — Medication 2 PUFF: at 21:33

## 2024-09-07 RX ADMIN — BUSPIRONE HYDROCHLORIDE 5 MG: 5 TABLET ORAL at 21:28

## 2024-09-07 RX ADMIN — IPRATROPIUM BROMIDE AND ALBUTEROL SULFATE 1 DOSE: .5; 3 SOLUTION RESPIRATORY (INHALATION) at 15:25

## 2024-09-07 RX ADMIN — GUAIFENESIN 1200 MG: 600 TABLET, EXTENDED RELEASE ORAL at 11:05

## 2024-09-07 RX ADMIN — GUAIFENESIN 1200 MG: 600 TABLET, EXTENDED RELEASE ORAL at 21:28

## 2024-09-07 ASSESSMENT — PAIN DESCRIPTION - LOCATION: LOCATION: ARM

## 2024-09-07 ASSESSMENT — PAIN SCALES - GENERAL
PAINLEVEL_OUTOF10: 0
PAINLEVEL_OUTOF10: 3
PAINLEVEL_OUTOF10: 1

## 2024-09-07 ASSESSMENT — PAIN DESCRIPTION - DESCRIPTORS: DESCRIPTORS: BURNING

## 2024-09-07 ASSESSMENT — PAIN DESCRIPTION - ORIENTATION: ORIENTATION: RIGHT;LOWER

## 2024-09-08 VITALS
OXYGEN SATURATION: 97 % | RESPIRATION RATE: 18 BRPM | SYSTOLIC BLOOD PRESSURE: 151 MMHG | WEIGHT: 175 LBS | BODY MASS INDEX: 27.47 KG/M2 | HEIGHT: 67 IN | TEMPERATURE: 98.2 F | HEART RATE: 88 BPM | DIASTOLIC BLOOD PRESSURE: 80 MMHG

## 2024-09-08 LAB
BACTERIA BLD CULT ORG #2: NORMAL
BACTERIA BLD CULT: NORMAL

## 2024-09-08 PROCEDURE — 94640 AIRWAY INHALATION TREATMENT: CPT

## 2024-09-08 PROCEDURE — 94680 O2 UPTK RST&XERS DIR SIMPLE: CPT

## 2024-09-08 PROCEDURE — 6370000000 HC RX 637 (ALT 250 FOR IP): Performed by: STUDENT IN AN ORGANIZED HEALTH CARE EDUCATION/TRAINING PROGRAM

## 2024-09-08 PROCEDURE — 2700000000 HC OXYGEN THERAPY PER DAY

## 2024-09-08 PROCEDURE — 94761 N-INVAS EAR/PLS OXIMETRY MLT: CPT

## 2024-09-08 RX ADMIN — Medication 2 PUFF: at 09:22

## 2024-09-08 RX ADMIN — GUAIFENESIN 1200 MG: 600 TABLET, EXTENDED RELEASE ORAL at 11:06

## 2024-09-08 RX ADMIN — TROSPIUM CHLORIDE 20 MG: 20 TABLET, FILM COATED ORAL at 11:06

## 2024-09-08 RX ADMIN — BUSPIRONE HYDROCHLORIDE 5 MG: 5 TABLET ORAL at 11:06

## 2024-09-08 RX ADMIN — GABAPENTIN 300 MG: 300 CAPSULE ORAL at 11:06

## 2024-09-08 RX ADMIN — PANTOPRAZOLE SODIUM 40 MG: 40 TABLET, DELAYED RELEASE ORAL at 05:31

## 2024-09-08 RX ADMIN — DILTIAZEM HYDROCHLORIDE 30 MG: 30 TABLET, FILM COATED ORAL at 05:31

## 2024-09-08 RX ADMIN — IPRATROPIUM BROMIDE AND ALBUTEROL SULFATE 1 DOSE: .5; 3 SOLUTION RESPIRATORY (INHALATION) at 09:21

## 2024-09-08 RX ADMIN — TIOTROPIUM BROMIDE INHALATION SPRAY 2 PUFF: 3.12 SPRAY, METERED RESPIRATORY (INHALATION) at 09:22

## 2024-09-08 ASSESSMENT — PAIN SCALES - GENERAL: PAINLEVEL_OUTOF10: 1

## 2024-09-09 ENCOUNTER — TELEPHONE (OUTPATIENT)
Dept: SURGERY | Age: 79
End: 2024-09-09

## 2024-09-09 ENCOUNTER — CARE COORDINATION (OUTPATIENT)
Dept: CASE MANAGEMENT | Age: 79
End: 2024-09-09

## 2024-09-09 DIAGNOSIS — U07.1 COVID-19: Primary | ICD-10-CM

## 2024-09-16 ENCOUNTER — CARE COORDINATION (OUTPATIENT)
Dept: CASE MANAGEMENT | Age: 79
End: 2024-09-16

## 2024-09-17 DIAGNOSIS — J44.9 COPD, SEVERE (HCC): ICD-10-CM

## 2024-09-17 DIAGNOSIS — I10 PRIMARY HYPERTENSION: ICD-10-CM

## 2024-09-18 LAB
ALBUMIN SERPL-MCNC: 4.2 G/DL (ref 3.4–5)
ALBUMIN/GLOB SERPL: 1.7 {RATIO} (ref 1.1–2.2)
ALP SERPL-CCNC: 62 U/L (ref 40–129)
ALT SERPL-CCNC: 12 U/L (ref 10–40)
ANION GAP SERPL CALCULATED.3IONS-SCNC: 16 MMOL/L (ref 3–16)
AST SERPL-CCNC: 15 U/L (ref 15–37)
BASOPHILS # BLD: 0 K/UL (ref 0–0.2)
BASOPHILS NFR BLD: 0.5 %
BILIRUB SERPL-MCNC: 0.5 MG/DL (ref 0–1)
BUN SERPL-MCNC: 23 MG/DL (ref 7–20)
CALCIUM SERPL-MCNC: 10 MG/DL (ref 8.3–10.6)
CHLORIDE SERPL-SCNC: 99 MMOL/L (ref 99–110)
CHOLEST SERPL-MCNC: 227 MG/DL (ref 0–199)
CO2 SERPL-SCNC: 27 MMOL/L (ref 21–32)
CREAT SERPL-MCNC: 0.8 MG/DL (ref 0.6–1.2)
DEPRECATED RDW RBC AUTO: 16.8 % (ref 12.4–15.4)
EOSINOPHIL # BLD: 0 K/UL (ref 0–0.6)
EOSINOPHIL NFR BLD: 0.1 %
GFR SERPLBLD CREATININE-BSD FMLA CKD-EPI: 76 ML/MIN/{1.73_M2}
GLUCOSE P FAST SERPL-MCNC: 143 MG/DL (ref 70–99)
HCT VFR BLD AUTO: 39.8 % (ref 36–48)
HDLC SERPL-MCNC: 72 MG/DL (ref 40–60)
HGB BLD-MCNC: 12.5 G/DL (ref 12–16)
LDL CHOLESTEROL: 114 MG/DL
LYMPHOCYTES # BLD: 0.5 K/UL (ref 1–5.1)
LYMPHOCYTES NFR BLD: 5.5 %
MCH RBC QN AUTO: 27.8 PG (ref 26–34)
MCHC RBC AUTO-ENTMCNC: 31.4 G/DL (ref 31–36)
MCV RBC AUTO: 88.5 FL (ref 80–100)
MONOCYTES # BLD: 0.9 K/UL (ref 0–1.3)
MONOCYTES NFR BLD: 9.6 %
NEUTROPHILS # BLD: 8.3 K/UL (ref 1.7–7.7)
NEUTROPHILS NFR BLD: 84.3 %
PLATELET # BLD AUTO: 148 K/UL (ref 135–450)
PMV BLD AUTO: 11.7 FL (ref 5–10.5)
POTASSIUM SERPL-SCNC: 4.8 MMOL/L (ref 3.5–5.1)
PROT SERPL-MCNC: 6.7 G/DL (ref 6.4–8.2)
RBC # BLD AUTO: 4.49 M/UL (ref 4–5.2)
SODIUM SERPL-SCNC: 142 MMOL/L (ref 136–145)
TRIGL SERPL-MCNC: 206 MG/DL (ref 0–150)
VLDLC SERPL CALC-MCNC: 41 MG/DL
WBC # BLD AUTO: 9.9 K/UL (ref 4–11)

## 2024-09-20 ENCOUNTER — PROCEDURE VISIT (OUTPATIENT)
Dept: SURGERY | Age: 79
End: 2024-09-20

## 2024-09-20 VITALS — DIASTOLIC BLOOD PRESSURE: 70 MMHG | BODY MASS INDEX: 27.41 KG/M2 | WEIGHT: 175 LBS | SYSTOLIC BLOOD PRESSURE: 120 MMHG

## 2024-09-20 DIAGNOSIS — L72.3 SEBACEOUS CYST: Primary | ICD-10-CM

## 2024-09-20 RX ORDER — LIDOCAINE HYDROCHLORIDE AND EPINEPHRINE 10; 10 MG/ML; UG/ML
10 INJECTION, SOLUTION INFILTRATION; PERINEURAL ONCE
Status: COMPLETED | OUTPATIENT
Start: 2024-09-20 | End: 2024-09-20

## 2024-09-20 RX ADMIN — LIDOCAINE HYDROCHLORIDE AND EPINEPHRINE 10 ML: 10; 10 INJECTION, SOLUTION INFILTRATION; PERINEURAL at 15:33

## 2024-09-24 ENCOUNTER — OFFICE VISIT (OUTPATIENT)
Dept: PRIMARY CARE CLINIC | Age: 79
End: 2024-09-24
Payer: COMMERCIAL

## 2024-09-24 VITALS
RESPIRATION RATE: 14 BRPM | HEIGHT: 67 IN | HEART RATE: 111 BPM | BODY MASS INDEX: 27.69 KG/M2 | WEIGHT: 176.4 LBS | DIASTOLIC BLOOD PRESSURE: 84 MMHG | SYSTOLIC BLOOD PRESSURE: 144 MMHG | OXYGEN SATURATION: 97 %

## 2024-09-24 DIAGNOSIS — R52 PAIN: Primary | ICD-10-CM

## 2024-09-24 PROCEDURE — 1123F ACP DISCUSS/DSCN MKR DOCD: CPT | Performed by: INTERNAL MEDICINE

## 2024-09-24 PROCEDURE — 3077F SYST BP >= 140 MM HG: CPT | Performed by: INTERNAL MEDICINE

## 2024-09-24 PROCEDURE — 99214 OFFICE O/P EST MOD 30 MIN: CPT | Performed by: INTERNAL MEDICINE

## 2024-09-24 PROCEDURE — 3079F DIAST BP 80-89 MM HG: CPT | Performed by: INTERNAL MEDICINE

## 2024-09-24 RX ORDER — TRAMADOL HYDROCHLORIDE 50 MG/1
50 TABLET ORAL EVERY 4 HOURS PRN
Qty: 42 TABLET | Refills: 0 | Status: SHIPPED | OUTPATIENT
Start: 2024-09-24 | End: 2024-10-01

## 2024-09-25 ENCOUNTER — HOSPITAL ENCOUNTER (OUTPATIENT)
Dept: WOUND CARE | Age: 79
Discharge: HOME | End: 2024-09-25
Attending: SURGERY
Payer: COMMERCIAL

## 2024-09-25 ENCOUNTER — TELEPHONE (OUTPATIENT)
Dept: PRIMARY CARE CLINIC | Age: 79
End: 2024-09-25

## 2024-09-25 VITALS — DIASTOLIC BLOOD PRESSURE: 76 MMHG | HEART RATE: 95 BPM | RESPIRATION RATE: 14 BRPM | SYSTOLIC BLOOD PRESSURE: 134 MMHG

## 2024-09-25 DIAGNOSIS — N32.81 OAB (OVERACTIVE BLADDER): ICD-10-CM

## 2024-09-25 DIAGNOSIS — F41.9 ANXIETY: ICD-10-CM

## 2024-09-25 DIAGNOSIS — G89.29 CHRONIC MIDLINE LOW BACK PAIN WITHOUT SCIATICA: ICD-10-CM

## 2024-09-25 DIAGNOSIS — G62.9 NEUROPATHY: ICD-10-CM

## 2024-09-25 DIAGNOSIS — K21.01 GASTROESOPHAGEAL REFLUX DISEASE WITH ESOPHAGITIS AND HEMORRHAGE: ICD-10-CM

## 2024-09-25 DIAGNOSIS — S31.109D OPEN WOUND OF ABDOMEN, SUBSEQUENT ENCOUNTER: Primary | ICD-10-CM

## 2024-09-25 DIAGNOSIS — J41.1 MUCOPURULENT CHRONIC BRONCHITIS (HCC): ICD-10-CM

## 2024-09-25 DIAGNOSIS — M54.50 CHRONIC MIDLINE LOW BACK PAIN WITHOUT SCIATICA: ICD-10-CM

## 2024-09-25 PROBLEM — S31.109A OPEN WOUND OF ABDOMEN: Status: ACTIVE | Noted: 2024-09-25

## 2024-09-25 PROCEDURE — 99213 OFFICE O/P EST LOW 20 MIN: CPT

## 2024-09-25 PROCEDURE — 11042 DBRDMT SUBQ TIS 1ST 20SQCM/<: CPT

## 2024-09-25 PROCEDURE — 11042 DBRDMT SUBQ TIS 1ST 20SQCM/<: CPT | Performed by: SURGERY

## 2024-09-25 RX ORDER — BACITRACIN ZINC AND POLYMYXIN B SULFATE 500; 1000 [USP'U]/G; [USP'U]/G
OINTMENT TOPICAL ONCE
OUTPATIENT
Start: 2024-09-25 | End: 2024-09-25

## 2024-09-25 RX ORDER — GINSENG 100 MG
CAPSULE ORAL ONCE
OUTPATIENT
Start: 2024-09-25 | End: 2024-09-25

## 2024-09-25 RX ORDER — MONTELUKAST SODIUM 10 MG/1
10 TABLET ORAL NIGHTLY
Qty: 30 TABLET | Refills: 5 | Status: SHIPPED | OUTPATIENT
Start: 2024-09-25

## 2024-09-25 RX ORDER — NEOMYCIN/BACITRACIN/POLYMYXINB 3.5-400-5K
OINTMENT (GRAM) TOPICAL ONCE
OUTPATIENT
Start: 2024-09-25 | End: 2024-09-25

## 2024-09-25 RX ORDER — LIDOCAINE HYDROCHLORIDE 40 MG/ML
SOLUTION TOPICAL ONCE
OUTPATIENT
Start: 2024-09-25 | End: 2024-09-25

## 2024-09-25 RX ORDER — MUPIROCIN 20 MG/G
OINTMENT TOPICAL ONCE
OUTPATIENT
Start: 2024-09-25 | End: 2024-09-25

## 2024-09-25 RX ORDER — PANTOPRAZOLE SODIUM 40 MG/1
40 TABLET, DELAYED RELEASE ORAL DAILY
Qty: 30 TABLET | Refills: 5 | Status: SHIPPED | OUTPATIENT
Start: 2024-09-25

## 2024-09-25 RX ORDER — GENTAMICIN SULFATE 1 MG/G
OINTMENT TOPICAL ONCE
OUTPATIENT
Start: 2024-09-25 | End: 2024-09-25

## 2024-09-25 RX ORDER — DILTIAZEM HYDROCHLORIDE 30 MG/1
TABLET, FILM COATED ORAL
Qty: 90 TABLET | Refills: 5 | Status: SHIPPED | OUTPATIENT
Start: 2024-09-25

## 2024-09-25 RX ORDER — LIDOCAINE HYDROCHLORIDE 20 MG/ML
JELLY TOPICAL ONCE
OUTPATIENT
Start: 2024-09-25 | End: 2024-09-25

## 2024-09-25 RX ORDER — CLOBETASOL PROPIONATE 0.5 MG/G
OINTMENT TOPICAL ONCE
OUTPATIENT
Start: 2024-09-25 | End: 2024-09-25

## 2024-09-25 RX ORDER — GABAPENTIN 300 MG/1
300 CAPSULE ORAL 2 TIMES DAILY
Qty: 60 CAPSULE | Refills: 5 | Status: SHIPPED | OUTPATIENT
Start: 2024-09-25 | End: 2025-03-24

## 2024-09-25 RX ORDER — FESOTERODINE FUMARATE 4 MG/1
TABLET, FILM COATED, EXTENDED RELEASE ORAL
Qty: 30 TABLET | Refills: 5 | Status: SHIPPED | OUTPATIENT
Start: 2024-09-25

## 2024-09-25 RX ORDER — SODIUM CHLOR/HYPOCHLOROUS ACID 0.033 %
SOLUTION, IRRIGATION IRRIGATION ONCE
OUTPATIENT
Start: 2024-09-25 | End: 2024-09-25

## 2024-09-25 RX ORDER — SILVER SULFADIAZINE 10 MG/G
CREAM TOPICAL ONCE
OUTPATIENT
Start: 2024-09-25 | End: 2024-09-25

## 2024-09-25 RX ORDER — TRIAMCINOLONE ACETONIDE 1 MG/G
OINTMENT TOPICAL ONCE
OUTPATIENT
Start: 2024-09-25 | End: 2024-09-25

## 2024-09-25 RX ORDER — LIDOCAINE 40 MG/G
CREAM TOPICAL ONCE
OUTPATIENT
Start: 2024-09-25 | End: 2024-09-25

## 2024-09-25 RX ORDER — LIDOCAINE 50 MG/G
OINTMENT TOPICAL ONCE
OUTPATIENT
Start: 2024-09-25 | End: 2024-09-25

## 2024-09-25 RX ORDER — BETAMETHASONE DIPROPIONATE 0.5 MG/G
CREAM TOPICAL ONCE
OUTPATIENT
Start: 2024-09-25 | End: 2024-09-25

## 2024-09-25 RX ORDER — BUSPIRONE HYDROCHLORIDE 5 MG/1
TABLET ORAL
Qty: 60 TABLET | Refills: 5 | Status: SHIPPED | OUTPATIENT
Start: 2024-09-25

## 2024-09-25 ASSESSMENT — PAIN SCALES - GENERAL: PAINLEVEL_OUTOF10: 8

## 2024-09-25 ASSESSMENT — PAIN DESCRIPTION - LOCATION: LOCATION: ABDOMEN

## 2024-09-25 ASSESSMENT — PAIN DESCRIPTION - ORIENTATION: ORIENTATION: RIGHT

## 2024-09-25 ASSESSMENT — PAIN DESCRIPTION - PAIN TYPE: TYPE: SURGICAL PAIN

## 2024-09-26 ENCOUNTER — CARE COORDINATION (OUTPATIENT)
Dept: CASE MANAGEMENT | Age: 79
End: 2024-09-26

## 2024-09-30 ENCOUNTER — TELEPHONE (OUTPATIENT)
Dept: SURGERY | Age: 79
End: 2024-09-30

## 2024-09-30 NOTE — TELEPHONE ENCOUNTER
SPOKE WITH PT'S ,  DR BURTON WITH [facility] IS HER ONCOLOGIST        ----- Message from Dr. Robert Silverio MD sent at 9/30/2024  9:38 AM EDT -----  Can you please find out who is this patient's medical oncologist? 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 I need to talk to the oncologist to determine if she needs more surgery  ----- Message -----  From: Naina Incoming Lab Results From Soft (Epic Adt)  Sent: 9/26/2024   5:20 PM EDT  To: Robert Silverio MD

## 2024-09-30 NOTE — PATIENT INSTRUCTIONS
[facility]  [address]   [place], [place] [address]  Telephone: [phone]     FAX [phone]    Discharge Instructions    Important reminders:    **If you have any signs and symptoms of illness (Cough, fever, congestion, nausea, vomiting, diarrhea, etc.) please call the wound care center prior to your appointment.    1. Increase Protein intake for optimal wound healing  2. No added salt to reduce any swelling  3. If diabetic, maintain good glucose control  4. If you smoke, smoking prohibits wound healing, we ask that you refrain from smoking.  5. When taking antibiotics take the entire prescription as ordered. Do not stop taking until medication is all gone unless otherwise instructed.   6. Exercise as tolerated.   7. Keep weight off wounds and reposition every 2 hours if applicable.  8. If wound(s) is on your lower extremity, elevate legs to the level of the heart or above for 30 minutes 4-5 times a day and/or when sitting. Avoid standing for long periods of time.   9. Do not get wounds wet in bath or shower unless otherwise instructed by your physician. If your wound is on your foot or leg, you may purchase a cast bag. Please ask at the pharmacy.      If Vascular testing is ordered, please call [phone] to schedule.    Vascular tests ordered by Wound Care Physicians may take up to 2 hours to complete. Please keep that in mind when scheduling.     If Vascular testing is scheduled, please bring supplies to replace your dressing after testing is done. The vascular department does not stock supplies.     Wound: Right upper abdomen     With each dressing change, rinse wounds with 0.9% Saline. (May use wound wash or soft contact solution. Both can be purchased at a local drug store). If unable to obtain saline, may use a gentle soap and water.    Dressing care: Wet to dry, dry dressing- change daily. Call Dr Talley's office to schedule an appointment.    Hematology and

## 2024-10-02 ENCOUNTER — HOSPITAL ENCOUNTER (OUTPATIENT)
Dept: WOUND CARE | Age: 79
Discharge: HOME | End: 2024-10-02
Attending: SURGERY
Payer: COMMERCIAL

## 2024-10-02 ENCOUNTER — TELEPHONE (OUTPATIENT)
Dept: SURGERY | Age: 79
End: 2024-10-02

## 2024-10-02 DIAGNOSIS — J44.9 COPD, SEVERE (HCC): Primary | ICD-10-CM

## 2024-10-02 DIAGNOSIS — C80.1 CARCINOMA (HCC): ICD-10-CM

## 2024-10-02 DIAGNOSIS — S31.109D OPEN WOUND OF ABDOMEN, SUBSEQUENT ENCOUNTER: Primary | ICD-10-CM

## 2024-10-02 DIAGNOSIS — Z85.118 HISTORY OF LUNG CANCER: ICD-10-CM

## 2024-10-02 DIAGNOSIS — J43.2 CENTRILOBULAR EMPHYSEMA (HCC): ICD-10-CM

## 2024-10-02 PROCEDURE — 11042 DBRDMT SUBQ TIS 1ST 20SQCM/<: CPT

## 2024-10-02 PROCEDURE — 11042 DBRDMT SUBQ TIS 1ST 20SQCM/<: CPT | Performed by: SURGERY

## 2024-10-02 RX ORDER — BACITRACIN ZINC AND POLYMYXIN B SULFATE 500; 1000 [USP'U]/G; [USP'U]/G
OINTMENT TOPICAL ONCE
OUTPATIENT
Start: 2024-10-02 | End: 2024-10-02

## 2024-10-02 RX ORDER — SODIUM CHLOR/HYPOCHLOROUS ACID 0.033 %
SOLUTION, IRRIGATION IRRIGATION ONCE
OUTPATIENT
Start: 2024-10-02 | End: 2024-10-02

## 2024-10-02 RX ORDER — SILVER SULFADIAZINE 10 MG/G
CREAM TOPICAL ONCE
OUTPATIENT
Start: 2024-10-02 | End: 2024-10-02

## 2024-10-02 RX ORDER — LIDOCAINE 50 MG/G
OINTMENT TOPICAL ONCE
OUTPATIENT
Start: 2024-10-02 | End: 2024-10-02

## 2024-10-02 RX ORDER — NEOMYCIN/BACITRACIN/POLYMYXINB 3.5-400-5K
OINTMENT (GRAM) TOPICAL ONCE
OUTPATIENT
Start: 2024-10-02 | End: 2024-10-02

## 2024-10-02 RX ORDER — BETAMETHASONE DIPROPIONATE 0.5 MG/G
CREAM TOPICAL ONCE
OUTPATIENT
Start: 2024-10-02 | End: 2024-10-02

## 2024-10-02 RX ORDER — LIDOCAINE 40 MG/G
CREAM TOPICAL ONCE
Status: DISCONTINUED | OUTPATIENT
Start: 2024-10-02 | End: 2024-10-03 | Stop reason: HOSPADM

## 2024-10-02 RX ORDER — LIDOCAINE HYDROCHLORIDE 20 MG/ML
JELLY TOPICAL ONCE
OUTPATIENT
Start: 2024-10-02 | End: 2024-10-02

## 2024-10-02 RX ORDER — LIDOCAINE 40 MG/G
CREAM TOPICAL ONCE
OUTPATIENT
Start: 2024-10-02 | End: 2024-10-02

## 2024-10-02 RX ORDER — LIDOCAINE HYDROCHLORIDE 40 MG/ML
SOLUTION TOPICAL ONCE
OUTPATIENT
Start: 2024-10-02 | End: 2024-10-02

## 2024-10-02 RX ORDER — MUPIROCIN 20 MG/G
OINTMENT TOPICAL ONCE
OUTPATIENT
Start: 2024-10-02 | End: 2024-10-02

## 2024-10-02 RX ORDER — TRIAMCINOLONE ACETONIDE 1 MG/G
OINTMENT TOPICAL ONCE
OUTPATIENT
Start: 2024-10-02 | End: 2024-10-02

## 2024-10-02 RX ORDER — GENTAMICIN SULFATE 1 MG/G
OINTMENT TOPICAL ONCE
OUTPATIENT
Start: 2024-10-02 | End: 2024-10-02

## 2024-10-02 RX ORDER — CLOBETASOL PROPIONATE 0.5 MG/G
OINTMENT TOPICAL ONCE
OUTPATIENT
Start: 2024-10-02 | End: 2024-10-02

## 2024-10-02 RX ORDER — GINSENG 100 MG
CAPSULE ORAL ONCE
OUTPATIENT
Start: 2024-10-02 | End: 2024-10-02

## 2024-10-02 NOTE — PLAN OF CARE
Discharge instructions given.  Patient verbalized understanding.  Return to [facility] in 2 week(s).  Called/faxed orders to  Dr Bennett

## 2024-10-03 ENCOUNTER — CARE COORDINATION (OUTPATIENT)
Dept: CASE MANAGEMENT | Age: 79
End: 2024-10-03

## 2024-10-03 NOTE — CARE COORDINATION
Care Transitions Note    Follow Up Call     Patient Current Location:  Home: [address] [address] [address]    Care Transition Nurse contacted the patient by telephone. Verified name and  as identifiers.    Additional needs identified to be addressed with provider   No needs identified                 Method of communication with provider: none.    Care Summary Note: Pt states doing well, no issues or concerns. Agreed to more CTC f/u calls.    Advance Care Planning:   Does patient have an Advance Directive: reviewed and current.    Medication Review:  No changes since last call.     Remote Patient Monitoring:  Offered patient enrollment in the Remote Patient Monitoring (RPM) program for in-home monitoring: Patient is not eligible for RPM program because: insurance coverage.    Assessments:  Care Transitions Subsequent and Final Call    Subsequent and Final Calls  Do you have any ongoing symptoms?: No  Have your medications changed?: No  Do you have any questions related to your medications?: No  Do you currently have any active services?: No  Are you currently active with any services?: Home Health, Other  Do you have any needs or concerns that I can assist you with?: No  Identified Barriers: None  Care Transitions Interventions  No Identified Needs   Home Care Waiver: Declined        DME Assistance: Completed   Other Interventions:              Follow Up Appointment:   Reviewed upcoming appointment(s).  Future Appointments         Provider Specialty Dept Phone    10/16/2024 8:30 AM Robert Silverio MD Wound Ostomy [phone]    10/29/2024 3:00 PM Sachin Tay MD Primary Care [phone]    2024 9:00 AM Keon Bustillo MD Otolaryngology [phone]    2024 3:30 PM Jhonatan Medina MD Pulmonology [phone]            Care Transition Nurse provided contact information.  Plan for follow-up call in 6-10 days based on severity of symptoms and risk factors.  Plan for next call:

## 2024-10-04 RX ORDER — FLUTICASONE FUROATE, UMECLIDINIUM BROMIDE AND VILANTEROL TRIFENATATE 200; 62.5; 25 UG/1; UG/1; UG/1
1 POWDER RESPIRATORY (INHALATION) DAILY
Qty: 60 EACH | Refills: 3 | Status: SHIPPED | OUTPATIENT
Start: 2024-10-04

## 2024-10-07 ENCOUNTER — HOSPITAL ENCOUNTER (OUTPATIENT)
Dept: PET IMAGING | Age: 79
Discharge: HOME | End: 2024-10-07
Payer: COMMERCIAL

## 2024-10-07 DIAGNOSIS — C34.31 CANCER OF LOWER LOBE OF RIGHT LUNG (HCC): ICD-10-CM

## 2024-10-07 PROCEDURE — 3430000000 HC RX DIAGNOSTIC RADIOPHARMACEUTICAL: Performed by: INTERNAL MEDICINE

## 2024-10-07 PROCEDURE — 78815 PET IMAGE W/CT SKULL-THIGH: CPT

## 2024-10-07 PROCEDURE — A9609 HC RX DIAGNOSTIC RADIOPHARMACEUTICAL: HCPCS | Performed by: INTERNAL MEDICINE

## 2024-10-07 RX ORDER — FLUDEOXYGLUCOSE F 18 200 MCI/ML
11.14 INJECTION, SOLUTION INTRAVENOUS
Status: COMPLETED | OUTPATIENT
Start: 2024-10-07 | End: 2024-10-07

## 2024-10-07 RX ADMIN — FLUDEOXYGLUCOSE F 18 11.14 MILLICURIE: 200 INJECTION, SOLUTION INTRAVENOUS at 12:42

## 2024-10-09 ENCOUNTER — HOSPITAL ENCOUNTER (OUTPATIENT)
Dept: WOUND CARE | Age: 79
Discharge: HOME | End: 2024-10-09
Attending: SURGERY
Payer: COMMERCIAL

## 2024-10-09 DIAGNOSIS — S31.109D OPEN WOUND OF ABDOMEN, SUBSEQUENT ENCOUNTER: Primary | ICD-10-CM

## 2024-10-09 PROCEDURE — 99213 OFFICE O/P EST LOW 20 MIN: CPT

## 2024-10-09 PROCEDURE — 99212 OFFICE O/P EST SF 10 MIN: CPT | Performed by: SURGERY

## 2024-10-09 RX ORDER — LIDOCAINE 40 MG/G
CREAM TOPICAL ONCE
Status: CANCELLED | OUTPATIENT
Start: 2024-10-09 | End: 2024-10-09

## 2024-10-09 RX ORDER — LIDOCAINE 40 MG/G
CREAM TOPICAL ONCE
Status: DISCONTINUED | OUTPATIENT
Start: 2024-10-09 | End: 2024-10-10 | Stop reason: HOSPADM

## 2024-10-09 RX ORDER — SODIUM CHLOR/HYPOCHLOROUS ACID 0.033 %
SOLUTION, IRRIGATION IRRIGATION ONCE
Status: CANCELLED | OUTPATIENT
Start: 2024-10-09 | End: 2024-10-09

## 2024-10-09 RX ORDER — TRIAMCINOLONE ACETONIDE 1 MG/G
OINTMENT TOPICAL ONCE
Status: CANCELLED | OUTPATIENT
Start: 2024-10-09 | End: 2024-10-09

## 2024-10-09 RX ORDER — BACITRACIN ZINC AND POLYMYXIN B SULFATE 500; 1000 [USP'U]/G; [USP'U]/G
OINTMENT TOPICAL ONCE
Status: CANCELLED | OUTPATIENT
Start: 2024-10-09 | End: 2024-10-09

## 2024-10-09 RX ORDER — LIDOCAINE HYDROCHLORIDE 20 MG/ML
JELLY TOPICAL ONCE
Status: CANCELLED | OUTPATIENT
Start: 2024-10-09 | End: 2024-10-09

## 2024-10-09 RX ORDER — GINSENG 100 MG
CAPSULE ORAL ONCE
Status: CANCELLED | OUTPATIENT
Start: 2024-10-09 | End: 2024-10-09

## 2024-10-09 RX ORDER — GENTAMICIN SULFATE 1 MG/G
OINTMENT TOPICAL ONCE
Status: CANCELLED | OUTPATIENT
Start: 2024-10-09 | End: 2024-10-09

## 2024-10-09 RX ORDER — MUPIROCIN 20 MG/G
OINTMENT TOPICAL ONCE
Status: CANCELLED | OUTPATIENT
Start: 2024-10-09 | End: 2024-10-09

## 2024-10-09 RX ORDER — BETAMETHASONE DIPROPIONATE 0.5 MG/G
CREAM TOPICAL ONCE
Status: CANCELLED | OUTPATIENT
Start: 2024-10-09 | End: 2024-10-09

## 2024-10-09 RX ORDER — CLOBETASOL PROPIONATE 0.5 MG/G
OINTMENT TOPICAL ONCE
Status: CANCELLED | OUTPATIENT
Start: 2024-10-09 | End: 2024-10-09

## 2024-10-09 RX ORDER — LIDOCAINE HYDROCHLORIDE 40 MG/ML
SOLUTION TOPICAL ONCE
Status: CANCELLED | OUTPATIENT
Start: 2024-10-09 | End: 2024-10-09

## 2024-10-09 RX ORDER — NEOMYCIN/BACITRACIN/POLYMYXINB 3.5-400-5K
OINTMENT (GRAM) TOPICAL ONCE
Status: CANCELLED | OUTPATIENT
Start: 2024-10-09 | End: 2024-10-09

## 2024-10-09 RX ORDER — SILVER SULFADIAZINE 10 MG/G
CREAM TOPICAL ONCE
Status: CANCELLED | OUTPATIENT
Start: 2024-10-09 | End: 2024-10-09

## 2024-10-09 RX ORDER — LIDOCAINE 50 MG/G
OINTMENT TOPICAL ONCE
Status: CANCELLED | OUTPATIENT
Start: 2024-10-09 | End: 2024-10-09

## 2024-10-09 NOTE — PROGRESS NOTES
Wound Care Supplies      Supply Company:     [employer] [address] [place] [address] f: [phone] f: [phone] p: [phone] [email or web address]                                                                                                                                                                                                                                                                                                                                                                                                 Ordering Center:    [facility]  [address]  [facility] [address]  [phone]  Dept: [phone]   Fax# [phone]    Patient Demographics:     Mary Ann Torres  [address] [address] [facility] [address]   [phone]   : 1945  AGE: 79 y.o.     GENDER: female   PATIENT EMAIL: [email or web address]  TODAYS DATE:  10/9/2024      Insurance Information:     PRIMARY INSURANCE:  Plan: BCBS - [place] HMO  Coverage: BCBS  Effective Date: 2016  Group Number: [unfilled]  Subscriber Number: [id number] - ([place] [employer])    Payer/Plan Subscr  Sex Relation Sub. Ins. ID Effective Group Num   1. BCBS - BCBS -* RAN TORRES L 1950 Male Spouse [id number]                                   [address]         Wound Information:    Additional ICD-10 Codes:     Patient Active Problem List   Diagnosis Code    Hypertension I10    Diverticulosis K57.90    OAB (overactive bladder) N32.81    Pulmonary nodules R91.8    Ventral hernia K43.9    COPD, severe (HCC) J44.9    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    Upper GI bleeding K92.2    Chronic respiratory failure with hypoxia J96.11    Mucus plugging of bronchi T17.500A    Centrilobular emphysema (HCC) J43.2    History of lung cancer Z85.118    Sebaceous cyst L72.3    COVID-19 U07.1    Open wound of abdomen S31.109A       WOUNDS REQUIRING DRESSING SUPPLIES:     Wound 24 Abdomen Right # 1 (Active) 
developed and well- nourished, in no acute distress  Large right abdominal wound fungating without odor    Assessment:     Patient Active Problem List   Diagnosis    Hypertension    Diverticulosis    OAB (overactive bladder)    Pulmonary nodules    Ventral hernia    COPD, severe (HCC)    S/P lobectomy of lung    Upper GI bleeding    Chronic respiratory failure with hypoxia    Mucus plugging of bronchi    Centrilobular emphysema (HCC)    History of lung cancer    Sebaceous cyst    COVID-19    Open wound of abdomen       Wound 09/25/24 Abdomen Right # 1 (Active)   Wound Image   09/25/24 0831   Wound Etiology Non-Healing Surgical 10/09/24 1104   Wound Cleansed Cleansed with saline 10/09/24 1104   Wound Length (cm) 3.8 cm 10/09/24 1104   Wound Width (cm) 7.9 cm 10/09/24 1104   Wound Depth (cm) 1 cm 10/09/24 1104   Wound Surface Area (cm^2) 30.02 cm^2 10/09/24 1104   Change in Wound Size % (l*w) -95.06 10/09/24 1104   Wound Volume (cm^3) 30.02 cm^3 10/09/24 1104   Wound Healing % -95 10/09/24 1104   Post-Procedure Length (cm) 2.7 cm 10/02/24 0849   Post-Procedure Width (cm) 5.9 cm 10/02/24 0849   Post-Procedure Depth (cm) 0.8 cm 10/02/24 0849   Post-Procedure Surface Area (cm^2) 15.93 cm^2 10/02/24 0849   Post-Procedure Volume (cm^3) 12.744 cm^3 10/02/24 0849   Wound Assessment Devitalized tissue;Other (Comment) 10/09/24 1104   Drainage Amount Copious (>75 % saturated) 10/09/24 1104   Drainage Description Serosanguinous 10/09/24 1104   Odor Moderate 10/09/24 1104   Elena-wound Assessment Intact 10/09/24 1104   Margins Defined edges;Attached edges 10/09/24 1104   Number of days: 14         Plan:     The nature of the patient's condition was explained in depth. The patient was informed that their compliance to the treatment plan is paramount to successful healing and prevention of further ulceration and/or infection     Treatment Plan:       Treatment Note please see attached Discharge Instructions    Written patient

## 2024-10-09 NOTE — PLAN OF CARE
Discharge instructions given.  Patient verbalized understanding.  Return to [facility] as needed  Will follow with Dr Bennett

## 2024-10-09 NOTE — PATIENT INSTRUCTIONS
Congratulations! You have completed your treatment program. We have outlined a list of reminders to assist you in maintaining your continues health.    Return to your primary care physician for all your health issues.   Resume your ordinary activities as prescribed.  Take your medications as prescribed by your doctor.  Check your skin daily for cracks, bruises, sores, or dryness.  Clean and dry your skin thoroughly.  Avoid alcohol. Quit smoking if applicable.  Maintain a nutritious diet; take a multivitamin daily.  Avoid pressure on the wound site. Keep your legs elevated above the level of your heart whenever possible.  Continue to use wraps/stockings/compresion if prescribed/  Replace compression hose/stockings every 6 months to insure proper fit.  Wear well fitting shoes.    Call the Wound Care Center if your wound reopens or you develop new wounds or if you have any other questions about follow up care [phone]     Wound: Right upper abdomen     With each dressing change, rinse wounds with 0.9% Saline. (May use wound wash or soft contact solution. Both can be purchased at a local drug store). If unable to obtain saline, may use a gentle soap and water.    Dressing care: Wet to dry, dry dressing- change daily. Continue to follow with Dr Talley     Hematology and Oncology  Conrado Kyle MD  P: [phone]  F: [phone]

## 2024-10-10 ENCOUNTER — CARE COORDINATION (OUTPATIENT)
Dept: CASE MANAGEMENT | Age: 79
End: 2024-10-10

## 2024-10-10 NOTE — CARE COORDINATION
Care Transitions Note    Final Call     Patient Current Location:  Home: [id number] [place] [address]    Care Transition Nurse contacted the patient by telephone. Verified name and  as identifiers.    Patient graduated from the Care Transitions program on 10/10/24.  Patient/family verbalizes confidence in the ability to self-manage at this time..      Advance Care Planning:   Does patient have an Advance Directive: reviewed and current.    Handoff:   Patient/family agreeable to ACM outreach.      Care Summary Note: Pt states doing well, no issues or concerns. No need for further f/u CTC calls.        Assessments:  Care Transitions Subsequent and Final Call    Subsequent and Final Calls  Do you have any ongoing symptoms?: No  Have your medications changed?: No  Do you have any questions related to your medications?: No  Do you currently have any active services?: No  Are you currently active with any services?: Home Health, Other  Do you have any needs or concerns that I can assist you with?: No  Identified Barriers: None  Care Transitions Interventions  No Identified Needs   Home Care Waiver: Declined        DME Assistance: Completed   Other Interventions:              Upcoming Appointments:    Future Appointments         Provider Specialty Dept Phone    10/29/2024 3:00 PM Sachin Tay MD Primary Care [phone]    2024 9:00 AM Keon Bustillo MD Otolaryngology [phone]    2024 3:30 PM Jhonatan Medina MD Pulmonology [phone]            Patient has agreed to contact primary care provider and/or specialist for any further questions, concerns, or needs.    Hussain Heller RN

## 2024-10-11 ENCOUNTER — CARE COORDINATION (OUTPATIENT)
Dept: CARE COORDINATION | Age: 79
End: 2024-10-11

## 2024-10-11 NOTE — CARE COORDINATION
Ambulatory Care Coordination Note     10/11/2024 12:06 PM     Patient Current Location:  [place]     This patient was received as a referral from Care Transition Nurse.    ACM contacted the spouse/partner by telephone. Verified name and  with patient as identifiers. Provided introduction to self, and explanation of the ACM role.   Spouse/Partner accepted care management services at this time.          ACM: Mera Olivera RN     Challenges to be reviewed by the provider   Additional needs identified to be addressed with provider Yes  Poss palliative or hospice referral-- pt on way to [facility] to see DR Ewing radiologist               Method of communication with provider: chart routing.    Has the patient been seen in the ED since your last call? no    Care Summary Note: Call to patient, spoke w , on way to [facility] for appt w DR Ewing , radiologist to discuss treatment options re metastatic carcinoma- wound on ab. Chest wall   Was seeing Dr. Silverio, woundcare for  malignant right abdominal wound  Procedure Date 24, excision of skin lesion on right upper abdomen/chest, pathology malignant and likely metastatic      does not think she can withstand any treatment  Has tramadol for pain, but  reports it does not help     reports may need hospice?    He states he doesn't really know anything about it, and wonders if that is needed who would order that.   He has been doing daily wound care but becoming very difficult, not sure if he can continue without help  Reports pt very tired    Per request this ACM texted pt spouse this ACM name and number  to call later or next week.    Offered patient enrollment in the Remote Patient Monitoring (RPM) program for in-home monitoring: .     Assessments Completed:   No changes since last call    Medications Reviewed:   Patient denies any changes with medications and reports taking all medications as prescribed.    Advance Care Planning:   Not reviewed during

## 2024-10-16 ENCOUNTER — CARE COORDINATION (OUTPATIENT)
Dept: CARE COORDINATION | Age: 79
End: 2024-10-16

## 2024-10-16 NOTE — CARE COORDINATION
Ambulatory Care Coordination Note     10/16/2024 4:13 PM     Patient Current Location:  Home: [address] [place] [address]     ACM contacted the patient spouse by telephone. Verified name and  with patient as identifiers.         ACM: Mera Olivera RN     Challenges to be reviewed by the provider   Additional needs identified to be addressed with provider No  none               Method of communication with provider: chart routing.    Has the patient been seen in the ED since your last call? no    Care Summary Note:   Had visit w [facility] last week.  Had markers completed on Mon' going to try radiation on wound under her R breast that continues to grow   took this ACM anme and number to call with any questions     He has not heard from [facility] yet re when radiation will start  Pt has commercial plan per  as is his through work  He checked on hospice benefits and they do cover  He has been doing wound care but he is not certain how much longer he can comfortably continue      Offered patient enrollment in the Remote Patient Monitoring (RPM) program for in-home monitoring: .     Assessments Completed:   No changes since last call    Medications Reviewed:   Patient denies any changes with medications and reports taking all medications as prescribed.    Advance Care Planning:   Reviewed and current     Care Planning:   Not completed during this call    PCP/Specialist follow up:   Future Appointments         Provider Specialty Dept Phone    10/29/2024 3:00 PM Sachin Tay MD Primary Care [phone]    2024 9:00 AM Keon Bustillo MD Otolaryngology [phone]    2024 3:30 PM Jhonatan Medina MD Pulmonology [phone]            Follow Up:   Plan for next AC outreach in approximately 2 weeks to complete:  - follow up appointment with [facility]/ radiation plan .   [facility]  wound care?  Patient  is agreeable to this plan.

## 2024-10-18 ENCOUNTER — TELEPHONE (OUTPATIENT)
Dept: PRIMARY CARE CLINIC | Age: 79
End: 2024-10-18

## 2024-10-18 DIAGNOSIS — R11.0 NAUSEA: Primary | ICD-10-CM

## 2024-10-18 RX ORDER — ONDANSETRON 4 MG/1
4 TABLET, ORALLY DISINTEGRATING ORAL 3 TIMES DAILY PRN
Qty: 21 TABLET | Refills: 3 | Status: SHIPPED | OUTPATIENT
Start: 2024-10-18

## 2024-10-18 NOTE — TELEPHONE ENCOUNTER
Patient's , Jefferson, called and explained that the patient has been very nauseous yesterday and today.    He said that Dr Tay is aware that the patient's cancer came back of her lung so they think with that and her other medications that is the reason why she is feeling nauseous.    Medication is being requested for nausea.    [facility] [id number] - [place], [place] - [facility] LOCO [phone]

## 2024-10-21 ENCOUNTER — CARE COORDINATION (OUTPATIENT)
Dept: CARE COORDINATION | Age: 79
End: 2024-10-21

## 2024-10-21 NOTE — CARE COORDINATION
Ambulatory Care Coordination Note     10/21/2024 11:42 AM     Patient Current Location:  [place]     Spouse/Partner contacted the ACM by telephone. Verified name and  with patient as identifiers.         ACM: Mera Olivera RN     Challenges to be reviewed by the provider   Additional needs identified to be addressed with provider Yes  Pt  reports pt declining quickly. He continues w dressing changes, but he is changing now 3-3 times/ day, and the current dressing is not covering wound well as it is growing quickly per . He is not sure who to call. Zuleyka has appt tomorrow w radiologist. He is uncertain if he can get her there. He did purchase transport wc over weekend. He reports she is becoming very weak, no appetitie, nausea. DR Tay and DR Ewing called in antinausea medication .               Method of communication with provider: chart routing.    Has the patient been seen in the ED since your last call? no    Care Summary Note: Call to [facility], Nurse triage line will contact patient spouse  Pt spouse called , made aware to expect call from [facility] and provided  nurse line number at [phone]    [facility] sees DR Ewing and Dr Kyle  Radiology tomorrow and Dr Kyle on thursday    Offered patient enrollment in the Remote Patient Monitoring (RPM) program for in-home monitoring: .     Assessments Completed:   No changes since last call    Medications Reviewed:   Patient denies any changes with medications and reports taking all medications as prescribed.    Advance Care Planning:   Not reviewed during this call     Care Planning:   Not completed during this call    PCP/Specialist follow up:   Future Appointments         Provider Specialty Dept Phone    10/29/2024 3:00 PM Sachin Tay MD Primary Care [phone]    2024 9:00 AM Keon Bustillo MD Otolaryngology [phone]    2024 3:30 PM Jhonatan Medina MD Pulmonology

## 2024-10-28 ENCOUNTER — CARE COORDINATION (OUTPATIENT)
Dept: CARE COORDINATION | Age: 79
End: 2024-10-28

## 2024-10-28 NOTE — CARE COORDINATION
Ambulatory Care Coordination Note     10/28/2024 4:10 PM     Patient Current Location:  Home: [address] [place] [address]     ACM contacted the patient by telephone. Verified name and  with patient as identifiers.         ACM: Mera Olivera RN     Challenges to be reviewed by the provider   Additional needs identified to be addressed with provider No  none               Method of communication with provider: chart routing.    Has the patient been seen in the ED since your last call? no    Care Summary Note: Call to [facility], spoke w    Had radiation today, infusion last week   No appetite  Eating pudding and ice cream  Reports [facility] wound care is supposed to be calling   He thinks from [place].    Call to [facility] they received [facility] orders, no specific wound care orders so plan will be for  nurse to evaluate then call [facility] with recommendatins and get orders  Expect in next 1-2 days     Call to patient , made aware of above      Offered patient enrollment in the Remote Patient Monitoring (RPM) program for in-home monitoring: .     Assessments Completed:   No changes since last call    Medications Reviewed:   Patient denies any changes with medications and reports taking all medications as prescribed.    Advance Care Planning:   Reviewed and current     Care Planning:   Not completed during this call    PCP/Specialist follow up:   Future Appointments         Provider Specialty Dept Phone    2024 9:00 AM Keon Bustillo MD Otolaryngology [phone]    2024 3:30 PM Jhonatan Medina MD Pulmonology [phone]            Follow Up:   Plan for next [facility] outreach in approximately 1 week to complete:  - follow up appointment with DR Gildardo Rosales [facility] .   Patient  is agreeable to this plan.

## 2024-11-06 ENCOUNTER — OFFICE VISIT (OUTPATIENT)
Dept: ENT CLINIC | Age: 79
End: 2024-11-06
Payer: COMMERCIAL

## 2024-11-06 VITALS
BODY MASS INDEX: 27.62 KG/M2 | SYSTOLIC BLOOD PRESSURE: 98 MMHG | OXYGEN SATURATION: 97 % | HEIGHT: 67 IN | DIASTOLIC BLOOD PRESSURE: 60 MMHG | TEMPERATURE: 98 F

## 2024-11-06 DIAGNOSIS — H60.43 CHOLESTEATOMA OF EXTERNAL AUDITORY CANAL, BILATERAL: ICD-10-CM

## 2024-11-06 DIAGNOSIS — H61.23 IMPACTED CERUMEN OF BOTH EARS: Primary | ICD-10-CM

## 2024-11-06 DIAGNOSIS — H90.0 CONDUCTIVE HEARING LOSS OF BOTH EARS: ICD-10-CM

## 2024-11-06 PROCEDURE — 69210 REMOVE IMPACTED EAR WAX UNI: CPT | Performed by: OTOLARYNGOLOGY

## 2024-11-06 NOTE — PROGRESS NOTES
CHIEF COMPLAINT:  Chief Complaint   Patient presents with    Cerumen Impaction    Hearing Loss       HISTORY:    Mary Ann stated that the hearing is decreased in both ears, which are plugged up with ear wax.      EXAMINATION:    Both external ear canals were partially occluded by cerumen impaction and squamous debris, which was hard and crusting and was obscuring complete visualization of the TMs.        PROCEDURE - REMOVAL OF BILATERAL CERUMEN IMPACTION (CPT 70142):   The cerumen impaction and squamous debris and crusting was removed from both of the EACs, under otomicroscopic visualization, with instrumentation, using a Billeau wire loop and alligator forceps.  After successful cerumen removal, there were several areas in each EAC of ulcerated skin and exposed bone which appeared to be chronic and consistent with the external canal cholesteatoma.  The EACs appeared to be otherwise normal and clear bilaterally without mass, exudate, or edema.  The tympanic membranes appeared to be normal, including normal pneumatic mobility bilaterally.  There was no evidence of acute disease.  Mary Ann reported improved hearing, back to usual level, after cerumen removal.           IMPRESSION / DIAGNOSES / ORDERS / PROCEDURES:       Mary Ann \"Zuleyka\" was seen today for cerumen impaction and hearing loss.    Diagnoses and all orders for this visit:    Impacted cerumen of both ears    Conductive hearing loss of both ears    Cholesteatoma of external auditory canal, bilateral          RECOMMENDATIONS / PLAN:   See Patient Instructions on file for this visit.  Return in about 4 months (around 3/6/2025) for recheck/follow-up, possible ear cleaning, and sooner if condition worsens.

## 2024-11-11 ENCOUNTER — CARE COORDINATION (OUTPATIENT)
Dept: CARE COORDINATION | Age: 79
End: 2024-11-11

## 2024-11-11 NOTE — CARE COORDINATION
Ambulatory Care Coordination Note     2024 1:50 PM     Patient Current Location:  Home: [address] [place] [address]     ACM contacted the patient by telephone. Verified name and  with patient as identifiers.         ACM: Mera Olivera RN     Challenges to be reviewed by the provider   Additional needs identified to be addressed with provider No  Spoke w Jefferson               Method of communication with provider: chart routing.    Utilization: Patient has not had any utilization since our last call.     Care Summary Note: Spoke w    Infusions every 3 weeks now   Becoming more forgetful   Very weak   He has cur back to working PT  Have visiting nurse once weekly  Daughter is in research and was talking to him about discussing possible medications for the increased forgetfulness/ agitation that accompanies it  Sister has dementia    Offered patient enrollment in the Remote Patient Monitoring (RPM) program for in-home monitoring: Yes, but did not enroll at this time: already monitoring with home equipment.     Assessments Completed:   No changes since last call    Medications Reviewed:   Patient denies any changes with medications and reports taking all medications as prescribed.    Advance Care Planning:   Reviewed and current     Care Planning:   Not completed during this call    PCP/Specialist follow up:   Future Appointments         Provider Specialty Dept Phone    2024 3:30 PM Jhonatan Medina MD Pulmonology [phone]    3/5/2025 9:00 AM Keon Bustillo MD Otolaryngology [phone]            Follow Up:   Plan for next AC outreach in approximately 1 week to complete:  - dr Tay response re discussions w  re poss dementia- increased forgetfulness/ agitation .   Caregiver is agreeable to this plan.

## 2024-11-19 ENCOUNTER — CARE COORDINATION (OUTPATIENT)
Dept: CARE COORDINATION | Age: 79
End: 2024-11-19

## 2024-11-19 DIAGNOSIS — J44.9 COPD, SEVERE (HCC): Primary | ICD-10-CM

## 2024-11-19 NOTE — CARE COORDINATION
Ambulatory Care Coordination Note     2024 10:46 AM     Patient Current Location:  Home: [address] [place] [address]     ACM contacted the spouse/partner by telephone. Verified name and  with patient as identifiers.         ACM: Mera Olivera RN     Challenges to be reviewed by the provider   Additional needs identified to be addressed with provider Yes  none               Method of communication with provider: chart routing.    Utilization: Patient has not had any utilization since our last call.     Care Summary Note: Spoke w    Reports pt not eating much, becoming more forgetful        Offered patient enrollment in the Remote Patient Monitoring (RPM) program for in-home monitoring: Yes, but did not enroll at this time: limited patient ability to navigate RPM/equipment.     Assessments Completed:   No changes since last call    Medications Reviewed:   Patient denies any changes with medications and reports taking all medications as prescribed.    Advance Care Planning:   Reviewed and current     Care Planning:   Not completed during this call    PCP/Specialist follow up:   Future Appointments         Provider Specialty Dept Phone    2024 3:30 PM Jhonatan Medina MD Pulmonology [phone]    3/5/2025 9:00 AM Keon Bustillo MD Otolaryngology [phone]            Follow Up:   Plan for next ACM outreach in approximately 1 week to complete:  - follow up appointment with Pulm/ [facility] .   Caregiver is agreeable to this plan.         Keystone Flap Text: The defect edges were debeveled with a #15 scalpel blade.  Given the location of the defect, shape of the defect a keystone flap was deemed most appropriate.  Using a sterile surgical marker, an appropriate keystone flap was drawn incorporating the defect, outlining the appropriate donor tissue and placing the expected incisions within the relaxed skin tension lines where possible. The area thus outlined was incised deep to adipose tissue with a #15 scalpel blade.  The skin margins were undermined to an appropriate distance in all directions around the primary defect and laterally outward around the flap utilizing iris scissors.

## 2024-11-19 NOTE — CARE COORDINATION
Call to Jefferson, reports he talked to Zuleyka and she was amenable. She is becoming less and less responsive to converastions, memory concerns, not eating much  Jefferson asked [facility] dr if cancer could have gone to her brain, and he said there was no way of knowing unless more testing done and not worth doing that to her

## 2024-11-20 ENCOUNTER — CARE COORDINATION (OUTPATIENT)
Dept: CARE COORDINATION | Age: 79
End: 2024-11-20

## 2024-11-20 NOTE — CARE COORDINATION
Ambulatory Care Coordination Note     2024 9:42 AM     Patient Current Location:  Home: [address] [place] [id number]     ACM contacted the patient spouse by telephone. Verified name and  with patient as identifiers.         ACM: Mera Olivera RN     Challenges to be reviewed by the provider   Additional needs identified to be addressed with provider No  none               Method of communication with provider: chart routing.    Utilization: Patient has not had any utilization since our last call.     Care Summary Note: Faxed orders to [facility] ( [facility]) for palliative care services.  Call to [facility], someone will be reaching out to patient / family    Call to [place], [facility] name and number and expectation to hear from someone in next few days    Offered patient enrollment in the Remote Patient Monitoring (RPM) program for in-home monitoring: Yes, but did not enroll at this time: limited patient ability to navigate RPM/equipment.     Assessments Completed:   No changes since last call    Medications Reviewed:   Patient denies any changes with medications and reports taking all medications as prescribed.    Advance Care Planning:   Reviewed and current     Care Planning:   Not completed during this call    PCP/Specialist follow up:   Future Appointments         Provider Specialty Dept Phone    2024 3:30 PM Jhonatan Medina MD Pulmonology [phone]    3/5/2025 9:00 AM Keon Bustillo MD Otolaryngology [phone]            Follow Up:   Plan for next [facility] outreach in approximately 1 week to complete:  - Palliative care .   Caregiver is agreeable to this plan.

## 2024-11-27 ENCOUNTER — CARE COORDINATION (OUTPATIENT)
Dept: CARE COORDINATION | Age: 79
End: 2024-11-27

## 2024-11-27 NOTE — CARE COORDINATION
Ambulatory Care Coordination Note     2024 11:05 AM     Patient Current Location:  Home: [address] [place] [id number]     ACM contacted the patient by telephone. Verified name and  with patient as identifiers.         ACM: Mera Olivera RN     Challenges to be reviewed by the provider   Additional needs identified to be addressed with provider No  none               Method of communication with provider: chart routing.    Utilization: Patient has not had any utilization since our last call.     Care Summary Note: Call to patient , reports he has not heard from [facility]    Call to [facility], reports they called pt on  and she declined any needs.  Provided cell number to call to d/w spouse/ pt.    Call to spouse provided number and to expect call, he reports that she probably didn't understand what they were able to provide    WCNurse visits weekly  Has all medications   Denies questions/ concerns at this time    Offered patient enrollment in the Remote Patient Monitoring (RPM) program for in-home monitoring: Patient is not eligible for RPM program because: patient does not have qualifying diagnosis.     Assessments Completed:   No changes since last call    Medications Reviewed:   Patient denies any changes with medications and reports taking all medications as prescribed.    Advance Care Planning:   Reviewed and current     Care Planning:   Not completed during this call    PCP/Specialist follow up:   Future Appointments         Provider Specialty Dept Phone    2024 3:30 PM Jhonatan Medina MD Pulmonology [phone]    3/5/2025 9:00 AM Keon Bustillo MD Otolaryngology [phone]            Follow Up:   Plan for next [facility] outreach in approximately 2 weeks to complete:  - disease specific assessments  - follow up appointment with palliative care .   Caregiver is agreeable to this plan.

## 2024-12-02 ENCOUNTER — OFFICE VISIT (OUTPATIENT)
Dept: PULMONOLOGY | Age: 79
End: 2024-12-02
Payer: COMMERCIAL

## 2024-12-02 VITALS
DIASTOLIC BLOOD PRESSURE: 80 MMHG | HEIGHT: 67 IN | HEART RATE: 107 BPM | SYSTOLIC BLOOD PRESSURE: 134 MMHG | OXYGEN SATURATION: 96 % | WEIGHT: 162 LBS | BODY MASS INDEX: 25.43 KG/M2

## 2024-12-02 DIAGNOSIS — R91.8 PULMONARY NODULES: ICD-10-CM

## 2024-12-02 DIAGNOSIS — J43.2 CENTRILOBULAR EMPHYSEMA (HCC): ICD-10-CM

## 2024-12-02 DIAGNOSIS — J44.9 COPD, SEVERE (HCC): Primary | ICD-10-CM

## 2024-12-02 DIAGNOSIS — C34.31 MALIGNANT NEOPLASM OF LOWER LOBE OF RIGHT LUNG (HCC): ICD-10-CM

## 2024-12-02 PROCEDURE — 3075F SYST BP GE 130 - 139MM HG: CPT | Performed by: INTERNAL MEDICINE

## 2024-12-02 PROCEDURE — 1123F ACP DISCUSS/DSCN MKR DOCD: CPT | Performed by: INTERNAL MEDICINE

## 2024-12-02 PROCEDURE — 99214 OFFICE O/P EST MOD 30 MIN: CPT | Performed by: INTERNAL MEDICINE

## 2024-12-02 PROCEDURE — 3079F DIAST BP 80-89 MM HG: CPT | Performed by: INTERNAL MEDICINE

## 2024-12-02 RX ORDER — PREDNISONE 10 MG/1
10 TABLET ORAL DAILY
COMMUNITY
End: 2024-12-02 | Stop reason: SDUPTHER

## 2024-12-02 RX ORDER — AZITHROMYCIN 500 MG/1
500 TABLET, FILM COATED ORAL DAILY
COMMUNITY

## 2024-12-02 RX ORDER — LEVOFLOXACIN 750 MG/1
750 TABLET, FILM COATED ORAL DAILY
Qty: 10 TABLET | Refills: 3 | Status: SHIPPED | OUTPATIENT
Start: 2024-12-02

## 2024-12-02 RX ORDER — ALBUTEROL SULFATE 90 UG/1
2 INHALANT RESPIRATORY (INHALATION) EVERY 6 HOURS PRN
Qty: 18 G | Refills: 11 | Status: SHIPPED | OUTPATIENT
Start: 2024-12-02 | End: 2025-12-02

## 2024-12-02 RX ORDER — ALBUTEROL SULFATE 0.83 MG/ML
2.5 SOLUTION RESPIRATORY (INHALATION) EVERY 6 HOURS PRN
Qty: 360 ML | Refills: 11 | Status: SHIPPED | OUTPATIENT
Start: 2024-12-02

## 2024-12-02 RX ORDER — LEVOFLOXACIN 750 MG/1
750 TABLET, FILM COATED ORAL DAILY
COMMUNITY
End: 2024-12-02 | Stop reason: SDUPTHER

## 2024-12-02 RX ORDER — PREDNISONE 10 MG/1
10 TABLET ORAL DAILY
Qty: 30 TABLET | Refills: 3 | Status: SHIPPED | OUTPATIENT
Start: 2024-12-02

## 2024-12-02 NOTE — PROGRESS NOTES
dilTIAZem (CARDIZEM) 30 MG tablet TAKE ONE TABLET BY MOUTH THREE TIMES A DAY Yes Sachin Tay MD   busPIRone (BUSPAR) 5 MG tablet One tablet twice a day Yes Sachin Tay MD   pantoprazole (PROTONIX) 40 MG tablet Take 1 tablet by mouth daily Yes Sachin Tay MD   fesoterodine (TOVIAZ) 4 MG TB24 ER tablet TAKE 1 TABLET BY MOUTH DAILY Yes Sachin Tya MD   albuterol (PROVENTIL) (2.5 MG/3ML) 0.083% nebulizer solution Take 3 mLs by nebulization every 6 hours as needed for Wheezing Yes Jhonatan Medina MD   albuterol-ipratropium (COMBIVENT RESPIMAT)  MCG/ACT AERS inhaler Inhale 1 puff into the lungs in the morning and 1 puff at noon and 1 puff in the evening and 1 puff before bedtime. Yes Jhonatan Medina MD   guaiFENesin (MUCINEX) 600 MG extended release tablet Take 2 tablets by mouth daily Yes Glenis Nichols MD   OXYGEN Inhale 3 L/min into the lungs continuous Use nightly as needed  Patient taking differently: Inhale 3 L/min into the lungs continuous Yes Sachin Tay MD   albuterol sulfate HFA (PROVENTIL;VENTOLIN;PROAIR) 108 (90 Base) MCG/ACT inhaler Inhale 2 puffs into the lungs every 6 hours as needed for Wheezing  Jhonatan Medina MD   denosumab (PROLIA) 60 MG/ML SOSY SC injection Inject 1 mL into the skin once for 1 dose Every 6 months  Neftali Boles MD       Vitals:    12/02/24 1516   BP: 134/80   Site: Left Upper Arm   Position: Sitting   Cuff Size: Medium Adult   Pulse: (!) 107   SpO2: 96%   Weight: 73.5 kg (162 lb)   Height: 1.702 m (5' 7.01\")     Body mass index is 25.37 kg/m².     Wt Readings from Last 3 Encounters:   12/02/24 73.5 kg (162 lb)   09/24/24 80 kg (176 lb 6.4 oz)   09/20/24 79.4 kg (175 lb)     BP Readings from Last 3 Encounters:   12/02/24 134/80   11/06/24 98/60   09/25/24 134/76        Social History     Tobacco Use   Smoking Status Light Smoker    Current packs/day: 0.00    Average packs/day: (0.4 ttl pk-yrs)    Types: Cigarettes    Start date:

## 2025-01-31 DIAGNOSIS — J43.2 CENTRILOBULAR EMPHYSEMA (HCC): ICD-10-CM

## 2025-01-31 DIAGNOSIS — J44.9 COPD, SEVERE (HCC): ICD-10-CM

## 2025-01-31 RX ORDER — FLUTICASONE FUROATE, UMECLIDINIUM BROMIDE AND VILANTEROL TRIFENATATE 200; 62.5; 25 UG/1; UG/1; UG/1
POWDER RESPIRATORY (INHALATION)
Qty: 60 EACH | Refills: 3 | Status: SHIPPED | OUTPATIENT
Start: 2025-01-31

## 2025-02-17 ENCOUNTER — HOSPITAL ENCOUNTER (OUTPATIENT)
Dept: PET IMAGING | Age: 80
Discharge: HOME | End: 2025-02-17
Payer: COMMERCIAL

## 2025-02-17 DIAGNOSIS — C34.31 SQUAMOUS CELL CARCINOMA OF BRONCHUS IN RIGHT LOWER LOBE (HCC): ICD-10-CM

## 2025-02-17 PROCEDURE — 3430000000 HC RX DIAGNOSTIC RADIOPHARMACEUTICAL: Performed by: INTERNAL MEDICINE

## 2025-02-17 PROCEDURE — 78815 PET IMAGE W/CT SKULL-THIGH: CPT

## 2025-02-17 PROCEDURE — A9609 HC RX DIAGNOSTIC RADIOPHARMACEUTICAL: HCPCS | Performed by: INTERNAL MEDICINE

## 2025-02-17 RX ORDER — FLUDEOXYGLUCOSE F 18 200 MCI/ML
14.89 INJECTION, SOLUTION INTRAVENOUS
Status: COMPLETED | OUTPATIENT
Start: 2025-02-17 | End: 2025-02-17

## 2025-02-17 RX ADMIN — FLUDEOXYGLUCOSE F 18 14.89 MILLICURIE: 200 INJECTION, SOLUTION INTRAVENOUS at 10:10

## 2025-03-25 ENCOUNTER — OFFICE VISIT (OUTPATIENT)
Dept: PRIMARY CARE CLINIC | Age: 80
End: 2025-03-25

## 2025-03-25 VITALS
DIASTOLIC BLOOD PRESSURE: 76 MMHG | BODY MASS INDEX: 25.11 KG/M2 | SYSTOLIC BLOOD PRESSURE: 128 MMHG | HEART RATE: 88 BPM | OXYGEN SATURATION: 96 % | WEIGHT: 160.4 LBS

## 2025-03-25 DIAGNOSIS — G89.29 CHRONIC MIDLINE LOW BACK PAIN WITHOUT SCIATICA: ICD-10-CM

## 2025-03-25 DIAGNOSIS — M54.50 CHRONIC MIDLINE LOW BACK PAIN WITHOUT SCIATICA: ICD-10-CM

## 2025-03-25 DIAGNOSIS — J41.1 MUCOPURULENT CHRONIC BRONCHITIS (HCC): ICD-10-CM

## 2025-03-25 DIAGNOSIS — R11.0 NAUSEA: ICD-10-CM

## 2025-03-25 DIAGNOSIS — I10 PRIMARY HYPERTENSION: ICD-10-CM

## 2025-03-25 DIAGNOSIS — K21.01 GASTROESOPHAGEAL REFLUX DISEASE WITH ESOPHAGITIS AND HEMORRHAGE: ICD-10-CM

## 2025-03-25 DIAGNOSIS — F41.9 ANXIETY: ICD-10-CM

## 2025-03-25 DIAGNOSIS — C34.31 MALIGNANT NEOPLASM OF LOWER LOBE OF RIGHT LUNG (HCC): ICD-10-CM

## 2025-03-25 DIAGNOSIS — N32.81 OAB (OVERACTIVE BLADDER): ICD-10-CM

## 2025-03-25 DIAGNOSIS — J44.9 COPD, SEVERE (HCC): Primary | ICD-10-CM

## 2025-03-25 DIAGNOSIS — J43.2 CENTRILOBULAR EMPHYSEMA (HCC): ICD-10-CM

## 2025-03-25 DIAGNOSIS — J96.11 CHRONIC RESPIRATORY FAILURE WITH HYPOXIA: ICD-10-CM

## 2025-03-25 DIAGNOSIS — G62.9 NEUROPATHY: ICD-10-CM

## 2025-03-25 PROBLEM — Z85.118 HISTORY OF LUNG CANCER: Status: RESOLVED | Noted: 2023-05-01 | Resolved: 2025-03-25

## 2025-03-25 PROBLEM — K92.2 UPPER GI BLEEDING: Status: RESOLVED | Noted: 2018-01-17 | Resolved: 2025-03-25

## 2025-03-25 PROBLEM — L72.3 SEBACEOUS CYST: Status: RESOLVED | Noted: 2024-08-06 | Resolved: 2025-03-25

## 2025-03-25 PROBLEM — U07.1 COVID-19: Status: RESOLVED | Noted: 2024-09-04 | Resolved: 2025-03-25

## 2025-03-25 PROBLEM — S31.109A OPEN WOUND OF ABDOMEN: Status: RESOLVED | Noted: 2024-09-25 | Resolved: 2025-03-25

## 2025-03-25 RX ORDER — ALBUTEROL SULFATE 90 UG/1
2 INHALANT RESPIRATORY (INHALATION) EVERY 6 HOURS PRN
Qty: 18 G | Refills: 11 | Status: SHIPPED | OUTPATIENT
Start: 2025-03-25 | End: 2026-03-25

## 2025-03-25 RX ORDER — ONDANSETRON 4 MG/1
4 TABLET, ORALLY DISINTEGRATING ORAL 3 TIMES DAILY PRN
Qty: 21 TABLET | Refills: 3 | Status: CANCELLED | OUTPATIENT
Start: 2025-03-25

## 2025-03-25 RX ORDER — MONTELUKAST SODIUM 10 MG/1
10 TABLET ORAL NIGHTLY
Qty: 30 TABLET | Refills: 5 | Status: SHIPPED | OUTPATIENT
Start: 2025-03-25

## 2025-03-25 RX ORDER — ALBUTEROL SULFATE 0.83 MG/ML
2.5 SOLUTION RESPIRATORY (INHALATION) EVERY 6 HOURS PRN
Qty: 360 ML | Refills: 11 | Status: SHIPPED | OUTPATIENT
Start: 2025-03-25

## 2025-03-25 RX ORDER — PREDNISONE 10 MG/1
10 TABLET ORAL DAILY
Qty: 30 TABLET | Refills: 3 | Status: CANCELLED | OUTPATIENT
Start: 2025-03-25

## 2025-03-25 RX ORDER — GABAPENTIN 300 MG/1
300 CAPSULE ORAL 2 TIMES DAILY
Qty: 60 CAPSULE | Refills: 5 | Status: SHIPPED | OUTPATIENT
Start: 2025-03-25 | End: 2025-09-21

## 2025-03-25 RX ORDER — FESOTERODINE FUMARATE 4 MG/1
TABLET, FILM COATED, EXTENDED RELEASE ORAL
Qty: 30 TABLET | Refills: 5 | Status: SHIPPED | OUTPATIENT
Start: 2025-03-25

## 2025-03-25 RX ORDER — PANTOPRAZOLE SODIUM 40 MG/1
40 TABLET, DELAYED RELEASE ORAL DAILY
Qty: 30 TABLET | Refills: 5 | Status: SHIPPED | OUTPATIENT
Start: 2025-03-25

## 2025-03-25 RX ORDER — DILTIAZEM HYDROCHLORIDE 30 MG/1
TABLET, FILM COATED ORAL
Qty: 90 TABLET | Refills: 5 | Status: SHIPPED | OUTPATIENT
Start: 2025-03-25

## 2025-03-25 RX ORDER — FLUTICASONE FUROATE, UMECLIDINIUM BROMIDE AND VILANTEROL TRIFENATATE 200; 62.5; 25 UG/1; UG/1; UG/1
1 POWDER RESPIRATORY (INHALATION) DAILY
Qty: 60 EACH | Refills: 3 | Status: SHIPPED | OUTPATIENT
Start: 2025-03-25

## 2025-03-25 RX ORDER — BUSPIRONE HYDROCHLORIDE 5 MG/1
TABLET ORAL
Qty: 60 TABLET | Refills: 5 | Status: SHIPPED | OUTPATIENT
Start: 2025-03-25

## 2025-03-25 ASSESSMENT — PATIENT HEALTH QUESTIONNAIRE - PHQ9
SUM OF ALL RESPONSES TO PHQ QUESTIONS 1-9: 0
1. LITTLE INTEREST OR PLEASURE IN DOING THINGS: NOT AT ALL
2. FEELING DOWN, DEPRESSED OR HOPELESS: NOT AT ALL
SUM OF ALL RESPONSES TO PHQ QUESTIONS 1-9: 0

## 2025-03-25 NOTE — PROGRESS NOTES
3/25/2025   Mary Ann Alan  1945    The patients PMH, surgical history, family history, medications, allergies were all reviewed and updated as appropriate today.     Current Outpatient Medications on File Prior to Visit   Medication Sig Dispense Refill    TRELEGY ELLIPTA 200-62.5-25 MCG/ACT AEPB inhaler INHALE 1 PUFF BY MOUTH DAILY 60 each 3    azithromycin (ZITHROMAX) 500 MG tablet Take 1 tablet by mouth daily      albuterol-ipratropium (COMBIVENT RESPIMAT)  MCG/ACT AERS inhaler Inhale 1 puff into the lungs every 6 hours 1 each 11    albuterol (PROVENTIL) (2.5 MG/3ML) 0.083% nebulizer solution Take 3 mLs by nebulization every 6 hours as needed for Wheezing 360 mL 11    albuterol sulfate HFA (PROVENTIL;VENTOLIN;PROAIR) 108 (90 Base) MCG/ACT inhaler Inhale 2 puffs into the lungs every 6 hours as needed for Wheezing 18 g 11    predniSONE (DELTASONE) 10 MG tablet Take 1 tablet by mouth daily Take 40 mg by mouth for 3 days 30 mg for 3 days 20 mg for 3 days 10 mg for 3 days. 30 tablet 3    levoFLOXacin (LEVAQUIN) 750 MG tablet Take 1 tablet by mouth daily 10 tablet 3    ondansetron (ZOFRAN-ODT) 4 MG disintegrating tablet Take 1 tablet by mouth 3 times daily as needed for Nausea or Vomiting 21 tablet 3    montelukast (SINGULAIR) 10 MG tablet Take 1 tablet by mouth nightly 30 tablet 5    dilTIAZem (CARDIZEM) 30 MG tablet TAKE ONE TABLET BY MOUTH THREE TIMES A DAY 90 tablet 5    busPIRone (BUSPAR) 5 MG tablet One tablet twice a day 60 tablet 5    pantoprazole (PROTONIX) 40 MG tablet Take 1 tablet by mouth daily 30 tablet 5    fesoterodine (TOVIAZ) 4 MG TB24 ER tablet TAKE 1 TABLET BY MOUTH DAILY 30 tablet 5    guaiFENesin (MUCINEX) 600 MG extended release tablet Take 2 tablets by mouth daily      OXYGEN Inhale 3 L/min into the lungs continuous Use nightly as needed (Patient taking differently: Inhale 3 L/min into the lungs continuous) 1 Bottle 5    gabapentin (NEURONTIN) 300 MG capsule Take 1 capsule by mouth

## 2025-04-21 ENCOUNTER — HOSPITAL ENCOUNTER (OUTPATIENT)
Dept: CT IMAGING | Age: 80
Discharge: HOME | End: 2025-04-21
Attending: INTERNAL MEDICINE
Payer: COMMERCIAL

## 2025-04-21 DIAGNOSIS — C34.31 PRIMARY MALIGNANT NEOPLASM OF BRONCHUS OF RIGHT LOWER LOBE (HCC): ICD-10-CM

## 2025-04-21 PROCEDURE — 71260 CT THORAX DX C+: CPT

## 2025-04-21 PROCEDURE — 6360000004 HC RX CONTRAST MEDICATION: Performed by: INTERNAL MEDICINE

## 2025-04-21 RX ORDER — IOPAMIDOL 755 MG/ML
75 INJECTION, SOLUTION INTRAVASCULAR
Status: COMPLETED | OUTPATIENT
Start: 2025-04-21 | End: 2025-04-21

## 2025-04-21 RX ADMIN — IOPAMIDOL 75 ML: 755 INJECTION, SOLUTION INTRAVENOUS at 16:07

## 2025-04-23 ENCOUNTER — OFFICE VISIT (OUTPATIENT)
Dept: ENT CLINIC | Age: 80
End: 2025-04-23
Payer: COMMERCIAL

## 2025-04-23 VITALS
WEIGHT: 155 LBS | SYSTOLIC BLOOD PRESSURE: 118 MMHG | BODY MASS INDEX: 24.33 KG/M2 | HEIGHT: 67 IN | DIASTOLIC BLOOD PRESSURE: 74 MMHG | HEART RATE: 110 BPM

## 2025-04-23 DIAGNOSIS — H60.43: ICD-10-CM

## 2025-04-23 DIAGNOSIS — H60.392 OTHER INFECTIVE CHRONIC OTITIS EXTERNA OF LEFT EAR: ICD-10-CM

## 2025-04-23 DIAGNOSIS — H61.23 BILATERAL IMPACTED CERUMEN: Primary | ICD-10-CM

## 2025-04-23 PROCEDURE — 3078F DIAST BP <80 MM HG: CPT | Performed by: OTOLARYNGOLOGY

## 2025-04-23 PROCEDURE — 3074F SYST BP LT 130 MM HG: CPT | Performed by: OTOLARYNGOLOGY

## 2025-04-23 PROCEDURE — 99213 OFFICE O/P EST LOW 20 MIN: CPT | Performed by: OTOLARYNGOLOGY

## 2025-04-23 PROCEDURE — 1123F ACP DISCUSS/DSCN MKR DOCD: CPT | Performed by: OTOLARYNGOLOGY

## 2025-04-23 PROCEDURE — 69210 REMOVE IMPACTED EAR WAX UNI: CPT | Performed by: OTOLARYNGOLOGY

## 2025-04-23 RX ORDER — NEOMYCIN SULFATE, POLYMYXIN B SULFATE, HYDROCORTISONE 3.5; 10000; 1 MG/ML; [USP'U]/ML; MG/ML
4 SOLUTION/ DROPS AURICULAR (OTIC) 2 TIMES DAILY
Qty: 10 ML | Refills: 0 | Status: SHIPPED | OUTPATIENT
Start: 2025-04-23 | End: 2025-04-30

## 2025-04-23 NOTE — PROGRESS NOTES
[facility]  DIVISION OF OTOLARYNGOLOGY- HEAD & NECK SURGERY  Follow up      Patient Name: Mary Ann Alan  Medical Record Number:  [id number]  Primary Care Physician:  Sachin Tay MD  Date of Consultation: 4/23/2025    Chief Complaint: Ear cleaning        Interval History    Patient is following up for her ears.  She is a long-term patient of Dr. Bustillo.  It appears as though she has wax impactions, but also a history of canal cholesteatoma's.  She says that she feels like the need to be clean, but otherwise no new issues.          REVIEW OF SYSTEMS  As above    PHYSICAL EXAM  GENERAL: No Acute Distress, Alert and Oriented, no Hoarseness, strong voice  EYES: EOMI, Anti-icteric  HENT:   Head: Normocephalic and atraumatic.   Face:  Symmetric, facial nerve intact, no sinus tenderness  Ears: See below          PROCEDURE  Bilateral ear exam with cerumen removal  Right ear was visualized binoculars ago.  An impaction of wax and dead skin was removed the lateral canal with alligator forceps.  She has a breakdown on the inferior aspect of the canal that does not seem to extend into the mastoid.  Tympanic membrane appeared to be normal.  On the left side she had a more severe cerumen impaction I removed with alligator forceps.  She has some purulent drainage and breakdown of the inferior aspect of the canal, but again it did not seem to extend to the mastoid        ASSESSMENT/PLAN  1. Bilateral impacted cerumen  Cerumen was removed.  I think she needs to continue to have this cleaned as she has these canal cholesteatoma's.  The left side was slightly infected and I will give her eardrops.  She is a poor surgical candidate given her medical issues, but I think that as long as we keep this clean the cholesteatoma not get worse.  I will see her in 4 months    2. Cholesteatoma of both external auditory canals  As above    3. Other infective chronic otitis externa of left ear  Cortisporin drops             I have performed

## 2025-05-01 DIAGNOSIS — I10 PRIMARY HYPERTENSION: ICD-10-CM

## 2025-05-01 LAB
ALBUMIN SERPL-MCNC: 4.4 G/DL (ref 3.4–5)
ALBUMIN/GLOB SERPL: 1.6 {RATIO} (ref 1.1–2.2)
ALP SERPL-CCNC: 76 U/L (ref 40–129)
ALT SERPL-CCNC: 10 U/L (ref 10–40)
ANION GAP SERPL CALCULATED.3IONS-SCNC: 13 MMOL/L (ref 3–16)
AST SERPL-CCNC: 27 U/L (ref 15–37)
BASOPHILS # BLD: 0 K/UL (ref 0–0.2)
BASOPHILS NFR BLD: 0.2 %
BILIRUB SERPL-MCNC: 0.5 MG/DL (ref 0–1)
BUN SERPL-MCNC: 21 MG/DL (ref 7–20)
CALCIUM SERPL-MCNC: 9.9 MG/DL (ref 8.3–10.6)
CHLORIDE SERPL-SCNC: 104 MMOL/L (ref 99–110)
CHOLEST SERPL-MCNC: 177 MG/DL (ref 0–199)
CO2 SERPL-SCNC: 28 MMOL/L (ref 21–32)
CREAT SERPL-MCNC: 0.8 MG/DL (ref 0.6–1.2)
DEPRECATED RDW RBC AUTO: 17.2 % (ref 12.4–15.4)
EOSINOPHIL # BLD: 0.9 K/UL (ref 0–0.6)
EOSINOPHIL NFR BLD: 9.9 %
GFR SERPLBLD CREATININE-BSD FMLA CKD-EPI: 75 ML/MIN/{1.73_M2}
GLUCOSE P FAST SERPL-MCNC: 106 MG/DL (ref 70–99)
HCT VFR BLD AUTO: 42.7 % (ref 36–48)
HDLC SERPL-MCNC: 56 MG/DL (ref 40–60)
HGB BLD-MCNC: 13.6 G/DL (ref 12–16)
LDL CHOLESTEROL: 98 MG/DL
LYMPHOCYTES # BLD: 1 K/UL (ref 1–5.1)
LYMPHOCYTES NFR BLD: 11.2 %
MCH RBC QN AUTO: 29 PG (ref 26–34)
MCHC RBC AUTO-ENTMCNC: 31.9 G/DL (ref 31–36)
MCV RBC AUTO: 90.8 FL (ref 80–100)
MONOCYTES # BLD: 1.3 K/UL (ref 0–1.3)
MONOCYTES NFR BLD: 15.3 %
NEUTROPHILS # BLD: 5.5 K/UL (ref 1.7–7.7)
NEUTROPHILS NFR BLD: 63.4 %
PLATELET # BLD AUTO: 195 K/UL (ref 135–450)
PLATELET BLD QL SMEAR: ADEQUATE
PMV BLD AUTO: 11.4 FL (ref 5–10.5)
POTASSIUM SERPL-SCNC: 4.9 MMOL/L (ref 3.5–5.1)
PROT SERPL-MCNC: 7.1 G/DL (ref 6.4–8.2)
RBC # BLD AUTO: 4.7 M/UL (ref 4–5.2)
SLIDE REVIEW: ABNORMAL
SODIUM SERPL-SCNC: 145 MMOL/L (ref 136–145)
TRIGL SERPL-MCNC: 115 MG/DL (ref 0–150)
VLDLC SERPL CALC-MCNC: 23 MG/DL
WBC # BLD AUTO: 8.7 K/UL (ref 4–11)

## 2025-05-02 ENCOUNTER — RESULTS FOLLOW-UP (OUTPATIENT)
Dept: PRIMARY CARE CLINIC | Age: 80
End: 2025-05-02

## 2025-05-02 NOTE — RESULT ENCOUNTER NOTE
Labs unremarkable  Cholesterol has improved since last time  Follow-up as scheduled    Sachin Tay MD

## 2025-05-09 ENCOUNTER — TELEPHONE (OUTPATIENT)
Dept: INTERVENTIONAL RADIOLOGY/VASCULAR | Age: 80
End: 2025-05-09

## 2025-05-15 ENCOUNTER — HOSPITAL ENCOUNTER (OUTPATIENT)
Dept: GENERAL RADIOLOGY | Age: 80
Discharge: HOME | End: 2025-05-15
Payer: COMMERCIAL

## 2025-05-15 ENCOUNTER — HOSPITAL ENCOUNTER (OUTPATIENT)
Dept: CT IMAGING | Age: 80
Discharge: HOME | End: 2025-05-15
Payer: COMMERCIAL

## 2025-05-15 VITALS
HEART RATE: 75 BPM | TEMPERATURE: 96.8 F | HEIGHT: 67 IN | WEIGHT: 155 LBS | BODY MASS INDEX: 24.33 KG/M2 | OXYGEN SATURATION: 98 % | SYSTOLIC BLOOD PRESSURE: 127 MMHG | RESPIRATION RATE: 16 BRPM | DIASTOLIC BLOOD PRESSURE: 49 MMHG

## 2025-05-15 DIAGNOSIS — C80.0 DISSEMINATED MALIGNANT NEOPLASM (HCC): ICD-10-CM

## 2025-05-15 DIAGNOSIS — C34.81 MALIGNANT NEOPLASM OF OVERLAPPING SITES OF RIGHT LUNG (HCC): ICD-10-CM

## 2025-05-15 LAB
ANION GAP SERPL CALCULATED.3IONS-SCNC: 14 MMOL/L (ref 3–16)
BASOPHILS # BLD: 0 K/UL (ref 0–0.2)
BASOPHILS NFR BLD: 0 %
BUN SERPL-MCNC: 25 MG/DL (ref 7–20)
CALCIUM SERPL-MCNC: 10 MG/DL (ref 8.3–10.6)
CHLORIDE SERPL-SCNC: 100 MMOL/L (ref 99–110)
CO2 SERPL-SCNC: 26 MMOL/L (ref 21–32)
CREAT SERPL-MCNC: 0.9 MG/DL (ref 0.6–1.2)
DEPRECATED RDW RBC AUTO: 16.3 % (ref 12.4–15.4)
EOSINOPHIL # BLD: 0 K/UL (ref 0–0.6)
EOSINOPHIL NFR BLD: 0 %
GFR SERPLBLD CREATININE-BSD FMLA CKD-EPI: 65 ML/MIN/{1.73_M2}
GLUCOSE SERPL-MCNC: 100 MG/DL (ref 70–99)
HCT VFR BLD AUTO: 44 % (ref 36–48)
HGB BLD-MCNC: 14.2 G/DL (ref 12–16)
INR PPP: 0.97 (ref 0.85–1.15)
LYMPHOCYTES # BLD: 2 K/UL (ref 1–5.1)
LYMPHOCYTES NFR BLD: 19 %
MCH RBC QN AUTO: 28.7 PG (ref 26–34)
MCHC RBC AUTO-ENTMCNC: 32.2 G/DL (ref 31–36)
MCV RBC AUTO: 89 FL (ref 80–100)
MONOCYTES # BLD: 1.6 K/UL (ref 0–1.3)
MONOCYTES NFR BLD: 15 %
NEUTROPHILS # BLD: 7.1 K/UL (ref 1.7–7.7)
NEUTROPHILS NFR BLD: 65 %
NEUTS BAND NFR BLD MANUAL: 1 % (ref 0–7)
PLATELET # BLD AUTO: 197 K/UL (ref 135–450)
PMV BLD AUTO: 10.4 FL (ref 5–10.5)
POTASSIUM SERPL-SCNC: 4.6 MMOL/L (ref 3.5–5.1)
PROTHROMBIN TIME: 13.1 SEC (ref 11.9–14.9)
RBC # BLD AUTO: 4.94 M/UL (ref 4–5.2)
SODIUM SERPL-SCNC: 140 MMOL/L (ref 136–145)
WBC # BLD AUTO: 10.7 K/UL (ref 4–11)

## 2025-05-15 PROCEDURE — 80048 BASIC METABOLIC PNL TOTAL CA: CPT

## 2025-05-15 PROCEDURE — 85025 COMPLETE CBC W/AUTO DIFF WBC: CPT

## 2025-05-15 PROCEDURE — 85610 PROTHROMBIN TIME: CPT

## 2025-05-15 PROCEDURE — 99151 MOD SED SAME PHYS/QHP <5 YRS: CPT

## 2025-05-15 PROCEDURE — 7100000011 HC PHASE II RECOVERY - ADDTL 15 MIN: Performed by: INTERNAL MEDICINE

## 2025-05-15 PROCEDURE — 36415 COLL VENOUS BLD VENIPUNCTURE: CPT

## 2025-05-15 PROCEDURE — 88305 TISSUE EXAM BY PATHOLOGIST: CPT

## 2025-05-15 PROCEDURE — 6360000002 HC RX W HCPCS: Performed by: INTERNAL MEDICINE

## 2025-05-15 PROCEDURE — 7100000010 HC PHASE II RECOVERY - FIRST 15 MIN: Performed by: INTERNAL MEDICINE

## 2025-05-15 PROCEDURE — 71045 X-RAY EXAM CHEST 1 VIEW: CPT

## 2025-05-15 RX ORDER — FENTANYL CITRATE 50 UG/ML
INJECTION, SOLUTION INTRAMUSCULAR; INTRAVENOUS PRN
Status: COMPLETED | OUTPATIENT
Start: 2025-05-15 | End: 2025-05-15

## 2025-05-15 RX ORDER — MIDAZOLAM HYDROCHLORIDE 5 MG/ML
INJECTION, SOLUTION INTRAMUSCULAR; INTRAVENOUS PRN
Status: COMPLETED | OUTPATIENT
Start: 2025-05-15 | End: 2025-05-15

## 2025-05-15 RX ADMIN — MIDAZOLAM HYDROCHLORIDE 0.5 MG: 5 INJECTION, SOLUTION INTRAMUSCULAR; INTRAVENOUS at 11:36

## 2025-05-15 RX ADMIN — MIDAZOLAM HYDROCHLORIDE 0.5 MG: 5 INJECTION, SOLUTION INTRAMUSCULAR; INTRAVENOUS at 11:45

## 2025-05-15 RX ADMIN — FENTANYL CITRATE 25 MCG: 50 INJECTION, SOLUTION INTRAMUSCULAR; INTRAVENOUS at 11:40

## 2025-05-15 RX ADMIN — FENTANYL CITRATE 25 MCG: 50 INJECTION, SOLUTION INTRAMUSCULAR; INTRAVENOUS at 11:36

## 2025-05-15 RX ADMIN — MIDAZOLAM HYDROCHLORIDE 0.5 MG: 5 INJECTION, SOLUTION INTRAMUSCULAR; INTRAVENOUS at 11:31

## 2025-05-15 RX ADMIN — MIDAZOLAM HYDROCHLORIDE 0.5 MG: 5 INJECTION, SOLUTION INTRAMUSCULAR; INTRAVENOUS at 11:40

## 2025-05-15 RX ADMIN — FENTANYL CITRATE 25 MCG: 50 INJECTION, SOLUTION INTRAMUSCULAR; INTRAVENOUS at 11:45

## 2025-05-15 RX ADMIN — FENTANYL CITRATE 25 MCG: 50 INJECTION, SOLUTION INTRAMUSCULAR; INTRAVENOUS at 11:48

## 2025-05-15 ASSESSMENT — PAIN - FUNCTIONAL ASSESSMENT
PAIN_FUNCTIONAL_ASSESSMENT: NONE - DENIES PAIN
PAIN_FUNCTIONAL_ASSESSMENT: 0-10

## 2025-05-15 NOTE — PROGRESS NOTES
Patient arrives to phase 2 from IR s/p Right lung biopsy. She is fully alert, oriented and asymptomatic. VSS. On 3 liters oxygen via nasal canula per her baseline. Dressing to Right anterior chest. First cxr complete, next to be a portable at 1400 . Rails up, call light in reach, family at bedside. Patient in no apparent distress.

## 2025-05-15 NOTE — BRIEF OP NOTE
Brief Postoperative Note    Mary Ann Alan  YOB: 1945  [id number]    Pre-operative Diagnosis: Right middle lobe nodule    Post-operative Diagnosis: Same    Procedure: Nodule biopsy    Anesthesia: Local and Moderate Sedation    Surgeons/Assistants: Eliot Dutta M.D.    Estimated Blood Loss: less than 50     Complications: None    Specimens: Was Obtained: Two 21 gauge cores    Findings: Successful CT guided right middle lobe lung nodule biopsy    Electronically signed by Eliot Dutta MD on 5/15/2025 at 11:57 AM

## 2025-05-15 NOTE — PRE SEDATION
Sedation Pre-Procedure Note    Patient Name: Mary Ann Alan  YOB: 1945  Medical Record Number: [id number]  Date:  5/15/25  Time: 11:18 AM    Indication:  Right lung nodule biopsy    Consent: I have discussed with the patient and/or the patient representative the indication, alternatives, and the possible risks and/or complications of the planned procedure and the anesthesia methods. The patient and/or patient representative appear to understand and agree to proceed.    Vital Signs:   Vitals:    05/15/25 1057   BP: (!) 143/88   Pulse: 87   Resp: 18   Temp: 96.8 °F (36 °C)   SpO2: 95%       Past Medical History:  Past Medical History:   Diagnosis Date    Bronchitis     COPD     severe    Emphysema lung (HCC)     severe    Hemoptysis     Hernia     Hypertension     Lung cancer (HCC) 2008    left lung    Osteoporosis     Pneumonia     Rupture of colon (HCC) 05/24/2013    S/P lobectomy of lung     Skin cancer 2014    ear    Upper GI bleeding 01/17/2018       Past Surgical History:  Past Surgical History:   Procedure Laterality Date    BRONCHOSCOPY  08/05/2016    BRONCHOSCOPY  01/31/2018    CHOLECYSTECTOMY      COLONOSCOPY  8/20/13    KYPHOSIS SURGERY  02/04/2016    LOBECTOMY  0808    MANDIBLE SURGERY      upper jaw and lower jaw    OTHER SURGICAL HISTORY  5/30/13    secondary wound closure    SIGMOIDECTOMY  5/24/13    THORACOTOMY  0808    TONSILLECTOMY      UPPER GASTROINTESTINAL ENDOSCOPY  01/18/2018    UPPER GASTROINTESTINAL ENDOSCOPY N/A 10/31/2018    EGD BIOPSY performed by Stephen Jimenez MD at [facility] ENDOSCOPY    UPPER GASTROINTESTINAL ENDOSCOPY N/A 1/30/2019    EGD performed by Stephen Jimenez MD at [facility] ENDOSCOPY       Medications:   Current Outpatient Medications   Medication Sig Dispense Refill    busPIRone (BUSPAR) 5 MG tablet One tablet twice a day 60 tablet 5    dilTIAZem (CARDIZEM) 30 MG immediate release tablet TAKE ONE TABLET BY MOUTH THREE TIMES A DAY 90 tablet 5    fesoterodine

## 2025-05-15 NOTE — PROGRESS NOTES
Reviewed discharge instructions with patient and family. All verbalize understanding and deny any questions or concerns. Patient to be taken to lobby via wheelchair  with belongings when dressed. She is in no apparent distress.

## 2025-05-15 NOTE — DISCHARGE INSTRUCTIONS
Lung Biopsy Discharge Instructions     Rest quietly for 24 hours, no physical activity.  Avoid straining, lifting or strenuous physical activity for 2 weeks.  No airplane flights for 2 weeks.  Avoid coughing for 24 hours. Call your physician for any questions regarding your activity.  Return to the emergency room for any shortness of breath. Be sure to tell the staff you had a lung biopsy. Resume your regular diet.    -------------------------------------------------------------------------------------------------------------    Discharge Instructions for Lung Biopsy  You had a procedure called lung biopsy. Your doctor used a special needle to collect a small amount of tissue or fluid from your lung to examine it for signs of damage or disease. Lung biopsies are performed when lung disease is suspected, to rule out cancer or determine what a mass might be. Here's what to do following the procedure.    Home Care  Don’t drive for 24 to 48 hours after the procedure.  Remove the bandage covering the biopsy site 24 to 48 hours after the procedure.  Don’t shower for 24 hours after the biopsy. If you wish, you may wash yourself with a sponge or washcloth. When you are able to shower, don’t scrub the site. Gently wash the area and pat it dry.  Avoid coughing for 24 hours.  Don’t lift anything heavier than 10 pounds for 3 to 4 days after the procedure.  Ask your doctor when you can return to work. Most patients are able to go back to work within 24 to 48 hours of the procedure.  Do not smoke, and try to avoid anyone who is smoking.    Follow-Up  Make a follow-up appointment as directed by our staff with the physician who ordered the procedure    When to Call Your Doctor  Call your doctor right away if you have any of the following:  Exhaustion or extreme weakness  Coughing up blood  Sudden or increased shortness of breath  Dizziness or lightheadedness  Sudden chest or shoulder pain  Fever above 101°F (38.3°C) or shaking

## 2025-05-16 ENCOUNTER — TELEPHONE (OUTPATIENT)
Dept: INTERVENTIONAL RADIOLOGY/VASCULAR | Age: 80
End: 2025-05-16

## 2025-05-21 ENCOUNTER — OFFICE VISIT (OUTPATIENT)
Dept: PULMONOLOGY | Age: 80
End: 2025-05-21
Payer: COMMERCIAL

## 2025-05-21 VITALS
WEIGHT: 149.8 LBS | SYSTOLIC BLOOD PRESSURE: 126 MMHG | OXYGEN SATURATION: 91 % | DIASTOLIC BLOOD PRESSURE: 74 MMHG | BODY MASS INDEX: 23.51 KG/M2 | HEIGHT: 67 IN | HEART RATE: 84 BPM

## 2025-05-21 DIAGNOSIS — J44.9 COPD, SEVERE (HCC): ICD-10-CM

## 2025-05-21 DIAGNOSIS — J43.2 CENTRILOBULAR EMPHYSEMA (HCC): ICD-10-CM

## 2025-05-21 DIAGNOSIS — C34.31 MALIGNANT NEOPLASM OF LOWER LOBE OF RIGHT LUNG (HCC): Primary | ICD-10-CM

## 2025-05-21 DIAGNOSIS — J96.11 CHRONIC RESPIRATORY FAILURE WITH HYPOXIA (HCC): ICD-10-CM

## 2025-05-21 PROCEDURE — 99214 OFFICE O/P EST MOD 30 MIN: CPT | Performed by: INTERNAL MEDICINE

## 2025-05-21 PROCEDURE — 1123F ACP DISCUSS/DSCN MKR DOCD: CPT | Performed by: INTERNAL MEDICINE

## 2025-05-21 PROCEDURE — 3078F DIAST BP <80 MM HG: CPT | Performed by: INTERNAL MEDICINE

## 2025-05-21 PROCEDURE — 3074F SYST BP LT 130 MM HG: CPT | Performed by: INTERNAL MEDICINE

## 2025-05-21 RX ORDER — AZITHROMYCIN 500 MG/1
TABLET, FILM COATED ORAL
Qty: 12 TABLET | Refills: 5 | Status: SHIPPED | OUTPATIENT
Start: 2025-05-21

## 2025-05-21 RX ORDER — PREDNISONE 10 MG/1
10 TABLET ORAL DAILY
Qty: 30 TABLET | Refills: 3 | Status: SHIPPED | OUTPATIENT
Start: 2025-05-21

## 2025-05-21 RX ORDER — LEVOFLOXACIN 750 MG/1
750 TABLET, FILM COATED ORAL DAILY
Qty: 10 TABLET | Refills: 3 | Status: SHIPPED | OUTPATIENT
Start: 2025-05-21

## 2025-05-21 NOTE — PROGRESS NOTES
[facility]  Follow Up Note  Jhonatan Medina MD         Mary Ann Alan   YOB: 1945    Date of Visit:  5/21/2025    Assessment/Plan:  1.  Recurrent lung cancer  I reviewed imaging with the patient and her  today.  CT scan is now showing rapidly enlarging right middle lobe lesion previously demonstrated to be hypermetabolic on PET scan imaging.    She had IR biopsy of this lesion but unfortunately it was nondiagnostic.  We discussed in detail today ION bronchoscopy and biopsy of the right middle lobe lesion.  This is technically feasible but carries significant risk given the severity of her underlying disease.  I discussed risks in detail with the patient and her .  This would include bleeding, pneumothorax, prolonged mechanical ventilation and death.    We also discussed a more conservative approach proceeding with treatment without a diagnosis.  While this would be clearly suboptimal, at least we would not have to take on the risk of bronchoscopy.    Also discussed with her a comfort approach including hospice care.    She does have appointments with radiation oncology and oncology coming up in the next couple of days.  She will discuss her options with them and make a final decision regarding bronchoscopy and sampling.    2. COPD, severe (HCC)  I reviewed pulmonary function testing  Most recent PFT was 2018  FEV1 is less than 1 L, 34% predicted  This reveals very severe COPD    Wheezing on exam diffusely  Still smokes some    Medication management:  Advair ==> Trelegy 200  Combivent  DuoNeb  Azithromycin MWF    3. Centrilobular emphysema (HCC)  I reviewed CT imaging today and this does reveal very severe emphysema which appears stable    4. Chronic respiratory failure with hypoxia (HCC)  Stable  Benefits from supplemental oxygen and portable oxygen concentrator  Now needing up to 4 L/min continuous flow oxygen    Follow-up in 6

## 2025-06-17 DIAGNOSIS — I10 PRIMARY HYPERTENSION: ICD-10-CM

## 2025-06-17 RX ORDER — DILTIAZEM HYDROCHLORIDE 30 MG/1
TABLET, FILM COATED ORAL
Qty: 90 TABLET | Refills: 5 | Status: SHIPPED | OUTPATIENT
Start: 2025-06-17

## 2025-06-17 NOTE — TELEPHONE ENCOUNTER
Recent Visits  Date Type Provider Dept   03/25/25 Office Visit Sachin Tay MD [facility]   09/24/24 Office Visit Sachin Tay MD [facility]   07/31/24 Office Visit Sachin Tay MD [facility]   04/15/24 Office Visit Sachin Tay MD [facility]   Showing recent visits within past 540 days with a meds authorizing provider and meeting all other requirements  Future Appointments  Date Type Provider Dept   09/26/25 Appointment Sachin Tay MD [facility]   Showing future appointments within next 150 days with a meds authorizing provider and meeting all other requirements     3/25/2025     
Refill requested   dilTIAZem (CARDIZEM) 30 MG immediate release tablet   [facility] [id number] - [place] [address] [address] [phone] - F [phone] [[id number]]   
Abdomen soft, nontender, nondistended, bowel sounds present in all 4 quadrants.

## 2025-07-02 ENCOUNTER — TRANSCRIBE ORDERS (OUTPATIENT)
Dept: ADMINISTRATIVE | Age: 80
End: 2025-07-02

## 2025-07-02 DIAGNOSIS — C34.11 MALIGNANT NEOPLASM OF UPPER LOBE OF RIGHT LUNG (HCC): Primary | ICD-10-CM

## 2025-07-07 ENCOUNTER — HOSPITAL ENCOUNTER (OUTPATIENT)
Dept: PET IMAGING | Age: 80
Discharge: HOME | End: 2025-07-07
Attending: INTERNAL MEDICINE
Payer: COMMERCIAL

## 2025-07-07 DIAGNOSIS — C34.11 MALIGNANT NEOPLASM OF UPPER LOBE OF RIGHT LUNG (HCC): ICD-10-CM

## 2025-07-07 PROCEDURE — 3430000000 HC RX DIAGNOSTIC RADIOPHARMACEUTICAL: Performed by: INTERNAL MEDICINE

## 2025-07-07 PROCEDURE — 78815 PET IMAGE W/CT SKULL-THIGH: CPT

## 2025-07-07 PROCEDURE — A9609 HC RX DIAGNOSTIC RADIOPHARMACEUTICAL: HCPCS | Performed by: INTERNAL MEDICINE

## 2025-07-07 RX ORDER — FLUDEOXYGLUCOSE F 18 200 MCI/ML
13 INJECTION, SOLUTION INTRAVENOUS
Status: COMPLETED | OUTPATIENT
Start: 2025-07-07 | End: 2025-07-07

## 2025-07-07 RX ADMIN — FLUDEOXYGLUCOSE F 18 13 MILLICURIE: 200 INJECTION, SOLUTION INTRAVENOUS at 13:38

## 2025-08-15 ENCOUNTER — TELEPHONE (OUTPATIENT)
Dept: PRIMARY CARE CLINIC | Age: 80
End: 2025-08-15

## 2025-08-21 ENCOUNTER — OFFICE VISIT (OUTPATIENT)
Dept: ENT CLINIC | Age: 80
End: 2025-08-21
Payer: COMMERCIAL

## 2025-08-21 VITALS
WEIGHT: 150 LBS | HEIGHT: 67 IN | DIASTOLIC BLOOD PRESSURE: 71 MMHG | SYSTOLIC BLOOD PRESSURE: 113 MMHG | BODY MASS INDEX: 23.54 KG/M2 | HEART RATE: 106 BPM

## 2025-08-21 DIAGNOSIS — H60.43: Primary | ICD-10-CM

## 2025-08-21 PROCEDURE — 3074F SYST BP LT 130 MM HG: CPT | Performed by: OTOLARYNGOLOGY

## 2025-08-21 PROCEDURE — 1123F ACP DISCUSS/DSCN MKR DOCD: CPT | Performed by: OTOLARYNGOLOGY

## 2025-08-21 PROCEDURE — 3078F DIAST BP <80 MM HG: CPT | Performed by: OTOLARYNGOLOGY

## 2025-08-21 PROCEDURE — 99213 OFFICE O/P EST LOW 20 MIN: CPT | Performed by: OTOLARYNGOLOGY

## (undated) DEVICE — BW-412T DISP COMBO CLEANING BRUSH: Brand: SINGLE USE COMBINATION CLEANING BRUSH

## (undated) DEVICE — PROCEDURE KIT ENDOSCP CUST

## (undated) DEVICE — MOUTHPIECE ENDOSCP L CTRL OPN AND SIDE PORTS DISP

## (undated) DEVICE — SOLUTION IV IRRIG WATER 500ML POUR BRL ST 2F7113

## (undated) DEVICE — GOWN AURORA NONREINF LG: Brand: MEDLINE INDUSTRIES, INC.

## (undated) DEVICE — SET VLV 3 PC AWS DISPOSABLE GRDIAN SCOPEVALET

## (undated) DEVICE — FORCEPS BX L240CM WRK CHN 2.8MM STD CAP W/ NDL MIC MESH